# Patient Record
Sex: FEMALE | Race: WHITE | NOT HISPANIC OR LATINO | Employment: OTHER | URBAN - NONMETROPOLITAN AREA
[De-identification: names, ages, dates, MRNs, and addresses within clinical notes are randomized per-mention and may not be internally consistent; named-entity substitution may affect disease eponyms.]

---

## 2017-01-11 ENCOUNTER — OFFICE VISIT (OUTPATIENT)
Dept: FAMILY MEDICINE CLINIC | Facility: CLINIC | Age: 67
End: 2017-01-11

## 2017-01-11 VITALS
HEIGHT: 68 IN | OXYGEN SATURATION: 97 % | SYSTOLIC BLOOD PRESSURE: 112 MMHG | WEIGHT: 136 LBS | DIASTOLIC BLOOD PRESSURE: 68 MMHG | HEART RATE: 110 BPM | BODY MASS INDEX: 20.61 KG/M2

## 2017-01-11 DIAGNOSIS — E11.8 TYPE 2 DIABETES MELLITUS WITH COMPLICATION, WITHOUT LONG-TERM CURRENT USE OF INSULIN (HCC): Primary | ICD-10-CM

## 2017-01-11 DIAGNOSIS — Z11.59 NEED FOR HEPATITIS C SCREENING TEST: Primary | ICD-10-CM

## 2017-01-11 DIAGNOSIS — I10 ESSENTIAL HYPERTENSION: ICD-10-CM

## 2017-01-11 DIAGNOSIS — E78.00 PURE HYPERCHOLESTEROLEMIA: ICD-10-CM

## 2017-01-11 DIAGNOSIS — E03.9 ACQUIRED HYPOTHYROIDISM: ICD-10-CM

## 2017-01-11 LAB
ALT SERPL-CCNC: 37 U/L (ref 7–40)
AST SERPL-CCNC: 32 U/L (ref 0–33)
CHOLEST SERPL-MCNC: 208 MG/DL (ref 0–200)
HDLC SERPL-MCNC: 99 MG/DL (ref 40–60)
LDLC SERPL CALC-MCNC: 87 MG/DL (ref 0–100)
TRIGL SERPL-MCNC: 111 MG/DL (ref 0–150)
VLDLC SERPL CALC-MCNC: 22.2 MG/DL

## 2017-01-11 PROCEDURE — 36415 COLL VENOUS BLD VENIPUNCTURE: CPT | Performed by: FAMILY MEDICINE

## 2017-01-11 PROCEDURE — 99214 OFFICE O/P EST MOD 30 MIN: CPT | Performed by: FAMILY MEDICINE

## 2017-01-11 RX ORDER — ATORVASTATIN CALCIUM 20 MG/1
20 TABLET, FILM COATED ORAL DAILY
Qty: 90 TABLET | Refills: 1 | Status: SHIPPED | OUTPATIENT
Start: 2017-01-11 | End: 2017-07-13 | Stop reason: SDUPTHER

## 2017-01-11 RX ORDER — CYCLOPENTOLATE HYDROCHLORIDE 10 MG/ML
SOLUTION/ DROPS OPHTHALMIC
COMMUNITY
Start: 2016-10-26 | End: 2017-05-22

## 2017-01-11 RX ORDER — CETIRIZINE HYDROCHLORIDE 10 MG/1
1 TABLET ORAL DAILY
COMMUNITY
Start: 2017-01-06

## 2017-01-11 RX ORDER — AMITRIPTYLINE HYDROCHLORIDE 25 MG/1
25 TABLET, FILM COATED ORAL NIGHTLY
Status: ON HOLD | COMMUNITY
Start: 2016-11-11 | End: 2018-01-25 | Stop reason: ALTCHOICE

## 2017-01-11 RX ORDER — ERYTHROMYCIN 5 MG/G
OINTMENT OPHTHALMIC
COMMUNITY
Start: 2016-10-26 | End: 2017-05-22

## 2017-01-11 RX ORDER — DICLOFENAC POTASSIUM 50 MG/1
TABLET, FILM COATED ORAL
COMMUNITY
Start: 2016-12-16 | End: 2017-04-05

## 2017-01-11 NOTE — MR AVS SNAPSHOT
Marifer Davion   1/11/2017 9:00 AM   Office Visit    Dept Phone:  [phone]   Encounter #:  [id number]    Provider:  Payal Edwards MD   Department:  [facility] PRIMARY CARE                Your Full Care Plan              Where to Get Your Medications      These medications were sent to [facility] - [place], [place] - [address] - [phone] [facility] [phone] FX  [address], [facility] [address]     Phone:  [phone]     atorvastatin 20 MG tablet            Your Updated Medication List          This list is accurate as of: 1/11/17  9:44 AM.  Always use your most recent med list.                amitriptyline 25 MG tablet   Commonly known as:  ELAVIL       atorvastatin 20 MG tablet   Commonly known as:  LIPITOR   Take 1 tablet by mouth Daily.       cetirizine 10 MG tablet   Commonly known as:  zyrTEC       cholecalciferol 1000 UNITS tablet   Commonly known as:  VITAMIN D3       Cinnamon 500 MG tablet       coenzyme Q10 100 MG capsule       CVS BIOTIN HIGH POTENCY 1000 MCG tablet   Generic drug:  Biotin       cyclopentolate 1 % ophthalmic solution   Commonly known as:  CYCLOGYL       DEVILS CLAW PO       diclofenac 50 MG tablet   Commonly known as:  CATAFLAM       erythromycin 5 MG/GM ophthalmic ointment   Commonly known as:  ROMYCIN       glucosamine sulfate 500 MG capsule capsule       HYDROcodone-acetaminophen 5-325 MG per tablet   Commonly known as:  NORCO   Take 1 tablet by mouth Every 8 (Eight) Hours As Needed for severe pain (7-10).       Ibuprofen 200 MG capsule       OMEGA 3-6-9 COMPLEX PO       traMADol 50 MG tablet   Commonly known as:  ULTRAM       Vitamin B-12 5000 MCG sublingual tablet       WHITE WILLOW BARK PO       zolpidem 5 MG tablet   Commonly known as:  AMBIEN               We Performed the Following     ALT     AST     Lipid Panel       You Were Diagnosed With        Codes Comments    Type 2 diabetes mellitus with  "complication, without long-term current use of insulin    -  Primary ICD-10-CM: E11.8  ICD-9-CM: 250.90     Essential hypertension     ICD-10-CM: I10  ICD-9-CM: 401.9     Acquired hypothyroidism     ICD-10-CM: E03.9  ICD-9-CM: 244.9     Pure hypercholesterolemia     ICD-10-CM: E78.00  ICD-9-CM: 272.0       Instructions     None    Patient Instructions History      Upcoming Appointments     Visit Type Date Time Department    FOLLOW UP 1/11/2017  9:00 AM [facility]      [employer] Signup     Our records indicate that you have declined [employer] signup. If you would like to sign up for [employer], please email [email or web address] or call [phone] to obtain an activation code.             Other Info from Your Visit           Allergies     Betadine [Povidone Iodine]      Codeine      Penicillins      Reglan [Metoclopramide]  Hives      Vital Signs     Blood Pressure Pulse Height Weight Last Menstrual Period Oxygen Saturation    112/68 110 67.5\" (171.5 cm) 136 lb (61.7 kg) (LMP Unknown) 97%    Body Mass Index Smoking Status                20.99 kg/m2 Never Smoker          Problems and Diagnoses Noted     Acquired underactive thyroid    High blood pressure    High cholesterol or triglycerides    Type 2 diabetes        "

## 2017-01-11 NOTE — PROGRESS NOTES
"Subjective   Marifer Ricardo is a 66 y.o. female.     History of Present Illness   Diabetes Mellitus Type II, Follow-up: Patient here for follow-up of Type 2 diabetes mellitus.  Current symptoms/problems include none.   Known diabetic complications: peripheral neuropathy  Cardiovascular risk factors: advanced age (older than 55 for men, 65 for women), diabetes mellitus, dyslipidemia and hypertension  Current diabetic medications include none.     Eye exam current (within one year): yes, 9/2016  Weight trend: stable  Prior visit with dietician: yes  Current diet: in general, a \"healthy\" diet    Current exercise: none    Current monitoring regimen: home blood tests - occ  Home blood sugar records: stable  Any episodes of hypoglycemia? no    Is She on ACE inhibitor or angiotensin II receptor blocker?   No- not indicated    Hypertension: Patient here for follow-up of h/o HTN. Not currently requiring medication.  She is irregularly exercising and is adherent to low salt diet.  Blood pressure is well controlled at home. Cardiac symptoms fatigue. Patient denies chest pain, claudication, cough, exertional chest pressure/discomfort, irregular heart beat, lower extremity edema, near-syncope, orthopnea, palpitations, syncope and tachypnea.  Cardiovascular risk factors: advanced age (older than 55 for men, 65 for women), diabetes mellitus, dyslipidemia and hypertension. Use of agents associated with hypertension: NSAIDS. History of target organ damage: chronic kidney disease.    Hyperlipidemia: Patient presents with hyperlipidemia.  She was tested because or comorbidities.  Her last labs reviewed as listed in chart. LDL >180. There is a family history of hyperlipidemia. There is a family history of ischemia heart disease.  She was started on Lipitor approx 3 months ago. Tolerating well. Due for recheck FLP and LFTs.    Since last visit she has seen Dr. Mondragon (neurology) and was started on diclofenac for arthritis pain and headaches. "  She uses ibuprofen only intermittently. Denies GI side effects.    The following portions of the patient's history were reviewed and updated as appropriate: allergies, current medications, past family history, past medical history, past social history, past surgical history and problem list.    Review of Systems   Constitutional: Positive for fatigue. Negative for activity change, chills and fever.   HENT: Negative for congestion, mouth sores, rhinorrhea, sore throat and trouble swallowing.    Eyes: Negative for pain and redness.   Respiratory: Positive for cough (mild, dry, intermittent, chronic). Negative for shortness of breath and wheezing.    Cardiovascular: Negative for chest pain, palpitations and leg swelling.   Gastrointestinal: Negative for abdominal pain, diarrhea, nausea and vomiting.   Endocrine: Positive for cold intolerance. Negative for polydipsia and polyuria.   Genitourinary: Negative for dysuria, hematuria and urgency.   Musculoskeletal: Positive for arthralgias and back pain.   Skin: Negative for rash and wound.   Neurological: Positive for weakness (generalized, non-focal) and headaches. Negative for dizziness.   Hematological: Negative for adenopathy. Bruises/bleeds easily.   Psychiatric/Behavioral: Positive for dysphoric mood and sleep disturbance. Negative for suicidal ideas.     Objective    Vitals:    01/11/17 0900   BP: 112/68   Pulse: 110   SpO2: 97%     Body mass index is 20.99 kg/(m^2).  Last 2 weights    01/11/17  0900   Weight: 136 lb (61.7 kg)     Physical Exam   Constitutional: She is oriented to person, place, and time. She appears well-developed and well-nourished. She is cooperative. She does not appear ill. No distress.   Appears older than stated age   HENT:   Head: Normocephalic and atraumatic.   Right Ear: Decreased hearing is noted.   Left Ear: Decreased hearing is noted.   Mouth/Throat: Uvula is midline, oropharynx is clear and moist and mucous membranes are normal. No  posterior oropharyngeal erythema.   Eyes: Conjunctivae, EOM and lids are normal.   Neck: Neck supple. Normal carotid pulses present. Carotid bruit is not present. No thyroid mass and no thyromegaly present.   Cardiovascular: Normal rate, regular rhythm, S1 normal, S2 normal and intact distal pulses.    No edema   Pulmonary/Chest: Effort normal and breath sounds normal. She has no decreased breath sounds. She has no wheezes. She has no rhonchi. She has no rales.   Wet cough with deep breathing   Abdominal: Soft. Bowel sounds are normal. She exhibits no distension and no mass. There is no hepatosplenomegaly. There is no tenderness.   Musculoskeletal: She exhibits no edema or tenderness.    Marifer had a diabetic foot exam performed today.   During the foot exam she had a monofilament test performed (abnormal testing entire plantar surface bilaterally, normal dorsal foot testing).    Vascular Status -  Her exam exhibits right foot vasculature abnormal and no right foot edema. Her exam exhibits left foot vasculature abnormal and no left foot edema.   Skin Integrity  -  Her right foot skin is intact. She has callous right foot.  She hasno right foot ulcer.   Marifer's left foot skin is intact. She has callous left foot. She has no left foot ulcer..  Lymphadenopathy:     She has no cervical adenopathy.   Neurological: She is alert and oriented to person, place, and time. She has normal strength. She displays no tremor. Gait normal.   Skin: Skin is warm and dry. No ecchymosis and no rash noted.   Psychiatric: Her speech is normal and behavior is normal. Thought content normal. Her affect is blunt.   Nursing note and vitals reviewed.    Assessment/Plan   Marifer was seen today for hyperlipidemia and diabetes.    Diagnoses and all orders for this visit:    Type 2 diabetes mellitus with complication, without long-term current use of insulin    Essential hypertension    Acquired hypothyroidism    Pure hypercholesterolemia  -     Lipid  Panel  -     AST  -     ALT    Other orders  -     atorvastatin (LIPITOR) 20 MG tablet; Take 1 tablet by mouth Daily.    Good tolerance of statin. I will contact patient regarding test results and provide instructions regarding any necessary changes in plan of care.  Appears euthyroid; most recent labs normal.  BP at goal without meds; no micro/macroalbuminuria.  DM well controlled diet only.  Routine f/u in 3-6 months, sooner as needed/instructed.  Pt encouraged to schedule [employer] wellness visit.  Patient was encouraged to keep me informed of any acute changes, or any new concerning symptoms.  F/U with neurology as scheduled.  Diabetic appropriate diet and daily exercise recommended.  She is UTD on eye exam.  She is aware of risks/benefits of NSAID use and wishes to continue tx.  Patient voiced understanding of all instructions and denied further questions.

## 2017-01-19 ENCOUNTER — OFFICE VISIT (OUTPATIENT)
Dept: FAMILY MEDICINE CLINIC | Facility: CLINIC | Age: 67
End: 2017-01-19

## 2017-01-19 VITALS
WEIGHT: 138 LBS | HEART RATE: 62 BPM | HEIGHT: 67 IN | OXYGEN SATURATION: 98 % | BODY MASS INDEX: 21.66 KG/M2 | DIASTOLIC BLOOD PRESSURE: 72 MMHG | SYSTOLIC BLOOD PRESSURE: 130 MMHG

## 2017-01-19 DIAGNOSIS — Z78.0 POSTMENOPAUSAL: ICD-10-CM

## 2017-01-19 DIAGNOSIS — Z23 NEED FOR VACCINATION WITH 13-POLYVALENT PNEUMOCOCCAL CONJUGATE VACCINE: ICD-10-CM

## 2017-01-19 DIAGNOSIS — Z12.31 ENCOUNTER FOR SCREENING MAMMOGRAM FOR BREAST CANCER: ICD-10-CM

## 2017-01-19 DIAGNOSIS — Z00.00 ENCOUNTER FOR MEDICARE ANNUAL WELLNESS EXAM: Primary | ICD-10-CM

## 2017-01-19 PROCEDURE — 90670 PCV13 VACCINE IM: CPT | Performed by: FAMILY MEDICINE

## 2017-01-19 PROCEDURE — G0009 ADMIN PNEUMOCOCCAL VACCINE: HCPCS | Performed by: FAMILY MEDICINE

## 2017-01-19 PROCEDURE — 99397 PER PM REEVAL EST PAT 65+ YR: CPT | Performed by: FAMILY MEDICINE

## 2017-01-19 PROCEDURE — G0439 PPPS, SUBSEQ VISIT: HCPCS | Performed by: FAMILY MEDICINE

## 2017-01-19 PROCEDURE — 96160 PT-FOCUSED HLTH RISK ASSMT: CPT | Performed by: FAMILY MEDICINE

## 2017-01-19 NOTE — PROGRESS NOTES
QUICK REFERENCE INFORMATION:  The ABCs of the Annual Wellness Visit    Initial Medicare Wellness Visit    HEALTH RISK ASSESSMENT    Recent Hospitalizations:  No recent hospitalization(s)..        Current Medical Providers:  Patient Care Team:  Payal Edwards MD as PCP - General (Family Medicine)  Jonathan Mondragon MD as Consulting Physician (Neurology)  Ming Stiles MD (Nephrology)  Rosmery Nicholson DC as Consulting Physician (Chiropractic Medicine)        Smoking Status:  History   Smoking Status   • Never Smoker   Smokeless Tobacco   • Not on file       Alcohol Consumption:  History   Alcohol Use   • Yes     Comment: occasionally       Depression Screen:   PHQ-9 Depression Screening 1/19/2017   Little interest or pleasure in doing things 0   Feeling down, depressed, or hopeless 0   Trouble falling or staying asleep, or sleeping too much 3   Feeling tired or having little energy 1   Poor appetite or overeating 0   Feeling bad about yourself - or that you are a failure or have let yourself or your family down 1   Trouble concentrating on things, such as reading the newspaper or watching television 0   Moving or speaking so slowly that other people could have noticed. Or the opposite - being so fidgety or restless that you have been moving around a lot more than usual 0   Thoughts that you would be better off dead, or of hurting yourself in some way 0   PHQ-9 Total Score 5   If you checked off any problems, how difficult have these problems made it for you to do your work, take care of things at home, or get along with other people? Not difficult at all       Health Habits and Functional and Cognitive Screening:  Functional & Cognitive Status 1/19/2017   Do you have difficulty preparing food and eating? No   Do you have difficulty bathing yourself? No   Do you have difficulty getting dressed? No   Do you have difficulty using the toilet? No   Do you have difficulty moving around from place to place? No   In the past  year have you fallen or experienced a near fall? Yes   Do you need help using the phone?  No   Are you deaf or do you have serious difficulty hearing?  Yes   Do you need help with transportation? No   Do you need help shopping? No   Do you need help preparing meals?  No   Do you need help with housework?  No   Do you need help with laundry? No   Do you need help taking your medications? No   Do you need help managing money? No   Do you have difficulty concentrating, remembering or making decisions? No                  Does the patient have evidence of cognitive impairment? No    Asprin use counseling:yes    Finger Rub Hearing Test (right ear):passed  Finger Rub Hearing Test (left ear):passed    Recent Lab Results:  CMP:  Lab Results   Component Value Date    GLU 69 09/12/2016    BUN 18 09/12/2016    CREATININE 0.81 09/12/2016    EGFRIFNONA 75 09/12/2016    EGFRIFAFRI 87 09/12/2016    BCR 22 09/12/2016     (H) 09/12/2016    K 4.5 09/12/2016    CO2 24 09/12/2016    CALCIUM 9.3 09/12/2016    PROTENTOTREF 6.9 09/12/2016    ALBUMIN 4.5 09/12/2016    LABGLOBREF 2.4 09/12/2016    LABIL2 1.9 09/12/2016    BILITOT 0.7 09/12/2016    ALKPHOS 69 09/12/2016    AST 32 01/11/2017    ALT 37 01/11/2017     Lipid Panel:  Lab Results   Component Value Date    CHLPL 208 (H) 01/11/2017    TRIG 111 01/11/2017    HDL 99 (H) 01/11/2017    VLDL 22.2 01/11/2017    LDL 87 01/11/2017     HbA1c:  Lab Results   Component Value Date    HGBA1C 5.6 09/12/2016     Urine Microalbumin:  Lab Results   Component Value Date    MICROALBUR 6.3 09/12/2016     Visual Acuity:  No exam data present pt declines; has had recent cataract surgery    Age-appropriate Screening Schedule:  Refer to the list below for future screening recommendations based on patient's age, sex and/or medical conditions. Orders for these recommended tests are listed in the plan section. The patient has been provided with a written plan.    Health Maintenance   Topic Date Due   •  TDAP/TD VACCINES (1 - Tdap) 01/16/1969   • PNEUMOCOCCAL VACCINES (65+ LOW/MEDIUM RISK) (2 of 2 - PPSV23) 01/16/2014   • MAMMOGRAM  07/06/2016   • DXA SCAN  09/28/2016   • HEMOGLOBIN A1C  03/12/2017   • DIABETIC EYE EXAM  09/08/2017   • URINE MICROALBUMIN  09/12/2017   • DIABETIC FOOT EXAM  01/11/2018   • LIPID PANEL  01/11/2018   • INFLUENZA VACCINE  Excluded   • COLONOSCOPY  Excluded   • ZOSTER VACCINE  Excluded        Subjective   History of Present Illness    Marifer Ricardo is a 67 y.o. female who presents for an Annual Wellness Visit. In addition, we addressed the following health issues: none; recently seen for chronic disease mngt    The following portions of the patient's history were reviewed and updated as appropriate: allergies, current medications, past family history, past medical history, past social history, past surgical history and problem list.    IHWA/PAF reviewed and scanned to chart.    Outpatient Medications Prior to Visit   Medication Sig Dispense Refill   • amitriptyline (ELAVIL) 25 MG tablet      • atorvastatin (LIPITOR) 20 MG tablet Take 1 tablet by mouth Daily. 90 tablet 1   • Biotin (CVS BIOTIN HIGH POTENCY) 1000 MCG tablet Take 1,000 mcg by mouth 3 (three) times a day.     • cetirizine (zyrTEC) 10 MG tablet Take 1 tablet by mouth Daily.     • cholecalciferol (VITAMIN D3) 1000 UNITS tablet Take 1,000 Units by mouth daily.     • Cinnamon 500 MG tablet Take  by mouth 2 (two) times a day.     • coenzyme Q10 100 MG capsule Take 100 mg by mouth daily.     • Cyanocobalamin (VITAMIN B-12) 5000 MCG sublingual tablet      • cyclopentolate (CYCLOGYL) 1 % ophthalmic solution      • DEVILS CLAW PO Take  by mouth.     • diclofenac (CATAFLAM) 50 MG tablet      • erythromycin (ROMYCIN) 5 MG/GM ophthalmic ointment      • glucosamine sulfate 500 MG capsule capsule Take  by mouth 3 (three) times a day with meals.     • HYDROcodone-acetaminophen (NORCO) 5-325 MG per tablet Take 1 tablet by mouth Every 8 (Eight)  Hours As Needed for severe pain (7-10). 10 tablet 0   • Ibuprofen 200 MG capsule      • Misc Natural Products (WHITE WILLOW BARK PO) Take  by mouth.     • Omega 3-6-9 Fatty Acids (OMEGA 3-6-9 COMPLEX PO) Take  by mouth.     • traMADol (ULTRAM) 50 MG tablet      • zolpidem (AMBIEN) 5 MG tablet        No facility-administered medications prior to visit.        Patient Active Problem List   Diagnosis   • Type 2 diabetes mellitus   • Intractable migraine   • Primary generalized (osteo)arthritis   • Difficult intravenous access   • Copy of advanced directive obtained from patient   • No blood products   • Hyperlipidemia   • Essential hypertension   • Acquired hypothyroidism   • Chronic mixed headache syndrome   • Chronic renal insufficiency   • Diabetic peripheral neuropathy   • Gastroparesis   • Rosacea   • Osteopenia   • Need for hepatitis C screening test       Advanced Care Planning:  has an advanced directive - a copy has been provided and is in file    Identification of Risk Factors:  Risk factors include: cardiovascular risk, chronic pain and depression.    Review of Systems   Constitutional: Positive for fatigue. Negative for activity change, chills and fever.   HENT: Negative for congestion, mouth sores, rhinorrhea, sore throat and trouble swallowing.    Eyes: Negative for pain and redness.   Respiratory: Positive for cough (mild, dry, intermittent, chronic). Negative for shortness of breath and wheezing.    Cardiovascular: Negative for chest pain, palpitations and leg swelling.   Gastrointestinal: Negative for abdominal pain, diarrhea, nausea and vomiting.   Endocrine: Positive for cold intolerance. Negative for polydipsia and polyuria.   Genitourinary: Negative for dysuria, hematuria and urgency.   Musculoskeletal: Positive for arthralgias and back pain.   Skin: Negative for rash and wound.   Neurological: Positive for weakness (generalized, non-focal) and headaches. Negative for dizziness.   Hematological:  "Negative for adenopathy. Bruises/bleeds easily.   Psychiatric/Behavioral: Positive for dysphoric mood and sleep disturbance. Negative for suicidal ideas.     Compared to one year ago, the patient feels her physical health is the same.  Compared to one year ago, the patient feels her mental health is the same.    Objective     Physical Exam   Constitutional: She is oriented to person, place, and time. She appears well-developed and well-nourished. She is cooperative. She does not appear ill. No distress.   HENT:   Mouth/Throat: Mucous membranes are normal. Mucous membranes are not dry.   Eyes: Conjunctivae and lids are normal.   Neck: Phonation normal.   Cardiovascular: Normal rate, regular rhythm and intact distal pulses.    Pulmonary/Chest: Effort normal and breath sounds normal.       Vascular Status -  Her exam exhibits no right foot edema. Her exam exhibits no left foot edema.  Neurological: She is alert and oriented to person, place, and time. She has normal strength. She displays no tremor. No cranial nerve deficit. Gait normal.   Skin: Skin is warm and dry. No bruising and no rash noted.   Psychiatric: She has a normal mood and affect. Her behavior is normal. Cognition and memory are normal.   Nursing note and vitals reviewed.  She declines breast exam.    Vitals:    01/19/17 0949   BP: 130/72   Pulse: 62   SpO2: 98%   Weight: 138 lb (62.6 kg)   Height: 66.5\" (168.9 cm)       Body mass index is 21.94 kg/(m^2).  Discussed the patient's BMI with her. The BMI is in the acceptable range.    Assessment/Plan   Patient Self-Management and Personalized Health Advice  The patient has been provided with information about: diet, exercise, prevention of cardiac or vascular disease, fall prevention, supplements and mental health concerns and preventive services including:   · Advanced directives: has an advanced directive - a copy has been provided and is in file, Bone densitometry screening, Counseling for cardiovascular " disease risk reduction, Exercise counseling provided, Fall Risk assessment done, Nutrition counseling provided.  Mammogram rec'd.    Visit Diagnoses:    ICD-10-CM ICD-9-CM   1. Encounter for Medicare annual wellness exam Z00.00 V70.0   2. Encounter for screening mammogram for breast cancer Z12.31 V76.12   3. Postmenopausal Z78.0 V49.81   4. Need for vaccination with 13-polyvalent pneumococcal conjugate vaccine Z23 V03.82       Orders Placed This Encounter   Procedures   • Mammo Screening Bilateral With CAD     Standing Status:   Future     Standing Expiration Date:   1/19/2018     Order Specific Question:   Reason for Exam:     Answer:   screening for breast cancer   • DEXA Bone Density Axial     Standing Status:   Future     Standing Expiration Date:   1/19/2018     Order Specific Question:   Reason for Exam:     Answer:   postmenopausal status,   • Pneumococcal Conjugate Vaccine 13-Valent All       Outpatient Encounter Prescriptions as of 1/19/2017   Medication Sig Dispense Refill   • amitriptyline (ELAVIL) 25 MG tablet      • atorvastatin (LIPITOR) 20 MG tablet Take 1 tablet by mouth Daily. 90 tablet 1   • Biotin (CVS BIOTIN HIGH POTENCY) 1000 MCG tablet Take 1,000 mcg by mouth 3 (three) times a day.     • cetirizine (zyrTEC) 10 MG tablet Take 1 tablet by mouth Daily.     • cholecalciferol (VITAMIN D3) 1000 UNITS tablet Take 1,000 Units by mouth daily.     • Cinnamon 500 MG tablet Take  by mouth 2 (two) times a day.     • coenzyme Q10 100 MG capsule Take 100 mg by mouth daily.     • Cyanocobalamin (VITAMIN B-12) 5000 MCG sublingual tablet      • cyclopentolate (CYCLOGYL) 1 % ophthalmic solution      • DEVILS CLAW PO Take  by mouth.     • diclofenac (CATAFLAM) 50 MG tablet      • erythromycin (ROMYCIN) 5 MG/GM ophthalmic ointment      • glucosamine sulfate 500 MG capsule capsule Take  by mouth 3 (three) times a day with meals.     • HYDROcodone-acetaminophen (NORCO) 5-325 MG per tablet Take 1 tablet by mouth Every  8 (Eight) Hours As Needed for severe pain (7-10). 10 tablet 0   • Ibuprofen 200 MG capsule      • Misc Natural Products (WHITE WILLOW BARK PO) Take  by mouth.     • Omega 3-6-9 Fatty Acids (OMEGA 3-6-9 COMPLEX PO) Take  by mouth.     • traMADol (ULTRAM) 50 MG tablet      • zolpidem (AMBIEN) 5 MG tablet        No facility-administered encounter medications on file as of 1/19/2017.        Reviewed use of high risk medication in the elderly: yes  Reviewed for potential of harmful drug interactions in the elderly: yes  She declines colonoscopy or other forms of colon cancer screening.  She declines vaccinations other than Prevnar 13.  She is considering Tdap, thinks she has had in past but not found in records from previous PCP.    Follow Up:  Return in about 6 months (around 7/19/2017) for Next scheduled follow up.     An After Visit Summary and PPPS with all of these plans were given to the patient.

## 2017-01-19 NOTE — MR AVS SNAPSHOT
Marifer Mariawalt   1/19/2017 10:00 AM   Office Visit    Dept Phone:  [phone]   Encounter #:  [id number]    Provider:  Payal Edwards MD   Department:  [facility] PRIMARY CARE                Your Full Care Plan              Your Updated Medication List          This list is accurate as of: 1/19/17 10:44 AM.  Always use your most recent med list.                amitriptyline 25 MG tablet   Commonly known as:  ELAVIL       atorvastatin 20 MG tablet   Commonly known as:  LIPITOR   Take 1 tablet by mouth Daily.       cetirizine 10 MG tablet   Commonly known as:  zyrTEC       cholecalciferol 1000 UNITS tablet   Commonly known as:  VITAMIN D3       Cinnamon 500 MG tablet       coenzyme Q10 100 MG capsule       CVS BIOTIN HIGH POTENCY 1000 MCG tablet   Generic drug:  Biotin       cyclopentolate 1 % ophthalmic solution   Commonly known as:  CYCLOGYL       DEVILS CLAW PO       diclofenac 50 MG tablet   Commonly known as:  CATAFLAM       erythromycin 5 MG/GM ophthalmic ointment   Commonly known as:  ROMYCIN       glucosamine sulfate 500 MG capsule capsule       HYDROcodone-acetaminophen 5-325 MG per tablet   Commonly known as:  NORCO   Take 1 tablet by mouth Every 8 (Eight) Hours As Needed for severe pain (7-10).       Ibuprofen 200 MG capsule       OMEGA 3-6-9 COMPLEX PO       traMADol 50 MG tablet   Commonly known as:  ULTRAM       Vitamin B-12 5000 MCG sublingual tablet       WHITE WILLOW BARK PO       zolpidem 5 MG tablet   Commonly known as:  AMBIEN               We Performed the Following     Pneumococcal Conjugate Vaccine 13-Valent All       You Were Diagnosed With        Codes Comments    Encounter for Medicare annual wellness exam    -  Primary ICD-10-CM: Z00.00  ICD-9-CM: V70.0     Encounter for screening mammogram for breast cancer     ICD-10-CM: Z12.31  ICD-9-CM: V76.12     Postmenopausal     ICD-10-CM: Z78.0  ICD-9-CM: V49.81     Need for vaccination with 13-polyvalent  pneumococcal conjugate vaccine     ICD-10-CM: Z23  ICD-9-CM: V03.82       Instructions      Medicare Wellness  Personal Prevention Plan of Service     Date of Office Visit:  2017  Encounter Provider:  Payal Edwards MD  Place of Service:  [facility] PRIMARY CARE  Patient Name: Marifer Ricardo  :  1950    As part of the Medicare Wellness portion of your visit today, we are providing you with this personalized preventive plan of services (PPPS). This plan is based upon recommendations of the [place] Preventive Services Task Force (USPSTF) and the Advisory Committee on Immunization Practices (ACIP).    This lists the preventive care services that should be considered, and provides dates of when you are due. Items listed as completed are up-to-date and do not require any further intervention.    Health Maintenance   Topic Date Due   • TDAP/TD VACCINES (1 - Tdap) 1968   • PNEUMOCOCCAL VACCINES (65+ LOW/MEDIUM RISK) (2 of 2 - PPSV23) 2014   • MAMMOGRAM  2016   • DXA SCAN  2016   • HEMOGLOBIN A1C  2017   • DIABETIC EYE EXAM  2017   • URINE MICROALBUMIN  2017   • DIABETIC FOOT EXAM  2018   • LIPID PANEL  2018   • HEPATITIS C SCREENING  Excluded   • INFLUENZA VACCINE  Excluded   • COLONOSCOPY  Excluded   • ZOSTER VACCINE  Excluded         Patient Self-Management and Personalized Health Advice  The patient has been provided with information about: diet, exercise, prevention of cardiac or vascular disease, fall prevention, supplements and mental health concerns and preventive services including:   · Advanced directives: has an advanced directive - a copy has been provided and is in file, Bone densitometry screening, Counseling for cardiovascular disease risk reduction, Exercise counseling provided, Fall Risk assessment done, Nutrition counseling provided.    Visit Diagnoses:  No diagnosis found.    No orders of the defined types were  placed in this encounter.      Outpatient Encounter Prescriptions as of 1/19/2017   Medication Sig Dispense Refill   • amitriptyline (ELAVIL) 25 MG tablet      • atorvastatin (LIPITOR) 20 MG tablet Take 1 tablet by mouth Daily. 90 tablet 1   • Biotin (CVS BIOTIN HIGH POTENCY) 1000 MCG tablet Take 1,000 mcg by mouth 3 (three) times a day.     • cetirizine (zyrTEC) 10 MG tablet Take 1 tablet by mouth Daily.     • cholecalciferol (VITAMIN D3) 1000 UNITS tablet Take 1,000 Units by mouth daily.     • Cinnamon 500 MG tablet Take  by mouth 2 (two) times a day.     • coenzyme Q10 100 MG capsule Take 100 mg by mouth daily.     • Cyanocobalamin (VITAMIN B-12) 5000 MCG sublingual tablet      • cyclopentolate (CYCLOGYL) 1 % ophthalmic solution      • DEVILS CLAW PO Take  by mouth.     • diclofenac (CATAFLAM) 50 MG tablet      • erythromycin (ROMYCIN) 5 MG/GM ophthalmic ointment      • glucosamine sulfate 500 MG capsule capsule Take  by mouth 3 (three) times a day with meals.     • HYDROcodone-acetaminophen (NORCO) 5-325 MG per tablet Take 1 tablet by mouth Every 8 (Eight) Hours As Needed for severe pain (7-10). 10 tablet 0   • Ibuprofen 200 MG capsule      • Misc Natural Products (WHITE WILLOW BARK PO) Take  by mouth.     • Omega 3-6-9 Fatty Acids (OMEGA 3-6-9 COMPLEX PO) Take  by mouth.     • traMADol (ULTRAM) 50 MG tablet      • zolpidem (AMBIEN) 5 MG tablet        No facility-administered encounter medications on file as of 1/19/2017.        Reviewed use of high risk medication in the elderly: yes  Reviewed for potential of harmful drug interactions in the elderly: yes    Follow Up:  Return in about 6 months (around 7/19/2017) for Next scheduled follow up.     An After Visit Summary and PPPS with all of these plans were given to the patient.              Patient Instructions History      Upcoming Appointments     Visit Type Date Time Department    INIT MEDICARE WELLNESS VISIT 1/19/2017 10:00 AM [facility]    FOLLOW UP  "7/11/2017  8:15 AM [facility] Signup     Our records indicate that you have an active [facility] ABB account.    You can view your After Visit Summary by going to b-datum and logging in with your ABB username and password.  If you don't have a ABB username and password but a parent or guardian has access to your record, the parent or guardian should login with their own ABB username and password and access your record to view the After Visit Summary.    If you have questions, you can email [email or web address] or call [phone] to talk to our ABB staff.  Remember, ABB is NOT to be used for urgent needs.  For medical emergencies, dial 911.               Other Info from Your Visit           Your Appointments     Jul 11, 2017  8:15 AM EDT   Follow Up with Payal Edwards MD   [facility] PRIMARY CARE (--)    [address]   [place] [address]   [phone]           Arrive 15 minutes prior to appointment.              Allergies     Betadine [Povidone Iodine]      Codeine      Penicillins      Reglan [Metoclopramide]  Hives      Vital Signs     Blood Pressure Pulse Height Weight Last Menstrual Period Oxygen Saturation    130/72 62 66.5\" (168.9 cm) 138 lb (62.6 kg) (LMP Unknown) 98%    Body Mass Index Smoking Status                21.94 kg/m2 Never Smoker          Problems and Diagnoses Noted     Encounter for Medicare annual wellness exam    -  Primary    Encounter for screening mammogram for breast cancer        Postmenopausal        Need for vaccination with 13-polyvalent pneumococcal conjugate vaccine          Immunizations Administered     Name Date    Pneumococcal Conjugate 13-Valent         "

## 2017-01-19 NOTE — PATIENT INSTRUCTIONS
Medicare Wellness  Personal Prevention Plan of Service     Date of Office Visit:  2017  Encounter Provider:  Payal Edwards MD  Place of Service:  [facility] PRIMARY CARE  Patient Name: Marifer Ricardo  :  1950    As part of the Medicare Wellness portion of your visit today, we are providing you with this personalized preventive plan of services (PPPS). This plan is based upon recommendations of the [place] Preventive Services Task Force (USPSTF) and the Advisory Committee on Immunization Practices (ACIP).    This lists the preventive care services that should be considered, and provides dates of when you are due. Items listed as completed are up-to-date and do not require any further intervention.    Health Maintenance   Topic Date Due   • TDAP/TD VACCINES (1 - Tdap) 1968   • PNEUMOCOCCAL VACCINES (65+ LOW/MEDIUM RISK) (2 of 2 - PPSV23) 2014   • MAMMOGRAM  2016   • DXA SCAN  2016   • HEMOGLOBIN A1C  2017   • DIABETIC EYE EXAM  2017   • URINE MICROALBUMIN  2017   • DIABETIC FOOT EXAM  2018   • LIPID PANEL  2018   • HEPATITIS C SCREENING  Excluded   • INFLUENZA VACCINE  Excluded   • COLONOSCOPY  Excluded   • ZOSTER VACCINE  Excluded         Patient Self-Management and Personalized Health Advice  The patient has been provided with information about: diet, exercise, prevention of cardiac or vascular disease, fall prevention, supplements and mental health concerns and preventive services including:   · Advanced directives: has an advanced directive - a copy has been provided and is in file, Bone densitometry screening, Counseling for cardiovascular disease risk reduction, Exercise counseling provided, Fall Risk assessment done, Nutrition counseling provided.    Visit Diagnoses:  No diagnosis found.    No orders of the defined types were placed in this encounter.      Outpatient Encounter Prescriptions as of 2017   Medication  Sig Dispense Refill   • amitriptyline (ELAVIL) 25 MG tablet      • atorvastatin (LIPITOR) 20 MG tablet Take 1 tablet by mouth Daily. 90 tablet 1   • Biotin (CVS BIOTIN HIGH POTENCY) 1000 MCG tablet Take 1,000 mcg by mouth 3 (three) times a day.     • cetirizine (zyrTEC) 10 MG tablet Take 1 tablet by mouth Daily.     • cholecalciferol (VITAMIN D3) 1000 UNITS tablet Take 1,000 Units by mouth daily.     • Cinnamon 500 MG tablet Take  by mouth 2 (two) times a day.     • coenzyme Q10 100 MG capsule Take 100 mg by mouth daily.     • Cyanocobalamin (VITAMIN B-12) 5000 MCG sublingual tablet      • cyclopentolate (CYCLOGYL) 1 % ophthalmic solution      • DEVILS CLAW PO Take  by mouth.     • diclofenac (CATAFLAM) 50 MG tablet      • erythromycin (ROMYCIN) 5 MG/GM ophthalmic ointment      • glucosamine sulfate 500 MG capsule capsule Take  by mouth 3 (three) times a day with meals.     • HYDROcodone-acetaminophen (NORCO) 5-325 MG per tablet Take 1 tablet by mouth Every 8 (Eight) Hours As Needed for severe pain (7-10). 10 tablet 0   • Ibuprofen 200 MG capsule      • Misc Natural Products (WHITE WILLOW BARK PO) Take  by mouth.     • Omega 3-6-9 Fatty Acids (OMEGA 3-6-9 COMPLEX PO) Take  by mouth.     • traMADol (ULTRAM) 50 MG tablet      • zolpidem (AMBIEN) 5 MG tablet        No facility-administered encounter medications on file as of 1/19/2017.        Reviewed use of high risk medication in the elderly: yes  Reviewed for potential of harmful drug interactions in the elderly: yes    Follow Up:  Return in about 6 months (around 7/19/2017) for Next scheduled follow up.     An After Visit Summary and PPPS with all of these plans were given to the patient.

## 2017-04-04 ENCOUNTER — TELEPHONE (OUTPATIENT)
Dept: FAMILY MEDICINE CLINIC | Facility: CLINIC | Age: 67
End: 2017-04-04

## 2017-04-05 RX ORDER — MELOXICAM 15 MG/1
15 TABLET ORAL DAILY
Qty: 90 TABLET | Refills: 1 | Status: SHIPPED | OUTPATIENT
Start: 2017-04-05 | End: 2017-09-11 | Stop reason: SDUPTHER

## 2017-04-05 NOTE — TELEPHONE ENCOUNTER
She currently has diclofenac and ibuprofen on her med list. Mobic is in the same family as these meds. Please clarify if she is wishing to stop the 2 in order to begin Mobic. They should not be taken together.

## 2017-04-05 NOTE — TELEPHONE ENCOUNTER
I spoke with Marifer and she stated she has stopped the diclofenac and ibuprofen about 2 weeks ago and isnt going back on them. She would like to mobic sent to mail order.

## 2017-05-18 ENCOUNTER — TELEPHONE (OUTPATIENT)
Dept: FAMILY MEDICINE CLINIC | Facility: CLINIC | Age: 67
End: 2017-05-18

## 2017-05-22 ENCOUNTER — OFFICE VISIT (OUTPATIENT)
Dept: FAMILY MEDICINE CLINIC | Facility: CLINIC | Age: 67
End: 2017-05-22

## 2017-05-22 VITALS
OXYGEN SATURATION: 95 % | DIASTOLIC BLOOD PRESSURE: 78 MMHG | HEIGHT: 67 IN | SYSTOLIC BLOOD PRESSURE: 148 MMHG | WEIGHT: 135 LBS | BODY MASS INDEX: 21.19 KG/M2 | HEART RATE: 78 BPM

## 2017-05-22 DIAGNOSIS — R22.41 MASS OF KNEE, RIGHT: Primary | ICD-10-CM

## 2017-05-22 PROCEDURE — 99213 OFFICE O/P EST LOW 20 MIN: CPT | Performed by: NURSE PRACTITIONER

## 2017-05-22 RX ORDER — GABAPENTIN 100 MG/1
CAPSULE ORAL
Qty: 90 CAPSULE | Refills: 0 | Status: SHIPPED | OUTPATIENT
Start: 2017-05-22 | End: 2017-07-26 | Stop reason: SDUPTHER

## 2017-05-24 ENCOUNTER — HOSPITAL ENCOUNTER (OUTPATIENT)
Dept: ULTRASOUND IMAGING | Facility: HOSPITAL | Age: 67
Discharge: HOME | End: 2017-05-24
Admitting: NURSE PRACTITIONER

## 2017-05-24 DIAGNOSIS — R22.41 MASS OF KNEE, RIGHT: ICD-10-CM

## 2017-05-24 PROCEDURE — 76882 US LMTD JT/FCL EVL NVASC XTR: CPT

## 2017-05-30 ENCOUNTER — TELEPHONE (OUTPATIENT)
Dept: FAMILY MEDICINE CLINIC | Facility: CLINIC | Age: 67
End: 2017-05-30

## 2017-05-30 DIAGNOSIS — D36.7 DERMOID CYST OF LEG, RIGHT: Primary | ICD-10-CM

## 2017-06-27 ENCOUNTER — OFFICE VISIT (OUTPATIENT)
Dept: FAMILY MEDICINE CLINIC | Facility: CLINIC | Age: 67
End: 2017-06-27

## 2017-06-27 VITALS
HEART RATE: 78 BPM | DIASTOLIC BLOOD PRESSURE: 80 MMHG | SYSTOLIC BLOOD PRESSURE: 118 MMHG | WEIGHT: 137 LBS | OXYGEN SATURATION: 98 % | TEMPERATURE: 99.1 F | BODY MASS INDEX: 21.5 KG/M2 | HEIGHT: 67 IN

## 2017-06-27 DIAGNOSIS — L24.89 IRRITANT CONTACT DERMATITIS DUE TO OTHER AGENTS: Primary | ICD-10-CM

## 2017-06-27 PROCEDURE — 99213 OFFICE O/P EST LOW 20 MIN: CPT | Performed by: FAMILY MEDICINE

## 2017-06-27 RX ORDER — TRIAMCINOLONE ACETONIDE 1 MG/G
1 CREAM TOPICAL 2 TIMES DAILY PRN
COMMUNITY
Start: 2017-06-22 | End: 2018-01-23

## 2017-06-27 RX ORDER — PREDNISONE 10 MG/1
10 TABLET ORAL 2 TIMES DAILY
Qty: 10 TABLET | Refills: 0 | Status: SHIPPED | OUTPATIENT
Start: 2017-06-27 | End: 2017-07-11

## 2017-06-27 NOTE — PROGRESS NOTES
Subjective   Marifer Ricardo is a 67 y.o. female.     Rash   This is a new problem. The current episode started 1 to 4 weeks ago (approx 11 days ago). The problem has been gradually worsening since onset. The affected locations include the right lowerleg, left lower leg, left arm, right arm and neck. The rash is characterized by itchiness and redness. It is unknown (no change is cosmetics/toiletries but has been doing yard work including weedeating) if there was an exposure to a precipitant. Pertinent negatives include no congestion, cough, diarrhea, eye pain, fever, rhinorrhea, shortness of breath, sore throat or vomiting. Past treatments include anti-itch cream and topical steroids. The treatment provided no relief.       The following portions of the patient's history were reviewed and updated as appropriate: allergies, current medications, past family history, past medical history, past social history, past surgical history and problem list.    Review of Systems   Constitutional: Negative for fever.   HENT: Negative for congestion, mouth sores, rhinorrhea, sore throat and trouble swallowing.    Eyes: Negative for pain and visual disturbance.   Respiratory: Negative for cough, shortness of breath and wheezing.    Cardiovascular: Negative for chest pain.   Gastrointestinal: Negative for diarrhea, nausea and vomiting.   Skin: Positive for rash.   Psychiatric/Behavioral: Positive for sleep disturbance.       Objective    Vitals:    06/27/17 1044   BP: 118/80   Pulse: 78   Temp: 99.1 °F (37.3 °C)   SpO2: 98%     Body mass index is 21.78 kg/(m^2).  Last 2 weights    06/27/17  1044   Weight: 137 lb (62.1 kg)       Physical Exam   Constitutional: She is oriented to person, place, and time. She appears well-developed and well-nourished. She is cooperative. She does not appear ill. No distress.   HENT:   Mouth/Throat: Oropharynx is clear and moist and mucous membranes are normal. Mucous membranes are not dry. No oral lesions.    Eyes: Conjunctivae and lids are normal.   Cardiovascular: Normal rate and regular rhythm.    Pulmonary/Chest: Effort normal and breath sounds normal.   Neurological: She is alert and oriented to person, place, and time. Gait normal.   Skin: Skin is warm and dry. Rash noted. Rash is maculopapular (erythematous generally M-P rash affecting marked areas;  no erika vesicles but evidence of excoriation in marked areas).        Psychiatric: She has a normal mood and affect. Her behavior is normal.   Nursing note and vitals reviewed.      Assessment/Plan   Marifer was seen today for rash.    Diagnoses and all orders for this visit:    Irritant contact dermatitis due to other agents  Comments:  Most likely with recent weed-eating. She is advised to wear long sleeves/pants with yard work. Encouraged use of emoliient as she has very dry skin.  Orders:  -     predniSONE (DELTASONE) 10 MG tablet; Take 1 tablet by mouth 2 (Two) Times a Day.    Continue topical steroid as needed for itching. Can be mixed with eucerin prior to application.  Patient was encouraged to keep me informed of any acute changes, lack of improvement, or any new concerning symptoms.  Pt is aware of reasons to seek emergent care.  Keep routine f/u apt, f/u sooner as needed.  Patient voiced understanding of all instructions and denied further questions.

## 2017-07-11 ENCOUNTER — OFFICE VISIT (OUTPATIENT)
Dept: FAMILY MEDICINE CLINIC | Facility: CLINIC | Age: 67
End: 2017-07-11

## 2017-07-11 VITALS
WEIGHT: 138 LBS | HEIGHT: 67 IN | OXYGEN SATURATION: 97 % | DIASTOLIC BLOOD PRESSURE: 80 MMHG | BODY MASS INDEX: 21.66 KG/M2 | HEART RATE: 78 BPM | SYSTOLIC BLOOD PRESSURE: 122 MMHG

## 2017-07-11 DIAGNOSIS — E11.8 TYPE 2 DIABETES MELLITUS WITH COMPLICATION, WITHOUT LONG-TERM CURRENT USE OF INSULIN (HCC): Primary | ICD-10-CM

## 2017-07-11 DIAGNOSIS — N18.9 CHRONIC RENAL INSUFFICIENCY, UNSPECIFIED STAGE: ICD-10-CM

## 2017-07-11 DIAGNOSIS — E78.00 PURE HYPERCHOLESTEROLEMIA: ICD-10-CM

## 2017-07-11 DIAGNOSIS — I10 ESSENTIAL HYPERTENSION: ICD-10-CM

## 2017-07-11 DIAGNOSIS — Z23 NEED FOR TDAP VACCINATION: ICD-10-CM

## 2017-07-11 DIAGNOSIS — E03.9 ACQUIRED HYPOTHYROIDISM: ICD-10-CM

## 2017-07-11 PROBLEM — Z11.59 NEED FOR HEPATITIS C SCREENING TEST: Status: RESOLVED | Noted: 2017-01-11 | Resolved: 2017-07-11

## 2017-07-11 PROCEDURE — 99214 OFFICE O/P EST MOD 30 MIN: CPT | Performed by: FAMILY MEDICINE

## 2017-07-11 NOTE — PROGRESS NOTES
"Subjective   Marifer Ricardo is a 67 y.o. female.     History of Present Illness   Ms. Ricardo is here today to f/u on several chronic issues.  Diabetes Mellitus Type II, Follow-up: Patient here for follow-up of Type 2 diabetes mellitus. Current symptoms/problems include none.   Known diabetic complications: peripheral neuropathy  Cardiovascular risk factors: advanced age (older than 55 for men, 65 for women), diabetes mellitus, dyslipidemia and hypertension  Current diabetic medications include none.   Eye exam current (within one year): yes, 9/2016  Weight trend: stable  Prior visit with dietician: yes  Current diet: in general, a \"healthy\" diet   Current exercise: none  Current monitoring regimen: not currently checking BG  Any episodes of hypoglycemia? no  Is She on ACE inhibitor or angiotensin II receptor blocker?   No- not indicated     Hypertension: Patient here for follow-up of h/o HTN. Not currently requiring medication. She is irregularly exercising and is adherent to low salt diet. Blood pressure is well controlled at home. Cardiac symptoms fatigue. Patient denies chest pain, claudication, cough, exertional chest pressure/discomfort, irregular heart beat, lower extremity edema, near-syncope, orthopnea, palpitations, syncope and tachypnea. Cardiovascular risk factors: advanced age (older than 55 for men, 65 for women), diabetes mellitus, dyslipidemia and hypertension. Use of agents associated with hypertension: NSAIDS. History of target organ damage: chronic kidney disease.     Hyperlipidemia: Patient presents with hyperlipidemia. She was tested because or comorbidities. Her last labs reviewed as listed in chart. There is a family history of hyperlipidemia. There is a family history of ischemia heart disease. She is taking Lipitor. Tolerating well.      She is followed by Dr. Mondragon (neurology) . Currently on diclofenac for arthritis pain and headaches. She uses ibuprofen only intermittently. Denies GI side " effects.    She has h/o abnormal thyroid function as well as mild CRI.    The following portions of the patient's history were reviewed and updated as appropriate: allergies, current medications, past family history, past medical history, past social history, past surgical history and problem list.    Review of Systems   Constitutional: Positive for fatigue. Negative for activity change, chills and fever.   HENT: Negative for congestion, mouth sores, rhinorrhea, sore throat and trouble swallowing.    Eyes: Negative for pain and redness.   Respiratory: Positive for cough (mild, dry, intermittent, chronic). Negative for shortness of breath and wheezing.    Cardiovascular: Negative for chest pain, palpitations and leg swelling.   Gastrointestinal: Negative for abdominal pain, diarrhea, nausea and vomiting.   Endocrine: Positive for cold intolerance. Negative for polydipsia and polyuria.   Genitourinary: Negative for dysuria, hematuria and urgency.   Musculoskeletal: Positive for arthralgias and back pain.   Skin: Negative for rash and wound.   Neurological: Positive for weakness (generalized, non-focal) and headaches. Negative for dizziness.   Hematological: Negative for adenopathy. Bruises/bleeds easily.   Psychiatric/Behavioral: Positive for dysphoric mood and sleep disturbance. Negative for suicidal ideas.       Objective    Vitals:    07/11/17 0813   BP: 122/80   Pulse: 78   SpO2: 97%     Body mass index is 21.94 kg/(m^2).  Last 2 weights    07/11/17 0813   Weight: 138 lb (62.6 kg)       Physical Exam   Constitutional: She is oriented to person, place, and time. She appears well-developed and well-nourished. She is cooperative. She does not appear ill. No distress.   Appears older than stated age   HENT:   Head: Normocephalic and atraumatic.   Right Ear: Decreased hearing is noted.   Left Ear: Decreased hearing is noted.   Mouth/Throat: Oropharynx is clear and moist and mucous membranes are normal. No oral  lesions. No posterior oropharyngeal erythema.   Eyes: Conjunctivae, EOM and lids are normal.   Neck: Phonation normal. Neck supple. Normal carotid pulses present. Carotid bruit is not present. No thyroid mass and no thyromegaly present.   Cardiovascular: Normal rate, regular rhythm, S1 normal, S2 normal and intact distal pulses.    Pulmonary/Chest: Effort normal and breath sounds normal. She has no decreased breath sounds. She has no wheezes. She has no rhonchi. She has no rales.   Musculoskeletal: She exhibits no edema or tenderness.    Marifer had a diabetic foot exam performed today.   During the foot exam she had a monofilament test performed (abnormal testing entire plantar surface bilaterally, normal dorsal foot testing).    Vascular Status -  Her exam exhibits right foot vasculature normal. Her exam exhibits no right foot edema. Her exam exhibits left foot vasculature normal. Her exam exhibits no left foot edema.   Skin Integrity  -  Her right foot skin is intact.  She has callous right foot.  She hasno right foot ulcer.    Marifer 's left foot skin is intact. She has callous left foot. She has no left foot ulcer..  Lymphadenopathy:     She has no cervical adenopathy.   Neurological: She is alert and oriented to person, place, and time. She has normal strength. She displays no tremor. No cranial nerve deficit. Gait normal.   Skin: Skin is warm and dry. No ecchymosis and no rash noted.   Psychiatric: Her speech is normal and behavior is normal. Thought content normal. Her affect is blunt.   Nursing note and vitals reviewed.      Assessment/Plan   Marifer was seen today for diabetes and hyperlipidemia.    Diagnoses and all orders for this visit:    Type 2 diabetes mellitus with complication, without long-term current use of insulin  -     CBC (No Diff)  -     Comprehensive Metabolic Panel  -     Hemoglobin A1c  -     Microalbumin / Creatinine Urine Ratio    Essential hypertension  -     CBC (No Diff)  -      Comprehensive Metabolic Panel  -     Microalbumin / Creatinine Urine Ratio    Pure hypercholesterolemia  -     Comprehensive Metabolic Panel    Acquired hypothyroidism  -     TSH  -     T4, Free    Chronic renal insufficiency, unspecified stage  -     CBC (No Diff)  -     Comprehensive Metabolic Panel    Need for Tdap vaccination  -     Tdap (BOOSTRIX) 5-2.5-18.5 LF-MCG/0.5 injection; Inject 0.5 mL into the shoulder, thigh, or buttocks 1 (One) Time for 1 dose.    Stable chronic conditions.   She is aware of risks of long-term NSAID use.  I will contact patient regarding test results and provide instructions regarding any necessary changes in plan of care.  Diabetic appropriate, heart healthy diet with daily exercise advised.  Routine f/u in 3-6 months, sooner as needed/instructed.  Patient was encouraged to keep me informed of any acute changes, lack of improvement, or any new concerning symptoms.  Patient voiced understanding of all instructions and denied further questions.

## 2017-07-12 LAB
ALBUMIN SERPL-MCNC: 4.1 G/DL (ref 3.5–5)
ALBUMIN/CREAT UR: <19.5 MG/G CREAT (ref 0–30)
ALBUMIN/GLOB SERPL: 1.6 G/DL (ref 1–2)
ALP SERPL-CCNC: 77 U/L (ref 38–126)
ALT SERPL-CCNC: 35 U/L (ref 13–69)
AST SERPL-CCNC: 30 U/L (ref 15–46)
BILIRUB SERPL-MCNC: 0.8 MG/DL (ref 0.2–1.3)
BUN SERPL-MCNC: 19 MG/DL (ref 7–20)
BUN/CREAT SERPL: 21.1 (ref 7.1–23.5)
CALCIUM SERPL-MCNC: 9.2 MG/DL (ref 8.4–10.2)
CHLORIDE SERPL-SCNC: 102 MMOL/L (ref 98–107)
CO2 SERPL-SCNC: 29 MMOL/L (ref 26–30)
CREAT SERPL-MCNC: 0.9 MG/DL (ref 0.6–1.3)
CREAT UR-MCNC: 15.4 MG/DL
ERYTHROCYTE [DISTWIDTH] IN BLOOD BY AUTOMATED COUNT: 13.2 % (ref 11.5–14.5)
GLOBULIN SER CALC-MCNC: 2.6 GM/DL
GLUCOSE SERPL-MCNC: 80 MG/DL (ref 74–98)
HBA1C MFR BLD: 6 %
HCT VFR BLD AUTO: 43.2 % (ref 37–47)
HGB BLD-MCNC: 14 G/DL (ref 12–16)
MCH RBC QN AUTO: 31.2 PG (ref 27–31)
MCHC RBC AUTO-ENTMCNC: 32.4 G/DL (ref 30–37)
MCV RBC AUTO: 96.2 FL (ref 81–99)
MICROALBUMIN UR-MCNC: <3 UG/ML
PLATELET # BLD AUTO: 147 10*3/MM3 (ref 130–400)
POTASSIUM SERPL-SCNC: 4.4 MMOL/L (ref 3.5–5.1)
PROT SERPL-MCNC: 6.7 G/DL (ref 6.3–8.2)
RBC # BLD AUTO: 4.49 10*6/MM3 (ref 4.2–5.4)
SODIUM SERPL-SCNC: 138 MMOL/L (ref 137–145)
T4 FREE SERPL-MCNC: 1.2 NG/DL (ref 0.78–2.19)
TSH SERPL DL<=0.005 MIU/L-ACNC: 0.04 MIU/ML (ref 0.47–4.68)
WBC # BLD AUTO: 4.55 10*3/MM3 (ref 4.8–10.8)

## 2017-07-13 RX ORDER — ATORVASTATIN CALCIUM 20 MG/1
20 TABLET, FILM COATED ORAL DAILY
Qty: 90 TABLET | Refills: 1 | Status: SHIPPED | OUTPATIENT
Start: 2017-07-13 | End: 2017-12-11 | Stop reason: SDUPTHER

## 2017-07-26 ENCOUNTER — TELEPHONE (OUTPATIENT)
Dept: FAMILY MEDICINE CLINIC | Facility: CLINIC | Age: 67
End: 2017-07-26

## 2017-07-26 RX ORDER — GABAPENTIN 100 MG/1
CAPSULE ORAL
Qty: 270 CAPSULE | Refills: 0 | Status: ON HOLD | OUTPATIENT
Start: 2017-07-26 | End: 2018-01-25 | Stop reason: ALTCHOICE

## 2017-07-26 NOTE — TELEPHONE ENCOUNTER
----- Message from Martha Jasso MA sent at 7/26/2017 10:55 AM EDT -----  Regarding: FW: Prescription Question  Contact: [phone]  Do you want to do a 90 day script for this medication?  ----- Message -----     From: Marifer Ricardo     Sent: 7/26/2017  10:50 AM       To: [facility]  Subject: Prescription Question                            Could you fax [facility] so that I can get a refill for the Gabapentin? Thanks

## 2017-07-26 NOTE — TELEPHONE ENCOUNTER
----- Message from Martha Jasso MA sent at 7/26/2017 10:55 AM EDT -----  Regarding: FW: Prescription Question  Contact: [phone]  Do you want to do a 90 day script for this medication?  ----- Message -----     From: Marifer Ricardo     Sent: 7/26/2017  10:50 AM       To: [facility]  Subject: Prescription Question                            Could you fax [facility] so that I can get a refill for the Gabapentin? Thanks

## 2017-09-11 RX ORDER — MELOXICAM 15 MG/1
TABLET ORAL
Qty: 90 TABLET | Refills: 1 | Status: SHIPPED | OUTPATIENT
Start: 2017-09-11 | End: 2018-05-14 | Stop reason: SDUPTHER

## 2017-12-12 RX ORDER — ATORVASTATIN CALCIUM 20 MG/1
TABLET, FILM COATED ORAL
Qty: 90 TABLET | Refills: 1 | Status: SHIPPED | OUTPATIENT
Start: 2017-12-12 | End: 2018-05-03 | Stop reason: SDUPTHER

## 2018-01-04 ENCOUNTER — OFFICE VISIT (OUTPATIENT)
Dept: FAMILY MEDICINE CLINIC | Facility: CLINIC | Age: 68
End: 2018-01-04

## 2018-01-04 VITALS
SYSTOLIC BLOOD PRESSURE: 100 MMHG | BODY MASS INDEX: 21.53 KG/M2 | WEIGHT: 134 LBS | HEIGHT: 66 IN | OXYGEN SATURATION: 93 % | HEART RATE: 82 BPM | DIASTOLIC BLOOD PRESSURE: 60 MMHG | TEMPERATURE: 98.3 F

## 2018-01-04 DIAGNOSIS — N18.9 CHRONIC RENAL IMPAIRMENT, UNSPECIFIED CKD STAGE: ICD-10-CM

## 2018-01-04 DIAGNOSIS — E03.9 ACQUIRED HYPOTHYROIDISM: ICD-10-CM

## 2018-01-04 DIAGNOSIS — E11.8 TYPE 2 DIABETES MELLITUS WITH COMPLICATION, WITHOUT LONG-TERM CURRENT USE OF INSULIN (HCC): Primary | ICD-10-CM

## 2018-01-04 DIAGNOSIS — E78.00 PURE HYPERCHOLESTEROLEMIA: ICD-10-CM

## 2018-01-04 DIAGNOSIS — K31.84 GASTROPARESIS: ICD-10-CM

## 2018-01-04 DIAGNOSIS — R10.13 POSTPRANDIAL EPIGASTRIC PAIN: ICD-10-CM

## 2018-01-04 LAB — GLUCOSE BLDC GLUCOMTR-MCNC: 141 MG/DL (ref 70–130)

## 2018-01-04 PROCEDURE — 82962 GLUCOSE BLOOD TEST: CPT | Performed by: FAMILY MEDICINE

## 2018-01-04 PROCEDURE — 99214 OFFICE O/P EST MOD 30 MIN: CPT | Performed by: FAMILY MEDICINE

## 2018-01-04 NOTE — PROGRESS NOTES
"Subjective   Marifer Ricardo is a 67 y.o. female.     History of Present Illness  Ms. Ricardo is here today to f/u on several chronic issues.  Diabetes Mellitus Type II, Follow-up: Patient here for follow-up of Type 2 diabetes mellitus. Current symptoms/problems include none.   Known diabetic complications: peripheral neuropathy  Cardiovascular risk factors: advanced age (older than 55 for men, 65 for women), diabetes mellitus, dyslipidemia and hypertension  Current diabetic medications include none.   Eye exam current (within one year): yes  Weight trend: recent drop  Prior visit with dietician: yes  Current diet: in general, a \"healthy\" diet   Current exercise: none  Current monitoring regimen: not currently checking BG  Any episodes of hypoglycemia? no  Is She on ACE inhibitor or angiotensin II receptor blocker?   No- not indicated      Hyperlipidemia: Patient presents with hyperlipidemia. She was tested because or comorbidities. Her last labs reviewed as listed in chart. There is a family history of hyperlipidemia. There is a family history of ischemia heart disease. She is taking Lipitor. Tolerating well.       She is followed by Dr. Mondragon (neurology) for chronic migraine. Currently on NSAID for arthritis pain and headaches. Denies GI side effects. Also on gabapentin.     She has h/o mild CRI due for recheck.    Hypothyroidism: Patient presents for evaluation of thyroid function. Symptoms consist of fatigue, feeling cold and cold intolerance, change in skin,  nails, or hair. Symptoms have present for several years. The symptoms are moderate.  The problem has been stable.  Previous thyroid studies include TSH, triiodothyronine free and free thyroxine. The hypothyroidism is acquired. SHe is taking replacment as rx'd.    Ms. Ricardo also c/o intermittent epigastric \"crushing\" abd pain x 3 weeks. No change in bowel habits. No nausea. Worsened by eating. Better with fasting. Some mild weight loss. Appetite decreased. Has " noticed increased belching as well. Worse when lying down. No fever.    The following portions of the patient's history were reviewed and updated as appropriate: allergies, current medications, past family history, past medical history, past social history, past surgical history and problem list.    Review of Systems   Constitutional: Positive for fatigue. Negative for activity change, chills and fever.   HENT: Negative for congestion, mouth sores, rhinorrhea, sore throat and trouble swallowing.    Eyes: Negative for pain and redness.   Respiratory: Positive for cough (mild, dry, intermittent, chronic). Negative for shortness of breath and wheezing.    Cardiovascular: Negative for chest pain, palpitations and leg swelling.   Gastrointestinal: Positive for abdominal pain. Negative for diarrhea, nausea and vomiting.   Endocrine: Positive for cold intolerance. Negative for polydipsia and polyuria.   Genitourinary: Negative for dysuria, hematuria and urgency.   Musculoskeletal: Positive for arthralgias and back pain.   Skin: Negative for rash and wound.   Neurological: Positive for weakness (generalized, non-focal) and headaches. Negative for dizziness.   Hematological: Negative for adenopathy. Bruises/bleeds easily.   Psychiatric/Behavioral: Positive for dysphoric mood and sleep disturbance. Negative for confusion and suicidal ideas.       Objective    Vitals:    01/04/18 0913   BP: 100/60   Pulse: 82   Temp: 98.3 °F (36.8 °C)   SpO2: 93%     Body mass index is 21.31 kg/(m^2).  Last 2 weights    01/04/18  0913   Weight: 60.8 kg (134 lb)       Physical Exam   Constitutional: She is oriented to person, place, and time. She appears well-developed and well-nourished. She is cooperative. She does not appear ill. No distress.   Appears older than stated age   HENT:   Head: Normocephalic and atraumatic.   Right Ear: Decreased hearing is noted.   Left Ear: Decreased hearing is noted.   Mouth/Throat: Oropharynx is clear and  moist and mucous membranes are normal. No oral lesions. No posterior oropharyngeal erythema.   Eyes: Conjunctivae, EOM and lids are normal.   Neck: Phonation normal. Neck supple. Normal carotid pulses present. No thyroid mass and no thyromegaly present.   Cardiovascular: Normal rate, regular rhythm, S1 normal, S2 normal and intact distal pulses.    Pulmonary/Chest: Effort normal and breath sounds normal. She has no decreased breath sounds. She has no wheezes. She has no rhonchi. She has no rales.   Abdominal: Soft. She exhibits no distension and no mass. Bowel sounds are decreased. There is no hepatosplenomegaly. There is tenderness (mild) in the epigastric area. There is no rigidity, no rebound, no guarding and no CVA tenderness.   Musculoskeletal: She exhibits no edema or tenderness.       Vascular Status -  Her exam exhibits no right foot edema. Her exam exhibits no left foot edema.  Lymphadenopathy:     She has no cervical adenopathy.   Neurological: She is alert and oriented to person, place, and time. She has normal strength. She displays no tremor. Gait normal.   Skin: Skin is warm and dry. No ecchymosis and no rash noted.   Psychiatric: Her speech is normal and behavior is normal. Thought content normal. Her affect is blunt. Cognition and memory are normal.   Nursing note and vitals reviewed.      Assessment/Plan   Marifer was seen today for diabetes and heartburn.    Diagnoses and all orders for this visit:    Type 2 diabetes mellitus with complication, without long-term current use of insulin  -     POC Glucose  -     Comprehensive Metabolic Panel  -     Hemoglobin A1c    Pure hypercholesterolemia  -     Comprehensive Metabolic Panel  -     Lipid Panel    Acquired hypothyroidism  -     TSH Rfx On Abnormal To Free T4    Chronic renal impairment, unspecified CKD stage    Postprandial epigastric pain  -     CBC & Differential  -     Comprehensive Metabolic Panel  -     Amylase  -      Lipase    Gastroparesis    Apparently stable chronic conditions. F/U labs as above.  PP epigastric pain possibly due to gallbladder dysfunction, PUDz, etc. Of note, she has h/o severe gastroparesis in past. She is clinically stable at this time. Recommend lab eval and f/u EGD vs imaging pending review of results. [place] diet for now. She is instructed to remain well hydrated. She is amenable to plan of care.  Otherwise continue current tx plan.     I will contact patient regarding test results and provide instructions regarding any necessary changes in plan of care.  Patient was encouraged to keep me informed of any acute changes, lack of improvement, or any new concerning symptoms.  Pt is aware of reasons to seek emergent care.  Routine f/u in 3 months, sooner as needed/instructed.  Patient voiced understanding of all instructions and denied further questions.

## 2018-01-08 ENCOUNTER — TELEPHONE (OUTPATIENT)
Dept: FAMILY MEDICINE CLINIC | Facility: CLINIC | Age: 68
End: 2018-01-08

## 2018-01-08 DIAGNOSIS — R10.84 GENERALIZED POSTPRANDIAL ABDOMINAL PAIN: Primary | ICD-10-CM

## 2018-01-09 NOTE — TELEPHONE ENCOUNTER
Was she able to have labs at [facility]- it appears they were not able to draw them here. It would be helpful to have those as she may need CT abd/pelvis with contrast rather than EGD or US.

## 2018-01-11 DIAGNOSIS — R10.12 POSTPRANDIAL ABDOMINAL PAIN IN LEFT UPPER QUADRANT: Primary | ICD-10-CM

## 2018-01-11 LAB
ALBUMIN SERPL-MCNC: 3.3 G/DL (ref 3.5–5)
ALBUMIN/GLOB SERPL: 1.2 G/DL (ref 1–2)
ALP SERPL-CCNC: 77 U/L (ref 38–126)
ALT SERPL-CCNC: 40 U/L (ref 13–69)
AMYLASE SERPL-CCNC: 41 U/L (ref 30–110)
AST SERPL-CCNC: 33 U/L (ref 15–46)
BASOPHILS # BLD AUTO: 0.02 10*3/MM3 (ref 0–0.2)
BASOPHILS NFR BLD AUTO: 0.3 % (ref 0–2.5)
BILIRUB SERPL-MCNC: 0.6 MG/DL (ref 0.2–1.3)
BUN SERPL-MCNC: 9 MG/DL (ref 7–20)
BUN/CREAT SERPL: 12.9 (ref 7.1–23.5)
CALCIUM SERPL-MCNC: 8.7 MG/DL (ref 8.4–10.2)
CHLORIDE SERPL-SCNC: 99 MMOL/L (ref 98–107)
CHOLEST SERPL-MCNC: 187 MG/DL (ref 0–199)
CO2 SERPL-SCNC: 26 MMOL/L (ref 26–30)
CREAT SERPL-MCNC: 0.7 MG/DL (ref 0.6–1.3)
EOSINOPHIL # BLD AUTO: 0.12 10*3/MM3 (ref 0–0.7)
EOSINOPHIL NFR BLD AUTO: 1.7 % (ref 0–7)
ERYTHROCYTE [DISTWIDTH] IN BLOOD BY AUTOMATED COUNT: 12.7 % (ref 11.5–14.5)
GLOBULIN SER CALC-MCNC: 2.8 GM/DL
GLUCOSE SERPL-MCNC: 98 MG/DL (ref 74–98)
HBA1C MFR BLD: 6.4 %
HCT VFR BLD AUTO: 38.1 % (ref 37–47)
HDLC SERPL-MCNC: 40 MG/DL (ref 40–60)
HGB BLD-MCNC: 12.3 G/DL (ref 12–16)
IMM GRANULOCYTES # BLD: 0.03 10*3/MM3 (ref 0–0.06)
IMM GRANULOCYTES NFR BLD: 0.4 % (ref 0–0.6)
LDLC SERPL CALC-MCNC: 128 MG/DL (ref 0–99)
LIPASE SERPL-CCNC: 43 U/L (ref 23–300)
LYMPHOCYTES # BLD AUTO: 0.74 10*3/MM3 (ref 0.6–3.4)
LYMPHOCYTES NFR BLD AUTO: 10.5 % (ref 10–50)
MCH RBC QN AUTO: 29.7 PG (ref 27–31)
MCHC RBC AUTO-ENTMCNC: 32.3 G/DL (ref 30–37)
MCV RBC AUTO: 92 FL (ref 81–99)
MONOCYTES # BLD AUTO: 0.57 10*3/MM3 (ref 0–0.9)
MONOCYTES NFR BLD AUTO: 8.1 % (ref 0–12)
NEUTROPHILS # BLD AUTO: 5.58 10*3/MM3 (ref 2–6.9)
NEUTROPHILS NFR BLD AUTO: 79 % (ref 37–80)
NRBC BLD AUTO-RTO: 0 /100 WBC (ref 0–0)
PLATELET # BLD AUTO: 322 10*3/MM3 (ref 130–400)
POTASSIUM SERPL-SCNC: 3.9 MMOL/L (ref 3.5–5.1)
PROT SERPL-MCNC: 6.1 G/DL (ref 6.3–8.2)
RBC # BLD AUTO: 4.14 10*6/MM3 (ref 4.2–5.4)
SODIUM SERPL-SCNC: 136 MMOL/L (ref 137–145)
TRIGL SERPL-MCNC: 94 MG/DL
TSH SERPL DL<=0.005 MIU/L-ACNC: 3.16 MIU/ML (ref 0.47–4.68)
VLDLC SERPL CALC-MCNC: 18.8 MG/DL
WBC # BLD AUTO: 7.06 10*3/MM3 (ref 4.8–10.8)

## 2018-01-12 ENCOUNTER — TELEPHONE (OUTPATIENT)
Dept: FAMILY MEDICINE CLINIC | Facility: CLINIC | Age: 68
End: 2018-01-12

## 2018-01-16 NOTE — TELEPHONE ENCOUNTER
I agree she needs to be seen in ER, receive fluids, have further eval. Pending her w/u in ER I will order upper GI. HIDA has already been scheduled as far as I know.

## 2018-01-16 NOTE — TELEPHONE ENCOUNTER
Patient daughter called to let us know Marifer isn't any better. She unable to keep anything down, she either vomits or has diarrhea.  They have been tring to get her to drink some Pediasure and broth.  She seems weak and is light headed. they wanted to know about further testing that she did have a HX of hiatal hernia in the past and wondered if that could be it. She wanted to see if you would go ahead and do the HIDA scan and the upper GI.  I also informed her to take Mrs Ricardo to the ER to be evaluated for dehydration.  She stated that she would.

## 2018-01-18 ENCOUNTER — HOSPITAL ENCOUNTER (EMERGENCY)
Facility: HOSPITAL | Age: 68
Discharge: HOME | End: 2018-01-18
Attending: EMERGENCY MEDICINE | Admitting: EMERGENCY MEDICINE

## 2018-01-18 ENCOUNTER — APPOINTMENT (OUTPATIENT)
Dept: CT IMAGING | Facility: HOSPITAL | Age: 68
End: 2018-01-18

## 2018-01-18 VITALS
HEIGHT: 68 IN | BODY MASS INDEX: 19.4 KG/M2 | RESPIRATION RATE: 18 BRPM | WEIGHT: 128 LBS | HEART RATE: 98 BPM | DIASTOLIC BLOOD PRESSURE: 92 MMHG | TEMPERATURE: 97.9 F | OXYGEN SATURATION: 96 % | SYSTOLIC BLOOD PRESSURE: 161 MMHG

## 2018-01-18 DIAGNOSIS — R10.13 EPIGASTRIC PAIN: Primary | ICD-10-CM

## 2018-01-18 DIAGNOSIS — R10.84 GENERALIZED POSTPRANDIAL ABDOMINAL PAIN: Primary | ICD-10-CM

## 2018-01-18 DIAGNOSIS — R10.11 RUQ ABDOMINAL PAIN: ICD-10-CM

## 2018-01-18 LAB
ALBUMIN SERPL-MCNC: 3.5 G/DL (ref 3.5–5)
ALBUMIN/GLOB SERPL: 1.1 G/DL (ref 1–2)
ALP SERPL-CCNC: 82 U/L (ref 38–126)
ALT SERPL W P-5'-P-CCNC: 31 U/L (ref 13–69)
ANION GAP SERPL CALCULATED.3IONS-SCNC: 12.9 MMOL/L
AST SERPL-CCNC: 23 U/L (ref 15–46)
BASOPHILS # BLD AUTO: 0.02 10*3/MM3 (ref 0–0.2)
BASOPHILS NFR BLD AUTO: 0.2 % (ref 0–2.5)
BILIRUB SERPL-MCNC: 0.6 MG/DL (ref 0.2–1.3)
BILIRUB UR QL STRIP: NEGATIVE
BUN BLD-MCNC: 14 MG/DL (ref 7–20)
BUN/CREAT SERPL: 20 (ref 7.1–23.5)
CALCIUM SPEC-SCNC: 9.2 MG/DL (ref 8.4–10.2)
CHLORIDE SERPL-SCNC: 98 MMOL/L (ref 98–107)
CLARITY UR: ABNORMAL
CO2 SERPL-SCNC: 33 MMOL/L (ref 26–30)
COLOR UR: ABNORMAL
CREAT BLD-MCNC: 0.7 MG/DL (ref 0.6–1.3)
DEPRECATED RDW RBC AUTO: 42.1 FL (ref 37–54)
EOSINOPHIL # BLD AUTO: 0.17 10*3/MM3 (ref 0–0.7)
EOSINOPHIL NFR BLD AUTO: 2.1 % (ref 0–7)
ERYTHROCYTE [DISTWIDTH] IN BLOOD BY AUTOMATED COUNT: 12.7 % (ref 11.5–14.5)
GFR SERPL CREATININE-BSD FRML MDRD: 83 ML/MIN/1.73
GLOBULIN UR ELPH-MCNC: 3.3 GM/DL
GLUCOSE BLD-MCNC: 122 MG/DL (ref 74–98)
GLUCOSE UR STRIP-MCNC: NEGATIVE MG/DL
HCT VFR BLD AUTO: 37.9 % (ref 37–47)
HGB BLD-MCNC: 12.4 G/DL (ref 12–16)
HGB UR QL STRIP.AUTO: NEGATIVE
HOLD SPECIMEN: NORMAL
HOLD SPECIMEN: NORMAL
IMM GRANULOCYTES # BLD: 0.05 10*3/MM3 (ref 0–0.06)
IMM GRANULOCYTES NFR BLD: 0.6 % (ref 0–0.6)
KETONES UR QL STRIP: ABNORMAL
LEUKOCYTE ESTERASE UR QL STRIP.AUTO: NEGATIVE
LIPASE SERPL-CCNC: 51 U/L (ref 23–300)
LYMPHOCYTES # BLD AUTO: 0.99 10*3/MM3 (ref 0.6–3.4)
LYMPHOCYTES NFR BLD AUTO: 12.3 % (ref 10–50)
MCH RBC QN AUTO: 29.8 PG (ref 27–31)
MCHC RBC AUTO-ENTMCNC: 32.7 G/DL (ref 30–37)
MCV RBC AUTO: 91.1 FL (ref 81–99)
MONOCYTES # BLD AUTO: 0.57 10*3/MM3 (ref 0–0.9)
MONOCYTES NFR BLD AUTO: 7.1 % (ref 0–12)
NEUTROPHILS # BLD AUTO: 6.28 10*3/MM3 (ref 2–6.9)
NEUTROPHILS NFR BLD AUTO: 77.7 % (ref 37–80)
NITRITE UR QL STRIP: NEGATIVE
NRBC BLD MANUAL-RTO: 0 /100 WBC (ref 0–0)
PH UR STRIP.AUTO: 7.5 [PH] (ref 5–8)
PLATELET # BLD AUTO: 328 10*3/MM3 (ref 130–400)
PMV BLD AUTO: 9.5 FL (ref 6–12)
POTASSIUM BLD-SCNC: 3.9 MMOL/L (ref 3.5–5.1)
PROT SERPL-MCNC: 6.8 G/DL (ref 6.3–8.2)
PROT UR QL STRIP: NEGATIVE
RBC # BLD AUTO: 4.16 10*6/MM3 (ref 4.2–5.4)
SODIUM BLD-SCNC: 140 MMOL/L (ref 137–145)
SP GR UR STRIP: 1.01 (ref 1–1.03)
TROPONIN I SERPL-MCNC: <0.012 NG/ML (ref 0–0.03)
UROBILINOGEN UR QL STRIP: ABNORMAL
WBC NRBC COR # BLD: 8.08 10*3/MM3 (ref 4.8–10.8)
WHOLE BLOOD HOLD SPECIMEN: NORMAL
WHOLE BLOOD HOLD SPECIMEN: NORMAL

## 2018-01-18 PROCEDURE — 99284 EMERGENCY DEPT VISIT MOD MDM: CPT

## 2018-01-18 PROCEDURE — 93005 ELECTROCARDIOGRAM TRACING: CPT | Performed by: EMERGENCY MEDICINE

## 2018-01-18 PROCEDURE — 74177 CT ABD & PELVIS W/CONTRAST: CPT

## 2018-01-18 PROCEDURE — 84484 ASSAY OF TROPONIN QUANT: CPT | Performed by: PHYSICIAN ASSISTANT

## 2018-01-18 PROCEDURE — 80053 COMPREHEN METABOLIC PANEL: CPT | Performed by: PHYSICIAN ASSISTANT

## 2018-01-18 PROCEDURE — 81003 URINALYSIS AUTO W/O SCOPE: CPT | Performed by: EMERGENCY MEDICINE

## 2018-01-18 PROCEDURE — 0 IOPAMIDOL 61 % SOLUTION: Performed by: PHYSICIAN ASSISTANT

## 2018-01-18 PROCEDURE — 83690 ASSAY OF LIPASE: CPT | Performed by: PHYSICIAN ASSISTANT

## 2018-01-18 PROCEDURE — 85025 COMPLETE CBC W/AUTO DIFF WBC: CPT | Performed by: PHYSICIAN ASSISTANT

## 2018-01-18 RX ORDER — SODIUM CHLORIDE 0.9 % (FLUSH) 0.9 %
10 SYRINGE (ML) INJECTION AS NEEDED
Status: DISCONTINUED | OUTPATIENT
Start: 2018-01-18 | End: 2018-01-18 | Stop reason: HOSPADM

## 2018-01-18 RX ADMIN — IOPAMIDOL 100 ML: 612 INJECTION, SOLUTION INTRAVENOUS at 19:25

## 2018-01-18 RX ADMIN — SODIUM CHLORIDE 1000 ML: 9 INJECTION, SOLUTION INTRAVENOUS at 17:48

## 2018-01-18 NOTE — ED PROVIDER NOTES
Subjective   HPI Comments: 68-year-old female presents with epigastric pain and belching ×1 week.  She also reports a decreased appetite.  She reports that she's had chronic abdominal pain intermittently for years.  She reports that in the past she had parenteral nutrition due to malnourishment from poor intake from abdominal pain.  She recently had her gallbladder evaluated by an ultrasound and had a HIDA scan today.  She reports that she has epigastric and upper abdominal pain that can be worse with eating.  Some nausea no vomiting.  Last episode of abdominal pain was earlier today.      History provided by:  Patient   used: No        Review of Systems   Gastrointestinal: Positive for abdominal pain.       Past Medical History:   Diagnosis Date   • Abnormal bone density screening 2014    osteopenia   • Arthritis    • Asthma    • Diabetes mellitus    • Essential hypertension 2016   • GERD (gastroesophageal reflux disease)    • Hx of mammogram    • Hyperparathyroidism    • Mastoiditis    • Pregnancy      s/p  x 3   • Renal insufficiency    • Urticaria        Allergies   Allergen Reactions   • Betadine [Povidone Iodine]    • Codeine    • Penicillins    • Reglan [Metoclopramide] Hives       Past Surgical History:   Procedure Laterality Date   • COLECTOMY PARTIAL / TOTAL      Pt reports multiple abd surgeries for ?pseudo-bowel obstruction   • COLONOSCOPY     • COLOSTOMY      and revision   • HYSTERECTOMY      age 31 for DUB, ovaries intact   • TONSILLECTOMY AND ADENOIDECTOMY         Family History   Problem Relation Age of Onset   • Arthritis Mother    • Migraines Mother    • Thyroid disease Mother    • Diabetes Mother    • Hypertension Mother    • Stroke Mother    • Tuberculosis Mother    • Heart attack Father    • Heart disease Father    • Diabetes Maternal Grandmother    • Cancer Maternal Aunt    • Cancer Paternal Aunt    • Cancer Cousin        Social History     Social  History   • Marital status:      Spouse name: N/A   • Number of children: N/A   • Years of education: N/A     Social History Main Topics   • Smoking status: Never Smoker   • Smokeless tobacco: Never Used   • Alcohol use Yes      Comment: occasionally   • Drug use: No   • Sexual activity: Defer     Other Topics Concern   • None     Social History Narrative   • None           Objective   Physical Exam   Constitutional: She is oriented to person, place, and time. She appears well-developed and well-nourished.   Eyes: EOM are normal.   Neck: Normal range of motion. Neck supple.   Cardiovascular: Normal rate and regular rhythm.    Pulmonary/Chest: Effort normal and breath sounds normal.   Abdominal: Soft. Bowel sounds are normal. There is tenderness. There is no rebound and no guarding.   Musculoskeletal: Normal range of motion.   Neurological: She is alert and oriented to person, place, and time. She has normal reflexes.   Skin: Skin is warm and dry.   Psychiatric: She has a normal mood and affect.   Nursing note and vitals reviewed.      Procedures         ED Course  ED Course                  MDM    Final diagnoses:   Epigastric pain   RUQ abdominal pain            Spencer Johnson Jr., PA-C  01/18/18 2037       Spencer Johnson Jr., PA-C  01/18/18 2038

## 2018-01-19 ENCOUNTER — TELEPHONE (OUTPATIENT)
Dept: FAMILY MEDICINE CLINIC | Facility: CLINIC | Age: 68
End: 2018-01-19

## 2018-01-19 NOTE — TELEPHONE ENCOUNTER
----- Message from Payal Edwards MD sent at 1/18/2018  4:21 PM EST -----  Please make sure pt gets message regarding normal HIDA scan. She needs to see surgeon about possible endoscopy. Referral placed. SHe should let us know if she has a preference.

## 2018-01-19 NOTE — DISCHARGE INSTRUCTIONS
Abdominal Pain, Adult  Many things can cause belly (abdominal) pain. Most times, belly pain is not dangerous. Many cases of belly pain can be watched and treated at home. Sometimes belly pain is serious, though. Your doctor will try to find the cause of your belly pain.  Follow these instructions at home:  · Take over-the-counter and prescription medicines only as told by your doctor. Do not take medicines that help you poop (laxatives) unless told to by your doctor.  · Drink enough fluid to keep your pee (urine) clear or pale yellow.  · Watch your belly pain for any changes.  · Keep all follow-up visits as told by your doctor. This is important.  Contact a doctor if:  · Your belly pain changes or gets worse.  · You are not hungry, or you lose weight without trying.  · You are having trouble pooping (constipated) or have watery poop (diarrhea) for more than 2-3 days.  · You have pain when you pee or poop.  · Your belly pain wakes you up at night.  · Your pain gets worse with meals, after eating, or with certain foods.  · You are throwing up and cannot keep anything down.  · You have a fever.  Get help right away if:  · Your pain does not go away as soon as your doctor says it should.  · You cannot stop throwing up.  · Your pain is only in areas of your belly, such as the right side or the left lower part of the belly.  · You have bloody or black poop, or poop that looks like tar.  · You have very bad pain, cramping, or bloating in your belly.  · You have signs of not having enough fluid or water in your body (dehydration), such as:  ¨ Dark pee, very little pee, or no pee.  ¨ Cracked lips.  ¨ Dry mouth.  ¨ Sunken eyes.  ¨ Sleepiness.  ¨ Weakness.  This information is not intended to replace advice given to you by your health care provider. Make sure you discuss any questions you have with your health care provider.  Document Released: 06/05/2009 Document Revised: 07/07/2017 Document Reviewed: 05/31/2017  [employer]  Interactive Patient Education © 2017 [employer]

## 2018-01-22 ENCOUNTER — OFFICE VISIT (OUTPATIENT)
Dept: SURGERY | Facility: CLINIC | Age: 68
End: 2018-01-22

## 2018-01-22 VITALS
WEIGHT: 129.8 LBS | OXYGEN SATURATION: 96 % | SYSTOLIC BLOOD PRESSURE: 138 MMHG | BODY MASS INDEX: 19.67 KG/M2 | HEIGHT: 68 IN | HEART RATE: 108 BPM | DIASTOLIC BLOOD PRESSURE: 70 MMHG | TEMPERATURE: 98.7 F

## 2018-01-22 DIAGNOSIS — R12 HEARTBURN: Primary | ICD-10-CM

## 2018-01-22 PROCEDURE — 99204 OFFICE O/P NEW MOD 45 MIN: CPT | Performed by: SURGERY

## 2018-01-22 NOTE — PROGRESS NOTES
Patient: Marifer Ricardo    YOB: 1950    Date: 01/22/2018    Primary Care Provider: Payal Edwards MD    Reason for Consultation:Epigastric pain, GERD    Chief complaint:   Chief Complaint   Patient presents with   • Heartburn     Heartburn and belching after lying down       Subjective .     History of present illness:  I saw the patient in the office  today as a consultation for evaluation and treatment of heartburn and belching.  The patient states that a couple months ago she started having excessive heartburn/reflux and belching after meals and after lying down in the evening.  She also states she has had a 20lb weight loss in a short amount of time, fatigue and weakness.  She does have a history of a hiatal hernia that was diagnoses over 20 years ago when having an EGD.  She states that not eating or drinking is the only thing that will ease up the discomfort.  She was recently seen in  ED where they did a CT scan that showed Nonobstructive left nephrolithiasis.  She also has had a gallbladder ultrasound that showed some wall thickening but otherwise normal along with a hida scan that showed a 66% EF.      She does have a significant history of previous multiple abdominal operations.  In [place] she did have to have a colectomy performed with colostomy, the patient states that she has never had colon cancer.  She did have a reversal of her colostomy performed eventually.  She has had a long-standing history of belching, reflux esophagitis and a history significant for epigastric discomfort.  This does seem to be diet related.    The following portions of the patient's history were reviewed and updated as appropriate: allergies, current medications, past family history, past medical history, past social history, past surgical history and problem list.      Review of Systems   Constitutional: Positive for activity change, appetite change, fatigue and unexpected weight change. Negative for chills  and fever.   HENT: Negative for hearing loss, trouble swallowing and voice change.    Eyes: Negative for visual disturbance.   Respiratory: Positive for cough. Negative for apnea, chest tightness, shortness of breath and wheezing.    Cardiovascular: Negative for chest pain, palpitations and leg swelling.   Gastrointestinal: Negative for abdominal distention, abdominal pain, anal bleeding, blood in stool, constipation, diarrhea, nausea, rectal pain and vomiting.   Endocrine: Negative for cold intolerance and heat intolerance.   Genitourinary: Negative for difficulty urinating, dysuria and flank pain.   Musculoskeletal: Negative for back pain and gait problem.   Skin: Negative for color change, rash and wound.   Neurological: Negative for dizziness, syncope, speech difficulty, weakness, light-headedness, numbness and headaches.   Hematological: Negative for adenopathy. Does not bruise/bleed easily.   Psychiatric/Behavioral: Negative for confusion. The patient is not nervous/anxious.        History:  Past Medical History:   Diagnosis Date   • Abnormal bone density screening 2014    osteopenia   • Arthritis    • Asthma    • Body piercing     BOTH EARS   • Cataract, bilateral    • Diabetes mellitus    • Essential hypertension 2016   • GERD (gastroesophageal reflux disease)    • Hx of mammogram    • Hyperparathyroidism    • Mastoiditis    • Pregnancy      s/p  x 3   • Renal insufficiency    • Urticaria    • Wears glasses        Past Surgical History:   Procedure Laterality Date   • CATARACT EXTRACTION, BILATERAL     • COLECTOMY PARTIAL / TOTAL      Pt reports multiple abd surgeries for ?pseudo-bowel obstruction   • COLONOSCOPY     • COLOSTOMY      and revision   • ENDOSCOPY     • HYSTERECTOMY      age 31 for DUB, ovaries intact   • TONSILLECTOMY AND ADENOIDECTOMY         Family History   Problem Relation Age of Onset   • Arthritis Mother    • Migraines Mother    • Thyroid disease Mother    •  Diabetes Mother    • Hypertension Mother    • Stroke Mother    • Tuberculosis Mother    • Heart attack Father    • Heart disease Father    • Diabetes Maternal Grandmother    • Cancer Maternal Aunt    • Cancer Paternal Aunt    • Cancer Cousin        Social History   Substance Use Topics   • Smoking status: Never Smoker   • Smokeless tobacco: Never Used   • Alcohol use No       Allergies:  Allergies   Allergen Reactions   • Penicillins Anaphylaxis   • Betadine [Povidone Iodine] Hives   • Codeine Hives   • Reglan [Metoclopramide] Hives       Medications:    Current Outpatient Prescriptions:   •  atorvastatin (LIPITOR) 20 MG tablet, TAKE 1 TABLET EVERY DAY, Disp: 90 tablet, Rfl: 1  •  Biotin ([employer] BIOTIN HIGH POTENCY) 1000 MCG tablet, Take 1,000 mcg by mouth 3 (three) times a day., Disp: , Rfl:   •  cetirizine (zyrTEC) 10 MG tablet, Take 1 tablet by mouth Daily., Disp: , Rfl:   •  cholecalciferol (VITAMIN D3) 1000 UNITS tablet, Take 1,000 Units by mouth daily., Disp: , Rfl:   •  Cinnamon 500 MG tablet, Take  by mouth 2 (two) times a day., Disp: , Rfl:   •  coenzyme Q10 100 MG capsule, Take 100 mg by mouth daily., Disp: , Rfl:   •  Cyanocobalamin (VITAMIN B-12) 5000 MCG sublingual tablet, Place 1 tablet under the tongue Daily., Disp: , Rfl:   •  DEVILS CLAW PO, Take 1 tablet by mouth Daily., Disp: , Rfl:   •  glucosamine sulfate 500 MG capsule capsule, Take  by mouth 3 (three) times a day with meals., Disp: , Rfl:   •  meloxicam (MOBIC) 15 MG tablet, TAKE 1 TABLET EVERY DAY, Disp: 90 tablet, Rfl: 1  •  Misc Natural Products (WHITE WILLOW BARK PO), Take 1 tablet by mouth Daily., Disp: , Rfl:   •  Omega 3-6-9 Fatty Acids (OMEGA 3-6-9 COMPLEX PO), Take 1 capsule by mouth Daily., Disp: , Rfl:   •  traMADol (ULTRAM) 50 MG tablet, Take 50 mg by mouth Every 6 (Six) Hours As Needed., Disp: , Rfl:   No current facility-administered medications for this visit.     Objective     Vital Signs:   Temp:  [97.3 °F (36.3 °C)-97.8 °F (36.6  °C)] 97.6 °F (36.4 °C)  Heart Rate:  [81-97] 81  Resp:  [16-18] 18  BP: (116-144)/(66-79) 144/78    Physical Exam:   General Appearance:    Alert, cooperative, in no acute distress   Head:    Normocephalic, without obvious abnormality, atraumatic   Eyes:            Lids and lashes normal, conjunctivae and sclerae normal, no   icterus, no pallor, corneas clear, PERRLA   Ears:    Ears appear intact with no abnormalities noted   Throat:   No oral lesions, no thrush, oral mucosa moist   Neck:   No adenopathy, supple, trachea midline, no thyromegaly, no   carotid bruit, no JVD   Lungs:     Clear to auscultation,respirations regular, even and                  unlabored    Heart:    Regular rhythm and normal rate, normal S1 and S2, no            murmur, no gallop, no rub, no click   Chest Wall:    No abnormalities observed   Abdomen:     Normal bowel sounds, no masses, no organomegaly, soft        non-tender, non-distended, no guarding, there is evidence of epigastric  tenderness   Extremities:   Moves all extremities well, no edema, no cyanosis, no             redness   Pulses:   Pulses palpable and equal bilaterally   Skin:   No bleeding, bruising or rash   Lymph nodes:   No palpable adenopathy   Neurologic:   Cranial nerves 2 - 12 grossly intact, sensation intact     Results Review:   I reviewed the patient's new clinical results.  I reviewed the patient's new imaging results and agree with the interpretation.  I reviewed the patient's other test results and agree with the interpretation    Review of Systems was reviewed and confirmed as accurate today.    Assessment/Plan     1. Heartburn        I did have a detailed and extensive discussion with the patient in the office and they understand that they need to undergo upper endoscopy. Full risks and benefits of operative versus nonoperative intervention were discussed with the patient and these include bleeding and esophageal injury. The patient understands, agrees, and  wishes to proceed with the surgical treatment plan as mentioned above. The patient had no questions for me at the end of the discussion.      She very well may need to undergo future colonoscopy also.     I discussed the patients findings and my recommendations with patient.     Electronically signed by Taras Morillo MD  01/25/18 9:34 AM        Scribed for Taras Morillo MD by Sasha Mcclain. 1/25/2018  12:26 PM

## 2018-01-25 ENCOUNTER — ANESTHESIA (OUTPATIENT)
Dept: GASTROENTEROLOGY | Facility: HOSPITAL | Age: 68
End: 2018-01-25

## 2018-01-25 ENCOUNTER — ANESTHESIA EVENT (OUTPATIENT)
Dept: GASTROENTEROLOGY | Facility: HOSPITAL | Age: 68
End: 2018-01-25

## 2018-01-25 ENCOUNTER — HOSPITAL ENCOUNTER (OUTPATIENT)
Facility: HOSPITAL | Age: 68
Setting detail: HOSPITAL OUTPATIENT SURGERY
Discharge: HOME | End: 2018-01-25
Attending: SURGERY | Admitting: SURGERY

## 2018-01-25 VITALS
TEMPERATURE: 97.6 F | HEIGHT: 68 IN | OXYGEN SATURATION: 95 % | SYSTOLIC BLOOD PRESSURE: 144 MMHG | WEIGHT: 129 LBS | RESPIRATION RATE: 18 BRPM | HEART RATE: 81 BPM | BODY MASS INDEX: 19.55 KG/M2 | DIASTOLIC BLOOD PRESSURE: 78 MMHG

## 2018-01-25 DIAGNOSIS — R12 HEARTBURN: ICD-10-CM

## 2018-01-25 LAB — GLUCOSE BLDC GLUCOMTR-MCNC: 55 MG/DL (ref 70–130)

## 2018-01-25 PROCEDURE — 25810000003 DEXTROSE-NACL PER 500 ML: Performed by: NURSE ANESTHETIST, CERTIFIED REGISTERED

## 2018-01-25 PROCEDURE — 88305 TISSUE EXAM BY PATHOLOGIST: CPT | Performed by: SURGERY

## 2018-01-25 PROCEDURE — 25010000002 PROPOFOL 10 MG/ML EMULSION: Performed by: NURSE ANESTHETIST, CERTIFIED REGISTERED

## 2018-01-25 PROCEDURE — 82962 GLUCOSE BLOOD TEST: CPT

## 2018-01-25 RX ORDER — ONDANSETRON 2 MG/ML
4 INJECTION INTRAMUSCULAR; INTRAVENOUS ONCE AS NEEDED
Status: CANCELLED | OUTPATIENT
Start: 2018-01-25 | End: 2018-01-25

## 2018-01-25 RX ORDER — PROPOFOL 10 MG/ML
VIAL (ML) INTRAVENOUS AS NEEDED
Status: DISCONTINUED | OUTPATIENT
Start: 2018-01-25 | End: 2018-01-25 | Stop reason: SURG

## 2018-01-25 RX ORDER — DEXTROSE AND SODIUM CHLORIDE 5; .9 G/100ML; G/100ML
20 INJECTION, SOLUTION INTRAVENOUS CONTINUOUS
Status: DISCONTINUED | OUTPATIENT
Start: 2018-01-25 | End: 2018-01-25 | Stop reason: HOSPADM

## 2018-01-25 RX ORDER — SODIUM CHLORIDE, SODIUM LACTATE, POTASSIUM CHLORIDE, CALCIUM CHLORIDE 600; 310; 30; 20 MG/100ML; MG/100ML; MG/100ML; MG/100ML
1000 INJECTION, SOLUTION INTRAVENOUS CONTINUOUS PRN
Status: CANCELLED | OUTPATIENT
Start: 2018-01-25

## 2018-01-25 RX ORDER — LIDOCAINE HYDROCHLORIDE 20 MG/ML
INJECTION, SOLUTION EPIDURAL; INFILTRATION; INTRACAUDAL; PERINEURAL AS NEEDED
Status: DISCONTINUED | OUTPATIENT
Start: 2018-01-25 | End: 2018-01-25 | Stop reason: SURG

## 2018-01-25 RX ADMIN — DEXTROSE AND SODIUM CHLORIDE 20 ML/HR: 5; 900 INJECTION, SOLUTION INTRAVENOUS at 08:20

## 2018-01-25 RX ADMIN — DEXTROSE AND SODIUM CHLORIDE: 5; 900 INJECTION, SOLUTION INTRAVENOUS at 08:33

## 2018-01-25 RX ADMIN — LIDOCAINE HYDROCHLORIDE 60 MG: 20 INJECTION, SOLUTION EPIDURAL; INFILTRATION; INTRACAUDAL; PERINEURAL at 08:36

## 2018-01-25 RX ADMIN — PROPOFOL 100 MG: 10 INJECTION, EMULSION INTRAVENOUS at 08:47

## 2018-01-25 NOTE — ANESTHESIA PREPROCEDURE EVALUATION
Anesthesia Evaluation     Patient summary reviewed and Nursing notes reviewed   no history of anesthetic complications:  NPO Solid Status: > 8 hours  NPO Liquid Status: > 8 hours     Airway   Mallampati: II  TM distance: >3 FB  Neck ROM: full  no difficulty expected  Dental - normal exam     Pulmonary - normal exam   (+) asthma,   Cardiovascular - normal exam    Rhythm: regular  Rate: normal    (+) hypertension, hyperlipidemia      Neuro/Psych  (+) headaches, numbness,     GI/Hepatic/Renal/Endo    (+)  diabetes mellitus type 2 well controlled, hypothyroidism,     Musculoskeletal     Abdominal    Substance History - negative use     OB/GYN negative ob/gyn ROS   (-)  Pregnant        Other   (+) arthritis                                             Anesthesia Plan    ASA 2     MAC   (Pt told that intravenous sedation will be used as the primary anesthetic along with local anesthesia if necessary. Every effort will be made to make sure the patient is comfortable.     The patient was told they may or may not have recall for the procedure. It was further explained that if the MAC was not adequate that a general anesthetic with either an LMA or endotracheal tube would be required.     Will proceed with the plan of care.)  intravenous induction   Anesthetic plan and risks discussed with patient.

## 2018-01-25 NOTE — ANESTHESIA POSTPROCEDURE EVALUATION
Patient: Marifer Ricardo    Procedure Summary     Date Anesthesia Start Anesthesia Stop Room / Location    01/25/18 0833 0848 [facility] ENDOSCOPY 3 / [facility] ENDOSCOPY       Procedure Diagnosis Surgeon Provider    ESOPHAGOGASTRODUODENOSCOPY WITH BIOPSIES (N/A Esophagus) Heartburn  (Heartburn [R12]) MD Josue Jose CRNA          Anesthesia Type: MAC  Last vitals  BP   116/66 (01/25/18 0855)   Temp   97.3 °F (36.3 °C) (01/25/18 0855)   Pulse   83 (01/25/18 0855)   Resp   16 (01/25/18 0855)     SpO2   94 % (01/25/18 0855)     Post Anesthesia Care and Evaluation    Patient location during evaluation: PACU  Patient participation: complete - patient participated  Level of consciousness: awake  Pain score: 1  Pain management: adequate  Airway patency: patent  Anesthetic complications: No anesthetic complications  PONV Status: controlled  Cardiovascular status: acceptable and stable  Respiratory status: acceptable and nasal cannula  Hydration status: acceptable

## 2018-01-26 ENCOUNTER — TELEPHONE (OUTPATIENT)
Dept: FAMILY MEDICINE CLINIC | Facility: CLINIC | Age: 68
End: 2018-01-26

## 2018-01-26 DIAGNOSIS — E11.9 DIABETES MELLITUS, STABLE (HCC): Primary | ICD-10-CM

## 2018-01-26 RX ORDER — LANCETS 30 GAUGE
1 EACH MISCELLANEOUS DAILY
Qty: 100 EACH | Refills: 5 | Status: SHIPPED | OUTPATIENT
Start: 2018-01-26

## 2018-01-26 RX ORDER — GLUCOSAMINE HCL/CHONDROITIN SU 500-400 MG
1 CAPSULE ORAL DAILY
Qty: 50 EACH | Refills: 5 | Status: SHIPPED | OUTPATIENT
Start: 2018-01-26

## 2018-01-26 NOTE — TELEPHONE ENCOUNTER
Patient called to see if she can get the H. Pyloric test done because her friend is a nurse and and said that can cause a lot of belching.

## 2018-01-26 NOTE — TELEPHONE ENCOUNTER
This was most likely checked when she had her recent upper endoscopy per Dr. Morillo. I or Dr. Morillo will let her know what the results show and if treatment is needed.

## 2018-01-29 ENCOUNTER — OFFICE VISIT (OUTPATIENT)
Dept: SURGERY | Facility: CLINIC | Age: 68
End: 2018-01-29

## 2018-01-29 VITALS
TEMPERATURE: 98.2 F | DIASTOLIC BLOOD PRESSURE: 86 MMHG | WEIGHT: 128.97 LBS | SYSTOLIC BLOOD PRESSURE: 142 MMHG | BODY MASS INDEX: 19.55 KG/M2 | HEIGHT: 68 IN | OXYGEN SATURATION: 97 % | HEART RATE: 110 BPM

## 2018-01-29 DIAGNOSIS — R13.10 DYSPHAGIA, UNSPECIFIED TYPE: Primary | ICD-10-CM

## 2018-01-29 DIAGNOSIS — R63.4 WEIGHT LOSS: ICD-10-CM

## 2018-01-29 PROCEDURE — 99213 OFFICE O/P EST LOW 20 MIN: CPT | Performed by: SURGERY

## 2018-01-29 NOTE — PROGRESS NOTES
Patient: Marifer Ricardo    YOB: 1950    Date: 2018    Primary Care Provider: Payal Edwards MD    Chief complaint:   Chief Complaint   Patient presents with   • Follow-up     Follow up EGD       Subjective .     History of present illness:  I saw the patient in the office today as a consultation for evaluation and follow up from her recent EGD performed on 18. The pathology report did show duodenal mucosa with no significant histopathologic abnormality, slight reactive gastropathy, minimal chronic inactive inflammation and gastric cardia-type glandular mucosa with no significant histopathologic abnormality. She states she is still experiencing belching after meals. She states she feels as if food is still getting stuck in her chest. She states in the hospital her blood glucose levels were low and when they added sugar water into her IV she felt a lot better.    She does have a history of weight loss over the past several months of 20+ pounds.    The following portions of the patient's history were reviewed and updated as appropriate: allergies, current medications, past family history, past medical history, past social history, past surgical history and problem list.      Review of Systems   Constitutional: Negative for chills, fatigue and fever.   Respiratory: Negative for cough.    Cardiovascular: Negative for chest pain.   Gastrointestinal: Negative for abdominal pain, diarrhea, nausea and vomiting.       History:  Past Medical History:   Diagnosis Date   • Abnormal bone density screening 2014    osteopenia   • Arthritis    • Asthma    • Body piercing     BOTH EARS   • Cataract, bilateral    • Diabetes mellitus    • Essential hypertension 2016   • GERD (gastroesophageal reflux disease)    • Hx of mammogram    • Hyperparathyroidism    • Mastoiditis    • Pregnancy      s/p  x 3   • Renal insufficiency    • Urticaria    • Wears glasses        Past Surgical History:    Procedure Laterality Date   • CATARACT EXTRACTION, BILATERAL     • COLECTOMY PARTIAL / TOTAL      Pt reports multiple abd surgeries for ?pseudo-bowel obstruction   • COLONOSCOPY  2005   • COLOSTOMY      and revision   • ENDOSCOPY     • ENDOSCOPY N/A 1/25/2018    Procedure: ESOPHAGOGASTRODUODENOSCOPY WITH BIOPSIES;  Surgeon: Taras Morillo MD;  Location: [facility] ENDOSCOPY;  Service:    • HYSTERECTOMY      age 31 for DUB, ovaries intact   • TONSILLECTOMY AND ADENOIDECTOMY         Family History   Problem Relation Age of Onset   • Arthritis Mother    • Migraines Mother    • Thyroid disease Mother    • Diabetes Mother    • Hypertension Mother    • Stroke Mother    • Tuberculosis Mother    • Heart attack Father    • Heart disease Father    • Diabetes Maternal Grandmother    • Cancer Maternal Aunt    • Cancer Paternal Aunt    • Cancer Cousin        Social History   Substance Use Topics   • Smoking status: Never Smoker   • Smokeless tobacco: Never Used   • Alcohol use No       Allergies:  Allergies   Allergen Reactions   • Penicillins Anaphylaxis   • Betadine [Povidone Iodine] Hives   • Codeine Hives   • Reglan [Metoclopramide] Hives       Medications:    Current Outpatient Prescriptions:   •  atorvastatin (LIPITOR) 20 MG tablet, TAKE 1 TABLET EVERY DAY, Disp: 90 tablet, Rfl: 1  •  Biotin ([employer] BIOTIN HIGH POTENCY) 1000 MCG tablet, Take 1,000 mcg by mouth 3 (three) times a day., Disp: , Rfl:   •  Blood Glucose Monitoring Suppl w/Device kit, 1 Device Daily. CHECK BLOOD SUGAR ONCE DAILY, Disp: 1 each, Rfl: 0  •  cetirizine (zyrTEC) 10 MG tablet, Take 1 tablet by mouth Daily., Disp: , Rfl:   •  cholecalciferol (VITAMIN D3) 1000 UNITS tablet, Take 1,000 Units by mouth daily., Disp: , Rfl:   •  Cinnamon 500 MG tablet, Take  by mouth 2 (two) times a day., Disp: , Rfl:   •  coenzyme Q10 100 MG capsule, Take 100 mg by mouth daily., Disp: , Rfl:   •  Cyanocobalamin (VITAMIN B-12) 5000 MCG sublingual tablet, Place 1 tablet under  the tongue Daily., Disp: , Rfl:   •  DEVILS CLAW PO, Take 1 tablet by mouth Daily., Disp: , Rfl:   •  glucosamine sulfate 500 MG capsule capsule, Take  by mouth 3 (three) times a day with meals., Disp: , Rfl:   •  Glucose Blood (BLOOD GLUCOSE TEST) strip, 1 strip by In Vitro route Daily. CHECK BLOOD SUGAR DAILY., Disp: 50 each, Rfl: 5  •  Lancets misc, 1 each Daily. CHECK BLOOD SUGAR DAILY, Disp: 100 each, Rfl: 5  •  meloxicam (MOBIC) 15 MG tablet, TAKE 1 TABLET EVERY DAY, Disp: 90 tablet, Rfl: 1  •  Misc Natural Products (WHITE WILLOW BARK PO), Take 1 tablet by mouth Daily., Disp: , Rfl:   •  Omega 3-6-9 Fatty Acids (OMEGA 3-6-9 COMPLEX PO), Take 1 capsule by mouth Daily., Disp: , Rfl:   •  traMADol (ULTRAM) 50 MG tablet, Take 50 mg by mouth Every 6 (Six) Hours As Needed., Disp: , Rfl:     Objective     Vital Signs:   Temp:  [98.2 °F (36.8 °C)] 98.2 °F (36.8 °C)  Heart Rate:  [110] 110  BP: (142)/(86) 142/86    Physical Exam:   General Appearance:    Alert, cooperative, in no acute distress   Head:    Normocephalic, without obvious abnormality, atraumatic   Eyes:            Lids and lashes normal, conjunctivae and sclerae normal, no   icterus, no pallor, corneas clear, PERRL   Ears:    Ears appear intact with no abnormalities noted   Throat:   No oral lesions, no thrush, oral mucosa moist   Neck:   No adenopathy, supple, trachea midline, no thyromegaly,  no JVD   Lungs:     Clear to auscultation,respirations regular, even and                  unlabored    Heart:    Regular rhythm and normal rate, normal S1 and S2, no            murmur   Abdomen:     no masses, no organomegaly, soft non-tender, non-distended, no guarding, there is no evidence of tenderness   Extremities:   Moves all extremities well, no edema, no cyanosis, no             redness   Pulses:   Pulses palpable and equal bilaterally   Skin:   No bleeding, bruising or rash   Lymph nodes:   No palpable adenopathy   Neurologic:   Cranial nerves 2 - 12  grossly intact, sensation intact      Results Review:   I reviewed the patient's new clinical results.  I reviewed the patient's new imaging results and agree with the interpretation.  I reviewed the patient's other test results and agree with the interpretation    Review of Systems was reviewed and confirmed as accurate today.    Assessment/Plan :    1. Dysphagia, unspecified type    2. Weight loss        I recommend a colonoscopy for further evaluation. The procedure was explained as well as the risks which include but are not limited to bleeding, infection, perforation, abdominal pain etc. The patient understands these risks and the procedure and wishes to proceed. I did have a detailed discussion with the patient and her son today in the office.    Electronically signed by Taras Morillo MD  01/29/18 10:51 AM  Scribed for Taras Morillo MD by Karlee Rivera. 1/29/2018  4:03 PM

## 2018-01-30 PROBLEM — R63.4 WEIGHT LOSS: Status: ACTIVE | Noted: 2018-01-30

## 2018-01-30 LAB
LAB AP CASE REPORT: NORMAL
Lab: NORMAL
PATH REPORT.FINAL DX SPEC: NORMAL

## 2018-02-07 ENCOUNTER — HOSPITAL ENCOUNTER (OUTPATIENT)
Facility: HOSPITAL | Age: 68
Setting detail: HOSPITAL OUTPATIENT SURGERY
Discharge: HOME | End: 2018-02-07
Attending: SURGERY | Admitting: SURGERY

## 2018-02-07 ENCOUNTER — ANESTHESIA (OUTPATIENT)
Dept: GASTROENTEROLOGY | Facility: HOSPITAL | Age: 68
End: 2018-02-07

## 2018-02-07 ENCOUNTER — ANESTHESIA EVENT (OUTPATIENT)
Dept: GASTROENTEROLOGY | Facility: HOSPITAL | Age: 68
End: 2018-02-07

## 2018-02-07 VITALS
BODY MASS INDEX: 19.4 KG/M2 | RESPIRATION RATE: 18 BRPM | HEART RATE: 85 BPM | OXYGEN SATURATION: 96 % | HEIGHT: 68 IN | WEIGHT: 128 LBS | DIASTOLIC BLOOD PRESSURE: 77 MMHG | TEMPERATURE: 97.8 F | SYSTOLIC BLOOD PRESSURE: 140 MMHG

## 2018-02-07 DIAGNOSIS — R63.4 WEIGHT LOSS: ICD-10-CM

## 2018-02-07 PROCEDURE — 82962 GLUCOSE BLOOD TEST: CPT

## 2018-02-07 PROCEDURE — 25010000002 PROPOFOL 200 MG/20ML EMULSION: Performed by: NURSE ANESTHETIST, CERTIFIED REGISTERED

## 2018-02-07 PROCEDURE — 88305 TISSUE EXAM BY PATHOLOGIST: CPT | Performed by: SURGERY

## 2018-02-07 RX ORDER — PROPOFOL 10 MG/ML
INJECTION, EMULSION INTRAVENOUS AS NEEDED
Status: DISCONTINUED | OUTPATIENT
Start: 2018-02-07 | End: 2018-02-07 | Stop reason: SURG

## 2018-02-07 RX ORDER — SODIUM CHLORIDE 0.9 % (FLUSH) 0.9 %
3 SYRINGE (ML) INJECTION AS NEEDED
Status: DISCONTINUED | OUTPATIENT
Start: 2018-02-07 | End: 2018-02-07 | Stop reason: HOSPADM

## 2018-02-07 RX ORDER — SODIUM CHLORIDE, SODIUM LACTATE, POTASSIUM CHLORIDE, CALCIUM CHLORIDE 600; 310; 30; 20 MG/100ML; MG/100ML; MG/100ML; MG/100ML
1000 INJECTION, SOLUTION INTRAVENOUS CONTINUOUS PRN
Status: DISCONTINUED | OUTPATIENT
Start: 2018-02-07 | End: 2018-02-07 | Stop reason: HOSPADM

## 2018-02-07 RX ADMIN — SODIUM CHLORIDE, POTASSIUM CHLORIDE, SODIUM LACTATE AND CALCIUM CHLORIDE 1000 ML: 600; 310; 30; 20 INJECTION, SOLUTION INTRAVENOUS at 08:15

## 2018-02-07 RX ADMIN — PROPOFOL 50 MG: 10 INJECTION, EMULSION INTRAVENOUS at 09:49

## 2018-02-07 RX ADMIN — PROPOFOL 50 MG: 10 INJECTION, EMULSION INTRAVENOUS at 09:46

## 2018-02-07 RX ADMIN — PROPOFOL 50 MG: 10 INJECTION, EMULSION INTRAVENOUS at 09:39

## 2018-02-07 RX ADMIN — PROPOFOL 50 MG: 10 INJECTION, EMULSION INTRAVENOUS at 09:33

## 2018-02-07 NOTE — DISCHARGE INSTRUCTIONS
Rest today  No pushing,pulling,tugging,heavy lifting, or strenuous activity   No major decision making,driving,or drinking alcoholic beverages for 24 hours due to the sedation you received  Always use good hand hygiene/washing technique  No driving on pain medication.    To assist you in voiding:  Drink plenty of fluids  Listen to running water while attempting to void.    If you are unable to urinate and you have an uncomfortable urge to void or it has been   6 hours since you were discharged, return to the Emergency Room.

## 2018-02-07 NOTE — ANESTHESIA POSTPROCEDURE EVALUATION
Patient: Marifer Ricardo    Procedure Summary     Date Anesthesia Start Anesthesia Stop Room / Location    02/07/18 0932 1007 [facility] ENDOSCOPY 3 / [facility] ENDOSCOPY       Procedure Diagnosis Surgeon Provider    COLONOSCOPY WITH RANDOM COLON BIOPSIES (N/A ) Weight loss  (Weight loss [R63.4]) MD Cooper Jose CRNA          Anesthesia Type: MAC  Last vitals  BP   100/60 (02/07/18 1008)   Temp   97.8 °F (36.6 °C) (02/07/18 1008)   Pulse   81 (02/07/18 1008)   Resp   16 (02/07/18 1008)     SpO2   98 % (02/07/18 1008)     Anesthesia Post Evaluation

## 2018-02-07 NOTE — H&P (VIEW-ONLY)
Patient: Marifer Ricardo    YOB: 1950    Date: 2018    Primary Care Provider: Payal Edwards MD    Chief complaint:   Chief Complaint   Patient presents with   • Follow-up     Follow up EGD       Subjective .     History of present illness:  I saw the patient in the office today as a consultation for evaluation and follow up from her recent EGD performed on 18. The pathology report did show duodenal mucosa with no significant histopathologic abnormality, slight reactive gastropathy, minimal chronic inactive inflammation and gastric cardia-type glandular mucosa with no significant histopathologic abnormality. She states she is still experiencing belching after meals. She states she feels as if food is still getting stuck in her chest. She states in the hospital her blood glucose levels were low and when they added sugar water into her IV she felt a lot better.    She does have a history of weight loss over the past several months of 20+ pounds.    The following portions of the patient's history were reviewed and updated as appropriate: allergies, current medications, past family history, past medical history, past social history, past surgical history and problem list.      Review of Systems   Constitutional: Negative for chills, fatigue and fever.   Respiratory: Negative for cough.    Cardiovascular: Negative for chest pain.   Gastrointestinal: Negative for abdominal pain, diarrhea, nausea and vomiting.       History:  Past Medical History:   Diagnosis Date   • Abnormal bone density screening 2014    osteopenia   • Arthritis    • Asthma    • Body piercing     BOTH EARS   • Cataract, bilateral    • Diabetes mellitus    • Essential hypertension 2016   • GERD (gastroesophageal reflux disease)    • Hx of mammogram    • Hyperparathyroidism    • Mastoiditis    • Pregnancy      s/p  x 3   • Renal insufficiency    • Urticaria    • Wears glasses        Past Surgical History:    Procedure Laterality Date   • CATARACT EXTRACTION, BILATERAL     • COLECTOMY PARTIAL / TOTAL      Pt reports multiple abd surgeries for ?pseudo-bowel obstruction   • COLONOSCOPY  2005   • COLOSTOMY      and revision   • ENDOSCOPY     • ENDOSCOPY N/A 1/25/2018    Procedure: ESOPHAGOGASTRODUODENOSCOPY WITH BIOPSIES;  Surgeon: Taras Morillo MD;  Location: [facility] ENDOSCOPY;  Service:    • HYSTERECTOMY      age 31 for DUB, ovaries intact   • TONSILLECTOMY AND ADENOIDECTOMY         Family History   Problem Relation Age of Onset   • Arthritis Mother    • Migraines Mother    • Thyroid disease Mother    • Diabetes Mother    • Hypertension Mother    • Stroke Mother    • Tuberculosis Mother    • Heart attack Father    • Heart disease Father    • Diabetes Maternal Grandmother    • Cancer Maternal Aunt    • Cancer Paternal Aunt    • Cancer Cousin        Social History   Substance Use Topics   • Smoking status: Never Smoker   • Smokeless tobacco: Never Used   • Alcohol use No       Allergies:  Allergies   Allergen Reactions   • Penicillins Anaphylaxis   • Betadine [Povidone Iodine] Hives   • Codeine Hives   • Reglan [Metoclopramide] Hives       Medications:    Current Outpatient Prescriptions:   •  atorvastatin (LIPITOR) 20 MG tablet, TAKE 1 TABLET EVERY DAY, Disp: 90 tablet, Rfl: 1  •  Biotin ([employer] BIOTIN HIGH POTENCY) 1000 MCG tablet, Take 1,000 mcg by mouth 3 (three) times a day., Disp: , Rfl:   •  Blood Glucose Monitoring Suppl w/Device kit, 1 Device Daily. CHECK BLOOD SUGAR ONCE DAILY, Disp: 1 each, Rfl: 0  •  cetirizine (zyrTEC) 10 MG tablet, Take 1 tablet by mouth Daily., Disp: , Rfl:   •  cholecalciferol (VITAMIN D3) 1000 UNITS tablet, Take 1,000 Units by mouth daily., Disp: , Rfl:   •  Cinnamon 500 MG tablet, Take  by mouth 2 (two) times a day., Disp: , Rfl:   •  coenzyme Q10 100 MG capsule, Take 100 mg by mouth daily., Disp: , Rfl:   •  Cyanocobalamin (VITAMIN B-12) 5000 MCG sublingual tablet, Place 1 tablet under  the tongue Daily., Disp: , Rfl:   •  DEVILS CLAW PO, Take 1 tablet by mouth Daily., Disp: , Rfl:   •  glucosamine sulfate 500 MG capsule capsule, Take  by mouth 3 (three) times a day with meals., Disp: , Rfl:   •  Glucose Blood (BLOOD GLUCOSE TEST) strip, 1 strip by In Vitro route Daily. CHECK BLOOD SUGAR DAILY., Disp: 50 each, Rfl: 5  •  Lancets misc, 1 each Daily. CHECK BLOOD SUGAR DAILY, Disp: 100 each, Rfl: 5  •  meloxicam (MOBIC) 15 MG tablet, TAKE 1 TABLET EVERY DAY, Disp: 90 tablet, Rfl: 1  •  Misc Natural Products (WHITE WILLOW BARK PO), Take 1 tablet by mouth Daily., Disp: , Rfl:   •  Omega 3-6-9 Fatty Acids (OMEGA 3-6-9 COMPLEX PO), Take 1 capsule by mouth Daily., Disp: , Rfl:   •  traMADol (ULTRAM) 50 MG tablet, Take 50 mg by mouth Every 6 (Six) Hours As Needed., Disp: , Rfl:     Objective     Vital Signs:   Temp:  [98.2 °F (36.8 °C)] 98.2 °F (36.8 °C)  Heart Rate:  [110] 110  BP: (142)/(86) 142/86    Physical Exam:   General Appearance:    Alert, cooperative, in no acute distress   Head:    Normocephalic, without obvious abnormality, atraumatic   Eyes:            Lids and lashes normal, conjunctivae and sclerae normal, no   icterus, no pallor, corneas clear, PERRL   Ears:    Ears appear intact with no abnormalities noted   Throat:   No oral lesions, no thrush, oral mucosa moist   Neck:   No adenopathy, supple, trachea midline, no thyromegaly,  no JVD   Lungs:     Clear to auscultation,respirations regular, even and                  unlabored    Heart:    Regular rhythm and normal rate, normal S1 and S2, no            murmur   Abdomen:     no masses, no organomegaly, soft non-tender, non-distended, no guarding, there is no evidence of tenderness   Extremities:   Moves all extremities well, no edema, no cyanosis, no             redness   Pulses:   Pulses palpable and equal bilaterally   Skin:   No bleeding, bruising or rash   Lymph nodes:   No palpable adenopathy   Neurologic:   Cranial nerves 2 - 12  grossly intact, sensation intact      Results Review:   I reviewed the patient's new clinical results.  I reviewed the patient's new imaging results and agree with the interpretation.  I reviewed the patient's other test results and agree with the interpretation    Review of Systems was reviewed and confirmed as accurate today.    Assessment/Plan :    1. Dysphagia, unspecified type    2. Weight loss        I recommend a colonoscopy for further evaluation. The procedure was explained as well as the risks which include but are not limited to bleeding, infection, perforation, abdominal pain etc. The patient understands these risks and the procedure and wishes to proceed. I did have a detailed discussion with the patient and her son today in the office.    Electronically signed by Taras Morillo MD  01/29/18 10:51 AM  Scribed for Taras Morillo MD by Karlee Rivera. 1/29/2018  4:03 PM

## 2018-02-07 NOTE — ANESTHESIA PREPROCEDURE EVALUATION
Anesthesia Evaluation     Patient summary reviewed and Nursing notes reviewed   no history of anesthetic complications:  NPO Solid Status: > 8 hours  NPO Liquid Status: > 8 hours           Airway   Mallampati: II  TM distance: >3 FB  Neck ROM: full  no difficulty expected  Dental - normal exam     Pulmonary - normal exam   (+) asthma,   (-) not a smoker  Cardiovascular - normal exam  Exercise tolerance: good (4-7 METS)    Rhythm: regular  Rate: normal    (+) hypertension well controlled, hyperlipidemia      Neuro/Psych  (+) headaches, numbness,     (-) seizures, TIA, CVA  GI/Hepatic/Renal/Endo    (+)  GERD, diabetes mellitus (FSBS 85) type 2 well controlled, hypothyroidism,     Musculoskeletal     Abdominal    Substance History - negative use     OB/GYN negative ob/gyn ROS   (-)  Pregnant        Other   (+) arthritis                       Anesthesia Plan    ASA 2     MAC   (Pt told that intravenous sedation will be used as the primary anesthetic along with local anesthesia if necessary. Every effort will be made to make sure the patient is comfortable.     The patient was told they may or may not have recall for the procedure. It was further explained that if the MAC was not adequate that a general anesthetic with either an LMA or endotracheal tube would be required.     Will proceed with the plan of care.)  intravenous induction   Anesthetic plan and risks discussed with patient.

## 2018-02-07 NOTE — ANESTHESIA POSTPROCEDURE EVALUATION
Patient: Marifer Ricardo    Procedure Summary     Date Anesthesia Start Anesthesia Stop Room / Location    02/07/18 0932  [facility] ENDOSCOPY 3 / [facility] ENDOSCOPY       Procedure Diagnosis Surgeon Provider    COLONOSCOPY WITH RANDOM COLON BIOPSIES (N/A ) Weight loss  (Weight loss [R63.4]) MD Cooper Jose CRNA          Anesthesia Type: MAC  Last vitals  BP   124/87 (02/07/18 0720)   Temp   98 °F (36.7 °C) (02/07/18 0720)   Pulse   101 (02/07/18 0720)   Resp   18 (02/07/18 0720)     SpO2   97 % (02/07/18 0720)     Post Anesthesia Care and Evaluation    Patient location during evaluation: bedside  Patient participation: complete - patient participated  Level of consciousness: awake and alert  Pain score: 0  Pain management: adequate  Airway patency: patent  Anesthetic complications: No anesthetic complications  PONV Status: none  Cardiovascular status: acceptable  Respiratory status: acceptable  Hydration status: acceptable

## 2018-02-07 NOTE — PLAN OF CARE
Problem: GI Endoscopy (Adult)  Goal: Signs and Symptoms of Listed Potential Problems Will be Absent or Manageable (GI Endoscopy)  Outcome: Ongoing (interventions implemented as appropriate)   02/07/18 0718   GI Endoscopy   Problems Assessed (GI Endoscopy) all   Problems Present (GI Endoscopy) none

## 2018-02-08 ENCOUNTER — HOSPITAL ENCOUNTER (OUTPATIENT)
Dept: GENERAL RADIOLOGY | Facility: HOSPITAL | Age: 68
Discharge: HOME | End: 2018-02-08
Attending: SURGERY | Admitting: SURGERY

## 2018-02-08 DIAGNOSIS — R63.4 WEIGHT LOSS: ICD-10-CM

## 2018-02-08 LAB — GLUCOSE BLDC GLUCOMTR-MCNC: 85 MG/DL (ref 70–130)

## 2018-02-08 PROCEDURE — 74280 X-RAY XM COLON 2CNTRST STD: CPT

## 2018-02-13 LAB
LAB AP CASE REPORT: NORMAL
Lab: NORMAL
PATH REPORT.FINAL DX SPEC: NORMAL

## 2018-02-21 ENCOUNTER — OFFICE VISIT (OUTPATIENT)
Dept: SURGERY | Facility: CLINIC | Age: 68
End: 2018-02-21

## 2018-02-21 VITALS
DIASTOLIC BLOOD PRESSURE: 68 MMHG | SYSTOLIC BLOOD PRESSURE: 122 MMHG | TEMPERATURE: 99.4 F | OXYGEN SATURATION: 98 % | RESPIRATION RATE: 18 BRPM | HEIGHT: 68 IN | HEART RATE: 92 BPM | WEIGHT: 124 LBS | BODY MASS INDEX: 18.79 KG/M2

## 2018-02-21 DIAGNOSIS — R63.4 WEIGHT LOSS: Primary | ICD-10-CM

## 2018-02-21 PROCEDURE — 99213 OFFICE O/P EST LOW 20 MIN: CPT | Performed by: SURGERY

## 2018-02-21 NOTE — PROGRESS NOTES
"Patient: Marifer Ricardo    YOB: 1950    Date: 02/21/2018    Primary Care Provider: Payal Edwards MD    Chief Complaint:   Chief Complaint   Patient presents with   • Follow-up     colonoscopy.       History: Pt is here for follow-up colonoscopy which was done on 02/07/2018, it was done in part for evaluation of recent weight loss.  Random colon biopsy showed benign colonic mucosa.  Pt has had a 4 lb weight loss since she was last seen in the office, she stated \"I don't have much of an appetite.\"  Pt denies change in bowel habits including dark colored stool and/or visible rectal bleeding.     After further discussion with the patient and her son she did have multiple previous surgeries in the past for unknown reason, barium enema does reveal previous Subtotal colectomy with ileocolonic anastomosis.  The patient's son states that she has been seen at both the [facility] and the [facility] in the distant past, she did have feedings tube placed in the past, she really doesn't know why she was evaluated at these institutions in the past.    She continues to complain of some aspects of food getting stuck in her esophagus when she swallows, this doesn't seem to happen with liquids.    The following portions of the patient's history were reviewed and updated as appropriate: allergies, current medications, past family history, past medical history, past social history, past surgical history and problem list.      Review of Systems   Constitutional: Positive for appetite change and unexpected weight change. Negative for chills and fever.   HENT: Negative for hearing loss, trouble swallowing and voice change.    Eyes: Negative for visual disturbance.   Respiratory: Negative for apnea, cough, chest tightness, shortness of breath and wheezing.    Cardiovascular: Negative for chest pain, palpitations and leg swelling.   Gastrointestinal: Negative for abdominal distention, abdominal pain, anal bleeding, " "blood in stool, constipation, diarrhea, nausea, rectal pain and vomiting.   Endocrine: Negative for cold intolerance and heat intolerance.   Genitourinary: Negative for difficulty urinating, dysuria and flank pain.   Musculoskeletal: Negative for back pain and gait problem.   Skin: Negative for color change, rash and wound.   Neurological: Negative for dizziness, syncope, speech difficulty, weakness, light-headedness, numbness and headaches.   Hematological: Negative for adenopathy. Does not bruise/bleed easily.   Psychiatric/Behavioral: Negative for confusion. The patient is not nervous/anxious.        Vital Signs  /68  Pulse 92  Temp 99.4 °F (37.4 °C) (Temporal Artery )   Resp 18  Ht 172.7 cm (67.99\")  Wt 56.2 kg (124 lb)  LMP  (LMP Unknown)  SpO2 98%  BMI 18.86 kg/m2    Allergies:  Allergies   Allergen Reactions   • Penicillins Anaphylaxis   • Betadine [Povidone Iodine] Hives   • Codeine Hives   • Reglan [Metoclopramide] Hives       Medications:    Current Outpatient Prescriptions:   •  atorvastatin (LIPITOR) 20 MG tablet, TAKE 1 TABLET EVERY DAY, Disp: 90 tablet, Rfl: 1  •  Biotin ([employer] BIOTIN HIGH POTENCY) 1000 MCG tablet, Take 1,000 mcg by mouth 3 (three) times a day., Disp: , Rfl:   •  cetirizine (zyrTEC) 10 MG tablet, Take 1 tablet by mouth Daily., Disp: , Rfl:   •  cholecalciferol (VITAMIN D3) 1000 UNITS tablet, Take 1,000 Units by mouth daily., Disp: , Rfl:   •  Cinnamon 500 MG tablet, Take  by mouth 2 (two) times a day., Disp: , Rfl:   •  coenzyme Q10 100 MG capsule, Take 100 mg by mouth daily., Disp: , Rfl:   •  DEVILS CLAW PO, Take 1 tablet by mouth Daily., Disp: , Rfl:   •  glucosamine sulfate 500 MG capsule capsule, Take  by mouth 3 (three) times a day with meals., Disp: , Rfl:   •  meloxicam (MOBIC) 15 MG tablet, TAKE 1 TABLET EVERY DAY, Disp: 90 tablet, Rfl: 1  •  Misc Natural Products (WHITE WILLOW BARK PO), Take 1 tablet by mouth Daily., Disp: , Rfl:   •  Omega 3-6-9 Fatty Acids " (OMEGA 3-6-9 COMPLEX PO), Take 1 capsule by mouth Daily., Disp: , Rfl:   •  traMADol (ULTRAM) 50 MG tablet, Take 50 mg by mouth Every 6 (Six) Hours As Needed., Disp: , Rfl:   •  Blood Glucose Monitoring Suppl w/Device kit, 1 Device Daily. CHECK BLOOD SUGAR ONCE DAILY, Disp: 1 each, Rfl: 0  •  Cyanocobalamin (VITAMIN B-12) 5000 MCG sublingual tablet, Place 1 tablet under the tongue Daily., Disp: , Rfl:   •  Glucose Blood (BLOOD GLUCOSE TEST) strip, 1 strip by In Vitro route Daily. CHECK BLOOD SUGAR DAILY., Disp: 50 each, Rfl: 5  •  Lancets misc, 1 each Daily. CHECK BLOOD SUGAR DAILY, Disp: 100 each, Rfl: 5    Physical Exam:   General Appearance:    Alert, cooperative, in no acute distress   Head:    Normocephalic, without obvious abnormality, atraumatic   Lungs:     Clear to auscultation,respirations regular, even and                  unlabored    Heart:    Regular rhythm and normal rate, normal S1 and S2, no            murmur, no gallop, no rub, no click   Abdomen:     Normal bowel sounds, no masses, no organomegaly, soft        non-tender, non-distended, no guarding, no rebound                tenderness   Extremities:   Moves all extremities well, no edema, no cyanosis, no             redness   Pulses:   Pulses palpable and equal bilaterally   Skin:   No bleeding, bruising or rash       Results Review:   I reviewed the patient's new clinical results.  I reviewed the patient's new imaging results and agree with the interpretation.  I reviewed the patient's other test results and agree with the interpretation     Assessment/Plan     1. Weight loss        I did have a detailed discussion with the patient and her son in the office today.  I can find no reason for her recent history of weight loss.  Given the fact that she continues to have difficulty swallowing I think she needs to have a barium swallow and upper GI performed and then would like to see her back in the office in follow-up.    Electronically signed by  Taras Morillo MD  02/21/18    Scribed for Taras Morillo MD by Sarah Jones. 2/21/2018  1:13 PM

## 2018-02-26 ENCOUNTER — HOSPITAL ENCOUNTER (OUTPATIENT)
Dept: GENERAL RADIOLOGY | Facility: HOSPITAL | Age: 68
Discharge: HOME | End: 2018-02-26
Attending: SURGERY | Admitting: SURGERY

## 2018-02-26 DIAGNOSIS — R63.4 WEIGHT LOSS: ICD-10-CM

## 2018-02-26 PROCEDURE — 74246 X-RAY XM UPR GI TRC 2CNTRST: CPT

## 2018-02-28 ENCOUNTER — OFFICE VISIT (OUTPATIENT)
Dept: SURGERY | Facility: CLINIC | Age: 68
End: 2018-02-28

## 2018-02-28 VITALS
BODY MASS INDEX: 18.82 KG/M2 | HEIGHT: 68 IN | HEART RATE: 80 BPM | DIASTOLIC BLOOD PRESSURE: 94 MMHG | SYSTOLIC BLOOD PRESSURE: 162 MMHG | WEIGHT: 124.2 LBS | TEMPERATURE: 97.9 F | OXYGEN SATURATION: 97 %

## 2018-02-28 DIAGNOSIS — R63.4 WEIGHT LOSS: Primary | ICD-10-CM

## 2018-02-28 DIAGNOSIS — R14.2 BELCHING: ICD-10-CM

## 2018-02-28 PROCEDURE — 99213 OFFICE O/P EST LOW 20 MIN: CPT | Performed by: SURGERY

## 2018-02-28 NOTE — PROGRESS NOTES
Patient: Marifer Ricardo    YOB: 1950    Date: 02/28/2018    Primary Care Provider: Payal Edwards MD    Chief Complaint   Patient presents with   • Follow-up     follow up upper gi       History: I saw the patient in the office today for an evaluation and follow up from her recent upper gi. The report was unremarkable. She had a colonoscopy done on 02/07/18 for evaluation for recent weight loss. Random colon biopsy showed benign colonic mucosa. Her weight has not changed since her last visit. She complains of excessive belching after meals, does not change with diet. She still does not have much of an appetite but has not gotten worse since her last visit. She denies dysphagia, abdominal pain nausea and vomiting.     The following portions of the patient's history were reviewed and updated as appropriate: allergies, current medications, past family history, past medical history, past social history, past surgical history and problem list.      Review of Systems   Constitutional: Positive for appetite change and unexpected weight change. Negative for chills and fever.   HENT: Negative for hearing loss, trouble swallowing and voice change.    Eyes: Negative for visual disturbance.   Respiratory: Negative for apnea, cough, chest tightness, shortness of breath and wheezing.    Cardiovascular: Negative for chest pain, palpitations and leg swelling.   Gastrointestinal: Negative for abdominal distention, abdominal pain, anal bleeding, blood in stool, constipation, diarrhea, nausea, rectal pain and vomiting.   Endocrine: Negative for cold intolerance and heat intolerance.   Genitourinary: Negative for difficulty urinating, dysuria and flank pain.   Musculoskeletal: Negative for back pain and gait problem.   Skin: Negative for color change, rash and wound.   Neurological: Negative for dizziness, syncope, speech difficulty, weakness, light-headedness, numbness and headaches.   Hematological: Negative for  "adenopathy. Does not bruise/bleed easily.   Psychiatric/Behavioral: Negative for confusion. The patient is not nervous/anxious.        Vital Signs  /94  Pulse 80  Temp 97.9 °F (36.6 °C)  Ht 172.7 cm (67.99\")  Wt 56.3 kg (124 lb 3.2 oz)  LMP  (LMP Unknown)  SpO2 97%  BMI 18.89 kg/m2    Allergies:  Allergies   Allergen Reactions   • Penicillins Anaphylaxis   • Betadine [Povidone Iodine] Hives   • Codeine Hives   • Reglan [Metoclopramide] Hives       Medications:    Current Outpatient Prescriptions:   •  atorvastatin (LIPITOR) 20 MG tablet, TAKE 1 TABLET EVERY DAY, Disp: 90 tablet, Rfl: 1  •  Biotin ([employer] BIOTIN HIGH POTENCY) 1000 MCG tablet, Take 1,000 mcg by mouth 3 (three) times a day., Disp: , Rfl:   •  Blood Glucose Monitoring Suppl w/Device kit, 1 Device Daily. CHECK BLOOD SUGAR ONCE DAILY, Disp: 1 each, Rfl: 0  •  cetirizine (zyrTEC) 10 MG tablet, Take 1 tablet by mouth Daily., Disp: , Rfl:   •  cholecalciferol (VITAMIN D3) 1000 UNITS tablet, Take 1,000 Units by mouth daily., Disp: , Rfl:   •  Cinnamon 500 MG tablet, Take  by mouth 2 (two) times a day., Disp: , Rfl:   •  coenzyme Q10 100 MG capsule, Take 100 mg by mouth daily., Disp: , Rfl:   •  Cyanocobalamin (VITAMIN B-12) 5000 MCG sublingual tablet, Place 1 tablet under the tongue Daily., Disp: , Rfl:   •  DEVILS CLAW PO, Take 1 tablet by mouth Daily., Disp: , Rfl:   •  glucosamine sulfate 500 MG capsule capsule, Take  by mouth 3 (three) times a day with meals., Disp: , Rfl:   •  Glucose Blood (BLOOD GLUCOSE TEST) strip, 1 strip by In Vitro route Daily. CHECK BLOOD SUGAR DAILY., Disp: 50 each, Rfl: 5  •  Lancets misc, 1 each Daily. CHECK BLOOD SUGAR DAILY, Disp: 100 each, Rfl: 5  •  meloxicam (MOBIC) 15 MG tablet, TAKE 1 TABLET EVERY DAY, Disp: 90 tablet, Rfl: 1  •  Misc Natural Products (WHITE WILLOW BARK PO), Take 1 tablet by mouth Daily., Disp: , Rfl:   •  Omega 3-6-9 Fatty Acids (OMEGA 3-6-9 COMPLEX PO), Take 1 capsule by mouth Daily., Disp: , " Rfl:   •  traMADol (ULTRAM) 50 MG tablet, Take 50 mg by mouth Every 6 (Six) Hours As Needed., Disp: , Rfl:     Physical Exam:   General Appearance:    Alert, cooperative, in no acute distress   Head:    Normocephalic, without obvious abnormality, atraumatic   Lungs:     Clear to auscultation,respirations regular, even and                  unlabored    Heart:    Regular rhythm and normal rate, normal S1 and S2, no            murmur, no gallop, no rub, no click   Abdomen:     Normal bowel sounds, no masses, no organomegaly, soft        non-tender, non-distended, no guarding, no rebound                tenderness   Extremities:   Moves all extremities well, no edema, no cyanosis, no             redness   Pulses:   Pulses palpable and equal bilaterally   Skin:   No bleeding, bruising or rash       Results Review:   I reviewed the patient's new clinical results.  I reviewed the patient's new imaging results and agree with the interpretation.  I reviewed the patient's other test results and agree with the interpretation     Assessment/Plan     1. Weight loss    2. Belching      I did have a detailed and extensive discussion with the patient and her son today in the office.  Her recent upper GI was fairly normal, she has had a previous subtotal colectomy for unknown reasons.  I think the patient needs to increase her dietary intake and specifically she needs to have protein supplementation.  I have given them some suggestions, I don't think she needs surgical intervention, I will see her back in the office only if she has further problems.    Electronically signed by Taras Morillo MD  02/28/18  Scribed for Taras Morillo MD by Karlee Rivera. 2/28/2018  1:46 PM

## 2018-03-05 ENCOUNTER — TELEPHONE (OUTPATIENT)
Dept: FAMILY MEDICINE CLINIC | Facility: CLINIC | Age: 68
End: 2018-03-05

## 2018-03-05 NOTE — TELEPHONE ENCOUNTER
H pylori is checked during upper endoscopy. Her pathology report from her EGD was negative for H pylori.

## 2018-03-05 NOTE — TELEPHONE ENCOUNTER
Pt called stating that she was released from Dr Morillo after all her testing was completed. States she is still having same symptoms and thought she maybe needed H-pylori testing to rule that out?

## 2018-04-09 ENCOUNTER — OFFICE VISIT (OUTPATIENT)
Dept: FAMILY MEDICINE CLINIC | Facility: CLINIC | Age: 68
End: 2018-04-09

## 2018-04-09 VITALS
OXYGEN SATURATION: 99 % | HEIGHT: 67 IN | TEMPERATURE: 98.5 F | SYSTOLIC BLOOD PRESSURE: 158 MMHG | BODY MASS INDEX: 19.62 KG/M2 | DIASTOLIC BLOOD PRESSURE: 90 MMHG | WEIGHT: 125 LBS | HEART RATE: 80 BPM

## 2018-04-09 DIAGNOSIS — R63.4 WEIGHT LOSS: ICD-10-CM

## 2018-04-09 DIAGNOSIS — G44.89 CHRONIC MIXED HEADACHE SYNDROME: ICD-10-CM

## 2018-04-09 DIAGNOSIS — E16.2 HYPOGLYCEMIA: ICD-10-CM

## 2018-04-09 DIAGNOSIS — R05.3 CHRONIC COUGH: ICD-10-CM

## 2018-04-09 DIAGNOSIS — M85.89 OSTEOPENIA OF MULTIPLE SITES: ICD-10-CM

## 2018-04-09 DIAGNOSIS — E11.8 TYPE 2 DIABETES MELLITUS WITH COMPLICATION, WITHOUT LONG-TERM CURRENT USE OF INSULIN (HCC): Primary | ICD-10-CM

## 2018-04-09 DIAGNOSIS — N18.9 CHRONIC RENAL IMPAIRMENT, UNSPECIFIED CKD STAGE: ICD-10-CM

## 2018-04-09 DIAGNOSIS — N18.30 CHRONIC KIDNEY DISEASE, STAGE III (MODERATE) (HCC): ICD-10-CM

## 2018-04-09 DIAGNOSIS — R53.82 CHRONIC FATIGUE: ICD-10-CM

## 2018-04-09 DIAGNOSIS — M25.50 POLYARTHRALGIA: ICD-10-CM

## 2018-04-09 DIAGNOSIS — Z78.9 DIFFICULT INTRAVENOUS ACCESS: ICD-10-CM

## 2018-04-09 LAB — GLUCOSE BLDC GLUCOMTR-MCNC: 72 MG/DL (ref 70–130)

## 2018-04-09 PROCEDURE — 82947 ASSAY GLUCOSE BLOOD QUANT: CPT | Performed by: FAMILY MEDICINE

## 2018-04-09 PROCEDURE — 99215 OFFICE O/P EST HI 40 MIN: CPT | Performed by: FAMILY MEDICINE

## 2018-04-09 RX ORDER — CETIRIZINE HYDROCHLORIDE 10 MG/1
TABLET ORAL
COMMUNITY
Start: 2018-02-14 | End: 2018-04-09

## 2018-04-09 NOTE — PROGRESS NOTES
"Subjective   Marifer Crista Ricardo is a 68 y.o. female.     History of Present Illness  Ms. Ricardo is here today to f/u on several chronic issues.  Diabetes Mellitus Type II, Follow-up: Patient here for follow-up of Type 2 diabetes mellitus. Current symptoms/problems include none.   Known diabetic complications: peripheral neuropathy, ?gastroparesis  Cardiovascular risk factors: advanced age (older than 55 for men, 65 for women), diabetes mellitus, dyslipidemia and hypertension  Current diabetic medications include none.   Eye exam current (within one year): yes  Weight trend: recent drop  Prior visit with dietician: yes  Current diet: in general, a generally \"healthy\" diet but poor appetite overall caloric intake low  Current exercise: none  Current monitoring regimen: not currently checking BG  Any episodes of hypoglycemia? no  Is She on ACE inhibitor or angiotensin II receptor blocker?   No- not indicated      Hyperlipidemia: Patient presents with hyperlipidemia. She was tested because or comorbidities. Her last labs reviewed as listed in chart. There is a family history of hyperlipidemia. There is a family history of ischemia heart disease. She is taking Lipitor. Tolerating well.       She is followed by Dr. Mondragon (neurology) for chronic migraine. Currently on NSAID for arthritis pain and headaches. Also on gabapentin.     She has mild CRI which is being closely monitored.      Over past few months she has had extensive w/u for abd pain assoc'd with early satiety, weight loss, nausea.  Has had eval per Dr. Morillo including EGD, upper GI, barium enema, HIDA, gallbladder US. W/U essentially negative. She has unclear medical hx regarding previous severe gastroparesis, protein malabsorption? With requirement of what sounds like TPN. Also had partial colectomy for reasons she cannot recall. Since her last visit with me her abd pain has resolved. She continues to have early satiety, belching/bloating. No dysphagia, no " blood in stool. Having some intermittent heartburn. She is on Mobic chronically but states she does not use it daily, generally 1-2 times per week.    The following portions of the patient's history were reviewed and updated as appropriate: allergies, current medications, past family history, past medical history, past social history, past surgical history and problem list.    Review of Systems   Constitutional: Positive for fatigue. Negative for activity change, chills and fever.   HENT: Negative for congestion, mouth sores, rhinorrhea, sore throat and trouble swallowing.    Eyes: Negative for pain and redness.   Respiratory: Positive for cough (mild, dry, intermittent, chronic). Negative for shortness of breath and wheezing.    Cardiovascular: Negative for chest pain, palpitations and leg swelling.   Gastrointestinal: Negative for abdominal pain, diarrhea, nausea and vomiting.        As per HPI   Endocrine: Positive for cold intolerance. Negative for polydipsia and polyuria.   Genitourinary: Negative for dysuria, hematuria and urgency.   Musculoskeletal: Positive for arthralgias and back pain.   Skin: Negative for rash and wound.   Neurological: Positive for weakness (generalized, non-focal) and headaches. Negative for dizziness.   Hematological: Negative for adenopathy. Bruises/bleeds easily.   Psychiatric/Behavioral: Positive for dysphoric mood and sleep disturbance. Negative for confusion and suicidal ideas.       Objective    Vitals:    04/09/18 1102   BP: 158/90   Pulse: 80   Temp: 98.5 °F (36.9 °C)   SpO2: 99%     Body mass index is 19.58 kg/m².  1    04/09/18  1102   Weight: 56.7 kg (125 lb)       Physical Exam   Constitutional: She is oriented to person, place, and time. She appears well-developed and well-nourished. She is cooperative. She does not appear ill. No distress.   Appears older than stated age   HENT:   Head: Normocephalic and atraumatic.   Right Ear: Decreased hearing is noted.   Left Ear:  Decreased hearing is noted.   Mouth/Throat: Oropharynx is clear and moist and mucous membranes are normal.   Eyes: Conjunctivae and lids are normal.   Neck: Phonation normal. Neck supple. Normal carotid pulses present. No thyroid mass and no thyromegaly present.   Cardiovascular: Normal rate, regular rhythm, S1 normal, S2 normal and intact distal pulses.  Exam reveals no gallop.    No murmur heard.  No per edema   Pulmonary/Chest: Effort normal and breath sounds normal.   Abdominal: Soft. She exhibits no distension and no mass. Bowel sounds are increased. There is no hepatosplenomegaly. There is no tenderness.   Musculoskeletal: She exhibits no edema or tenderness.   Lymphadenopathy:     She has no cervical adenopathy.   Neurological: She is alert and oriented to person, place, and time. She has normal strength. She displays no tremor. Gait normal.   Skin: Skin is warm and dry. No ecchymosis and no rash noted. There is pallor.   Psychiatric: Her speech is normal and behavior is normal. Thought content normal. Her affect is blunt. Cognition and memory are normal.   Nursing note and vitals reviewed.    Upper GI - normal  Barium Enema - multiple surgical clips, near total colectomy with intact rectaum and sigmoid colon with patent anastomosis.  CT abd/pelvis - non-obstructive left sided nephrolithiasis, otherwise normal.   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    Lab Results   Component Value Date    WBC 8.08 01/18/2018    HGB 12.4 01/18/2018    HCT 37.9 01/18/2018    MCV 91.1 01/18/2018     01/18/2018     Lab Results   Component Value Date    GLUCOSE 122 (H) 01/18/2018    BUN 14 01/18/2018    CREATININE 0.70 01/18/2018    EGFRIFNONA 83 01/18/2018    EGFRIFAFRI 101 01/10/2018    BCR 20.0 01/18/2018    K 3.9 01/18/2018    CO2 33.0 (H) 01/18/2018    CALCIUM 9.2 01/18/2018    PROTENTOTREF 6.1 (L) 01/10/2018    ALBUMIN 3.50  01/18/2018    LABIL2 1.1 01/18/2018    AST 23 01/18/2018    ALT 31 01/18/2018     Lab Results   Component Value Date    CHLPL 187 01/10/2018    TRIG 94 01/10/2018    HDL 40 01/10/2018     (H) 01/10/2018     Lab Results   Component Value Date    HGBA1C 6.40 01/10/2018     Lab Results   Component Value Date    LIPASE 51 01/18/2018     All available previous EMR/[employer] records with pertinent data summarized as above/below.    Assessment/Plan   Marifer was seen today for follow-up, diabetes and hypothyroidism.    Diagnoses and all orders for this visit:    Type 2 diabetes mellitus with complication, without long-term current use of insulin  -     POCT Glucose  -     Microalbumin / Creatinine Urine Ratio - Urine, Clean Catch; Future  -     Hemoglobin A1c; Future    Weight loss  -     XR Chest PA & Lateral; Future  -     CBC & Differential; Future  -     TSH Rfx On Abnormal To Free T4; Future  -     CHRISTINA With / DsDNA, RNP, Sjogrens A / B, Smith; Future  -     C-reactive Protein; Future  -     Sedimentation Rate; Future    Chronic mixed headache syndrome  -     CBC & Differential; Future  -     CHRISTINA With / DsDNA, RNP, Sjogrens A / B, Smith; Future  -     C-reactive Protein; Future  -     Sedimentation Rate; Future    Difficult intravenous access    Chronic renal impairment, unspecified CKD stage  -     CBC & Differential; Future  -     Renal Function Panel; Future  -     PTH, Intact; Future  -     Microalbumin / Creatinine Urine Ratio - Urine, Clean Catch; Future  -     Vitamin D 25 Hydroxy; Future    Osteopenia of multiple sites  -     TSH Rfx On Abnormal To Free T4; Future    Chronic fatigue  -     CBC & Differential; Future  -     TSH Rfx On Abnormal To Free T4; Future  -     CHRISTINA With / DsDNA, RNP, Sjogrens A / B, Smith; Future  -     C-reactive Protein; Future  -     Sedimentation Rate; Future    Hypoglycemia  -     TSH Rfx On Abnormal To Free T4; Future    Chronic cough  -     XR Chest PA & Lateral; Future  -     " CBC & Differential; Future  -     CHRISTINA With / DsDNA, RNP, Sjogrens A / B, Smith; Future  -     C-reactive Protein; Future  -     Sedimentation Rate; Future    Polyarthralgia  -     CBC & Differential; Future  -     CHRISTINA With / DsDNA, RNP, Sjogrens A / B, Smith; Future  -     C-reactive Protein; Future  -     Sedimentation Rate; Future    Chronic kidney disease, stage III (moderate)   -     Vitamin D 25 Hydroxy; Future    Ms. Ricardo is a somewhat limited historian in regard to her GI problems. SHe has had near total colectomy for unclear reasons (with temp colostomy, revision, etc). She reports having had this due to a ?pseudo-bowel obstruction, requiring \"feeds\", etc. She reports this was due to severe gastroparesis and malabsorption, but is unsure. 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 SHe also has h/o \"port\" in upper chest. Pt currently has mild chronic renal insufficiency. She has had leukopenia, anemia, and thrombocytopenia in the past. As well as hypoproteinemia, e-lyte fluctuations, and hypoglycemia. Has h/o osteopenia, nephrolithiasis and intermittently high PTH. As above, she continues to have intermittent abd pain, poor appetite, belching, heartburn. She has h/o being chronically very poor IV access, having chronic fatigue, chronic polyarthralgia.    Considerations include atypical adult CF, h/o paralytic ileus or intussusception or IBDz or bowel infarction/mesenteric ischemia, complications of previous anorexia nervosa, other autoimmune/connective tissue dz. She has h/o recurrent sinusitis based on MRI findings as well as previous mastoiditis (fluid in mastoid cells). She c/o chronic cough but denies h/o COPD, asthma, etc.    I have recommended she have further eval by gastroenterology. She declines at this time as she is \"not in pain right now\".    Mrs. Ricardo has a chronically stoic/blunt affect. Demonstrates poor memory vs withdrawal. I " do have concern for possible assoc'd mood/psychiatric or neurological DO as well. SHe has had extensive eval for chronic migraines for which she followed by Dr. Paris (neurology in [place]).    Have recommended she have CXR and further autoimmune lab eval to which she is amenable.    I will contact patient regarding test results and provide instructions regarding any necessary changes in plan of care.  Routine f/u in 3 months, sooner as needed/instructed.  [facility] and will need repeat Pneumovax at that time.    Patient was encouraged to keep me informed of any acute changes, lack of improvement, or any new concerning symptoms.  Pt is aware of reasons to seek emergent care.  Patient voiced understanding of all instructions and denied further questions.

## 2018-04-10 ENCOUNTER — TELEPHONE (OUTPATIENT)
Dept: FAMILY MEDICINE CLINIC | Facility: CLINIC | Age: 68
End: 2018-04-10

## 2018-04-10 DIAGNOSIS — R63.4 WEIGHT LOSS: ICD-10-CM

## 2018-04-10 DIAGNOSIS — R05.3 CHRONIC COUGH: ICD-10-CM

## 2018-04-10 DIAGNOSIS — J43.8 OTHER EMPHYSEMA (HCC): ICD-10-CM

## 2018-04-10 NOTE — TELEPHONE ENCOUNTER
Please inform patient that I have done extensive review of her medical record.  Considering her previous medical problems as well as her current symptoms I strongly advise that she have chest x-ray and follow-up labs.  She generally likes to have these at [facility] as she has difficult venipuncture access.  Orders have been entered into chart and printed.

## 2018-04-11 ENCOUNTER — RESULTS ENCOUNTER (OUTPATIENT)
Dept: FAMILY MEDICINE CLINIC | Facility: CLINIC | Age: 68
End: 2018-04-11

## 2018-04-11 DIAGNOSIS — R53.82 CHRONIC FATIGUE: ICD-10-CM

## 2018-04-11 DIAGNOSIS — R63.4 WEIGHT LOSS: ICD-10-CM

## 2018-04-11 DIAGNOSIS — G44.89 CHRONIC MIXED HEADACHE SYNDROME: ICD-10-CM

## 2018-04-11 DIAGNOSIS — E16.2 HYPOGLYCEMIA: ICD-10-CM

## 2018-04-11 DIAGNOSIS — N18.30 CHRONIC KIDNEY DISEASE, STAGE III (MODERATE) (HCC): ICD-10-CM

## 2018-04-11 DIAGNOSIS — E11.8 TYPE 2 DIABETES MELLITUS WITH COMPLICATION, WITHOUT LONG-TERM CURRENT USE OF INSULIN (HCC): ICD-10-CM

## 2018-04-11 DIAGNOSIS — M25.50 POLYARTHRALGIA: ICD-10-CM

## 2018-04-11 DIAGNOSIS — N18.9 CHRONIC RENAL IMPAIRMENT, UNSPECIFIED CKD STAGE: ICD-10-CM

## 2018-04-11 DIAGNOSIS — M85.89 OSTEOPENIA OF MULTIPLE SITES: ICD-10-CM

## 2018-04-11 DIAGNOSIS — R05.3 CHRONIC COUGH: ICD-10-CM

## 2018-04-11 NOTE — TELEPHONE ENCOUNTER
Pt notified and she said that was ok and would like order faxed to [facility].    Orders faxed to [facility] per patient

## 2018-04-12 DIAGNOSIS — J43.8 OTHER EMPHYSEMA (HCC): Primary | ICD-10-CM

## 2018-05-03 ENCOUNTER — OFFICE VISIT (OUTPATIENT)
Dept: FAMILY MEDICINE CLINIC | Facility: CLINIC | Age: 68
End: 2018-05-03

## 2018-05-03 DIAGNOSIS — Z23 NEED FOR DIPHTHERIA-TETANUS-PERTUSSIS (TDAP) VACCINE: ICD-10-CM

## 2018-05-03 DIAGNOSIS — E11.40 TYPE 2 DIABETES MELLITUS WITH DIABETIC NEUROPATHY, WITHOUT LONG-TERM CURRENT USE OF INSULIN (HCC): ICD-10-CM

## 2018-05-03 DIAGNOSIS — G43.919 INTRACTABLE MIGRAINE WITHOUT STATUS MIGRAINOSUS, UNSPECIFIED MIGRAINE TYPE: ICD-10-CM

## 2018-05-03 DIAGNOSIS — E11.42 DIABETIC PERIPHERAL NEUROPATHY (HCC): ICD-10-CM

## 2018-05-03 DIAGNOSIS — L71.9 ROSACEA: ICD-10-CM

## 2018-05-03 DIAGNOSIS — E78.00 PURE HYPERCHOLESTEROLEMIA: ICD-10-CM

## 2018-05-03 DIAGNOSIS — Z86.39 HISTORY OF HYPOTHYROIDISM: ICD-10-CM

## 2018-05-03 DIAGNOSIS — G44.89 CHRONIC MIXED HEADACHE SYNDROME: ICD-10-CM

## 2018-05-03 DIAGNOSIS — Z11.59 NEED FOR HEPATITIS C SCREENING TEST: ICD-10-CM

## 2018-05-03 DIAGNOSIS — Z00.00 MEDICARE ANNUAL WELLNESS VISIT, SUBSEQUENT: Primary | ICD-10-CM

## 2018-05-03 DIAGNOSIS — M85.89 OSTEOPENIA OF MULTIPLE SITES: ICD-10-CM

## 2018-05-03 DIAGNOSIS — Z23 NEED FOR PNEUMOCOCCAL VACCINATION: ICD-10-CM

## 2018-05-03 PROBLEM — R63.4 WEIGHT LOSS: Status: RESOLVED | Noted: 2018-01-30 | Resolved: 2018-05-03

## 2018-05-03 PROCEDURE — 99397 PER PM REEVAL EST PAT 65+ YR: CPT | Performed by: FAMILY MEDICINE

## 2018-05-03 PROCEDURE — 96372 THER/PROPH/DIAG INJ SC/IM: CPT | Performed by: FAMILY MEDICINE

## 2018-05-03 PROCEDURE — 90732 PPSV23 VACC 2 YRS+ SUBQ/IM: CPT | Performed by: FAMILY MEDICINE

## 2018-05-03 PROCEDURE — G0009 ADMIN PNEUMOCOCCAL VACCINE: HCPCS | Performed by: FAMILY MEDICINE

## 2018-05-03 PROCEDURE — G0439 PPPS, SUBSEQ VISIT: HCPCS | Performed by: FAMILY MEDICINE

## 2018-05-03 PROCEDURE — 96160 PT-FOCUSED HLTH RISK ASSMT: CPT | Performed by: FAMILY MEDICINE

## 2018-05-03 RX ORDER — ATORVASTATIN CALCIUM 20 MG/1
TABLET, FILM COATED ORAL
Qty: 90 TABLET | Refills: 1 | Status: SHIPPED | OUTPATIENT
Start: 2018-05-03 | End: 2018-10-10 | Stop reason: SDUPTHER

## 2018-05-03 RX ORDER — METHYLPREDNISOLONE ACETATE 80 MG/ML
120 INJECTION, SUSPENSION INTRA-ARTICULAR; INTRALESIONAL; INTRAMUSCULAR; SOFT TISSUE ONCE
Status: DISCONTINUED | OUTPATIENT
Start: 2018-05-03 | End: 2018-05-03

## 2018-05-03 RX ADMIN — METHYLPREDNISOLONE ACETATE 120 MG: 80 INJECTION, SUSPENSION INTRA-ARTICULAR; INTRALESIONAL; INTRAMUSCULAR; SOFT TISSUE at 16:23

## 2018-05-03 NOTE — PATIENT INSTRUCTIONS
Preventive Care 65 Years and Older, Female  Preventive care refers to lifestyle choices and visits with your health care provider that can promote health and wellness.  What does preventive care include?  · A yearly physical exam. This is also called an annual well check.  · Dental exams once or twice a year.  · Routine eye exams. Ask your health care provider how often you should have your eyes checked.  · Personal lifestyle choices, including:  ¨ Daily care of your teeth and gums.  ¨ Regular physical activity.  ¨ Eating a healthy diet.  ¨ Avoiding tobacco and drug use.  ¨ Limiting alcohol use.  ¨ Practicing safe sex.  ¨ Taking low-dose aspirin every day.  ¨ Taking vitamin and mineral supplements as recommended by your health care provider.  What happens during an annual well check?  The services and screenings done by your health care provider during your annual well check will depend on your age, overall health, lifestyle risk factors, and family history of disease.  Counseling   Your health care provider may ask you questions about your:  · Alcohol use.  · Tobacco use.  · Drug use.  · Emotional well-being.  · Home and relationship well-being.  · Sexual activity.  · Eating habits.  · History of falls.  · Memory and ability to understand (cognition).  · Work and work environment.  · Reproductive health.  Screening   You may have the following tests or measurements:  · Height, weight, and BMI.  · Blood pressure.  · Lipid and cholesterol levels. These may be checked every 5 years, or more frequently if you are over 50 years old.  · Skin check.  · Lung cancer screening. You may have this screening every year starting at age 55 if you have a 30-pack-year history of smoking and currently smoke or have quit within the past 15 years.  · Fecal occult blood test (FOBT) of the stool. You may have this test every year starting at age 50.  · Flexible sigmoidoscopy or colonoscopy. You may have a sigmoidoscopy every 5 years or  a colonoscopy every 10 years starting at age 50.  · Hepatitis C blood test.  · Hepatitis B blood test.  · Sexually transmitted disease (STD) testing.  · Diabetes screening. This is done by checking your blood sugar (glucose) after you have not eaten for a while (fasting). You may have this done every 1-3 years.  · Bone density scan. This is done to screen for osteoporosis. You may have this done starting at age 65.  · Mammogram. This may be done every 1-2 years. Talk to your health care provider about how often you should have regular mammograms.  Talk with your health care provider about your test results, treatment options, and if necessary, the need for more tests.  Vaccines   Your health care provider may recommend certain vaccines, such as:  · Influenza vaccine. This is recommended every year.  · Tetanus, diphtheria, and acellular pertussis (Tdap, Td) vaccine. You may need a Td booster every 10 years.  · Varicella vaccine. You may need this if you have not been vaccinated.  · Zoster vaccine. You may need this after age 60.  · Measles, mumps, and rubella (MMR) vaccine. You may need at least one dose of MMR if you were born in 1957 or later. You may also need a second dose.  · Pneumococcal 13-valent conjugate (PCV13) vaccine. One dose is recommended after age 65.  · Pneumococcal polysaccharide (PPSV23) vaccine. One dose is recommended after age 65.  · Meningococcal vaccine. You may need this if you have certain conditions.  · Hepatitis A vaccine. You may need this if you have certain conditions or if you travel or work in places where you may be exposed to hepatitis A.  · Hepatitis B vaccine. You may need this if you have certain conditions or if you travel or work in places where you may be exposed to hepatitis B.  · Haemophilus influenzae type b (Hib) vaccine. You may need this if you have certain conditions.  Talk to your health care provider about which screenings and vaccines you need and how often you need  them.  This information is not intended to replace advice given to you by your health care provider. Make sure you discuss any questions you have with your health care provider.  Document Released: 2017 Document Revised: 2017 Document Reviewed: 10/18/2016  [employer] Interactive Patient Education © 2017 [employer]        Medicare Wellness  Personal Prevention Plan of Service     Date of Office Visit:  2018  Encounter Provider:  Payal Edwards MD  Place of Service:  [facility]  Patient Name: Marifer Ricardo  :  1950    As part of the Medicare Wellness portion of your visit today, we are providing you with this personalized preventive plan of services (PPPS). This plan is based upon recommendations of the [place] Preventive Services Task Force (USPSTF) and the Advisory Committee on Immunization Practices (ACIP).    This lists the preventive care services that should be considered, and provides dates of when you are due. Items listed as completed are up-to-date and do not require any further intervention.    Health Maintenance   Topic Date Due   • TDAP/TD VACCINES (1 - Tdap) 1968   • MEDICARE ANNUAL WELLNESS  2018   • PNEUMOCOCCAL VACCINES (65+ LOW/MEDIUM RISK) (2 of 2 - PPSV23) 2018   • HEMOGLOBIN A1C  07/10/2018   • DIABETIC FOOT EXAM  2018   • LIPID PANEL  01/10/2019   • MAMMOGRAM  2019   • DXA SCAN  2019   • DIABETIC EYE EXAM  2019   • URINE MICROALBUMIN  2019   • COLONOSCOPY  2028   • HEPATITIS C SCREENING  Excluded   • INFLUENZA VACCINE  Excluded   • ZOSTER VACCINE  Excluded       No orders of the defined types were placed in this encounter.      Return in about 6 months (around 11/3/2018).

## 2018-05-03 NOTE — PROGRESS NOTES
QUICK REFERENCE INFORMATION:  The ABCs of the Annual Wellness Visit    Subsequent Medicare Wellness Visit    HEALTH RISK ASSESSMENT    1950    Recent Hospitalizations:  No hospitalization(s) within the last year..        Current Medical Providers:  Patient Care Team:  Payal Edwards MD as PCP - General (Family Medicine)  Jonathan Mondragon MD as Consulting Physician (Neurology)  Ming Stiles MD (Nephrology)  Rosmery Nicholson DC as Consulting Physician (Chiropractic Medicine)  Taras Morillo MD as Consulting Physician (General Surgery)        Smoking Status:  History   Smoking Status   • Never Smoker   Smokeless Tobacco   • Never Used       Alcohol Consumption:  History   Alcohol Use No       Depression Screen:   PHQ-2/PHQ-9 Depression Screening 5/3/2018   Little interest or pleasure in doing things 0   Feeling down, depressed, or hopeless 0   Trouble falling or staying asleep, or sleeping too much -   Feeling tired or having little energy -   Poor appetite or overeating -   Feeling bad about yourself - or that you are a failure or have let yourself or your family down -   Trouble concentrating on things, such as reading the newspaper or watching television -   Moving or speaking so slowly that other people could have noticed. Or the opposite - being so fidgety or restless that you have been moving around a lot more than usual -   Thoughts that you would be better off dead, or of hurting yourself in some way -   Total Score 0   If you checked off any problems, how difficult have these problems made it for you to do your work, take care of things at home, or get along with other people? -       Health Habits and Functional and Cognitive Screening:  Functional & Cognitive Status 5/3/2018   Do you have difficulty preparing food and eating? No   Do you have difficulty bathing yourself, getting dressed or grooming yourself? No   Do you have difficulty using the toilet? No   Do you have difficulty moving around from  place to place? No   Do you have trouble with steps or getting out of a bed or a chair? No   In the past year have you fallen or experienced a near fall? Yes   Current Diet Diabetic Diet   Dental Exam Up to date   Eye Exam Up to date   Exercise (times per week) 2 times per week   Current Exercise Activities Include Walking   Do you need help using the phone?  -   Are you deaf or do you have serious difficulty hearing?  No   Do you need help with transportation? No   Do you need help shopping? -   Do you need help preparing meals?  No   Do you need help with housework?  No   Do you need help with laundry? -   Do you need help taking your medications? No   Do you need help managing money? No   Do you ever drive or ride in a car without wearing a seat belt? No   Have you felt unusual stress, anger or loneliness in the last month? No   Who do you live with? Alone   If you need help, do you have trouble finding someone available to you? No   Have you been bothered in the last four weeks by sexual problems? No   Do you have difficulty concentrating, remembering or making decisions? No           Does the patient have evidence of cognitive impairment? No    Aspirin use counseling: Start ASA 81 mg daily       Recent Lab Results:  CMP:  Lab Results   Component Value Date    GLU 98 01/10/2018    BUN 14 01/18/2018    CREATININE 0.70 01/18/2018    EGFRIFNONA 83 01/18/2018    EGFRIFAFRI 101 01/10/2018    BCR 20.0 01/18/2018     01/18/2018    K 3.9 01/18/2018    CO2 33.0 (H) 01/18/2018    CALCIUM 9.2 01/18/2018    PROTENTOTREF 6.1 (L) 01/10/2018    ALBUMIN 3.50 01/18/2018    LABGLOBREF 2.8 01/10/2018    LABIL2 1.1 01/18/2018    BILITOT 0.6 01/18/2018    ALKPHOS 82 01/18/2018    AST 23 01/18/2018    ALT 31 01/18/2018     Lipid Panel:    Lab Results   Component Value Date    TRIG 73 05/03/2018    HDL 94 (H) 05/03/2018    VLDL 14.6 05/03/2018     HbA1c:    Lab Results   Component Value Date    HGBA1C 5.70 05/03/2018     Lab  Results   Component Value Date    TSH 3.16 01/10/2018     Visual Acuity:  No exam data present; pt declined as she has had recent eye exam    Age-appropriate Screening Schedule:  Refer to the list below for future screening recommendations based on patient's age, sex and/or medical conditions. Orders for these recommended tests are listed in the plan section. The patient has been provided with a written plan.    Health Maintenance   Topic Date Due   • TDAP/TD VACCINES (1 - Tdap) 01/16/1968   • DIABETIC FOOT EXAM  07/11/2018   • HEMOGLOBIN A1C  11/03/2018   • MAMMOGRAM  02/09/2019   • DXA SCAN  02/09/2019   • DIABETIC EYE EXAM  03/08/2019   • URINE MICROALBUMIN  04/12/2019   • LIPID PANEL  05/03/2019   • COLONOSCOPY  02/07/2028   • PNEUMOCOCCAL VACCINES (65+ LOW/MEDIUM RISK)  Completed   • INFLUENZA VACCINE  Excluded   • ZOSTER VACCINE  Excluded        Subjective   History of Present Illness    Marifer Ricarod is a 68 y.o. female who presents for an Subsequent Wellness Visit.    She has non-insulin-dependent diabetes mellitus with associated diabetic neuropathy.  Blood glucose his recently been under control.  A1c reviewed as above. Repeat today.  She is up-to-date on diabetic eye exam.  She requests prescription for diabetic shoes.  Taking statin as prescribed.  Advised to take daily aspirin.    She has history of intermittent elevated blood pressure but not currently requiring antihypertensives.  Mild microalbuminuria on last check.  Renal function actually improved from baseline.  She does not check blood pressure at home.  Generally does follow a low-salt diet.  Exercise at least twice weekly.    Followed by neurology for chronic mixed headache syndrome.  No new changes in severity, frequency or associated symptoms with headaches.    She has had abnormal TSH and thyroid levels previously.  Not currently on replacement.  Of note is currently taking biotin supplement.    The following portions of the patient's  history were reviewed and updated as appropriate: allergies, current medications, past family history, past medical history, past social history, past surgical history and problem list.    Outpatient Medications Prior to Visit   Medication Sig Dispense Refill   • Biotin ([facility] BIOTIN HIGH POTENCY) 1000 MCG tablet Take 1,000 mcg by mouth 3 (three) times a day.     • cetirizine (zyrTEC) 10 MG tablet Take 1 tablet by mouth Daily.     • cholecalciferol (VITAMIN D3) 1000 UNITS tablet Take 1,000 Units by mouth daily.     • Cinnamon 500 MG tablet Take  by mouth 2 (two) times a day.     • coenzyme Q10 100 MG capsule Take 100 mg by mouth daily.     • Cyanocobalamin (VITAMIN B-12) 5000 MCG sublingual tablet Place 1 tablet under the tongue Daily.     • DEVILS CLAW PO Take 1 tablet by mouth Daily.     • glucosamine sulfate 500 MG capsule capsule Take  by mouth 3 (three) times a day with meals.     • Glucose Blood (BLOOD GLUCOSE TEST) strip 1 strip by In Vitro route Daily. CHECK BLOOD SUGAR DAILY. 50 each 5   • Lancets misc 1 each Daily. CHECK BLOOD SUGAR DAILY 100 each 5   • meloxicam (MOBIC) 15 MG tablet TAKE 1 TABLET EVERY DAY 90 tablet 1   • Misc Natural Products (WHITE WILLOW BARK PO) Take 1 tablet by mouth Daily.     • Omega 3-6-9 Fatty Acids (OMEGA 3-6-9 COMPLEX PO) Take 1 capsule by mouth Daily.     • traMADol (ULTRAM) 50 MG tablet Take 50 mg by mouth Every 6 (Six) Hours As Needed.     • atorvastatin (LIPITOR) 20 MG tablet TAKE 1 TABLET EVERY DAY 90 tablet 1     No facility-administered medications prior to visit.        Patient Active Problem List   Diagnosis   • Type 2 diabetes mellitus   • Intractable migraine   • Primary generalized (osteo)arthritis   • Difficult intravenous access   • Copy of advanced directive obtained from patient   • No blood products   • Hyperlipidemia   • History of hypothyroidism   • Chronic mixed headache syndrome   • Diabetic peripheral neuropathy   • Gastroparesis   • Rosacea   • Osteopenia    • Heartburn       Advance Care Planning:  has an advance directive - a copy has been provided and is in file    Identification of Risk Factors:  Risk factors include: unhealthy diet, cardiovascular risk, inactivity, increased fall risk, depression, hearing limitations and polypharmacy.    Review of Systems   Constitutional: Positive for fatigue. Negative for activity change, chills and fever.   HENT: Negative for congestion, mouth sores, rhinorrhea, sore throat and trouble swallowing.    Eyes: Negative for pain and redness.   Respiratory: Positive for cough (mild, dry, intermittent, chronic). Negative for shortness of breath and wheezing.    Cardiovascular: Negative for chest pain, palpitations and leg swelling.   Gastrointestinal: Negative for abdominal pain, diarrhea, nausea and vomiting.   Endocrine: Positive for cold intolerance. Negative for polydipsia and polyuria.   Genitourinary: Negative for dysuria, hematuria and urgency.   Musculoskeletal: Positive for arthralgias and back pain.   Skin: Negative for rash and wound.   Neurological: Positive for weakness (generalized, non-focal) and headaches. Negative for dizziness.   Hematological: Negative for adenopathy. Bruises/bleeds easily.   Psychiatric/Behavioral: Positive for dysphoric mood and sleep disturbance. Negative for confusion and suicidal ideas.       Compared to one year ago, the patient feels her physical health is the same.  Compared to one year ago, the patient feels her mental health is the same.    Objective     Physical Exam   Constitutional: She is oriented to person, place, and time. She appears well-developed and well-nourished. She is cooperative. She does not appear ill. No distress.   Appears older than stated age   HENT:   Head: Normocephalic and atraumatic.   Right Ear: Decreased hearing is noted.   Left Ear: Decreased hearing is noted.   Mouth/Throat: Mucous membranes are normal. Mucous membranes are not dry.   Bilateral cerumen  "impaction   Eyes: Conjunctivae and lids are normal.   Neck: Phonation normal. Neck supple. Normal carotid pulses present. No thyromegaly present.   Cardiovascular: Normal rate, regular rhythm, S1 normal, S2 normal and intact distal pulses.  Exam reveals no gallop.    No murmur heard.  No per edema   Pulmonary/Chest: Effort normal and breath sounds normal.   Musculoskeletal: She exhibits no edema or tenderness.   Lymphadenopathy:     She has no cervical adenopathy.   Neurological: She is alert and oriented to person, place, and time. She has normal strength. She displays no tremor. Gait normal.   Skin: Skin is warm and dry. No ecchymosis and no rash noted. There is pallor.   Psychiatric: Her speech is normal and behavior is normal. Judgment and thought content normal. Her affect is blunt. Cognition and memory are normal.   Nursing note and vitals reviewed.      Vitals:    05/03/18 1027 05/10/18 1349   BP: 130/82    Pulse: 95    SpO2: 99%    Weight: 56.7 kg (125 lb)    Height: 168.9 cm (66.5\")    PainSc:  0-No pain       Patient's Body mass index is 19.87 kg/m². BMI is within normal parameters. No follow-up required.      Assessment/Plan   Patient Self-Management and Personalized Health Advice  The patient has been provided with information about: diet, exercise, prevention of cardiac or vascular disease, fall prevention, supplements and mental health concerns and preventive services including:   · Exercise counseling provided, Fall Risk assessment done, Fall Risk plan of care done, Nutrition counseling provided, Pneumococcal vaccine , TdaP vaccine.    Visit Diagnoses:    ICD-10-CM ICD-9-CM   1. Medicare annual wellness visit, subsequent Z00.00 V70.0   2. Need for diphtheria-tetanus-pertussis (Tdap) vaccine Z23 V06.1   3. Need for hepatitis C screening test Z11.59 V73.89   4. Pure hypercholesterolemia E78.00 272.0   5. Type 2 diabetes mellitus with diabetic neuropathy, without long-term current use of insulin E11.40 " 250.60     357.2   6. Need for pneumococcal vaccination Z23 V03.82   7. Chronic mixed headache syndrome G44.89 339.89   8. History of hypothyroidism Z86.39 V12.29   9. Diabetic peripheral neuropathy E11.42 250.60     357.2   10. Osteopenia of multiple sites M85.89 733.90   11. Rosacea L71.9 695.3   12. Intractable migraine without status migrainosus, unspecified migraine type G43.919 346.91       Orders Placed This Encounter   Procedures   • Pneumococcal Polysaccharide Vaccine 23-Valent Greater Than or Equal To 3yo Subcutaneous / IM   • Hemoglobin A1c     Order Specific Question:   [facility] Has the patient fasted?     Answer:   Yes   • Hepatitis C Antibody     Order Specific Question:   [facility] Has the patient fasted?     Answer:   Yes   • Lipid Panel     Order Specific Question:   [facility] Has the patient fasted?     Answer:   Yes       Outpatient Encounter Prescriptions as of 5/3/2018   Medication Sig Dispense Refill   • Biotin ([employer] BIOTIN HIGH POTENCY) 1000 MCG tablet Take 1,000 mcg by mouth 3 (three) times a day.     • cetirizine (zyrTEC) 10 MG tablet Take 1 tablet by mouth Daily.     • cholecalciferol (VITAMIN D3) 1000 UNITS tablet Take 1,000 Units by mouth daily.     • Cinnamon 500 MG tablet Take  by mouth 2 (two) times a day.     • coenzyme Q10 100 MG capsule Take 100 mg by mouth daily.     • Cyanocobalamin (VITAMIN B-12) 5000 MCG sublingual tablet Place 1 tablet under the tongue Daily.     • DEVILS CLAW PO Take 1 tablet by mouth Daily.     • glucosamine sulfate 500 MG capsule capsule Take  by mouth 3 (three) times a day with meals.     • Glucose Blood (BLOOD GLUCOSE TEST) strip 1 strip by In Vitro route Daily. CHECK BLOOD SUGAR DAILY. 50 each 5   • Lancets misc 1 each Daily. CHECK BLOOD SUGAR DAILY 100 each 5   • meloxicam (MOBIC) 15 MG tablet TAKE 1 TABLET EVERY DAY 90 tablet 1   • Misc Natural Products (WHITE WILLOW BARK PO) Take 1 tablet by mouth Daily.     • Omega 3-6-9 Fatty Acids (OMEGA 3-6-9 COMPLEX PO)  Take 1 capsule by mouth Daily.     • traMADol (ULTRAM) 50 MG tablet Take 50 mg by mouth Every 6 (Six) Hours As Needed.     • [DISCONTINUED] atorvastatin (LIPITOR) 20 MG tablet TAKE 1 TABLET EVERY DAY 90 tablet 1   • Menthol-Methyl Salicylate (MUSCLE RUB EX)      • [] Tdap (BOOSTRIX) 5-2.5-18.5 LF-MCG/0.5 injection Inject 0.5 mL into the shoulder, thigh, or buttocks 1 (One) Time for 1 dose. 0.5 mL 0   • [] methylPREDNISolone acetate (DEPO-medrol) injection 120 mg        No facility-administered encounter medications on file as of 5/3/2018.        Reviewed use of high risk medication in the elderly: yes  Reviewed for potential of harmful drug interactions in the elderly: yes    Pt advised to eat a heart healthy diet and get regular aerobic exercise.    Age appropriate preventive care reviewed including cancer screenings, safety measures, mental health concerns, supplements, prevention of CV disease and DM, etc. Handout provided.    Chronic conditions stable.     F/u with specialists as scheduled.    Follow Up:  Return in about 6 months (around 11/3/2018).     An After Visit Summary and PPPS with all of these plans were given to the patient.

## 2018-05-04 LAB
CHOLEST SERPL-MCNC: 252 MG/DL (ref 0–199)
HBA1C MFR BLD: 5.7 %
HCV AB S/CO SERPL IA: <0.1 S/CO RATIO (ref 0–0.9)
HDLC SERPL-MCNC: 94 MG/DL (ref 40–60)
LDLC SERPL CALC-MCNC: 143 MG/DL (ref 0–99)
TRIGL SERPL-MCNC: 73 MG/DL
VLDLC SERPL CALC-MCNC: 14.6 MG/DL

## 2018-05-10 VITALS
BODY MASS INDEX: 19.62 KG/M2 | HEART RATE: 95 BPM | SYSTOLIC BLOOD PRESSURE: 130 MMHG | HEIGHT: 67 IN | OXYGEN SATURATION: 99 % | DIASTOLIC BLOOD PRESSURE: 82 MMHG | WEIGHT: 125 LBS

## 2018-05-14 ENCOUNTER — TELEPHONE (OUTPATIENT)
Dept: FAMILY MEDICINE CLINIC | Facility: CLINIC | Age: 68
End: 2018-05-14

## 2018-05-14 RX ORDER — MELOXICAM 15 MG/1
15 TABLET ORAL DAILY
Qty: 90 TABLET | Refills: 0 | Status: SHIPPED | OUTPATIENT
Start: 2018-05-14 | End: 2018-09-18 | Stop reason: SDUPTHER

## 2018-09-18 RX ORDER — MELOXICAM 15 MG/1
15 TABLET ORAL DAILY
Qty: 90 TABLET | Refills: 1 | Status: SHIPPED | OUTPATIENT
Start: 2018-09-18 | End: 2019-05-06

## 2018-10-10 RX ORDER — ATORVASTATIN CALCIUM 20 MG/1
TABLET, FILM COATED ORAL
Qty: 90 TABLET | Refills: 1 | Status: SHIPPED | OUTPATIENT
Start: 2018-10-10 | End: 2019-05-06

## 2018-11-05 ENCOUNTER — OFFICE VISIT (OUTPATIENT)
Dept: FAMILY MEDICINE CLINIC | Facility: CLINIC | Age: 68
End: 2018-11-05

## 2018-11-05 VITALS
HEART RATE: 74 BPM | DIASTOLIC BLOOD PRESSURE: 78 MMHG | HEIGHT: 67 IN | SYSTOLIC BLOOD PRESSURE: 124 MMHG | OXYGEN SATURATION: 98 % | WEIGHT: 130 LBS | BODY MASS INDEX: 20.4 KG/M2

## 2018-11-05 DIAGNOSIS — E78.00 PURE HYPERCHOLESTEROLEMIA: ICD-10-CM

## 2018-11-05 DIAGNOSIS — M15.0 PRIMARY GENERALIZED (OSTEO)ARTHRITIS: ICD-10-CM

## 2018-11-05 DIAGNOSIS — Z28.21 INFLUENZA VACCINE REFUSED: ICD-10-CM

## 2018-11-05 DIAGNOSIS — R12 HEARTBURN: ICD-10-CM

## 2018-11-05 DIAGNOSIS — K21.00 REFLUX ESOPHAGITIS: ICD-10-CM

## 2018-11-05 DIAGNOSIS — K29.00 OTHER ACUTE GASTRITIS WITHOUT HEMORRHAGE: ICD-10-CM

## 2018-11-05 DIAGNOSIS — Z86.39 HISTORY OF HYPOTHYROIDISM: ICD-10-CM

## 2018-11-05 DIAGNOSIS — E11.40 TYPE 2 DIABETES MELLITUS WITH DIABETIC NEUROPATHY, WITHOUT LONG-TERM CURRENT USE OF INSULIN (HCC): Primary | ICD-10-CM

## 2018-11-05 DIAGNOSIS — D72.818 OTHER DECREASED WHITE BLOOD CELL (WBC) COUNT: ICD-10-CM

## 2018-11-05 DIAGNOSIS — R10.13 EPIGASTRIC ABDOMINAL PAIN: ICD-10-CM

## 2018-11-05 LAB
EXPIRATION DATE: NORMAL
HBA1C MFR BLD: 5.6 %
Lab: NORMAL

## 2018-11-05 PROCEDURE — 99214 OFFICE O/P EST MOD 30 MIN: CPT | Performed by: FAMILY MEDICINE

## 2018-11-05 PROCEDURE — 83036 HEMOGLOBIN GLYCOSYLATED A1C: CPT | Performed by: FAMILY MEDICINE

## 2018-11-05 RX ORDER — OMEPRAZOLE 40 MG/1
40 CAPSULE, DELAYED RELEASE ORAL DAILY
Qty: 30 CAPSULE | Refills: 2 | Status: SHIPPED | OUTPATIENT
Start: 2018-11-05 | End: 2019-05-06

## 2018-11-05 RX ORDER — SUCRALFATE ORAL 1 G/10ML
1 SUSPENSION ORAL
Qty: 600 ML | Refills: 1 | Status: SHIPPED | OUTPATIENT
Start: 2018-11-05 | End: 2019-05-06

## 2018-11-05 NOTE — PATIENT INSTRUCTIONS
HOLD MOBIC    START CARAFATE AND PPI    REPORT SYMPTOM STATUS IN 2 WEEKS, SOONER IF GETTING WORSE      Gastritis, Adult  Gastritis is inflammation of the stomach. There are two kinds of gastritis:  · Acute gastritis. This kind develops suddenly.  · Chronic gastritis. This kind lasts for a long time.    Gastritis happens when the lining of the stomach becomes weak or gets damaged. Without treatment, gastritis can lead to stomach bleeding and ulcers.  What are the causes?  This condition may be caused by:  · An infection.  · Drinking too much alcohol.  · Certain medicines.  · Having too much acid in the stomach.  · A disease of the intestines or stomach.  · Stress.    What are the signs or symptoms?  Symptoms of this condition include:  · Pain or a burning in the upper abdomen.  · Nausea.  · Vomiting.  · An uncomfortable feeling of fullness after eating.    In some cases, there are no symptoms.  How is this diagnosed?  This condition may be diagnosed with:  · A description of your symptoms.  · A physical exam.  · Tests. These can include:  ? Blood tests.  ? Stool tests.  ? A test in which a thin, flexible instrument with a light and camera on the end is passed down the esophagus and into the stomach (upper endoscopy).  ? A test in which a sample of tissue is taken for testing (biopsy).    How is this treated?  This condition may be treated with medicines. If the condition is caused by a bacterial infection, you may be given antibiotic medicines. If it is caused by too much acid in the stomach, you may get medicines called H2 blockers, proton pump inhibitors, or antacids. Treatment may also involve stopping the use of certain medicines, such as aspirin, ibuprofen, or other nonsteroidal anti-inflammatory drugs (NSAIDs).  Follow these instructions at home:  · Take over-the-counter and prescription medicines only as told by your health care provider.  · If you were prescribed an antibiotic, take it as told by your  health care provider. Do not stop taking the antibiotic even if you start to feel better.  · Drink enough fluid to keep your urine clear or pale yellow.  · Eat small, frequent meals instead of large meals.  Contact a health care provider if:  · Your symptoms get worse.  · Your symptoms return after treatment.  Get help right away if:  · You vomit blood or material that looks like coffee grounds.  · You have black or dark red stools.  · You are unable to keep fluids down.  · Your abdominal pain gets worse.  · You have a fever.  · You do not feel better after 1 week.  This information is not intended to replace advice given to you by your health care provider. Make sure you discuss any questions you have with your health care provider.  Document Released: 12/12/2002 Document Revised: 08/16/2017 Document Reviewed: 09/10/2016  [employer] Interactive Patient Education © 2018 [employer]

## 2018-11-05 NOTE — PROGRESS NOTES
"Subjective   Marifer Ricardo is a 68 y.o. female.     History of Present Illness   Ms. Ricardo presents today for routine follow-up on several chronic medical problems.    She has non-insulin-dependent diabetes mellitus with associated diabetic neuropathy.  Blood glucose has recently been under control. Repeat A1c due today.  She is up-to-date on diabetic eye exam. Taking statin as prescribed.  Advised to take daily aspirin.     She has history of intermittent elevated blood pressure but not currently requiring antihypertensives.  Mild microalbuminuria in past. Renal function stable.  She does not check blood pressure at home. Generally does follow a low-salt diet.  Exercise at least twice weekly.     Followed by neurology for chronic mixed headache syndrome.  No new changes in severity, frequency or associated symptoms with headaches.     She has had abnormal TSH and thyroid levels previously.  Not currently on replacement.      Has hyperlipidemia for which she is currently on statin.  Denies side effects.    She has chronic joint pain due to diffuse generalized osteoarthritis.  Has been taking Mobic daily for well over a year.    Leukopenia noted on previous labs.  Due for recheck.  Denies recurrent infection.    Her main concern today is that of persistent heartburn.  She describes this as a pressure in her upper abdomen, lower chest as if it is a \"volcano ready to explode\".  Associated with frequent water brash.  No dysphagia, blood in stool or weight loss. She is not currently on an H2 blocker or PPI.  Of note she has been on chronic NSAIDs. assoc'd symptoms include increased belching.  Of note she had a normal upper GI endoscopy early 2018 per Dr. Morillo which revealed reflux esophagitis as well as acute gastritis.  She was given PPI at that time but discontinued after improvement of symptoms.    She also complains of persistent dry cough.  Intermittent, but seems worse when lying down at night.    The " following portions of the patient's history were reviewed and updated as appropriate: allergies, current medications, past family history, past medical history, past social history, past surgical history and problem list.    Review of Systems   Constitutional: Positive for appetite change and fatigue. Negative for activity change, chills, fever and unexpected weight change.   HENT: Negative for congestion, mouth sores, rhinorrhea, sore throat and trouble swallowing.    Eyes: Negative for pain and redness.   Respiratory: Positive for cough (mild, dry, intermittent, chronic). Negative for shortness of breath and wheezing.    Cardiovascular: Negative for chest pain, palpitations and leg swelling.   Gastrointestinal: Positive for abdominal pain and nausea. Negative for diarrhea and vomiting.        As per HPI   Endocrine: Positive for cold intolerance. Negative for polydipsia and polyuria.   Genitourinary: Negative for dysuria, hematuria and urgency.   Musculoskeletal: Positive for arthralgias and back pain.   Skin: Negative for rash and wound.   Neurological: Positive for weakness (generalized, non-focal) and headaches. Negative for dizziness.   Hematological: Negative for adenopathy. Bruises/bleeds easily.   Psychiatric/Behavioral: Positive for dysphoric mood and sleep disturbance. Negative for confusion and suicidal ideas.       Objective    Vitals:    11/05/18 0756   BP: 124/78   Pulse: 74   SpO2: 98%     Body mass index is 20.67 kg/m².  1    11/05/18  0756   Weight: 59 kg (130 lb)       Physical Exam   Constitutional: She is oriented to person, place, and time. She appears well-developed and well-nourished. She is cooperative. She does not appear ill. No distress.   Appears older than stated age   HENT:   Head: Normocephalic and atraumatic.   Right Ear: Tympanic membrane, external ear and ear canal normal.   Left Ear: Tympanic membrane, external ear and ear canal normal.   Nose: Nose normal.   Mouth/Throat: Mucous  membranes are dry. No oral lesions. No oropharyngeal exudate or posterior oropharyngeal erythema.   Eyes: Conjunctivae and lids are normal.   Neck: Phonation normal. Neck supple. Normal carotid pulses present. No thyromegaly present.   Cardiovascular: Normal rate, regular rhythm, S1 normal, S2 normal and intact distal pulses.  Exam reveals no gallop.    No murmur heard.  Pulmonary/Chest: Effort normal and breath sounds normal.   Abdominal: Soft. She exhibits no distension and no mass. Bowel sounds are increased. There is no hepatosplenomegaly. There is tenderness (mild) in the epigastric area. There is no rigidity, no rebound and no guarding.   Musculoskeletal: She exhibits no edema or tenderness.     Vascular Status -  Her right foot exhibits no edema. Her left foot exhibits no edema.  Lymphadenopathy:     She has no cervical adenopathy.        Right: No supraclavicular adenopathy present.        Left: No supraclavicular adenopathy present.   Neurological: She is alert and oriented to person, place, and time. She displays no tremor. Gait normal.   Skin: Skin is warm and dry. No ecchymosis and no rash noted. There is pallor.   Psychiatric: She has a normal mood and affect. Her speech is normal and behavior is normal. Judgment and thought content normal. Cognition and memory are normal.   Good eye contact. Answers questions appropriately. Good personal hygiene and grooming.   Nursing note and vitals reviewed.    Lab Results   Component Value Date    HGBA1C 5.6 11/05/2018     Assessment/Plan   Marifer was seen today for diabetes, hyperlipidemia, hypothyroidism, hand pain, headache and hip pain.    Diagnoses and all orders for this visit:    Type 2 diabetes mellitus with diabetic neuropathy, without long-term current use of insulin (CMS/[facility])  -     TSH Rfx On Abnormal To Free T4  -     Comprehensive Metabolic Panel  -     POC Glycated Hemoglobin, Total    Pure hypercholesterolemia  -     Lipid  Panel    Heartburn    Primary generalized (osteo)arthritis    History of hypothyroidism  -     TSH Rfx On Abnormal To Free T4    Epigastric abdominal pain  -     CBC & Differential  -     Comprehensive Metabolic Panel  -     Lipase    Other decreased white blood cell (WBC) count  -     CBC & Differential  -     TSH Rfx On Abnormal To Free T4    Influenza vaccine refused    Other acute gastritis without hemorrhage    Reflux esophagitis    Other orders  -     sucralfate (CARAFATE) 1 GM/10ML suspension; Take 10 mL by mouth 4 (Four) Times a Day With Meals & at Bedtime.  -     omeprazole (priLOSEC) 40 MG capsule; Take 1 capsule by mouth Daily.    Epigastric pain on exam associated with complaints of heartburn/water brash concerning for worsening reflux esophagitis and/or worsening acute gastritis.  Abdominal exam nonacute at this time.  I reviewed risk/benefits and potential side effects of various treatment/diagnostic options.  She wishes to treat medically and undergo further studies only as necessary.  For that reason have placed her back on PPI as well as Carafate.  She is advised to hold Mobic for now.    Type II non-insulin-dependent diabetes mellitus controlled.  Continue current management.    Dyslipidemia with good tolerance of statin.  Continue heart healthy diet and regular exercise.    Assess status of thyroid function and treat as indicated.    I will contact patient regarding test results and provide instructions regarding any necessary changes in plan of care.  Routine f/u in 3-6 months, sooner as needed/instructed.  Patient was encouraged to keep me informed of any acute changes, lack of improvement, or any new concerning symptoms.  Pt is aware of reasons to seek emergent care.  Patient voiced understanding of all instructions and denied further questions.

## 2018-11-06 PROBLEM — K21.00 REFLUX ESOPHAGITIS: Status: ACTIVE | Noted: 2018-11-06

## 2018-11-06 PROBLEM — K29.70 GASTRITIS: Status: ACTIVE | Noted: 2018-11-06

## 2018-11-08 ENCOUNTER — APPOINTMENT (OUTPATIENT)
Dept: LAB | Facility: HOSPITAL | Age: 68
End: 2018-11-08

## 2018-11-08 PROCEDURE — 36415 COLL VENOUS BLD VENIPUNCTURE: CPT | Performed by: FAMILY MEDICINE

## 2018-11-12 ENCOUNTER — TELEPHONE (OUTPATIENT)
Dept: FAMILY MEDICINE CLINIC | Facility: CLINIC | Age: 68
End: 2018-11-12

## 2018-11-12 NOTE — TELEPHONE ENCOUNTER
"Pt called sts that she was instructed to to R lab to have done since she was such a hard stick. Was told that they could use a \"light\" technique to find her vein. When she went to have labs done, the  at the hospital did not know what she was referring to. Pt is requesting we call the lab at the [facility] and explain since she has to go back on Thursday for for redraw of her labs.  "

## 2018-11-13 NOTE — TELEPHONE ENCOUNTER
Please contact pt and discuss further. I'm not sure what she means. She may have discussed this with Cassidy or Martha.

## 2018-11-15 ENCOUNTER — APPOINTMENT (OUTPATIENT)
Dept: LAB | Facility: HOSPITAL | Age: 68
End: 2018-11-15

## 2018-11-15 LAB
ALBUMIN SERPL-MCNC: 4.8 G/DL (ref 3.5–5)
ALBUMIN/GLOB SERPL: 1.6 G/DL (ref 1–2)
ALP SERPL-CCNC: 87 U/L (ref 38–126)
ALT SERPL W P-5'-P-CCNC: 40 U/L (ref 13–69)
ANION GAP SERPL CALCULATED.3IONS-SCNC: 11.6 MMOL/L (ref 10–20)
AST SERPL-CCNC: 27 U/L (ref 15–46)
BASOPHILS # BLD AUTO: 0.05 10*3/MM3 (ref 0–0.2)
BASOPHILS NFR BLD AUTO: 1.4 % (ref 0–2.5)
BILIRUB SERPL-MCNC: 0.8 MG/DL (ref 0.2–1.3)
BUN BLD-MCNC: 22 MG/DL (ref 7–20)
BUN/CREAT SERPL: 24.4 (ref 7.1–23.5)
CALCIUM SPEC-SCNC: 9.9 MG/DL (ref 8.4–10.2)
CHLORIDE SERPL-SCNC: 105 MMOL/L (ref 98–107)
CHOLEST SERPL-MCNC: 220 MG/DL (ref 0–199)
CO2 SERPL-SCNC: 30 MMOL/L (ref 26–30)
CREAT BLD-MCNC: 0.9 MG/DL (ref 0.6–1.3)
DEPRECATED RDW RBC AUTO: 41.2 FL (ref 37–54)
EOSINOPHIL # BLD AUTO: 0.08 10*3/MM3 (ref 0–0.7)
EOSINOPHIL NFR BLD AUTO: 2.2 % (ref 0–7)
ERYTHROCYTE [DISTWIDTH] IN BLOOD BY AUTOMATED COUNT: 12 % (ref 11.5–14.5)
GFR SERPL CREATININE-BSD FRML MDRD: 62 ML/MIN/1.73
GLOBULIN UR ELPH-MCNC: 3 GM/DL
GLUCOSE BLD-MCNC: 90 MG/DL (ref 74–98)
HCT VFR BLD AUTO: 45.8 % (ref 37–47)
HDLC SERPL-MCNC: 106 MG/DL (ref 40–60)
HGB BLD-MCNC: 15.4 G/DL (ref 12–16)
IMM GRANULOCYTES # BLD: 0.01 10*3/MM3 (ref 0–0.06)
IMM GRANULOCYTES NFR BLD: 0.3 % (ref 0–0.6)
LDLC SERPL CALC-MCNC: 103 MG/DL (ref 0–99)
LDLC/HDLC SERPL: 0.97 {RATIO}
LIPASE SERPL-CCNC: 89 U/L (ref 23–300)
LYMPHOCYTES # BLD AUTO: 1.34 10*3/MM3 (ref 0.6–3.4)
LYMPHOCYTES NFR BLD AUTO: 36.8 % (ref 10–50)
MCH RBC QN AUTO: 31.2 PG (ref 27–31)
MCHC RBC AUTO-ENTMCNC: 33.6 G/DL (ref 30–37)
MCV RBC AUTO: 92.9 FL (ref 81–99)
MONOCYTES # BLD AUTO: 0.24 10*3/MM3 (ref 0–0.9)
MONOCYTES NFR BLD AUTO: 6.6 % (ref 0–12)
NEUTROPHILS # BLD AUTO: 1.92 10*3/MM3 (ref 2–6.9)
NEUTROPHILS NFR BLD AUTO: 52.7 % (ref 37–80)
NRBC BLD MANUAL-RTO: 0 /100 WBC (ref 0–0)
PLATELET # BLD AUTO: 153 10*3/MM3 (ref 130–400)
PMV BLD AUTO: 10.4 FL (ref 6–12)
POTASSIUM BLD-SCNC: 4.6 MMOL/L (ref 3.5–5.1)
PROT SERPL-MCNC: 7.8 G/DL (ref 6.3–8.2)
RBC # BLD AUTO: 4.93 10*6/MM3 (ref 4.2–5.4)
SODIUM BLD-SCNC: 142 MMOL/L (ref 137–145)
TRIGL SERPL-MCNC: 57 MG/DL
TSH SERPL DL<=0.05 MIU/L-ACNC: 1.88 MIU/ML (ref 0.47–4.68)
VLDLC SERPL-MCNC: 11.4 MG/DL
WBC NRBC COR # BLD: 3.64 10*3/MM3 (ref 4.8–10.8)

## 2018-11-15 PROCEDURE — 80061 LIPID PANEL: CPT | Performed by: FAMILY MEDICINE

## 2018-11-15 PROCEDURE — 36415 COLL VENOUS BLD VENIPUNCTURE: CPT | Performed by: FAMILY MEDICINE

## 2018-11-15 PROCEDURE — 83690 ASSAY OF LIPASE: CPT | Performed by: FAMILY MEDICINE

## 2018-11-15 PROCEDURE — 80053 COMPREHEN METABOLIC PANEL: CPT | Performed by: FAMILY MEDICINE

## 2018-11-15 PROCEDURE — 85025 COMPLETE CBC W/AUTO DIFF WBC: CPT | Performed by: FAMILY MEDICINE

## 2018-11-15 PROCEDURE — 84443 ASSAY THYROID STIM HORMONE: CPT | Performed by: FAMILY MEDICINE

## 2018-11-21 NOTE — TELEPHONE ENCOUNTER
Unable to reach patient, spoke with Cassidy yesterday she does not remember talking to patient about this.

## 2019-04-12 ENCOUNTER — TELEPHONE (OUTPATIENT)
Dept: FAMILY MEDICINE CLINIC | Facility: CLINIC | Age: 69
End: 2019-04-12

## 2019-04-12 NOTE — TELEPHONE ENCOUNTER
Pt called req ref on gabapentin to [facility]. Pt has fu/[facility] appt on 5/6/19. Please advise.

## 2019-04-15 NOTE — TELEPHONE ENCOUNTER
Please confirm requested med with pt as I believe she requested tramadol not gabapentin (no prescription for that in over a year).

## 2019-04-19 RX ORDER — GABAPENTIN 100 MG/1
CAPSULE ORAL
Qty: 270 CAPSULE | Refills: 0 | Status: SHIPPED | OUTPATIENT
Start: 2019-04-19 | End: 2021-08-18

## 2019-05-06 ENCOUNTER — OFFICE VISIT (OUTPATIENT)
Dept: FAMILY MEDICINE CLINIC | Facility: CLINIC | Age: 69
End: 2019-05-06

## 2019-05-06 DIAGNOSIS — M15.0 PRIMARY GENERALIZED (OSTEO)ARTHRITIS: ICD-10-CM

## 2019-05-06 DIAGNOSIS — E11.42 DIABETIC PERIPHERAL NEUROPATHY (HCC): ICD-10-CM

## 2019-05-06 DIAGNOSIS — G44.89 CHRONIC MIXED HEADACHE SYNDROME: ICD-10-CM

## 2019-05-06 DIAGNOSIS — Z00.00 MEDICARE ANNUAL WELLNESS VISIT, SUBSEQUENT: Primary | ICD-10-CM

## 2019-05-06 DIAGNOSIS — E11.8 TYPE 2 DIABETES MELLITUS WITH COMPLICATION, WITHOUT LONG-TERM CURRENT USE OF INSULIN (HCC): ICD-10-CM

## 2019-05-06 DIAGNOSIS — Z78.9 DIFFICULT INTRAVENOUS ACCESS: ICD-10-CM

## 2019-05-06 DIAGNOSIS — E78.00 PURE HYPERCHOLESTEROLEMIA: ICD-10-CM

## 2019-05-06 DIAGNOSIS — K21.00 REFLUX ESOPHAGITIS: ICD-10-CM

## 2019-05-06 DIAGNOSIS — M85.89 OSTEOPENIA OF MULTIPLE SITES: ICD-10-CM

## 2019-05-06 DIAGNOSIS — Z12.31 ENCOUNTER FOR SCREENING MAMMOGRAM FOR BREAST CANCER: ICD-10-CM

## 2019-05-06 DIAGNOSIS — K31.84 GASTROPARESIS: ICD-10-CM

## 2019-05-06 DIAGNOSIS — Z01.84 IMMUNITY STATUS TESTING: ICD-10-CM

## 2019-05-06 DIAGNOSIS — D72.818 OTHER DECREASED WHITE BLOOD CELL (WBC) COUNT: ICD-10-CM

## 2019-05-06 DIAGNOSIS — Z78.0 POSTMENOPAUSAL: ICD-10-CM

## 2019-05-06 LAB
ALUMINUM/CREAT UR: <30
EXPIRATION DATE: NORMAL
HBA1C MFR BLD: 5.6 %
Lab: NORMAL
POC CREATININE URINE: 10
POC MICROALBUMIN URINE: 10

## 2019-05-06 PROCEDURE — G0439 PPPS, SUBSEQ VISIT: HCPCS | Performed by: FAMILY MEDICINE

## 2019-05-06 PROCEDURE — 83036 HEMOGLOBIN GLYCOSYLATED A1C: CPT | Performed by: FAMILY MEDICINE

## 2019-05-06 PROCEDURE — 99397 PER PM REEVAL EST PAT 65+ YR: CPT | Performed by: FAMILY MEDICINE

## 2019-05-06 PROCEDURE — 82044 UR ALBUMIN SEMIQUANTITATIVE: CPT | Performed by: FAMILY MEDICINE

## 2019-05-06 PROCEDURE — 96160 PT-FOCUSED HLTH RISK ASSMT: CPT | Performed by: FAMILY MEDICINE

## 2019-05-06 NOTE — PATIENT INSTRUCTIONS
Preventive Care 65 Years and Older, Female  Preventive care refers to lifestyle choices and visits with your health care provider that can promote health and wellness.  What does preventive care include?  · A yearly physical exam. This is also called an annual well check.  · Dental exams once or twice a year.  · Routine eye exams. Ask your health care provider how often you should have your eyes checked.  · Personal lifestyle choices, including:  ? Daily care of your teeth and gums.  ? Regular physical activity.  ? Eating a healthy diet.  ? Avoiding tobacco and drug use.  ? Limiting alcohol use.  ? Practicing safe sex.  ? Taking low-dose aspirin every day.  ? Taking vitamin and mineral supplements as recommended by your health care provider.  What happens during an annual well check?  The services and screenings done by your health care provider during your annual well check will depend on your age, overall health, lifestyle risk factors, and family history of disease.  Counseling  Your health care provider may ask you questions about your:  · Alcohol use.  · Tobacco use.  · Drug use.  · Emotional well-being.  · Home and relationship well-being.  · Sexual activity.  · Eating habits.  · History of falls.  · Memory and ability to understand (cognition).  · Work and work environment.  · Reproductive health.    Screening  You may have the following tests or measurements:  · Height, weight, and BMI.  · Blood pressure.  · Lipid and cholesterol levels. These may be checked every 5 years, or more frequently if you are over 50 years old.  · Skin check.  · Lung cancer screening. You may have this screening every year starting at age 55 if you have a 30-pack-year history of smoking and currently smoke or have quit within the past 15 years.  · Fecal occult blood test (FOBT) of the stool. You may have this test every year starting at age 50.  · Flexible sigmoidoscopy or colonoscopy. You may have a sigmoidoscopy every 5 years or  a colonoscopy every 10 years starting at age 50.  · Hepatitis C blood test.  · Hepatitis B blood test.  · Sexually transmitted disease (STD) testing.  · Diabetes screening. This is done by checking your blood sugar (glucose) after you have not eaten for a while (fasting). You may have this done every 1-3 years.  · Bone density scan. This is done to screen for osteoporosis. You may have this done starting at age 65.  · Mammogram. This may be done every 1-2 years. Talk to your health care provider about how often you should have regular mammograms.    Talk with your health care provider about your test results, treatment options, and if necessary, the need for more tests.  Vaccines  Your health care provider may recommend certain vaccines, such as:  · Influenza vaccine. This is recommended every year.  · Tetanus, diphtheria, and acellular pertussis (Tdap, Td) vaccine. You may need a Td booster every 10 years.  · Varicella vaccine. You may need this if you have not been vaccinated.  · Zoster vaccine. You may need this after age 60.  · Measles, mumps, and rubella (MMR) vaccine. You may need at least one dose of MMR if you were born in 1957 or later. You may also need a second dose.  · Pneumococcal 13-valent conjugate (PCV13) vaccine. One dose is recommended after age 65.  · Pneumococcal polysaccharide (PPSV23) vaccine. One dose is recommended after age 65.  · Meningococcal vaccine. You may need this if you have certain conditions.  · Hepatitis A vaccine. You may need this if you have certain conditions or if you travel or work in places where you may be exposed to hepatitis A.  · Hepatitis B vaccine. You may need this if you have certain conditions or if you travel or work in places where you may be exposed to hepatitis B.  · Haemophilus influenzae type b (Hib) vaccine. You may need this if you have certain conditions.    Talk to your health care provider about which screenings and vaccines you need and how often you  need them.  This information is not intended to replace advice given to you by your health care provider. Make sure you discuss any questions you have with your health care provider.  Document Released: 2017 Document Revised: 2018 Document Reviewed: 10/18/2016  [facility] Interactive Patient Education © 2019 [employer]        Medicare Wellness  Personal Prevention Plan of Service     Date of Office Visit:  2019  Encounter Provider:  Payal Edwards MD  Place of Service:  [facility]  Patient Name: Marifer Ricardo  :  1950    As part of the Medicare Wellness portion of your visit today, we are providing you with this personalized preventive plan of services (PPPS). This plan is based upon recommendations of the [place] Preventive Services Task Force (USPSTF) and the Advisory Committee on Immunization Practices (ACIP).    This lists the preventive care services that should be considered, and provides dates of when you are due. Items listed as completed are up-to-date and do not require any further intervention.    Health Maintenance   Topic Date Due   • DIABETIC FOOT EXAM  2018   • MAMMOGRAM  2019   • DXA SCAN  2019   • URINE MICROALBUMIN  2019   • MEDICARE ANNUAL WELLNESS  2019   • HEMOGLOBIN A1C  2019   • INFLUENZA VACCINE  10/01/2019 (Originally 2019)   • TDAP/TD VACCINES (1 - Tdap) 10/01/2019 (Originally 1968)   • LIPID PANEL  11/15/2019   • DIABETIC EYE EXAM  2020   • COLONOSCOPY  2028   • HEPATITIS C SCREENING  Completed   • PNEUMOCOCCAL VACCINES (65+ LOW/MEDIUM RISK)  Completed   • ZOSTER VACCINE  Discontinued       Orders Placed This Encounter   Procedures   • Measles / Mumps / Rubella Immunity   • CBC & Differential     Order Specific Question:   Manual Differential     Answer:   No       Return in about 6 months (around 2019).

## 2019-05-06 NOTE — PROGRESS NOTES
QUICK REFERENCE INFORMATION:  The ABCs of the Annual Wellness Visit    Subsequent Medicare Wellness Visit     HEALTH RISK ASSESSMENT    : 1950    Recent Hospitalizations:  No hospitalization(s) within the last year..  ccc      Current Medical Providers:  Patient Care Team:  Payal Edwards MD as PCP - General (Family Medicine)  Jonathan Mondragon MD as Consulting Physician (Neurology)  Ming Stiles MD, GARY (Nephrology)  Rosmery Nicholson DC as Consulting Physician (Chiropractic Medicine)  Taras Morillo MD as Consulting Physician (General Surgery)        Smoking Status:  Social History     Tobacco Use   Smoking Status Never Smoker   Smokeless Tobacco Never Used       Alcohol Consumption:  Social History     Substance and Sexual Activity   Alcohol Use No       Depression Screen:   PHQ-2/PHQ-9 Depression Screening 2019   Little interest or pleasure in doing things 0   Feeling down, depressed, or hopeless 0   Trouble falling or staying asleep, or sleeping too much -   Feeling tired or having little energy -   Poor appetite or overeating -   Feeling bad about yourself - or that you are a failure or have let yourself or your family down -   Trouble concentrating on things, such as reading the newspaper or watching television -   Moving or speaking so slowly that other people could have noticed. Or the opposite - being so fidgety or restless that you have been moving around a lot more than usual -   Thoughts that you would be better off dead, or of hurting yourself in some way -   Total Score 0   If you checked off any problems, how difficult have these problems made it for you to do your work, take care of things at home, or get along with other people? -       Health Habits and Functional and Cognitive Screening:  Functional & Cognitive Status 2019   Do you have difficulty preparing food and eating? No   Do you have difficulty bathing yourself, getting dressed or grooming yourself? No   Do you have  difficulty using the toilet? No   Do you have difficulty moving around from place to place? No   Do you have trouble with steps or getting out of a bed or a chair? No   In the past year have you fallen or experienced a near fall? Yes   Current Diet Well Balanced Diet   Dental Exam Up to date   Eye Exam Up to date   Exercise (times per week) 5 times per week   Current Exercise Activities Include (No Data)   Do you need help using the phone?  No   Are you deaf or do you have serious difficulty hearing?  No   Do you need help with transportation? No   Do you need help shopping? No   Do you need help preparing meals?  No   Do you need help with housework?  No   Do you need help with laundry? No   Do you need help taking your medications? No   Do you need help managing money? No   Do you ever drive or ride in a car without wearing a seat belt? No   Have you felt unusual stress, anger or loneliness in the last month? No   Who do you live with? Alone   If you need help, do you have trouble finding someone available to you? No   Have you been bothered in the last four weeks by sexual problems? No   Do you have difficulty concentrating, remembering or making decisions? No           Does the patient have evidence of cognitive impairment? No    Asiprin use counseling: Does not need ASA (and currently is not on it)      Recent Lab Results:    Lab Results   Component Value Date    GLU 98 01/10/2018     Lab Results   Component Value Date    HGBA1C 5.6 05/06/2019     Lab Results   Component Value Date    CHOL 220 (H) 11/15/2018    TRIG 57 11/15/2018     (H) 11/15/2018    VLDL 11.4 11/15/2018    LDLHDL 0.97 11/15/2018           Age-appropriate Screening Schedule:  Refer to the list below for future screening recommendations based on patient's age, sex and/or medical conditions. Orders for these recommended tests are listed in the plan section. The patient has been provided with a written plan.    Health Maintenance   Topic  Date Due   • DIABETIC FOOT EXAM  07/11/2018   • MAMMOGRAM  02/09/2019   • DXA SCAN  02/09/2019   • INFLUENZA VACCINE  10/01/2019 (Originally 8/1/2019)   • TDAP/TD VACCINES (1 - Tdap) 10/01/2019 (Originally 1/16/1969)   • HEMOGLOBIN A1C  11/06/2019   • LIPID PANEL  11/15/2019   • DIABETIC EYE EXAM  04/17/2020   • URINE MICROALBUMIN  05/06/2020   • COLONOSCOPY  02/07/2028   • PNEUMOCOCCAL VACCINES (65+ LOW/MEDIUM RISK)  Completed   • ZOSTER VACCINE  Discontinued        Subjective   History of Present Illness    Marifer Ricardo is a 69 y.o. female who presents for an Annual Wellness Visit.    She has non-insulin-dependent diabetes mellitus with associated diabetic neuropathy and gastroparesis.  Blood glucose his recently been under control.  A1c reviewed as above. She is up-to-date on diabetic eye exam. Has HLP but nNo longer taking statin as prescribed. .     Followed by neurology for chronic mixed headache syndrome.  No new changes in severity, frequency or associated symptoms with headaches.     Noted to have decreased WBC on previous labs. Due for recheck. No recent infections.    Pt would like to have immunity testing to see if MMR booster needed.    Has GERD with esophagitis but smptosm currently well controlled on PPI as needed.    Has osteopenia. Due for DEXA. takign rishi min GERI as directed.    Has OA with no recent flares in pain.    The following portions of the patient's history were reviewed and updated as appropriate: allergies, current medications, past family history, past medical history, past social history, past surgical history and problem list.    Outpatient Medications Prior to Visit   Medication Sig Dispense Refill   • Biotin ([employer] BIOTIN HIGH POTENCY) 1000 MCG tablet Take 1,000 mcg by mouth 3 (three) times a day.     • cetirizine (zyrTEC) 10 MG tablet Take 1 tablet by mouth Daily.     • cholecalciferol (VITAMIN D3) 1000 UNITS tablet Take 1,000 Units by mouth daily.     • Cinnamon 500 MG tablet  Take  by mouth 2 (two) times a day.     • Cyanocobalamin (VITAMIN B-12) 5000 MCG sublingual tablet Place 1 tablet under the tongue Daily.     • DEVILS CLAW PO Take 1 tablet by mouth Daily.     • gabapentin (NEURONTIN) 100 MG capsule 1 po tid as needed for pain 270 capsule 0   • Glucose Blood (BLOOD GLUCOSE TEST) strip 1 strip by In Vitro route Daily. CHECK BLOOD SUGAR DAILY. 50 each 5   • Lancets misc 1 each Daily. CHECK BLOOD SUGAR DAILY 100 each 5   • Menthol-Methyl Salicylate (MUSCLE RUB EX)      • Misc Natural Products (WHITE WILLOW BARK PO) Take 1 tablet by mouth Daily.     • Omega 3-6-9 Fatty Acids (OMEGA 3-6-9 COMPLEX PO) Take 1 capsule by mouth Daily.     • traMADol (ULTRAM) 50 MG tablet Take 50 mg by mouth Every 6 (Six) Hours As Needed.     • atorvastatin (LIPITOR) 20 MG tablet TAKE 1 TABLET EVERY DAY 90 tablet 1   • glucosamine sulfate 500 MG capsule capsule Take  by mouth 3 (three) times a day with meals.     • meloxicam (MOBIC) 15 MG tablet Take 1 tablet by mouth Daily. 90 tablet 1   • omeprazole (priLOSEC) 40 MG capsule Take 1 capsule by mouth Daily. 30 capsule 2   • sucralfate (CARAFATE) 1 GM/10ML suspension Take 10 mL by mouth 4 (Four) Times a Day With Meals & at Bedtime. 600 mL 1     No facility-administered medications prior to visit.        Patient Active Problem List   Diagnosis   • Type 2 diabetes mellitus (CMS/HCC)   • Intractable migraine   • Primary generalized (osteo)arthritis   • Difficult intravenous access   • Copy of advanced directive obtained from patient   • No blood products   • Hyperlipidemia   • History of hypothyroidism   • Chronic mixed headache syndrome   • Diabetic peripheral neuropathy (CMS/HCC)   • Gastroparesis   • Rosacea   • Osteopenia   • Reflux esophagitis   • Gastritis       Advance Care Planning:  Patient has an advance directive - a copy has been provided and is visible in patient header    Identification of Risk Factors:  Risk factors include: cardiovascular risk,  inactivity, chronic pain and depression.    Review of Systems   Constitutional: Positive for fatigue. Negative for diaphoresis, fever and unexpected weight change.   HENT: Negative for congestion, mouth sores, rhinorrhea, sore throat and trouble swallowing.    Eyes: Positive for visual disturbance (chronic stable). Negative for pain and redness.   Respiratory: Positive for cough (mild, dry, intermittent, chronic). Negative for shortness of breath and wheezing.    Cardiovascular: Negative for chest pain, palpitations and leg swelling.   Gastrointestinal: Positive for abdominal pain and nausea. Negative for diarrhea and vomiting.   Endocrine: Positive for cold intolerance. Negative for polydipsia and polyuria.   Genitourinary: Negative for dysuria and hematuria.   Musculoskeletal: Positive for arthralgias and back pain. Negative for myalgias.   Skin: Negative for rash and wound.   Neurological: Positive for headaches. Negative for dizziness, tremors, syncope, speech difficulty and weakness.   Hematological: Negative for adenopathy. Bruises/bleeds easily.   Psychiatric/Behavioral: Positive for dysphoric mood and sleep disturbance. Negative for confusion and suicidal ideas.       Compared to one year ago, the patient feels her physical health is the same.  Compared to one year ago, the patient feels her mental health is the same.    Objective     Physical Exam   Constitutional: She is oriented to person, place, and time. She appears well-developed and well-nourished. She is cooperative. She does not appear ill. No distress.   Appears older than stated age   HENT:   Head: Normocephalic and atraumatic.   Right Ear: Decreased hearing is noted.   Left Ear: Decreased hearing is noted.   Mouth/Throat: Oropharynx is clear and moist and mucous membranes are normal. Mucous membranes are not dry. No oral lesions. No oropharyngeal exudate or posterior oropharyngeal erythema.   Eyes: Conjunctivae and lids are normal.   Neck:  "Phonation normal. Neck supple. Normal carotid pulses present. Carotid bruit is not present. No thyromegaly present.   Cardiovascular: Normal rate, regular rhythm, S1 normal, S2 normal and intact distal pulses. Exam reveals no gallop.   No murmur heard.  Pulmonary/Chest: Effort normal and breath sounds normal.   Musculoskeletal: She exhibits no edema or tenderness.     Vascular Status -  Her right foot exhibits no edema. Her left foot exhibits no edema.  Lymphadenopathy:     She has no cervical adenopathy.   Neurological: She is alert and oriented to person, place, and time. She has normal strength. She displays no tremor. No cranial nerve deficit or sensory deficit. Gait normal.   Skin: Skin is warm and dry. No ecchymosis and no rash noted. There is pallor.   Psychiatric: Her speech is normal and behavior is normal. Judgment and thought content normal. Her affect is blunt. Cognition and memory are normal.   Good eye contact. Answers questions appropriately. Good personal hygiene and grooming.   Nursing note and vitals reviewed.      Vitals:    05/06/19 1055   BP: 120/70   Pulse: 74   Resp: 14   SpO2: 96%   Weight: 56.7 kg (125 lb)   Height: 168.9 cm (66.5\")       Patient's Body mass index is 19.87 kg/m². BMI is within normal parameters. No follow-up required..      Assessment/Plan   Patient Self-Management and Personalized Health Advice  The patient has been provided with information about: diet, exercise, prevention of cardiac or vascular disease, fall prevention and mental health concerns and preventive services including:   · Bone densitometry screening, Counseling for cardiovascular disease risk reduction, Exercise counseling provided, Fall Risk assessment done, Fall Risk plan of care done, Nutrition counseling provided, Screening mammography, referral placed.    Visit Diagnoses:    ICD-10-CM ICD-9-CM   1. Medicare annual wellness visit, subsequent Z00.00 V70.0   2. Other decreased white blood cell (WBC) count " D72.818 288.59   3. Immunity status testing Z01.84 V72.61   4. Type 2 diabetes mellitus with complication, without long-term current use of insulin (CMS/[facility]) E11.8 250.90   5. Chronic mixed headache syndrome G44.89 339.89   6. Difficult intravenous access Z78.9 V49.89   7. Pure hypercholesterolemia E78.00 272.0   8. Gastroparesis K31.84 536.3   9. Reflux esophagitis K21.0 530.11   10. Diabetic peripheral neuropathy (CMS/[facility]) E11.42 250.60     357.2   11. Osteopenia of multiple sites M85.89 733.90   12. Primary generalized (osteo)arthritis M15.0 715.09   13. Postmenopausal Z78.0 V49.81   14. Encounter for screening mammogram for breast cancer Z12.31 V76.12       Orders Placed This Encounter   Procedures   • DEXA Bone Density Axial     Standing Status:   Future     Standing Expiration Date:   5/6/2020     Order Specific Question:   Reason for Exam:     Answer:   postmenopausal, screening for osteoporosis, osteopenia   • Mammo Screening Digital Tomosynthesis Bilateral With CAD     Standing Status:   Future     Standing Expiration Date:   5/7/2020     Order Specific Question:   Reason for Exam:     Answer:   screening for breast cancer   • Measles / Mumps / Rubella Immunity   • POC Microalbumin   • POC Glycated Hemoglobin, Total   • CBC & Differential     Order Specific Question:   Manual Differential     Answer:   No       Outpatient Encounter Medications as of 5/6/2019   Medication Sig Dispense Refill   • Biotin ([employer] BIOTIN HIGH POTENCY) 1000 MCG tablet Take 1,000 mcg by mouth 3 (three) times a day.     • cetirizine (zyrTEC) 10 MG tablet Take 1 tablet by mouth Daily.     • cholecalciferol (VITAMIN D3) 1000 UNITS tablet Take 1,000 Units by mouth daily.     • Cinnamon 500 MG tablet Take  by mouth 2 (two) times a day.     • Cyanocobalamin (VITAMIN B-12) 5000 MCG sublingual tablet Place 1 tablet under the tongue Daily.     • DEVILS CLAW PO Take 1 tablet by mouth Daily.     • gabapentin (NEURONTIN) 100 MG capsule 1 po tid  as needed for pain 270 capsule 0   • Glucose Blood (BLOOD GLUCOSE TEST) strip 1 strip by In Vitro route Daily. CHECK BLOOD SUGAR DAILY. 50 each 5   • Lancets misc 1 each Daily. CHECK BLOOD SUGAR DAILY 100 each 5   • Menthol-Methyl Salicylate (MUSCLE RUB EX)      • Misc Natural Products (WHITE WILLOW BARK PO) Take 1 tablet by mouth Daily.     • Omega 3-6-9 Fatty Acids (OMEGA 3-6-9 COMPLEX PO) Take 1 capsule by mouth Daily.     • traMADol (ULTRAM) 50 MG tablet Take 50 mg by mouth Every 6 (Six) Hours As Needed.     • [DISCONTINUED] atorvastatin (LIPITOR) 20 MG tablet TAKE 1 TABLET EVERY DAY 90 tablet 1   • [DISCONTINUED] glucosamine sulfate 500 MG capsule capsule Take  by mouth 3 (three) times a day with meals.     • [DISCONTINUED] meloxicam (MOBIC) 15 MG tablet Take 1 tablet by mouth Daily. 90 tablet 1   • [DISCONTINUED] omeprazole (priLOSEC) 40 MG capsule Take 1 capsule by mouth Daily. 30 capsule 2   • [DISCONTINUED] sucralfate (CARAFATE) 1 GM/10ML suspension Take 10 mL by mouth 4 (Four) Times a Day With Meals & at Bedtime. 600 mL 1     No facility-administered encounter medications on file as of 5/6/2019.        Reviewed use of high risk medication in the elderly: yes  Reviewed for potential of harmful drug interactions in the elderly: yes    Multiple chronic medical conditions which appear stable at this time.    Age appropriate preventive care reviewed including cancer screenings, safety measures, mental health concerns, supplements, prevention of CV disease, etc. Handout provided.    Pt advised to eat a heart healthy diet and get regular aerobic exercise.    Follow Up:  Return in about 6 months (around 11/6/2019).   I will contact patient regarding test results and provide instructions regarding any necessary changes in plan of care.  F/u sooner as needed/instructed.  Patient was encouraged to keep me informed of any acute changes, or any new concerning symptoms.  Pt is aware of reasons to seek emergent  care.  Patient voiced understanding of all instructions and denied further questions.      An After Visit Summary and PPPS with all of these plans were given to the patient.

## 2019-05-07 VITALS
HEART RATE: 74 BPM | SYSTOLIC BLOOD PRESSURE: 120 MMHG | BODY MASS INDEX: 19.62 KG/M2 | DIASTOLIC BLOOD PRESSURE: 70 MMHG | HEIGHT: 67 IN | WEIGHT: 125 LBS | OXYGEN SATURATION: 96 % | RESPIRATION RATE: 14 BRPM

## 2019-05-07 PROBLEM — R12 HEARTBURN: Status: RESOLVED | Noted: 2018-01-22 | Resolved: 2019-05-07

## 2019-05-17 LAB
BASOPHILS # BLD AUTO: 0.01 10*3/MM3 (ref 0–0.2)
BASOPHILS NFR BLD AUTO: 0.2 % (ref 0–1.5)
EOSINOPHIL # BLD AUTO: 0.13 10*3/MM3 (ref 0–0.4)
EOSINOPHIL NFR BLD AUTO: 2.9 % (ref 0.3–6.2)
ERYTHROCYTE [DISTWIDTH] IN BLOOD BY AUTOMATED COUNT: 12.6 % (ref 12.3–15.4)
HCT VFR BLD AUTO: 38.5 % (ref 34–46.6)
HGB BLD-MCNC: 12.8 G/DL (ref 12–15.9)
IMM GRANULOCYTES # BLD AUTO: 0.01 10*3/MM3 (ref 0–0.05)
IMM GRANULOCYTES NFR BLD AUTO: 0.2 % (ref 0–0.5)
LYMPHOCYTES # BLD AUTO: 1.15 10*3/MM3 (ref 0.7–3.1)
LYMPHOCYTES NFR BLD AUTO: 25.6 % (ref 19.6–45.3)
MCH RBC QN AUTO: 30.5 PG (ref 26.6–33)
MCHC RBC AUTO-ENTMCNC: 33.2 G/DL (ref 31.5–35.7)
MCV RBC AUTO: 91.9 FL (ref 79–97)
MEV IGG SER IA-ACNC: >300 AU/ML
MONOCYTES # BLD AUTO: 0.25 10*3/MM3 (ref 0.1–0.9)
MONOCYTES NFR BLD AUTO: 5.6 % (ref 5–12)
MUV IGG SER IA-ACNC: >300 AU/ML
NEUTROPHILS # BLD AUTO: 2.95 10*3/MM3 (ref 1.7–7)
NEUTROPHILS NFR BLD AUTO: 65.5 % (ref 42.7–76)
NRBC BLD AUTO-RTO: 0 /100 WBC (ref 0–0.2)
PLATELET # BLD AUTO: 270 10*3/MM3 (ref 140–450)
RBC # BLD AUTO: 4.19 10*6/MM3 (ref 3.77–5.28)
RUBV IGG SERPL IA-ACNC: 22.7 INDEX
WBC # BLD AUTO: 4.5 10*3/MM3 (ref 3.4–10.8)

## 2019-06-26 ENCOUNTER — HOSPITAL ENCOUNTER (OUTPATIENT)
Dept: MAMMOGRAPHY | Facility: HOSPITAL | Age: 69
Discharge: HOME | End: 2019-06-26
Admitting: FAMILY MEDICINE

## 2019-06-26 ENCOUNTER — APPOINTMENT (OUTPATIENT)
Dept: BONE DENSITY | Facility: HOSPITAL | Age: 69
End: 2019-06-26

## 2019-06-26 DIAGNOSIS — Z78.0 POSTMENOPAUSAL: ICD-10-CM

## 2019-06-26 DIAGNOSIS — M85.89 OSTEOPENIA OF MULTIPLE SITES: ICD-10-CM

## 2019-06-26 DIAGNOSIS — Z12.31 ENCOUNTER FOR SCREENING MAMMOGRAM FOR BREAST CANCER: ICD-10-CM

## 2019-06-26 PROCEDURE — 77063 BREAST TOMOSYNTHESIS BI: CPT

## 2019-06-26 PROCEDURE — 77080 DXA BONE DENSITY AXIAL: CPT

## 2019-06-26 PROCEDURE — 77067 SCR MAMMO BI INCL CAD: CPT

## 2019-11-06 ENCOUNTER — OFFICE VISIT (OUTPATIENT)
Dept: FAMILY MEDICINE CLINIC | Facility: CLINIC | Age: 69
End: 2019-11-06

## 2019-11-06 VITALS
HEIGHT: 67 IN | DIASTOLIC BLOOD PRESSURE: 98 MMHG | OXYGEN SATURATION: 98 % | BODY MASS INDEX: 19.99 KG/M2 | HEART RATE: 85 BPM | TEMPERATURE: 98 F | WEIGHT: 127.38 LBS | SYSTOLIC BLOOD PRESSURE: 168 MMHG

## 2019-11-06 DIAGNOSIS — Z53.20 STATIN DECLINED: ICD-10-CM

## 2019-11-06 DIAGNOSIS — E11.8 TYPE 2 DIABETES MELLITUS WITH COMPLICATION, WITHOUT LONG-TERM CURRENT USE OF INSULIN (HCC): Primary | ICD-10-CM

## 2019-11-06 DIAGNOSIS — E78.00 PURE HYPERCHOLESTEROLEMIA: ICD-10-CM

## 2019-11-06 DIAGNOSIS — K21.00 REFLUX ESOPHAGITIS: ICD-10-CM

## 2019-11-06 DIAGNOSIS — M85.89 OSTEOPENIA OF MULTIPLE SITES: ICD-10-CM

## 2019-11-06 DIAGNOSIS — E11.42 DIABETIC PERIPHERAL NEUROPATHY (HCC): ICD-10-CM

## 2019-11-06 LAB — HBA1C MFR BLD: 5.3 %

## 2019-11-06 PROCEDURE — 99214 OFFICE O/P EST MOD 30 MIN: CPT | Performed by: FAMILY MEDICINE

## 2019-11-06 PROCEDURE — 83036 HEMOGLOBIN GLYCOSYLATED A1C: CPT | Performed by: FAMILY MEDICINE

## 2019-11-06 NOTE — PROGRESS NOTES
Subjective   Mraifer Ricardo is a 69 y.o. female.     History of Present Illness   Mrs. Ricardo presents today for routine f/u on several chronic med problems.    She has non-insulin-dependent diabetes mellitus with associated diabetic neuropathy and gastroparesis.  Blood glucose his recently been under control. A1c less than 6. She is up-to-date on diabetic eye exam. Has HLP but nNo longer taking statin as rec'd.     Followed by neurology for chronic mixed headache syndrome.  No new changes in severity, frequency or associated symptoms with headaches.     Has GERD with esophagitis but no longer taking PPI. Manages with dietary changes. Denies dysphagia, weight loss, blood in stool, no increased abd pain.      Has osteopenia. Taking vit D supplement. No recent falls.     Pt's previous HPI reviewed and updated as indicated.       The following portions of the patient's history were reviewed and updated as appropriate: allergies, current medications, past family history, past medical history, past social history, past surgical history and problem list.    Review of Systems   Constitutional: Positive for fatigue. Negative for diaphoresis, fever and unexpected weight change.   HENT: Negative for congestion, mouth sores, rhinorrhea, sore throat and trouble swallowing.    Eyes: Positive for visual disturbance (chronic stable). Negative for pain and redness.   Respiratory: Negative for cough, shortness of breath and wheezing.    Cardiovascular: Negative for chest pain, palpitations and leg swelling.   Gastrointestinal: Positive for abdominal pain (mild, intermittent, chronic) and nausea. Negative for diarrhea and vomiting.   Endocrine: Positive for cold intolerance. Negative for polydipsia and polyuria.   Genitourinary: Negative for dysuria and hematuria.   Musculoskeletal: Positive for arthralgias and back pain. Negative for myalgias.   Skin: Negative for rash and wound.   Neurological: Positive for headaches. Negative  for dizziness, tremors, syncope, speech difficulty and weakness.   Hematological: Negative for adenopathy. Bruises/bleeds easily.   Psychiatric/Behavioral: Positive for dysphoric mood and sleep disturbance. Negative for confusion and suicidal ideas.   Pt's previous ROS reviewed and updated as indicated.     Objective    Vitals:    11/06/19 0812   BP: 168/98   Pulse: 85   Temp: 98 °F (36.7 °C)   SpO2: 98%     Body mass index is 20.25 kg/m².      11/06/19 0812   Weight: 57.8 kg (127 lb 6 oz)       Physical Exam   Constitutional: She is oriented to person, place, and time. She appears well-developed and well-nourished. She is cooperative. She does not appear ill. No distress.   Appears older than stated age   HENT:   Head: Normocephalic and atraumatic.   Right Ear: Decreased hearing is noted.   Left Ear: Decreased hearing is noted.   Mouth/Throat: Mucous membranes are normal. Mucous membranes are not dry.   Eyes: Conjunctivae and lids are normal. No scleral icterus.   Neck: Phonation normal. Neck supple. Normal carotid pulses present. Carotid bruit is not present.   Cardiovascular: Normal rate, regular rhythm, S1 normal, S2 normal and intact distal pulses. Exam reveals no gallop.   No murmur heard.  Pulmonary/Chest: Effort normal and breath sounds normal.   Musculoskeletal: She exhibits no edema or tenderness.     Vascular Status -  Her right foot exhibits no edema. Her left foot exhibits no edema.  Neurological: She is alert and oriented to person, place, and time. She displays no tremor. Gait normal.   Skin: Skin is warm and dry. No ecchymosis and no rash noted. No cyanosis. Nails show no clubbing.   Psychiatric: Her speech is normal and behavior is normal. Judgment and thought content normal. Her affect is blunt. Cognition and memory are normal.   Good eye contact. Answers questions appropriately. Good personal hygiene and grooming.   Nursing note and vitals reviewed.  Pt's previous physical exam reviewed and  updated as indicated.    Lab Results   Component Value Date    WBC 4.50 05/16/2019    HGB 12.8 05/16/2019    HCT 38.5 05/16/2019    MCV 91.9 05/16/2019     05/16/2019       Lab Results   Component Value Date    GLUCOSE 90 11/15/2018    BUN 22 (H) 11/15/2018    CREATININE 0.90 11/15/2018    EGFRIFNONA 62 11/15/2018    EGFRIFAFRI 101 01/10/2018    BCR 24.4 (H) 11/15/2018    K 4.6 11/15/2018    CO2 30.0 11/15/2018    CALCIUM 9.9 11/15/2018    PROTENTOTREF 6.1 (L) 01/10/2018    ALBUMIN 4.80 11/15/2018    LABIL2 1.2 01/10/2018    AST 27 11/15/2018    ALT 40 11/15/2018       Lab Results   Component Value Date    CHOL 220 (H) 11/15/2018    CHLPL 252 (H) 05/03/2018    TRIG 57 11/15/2018     (H) 11/15/2018     (H) 11/15/2018       Lab Results   Component Value Date    TSH 1.880 11/15/2018       Lab Results   Component Value Date    HGBA1C 5.3 11/06/2019    HGBA1C 5.6 05/06/2019    HGBA1C 5.6 11/05/2018     Lab Results   Component Value Date    MICROALBUR <3.0 07/11/2017    CREATININE 0.90 11/15/2018         Assessment/Plan   Marifer was seen today for diabetes and hyperlipidemia.    Diagnoses and all orders for this visit:    Type 2 diabetes mellitus with complication, without long-term current use of insulin (CMS/[facility])  -     POC Glycosylated Hemoglobin (Hb A1C)    Pure hypercholesterolemia    Diabetic peripheral neuropathy (CMS/[facility])    Statin declined    Osteopenia of multiple sites    Reflux esophagitis       NIDDM diet controlled. Patient encouraged to follow diabetic appropriate diet, exercise daily, perform feet check daily, and have yearly diabetic eye exams.    HLP and pt declines statin tx. Pt advised to eat a heart healthy diet and get regular aerobic exercise.    Doing well without gabapentin at this time. Neuropathy symptoms stable.    Continue vitamin D and q 2 year DEXA.    Reflux esophagitis/GERD well managed without medication per pt at this time. Continue dietary mgnt. Report  exacerbation.    Routine f/u in 6 months as scheduled, f/u sooner as needed.  Patient was encouraged to keep me informed of any acute changes, or any new concerning symptoms.  Pt is aware of reasons to seek emergent care.  Patient voiced understanding of all instructions and denied further questions.

## 2020-05-07 ENCOUNTER — OFFICE VISIT (OUTPATIENT)
Dept: FAMILY MEDICINE CLINIC | Facility: CLINIC | Age: 70
End: 2020-05-07

## 2020-05-07 DIAGNOSIS — M15.0 PRIMARY GENERALIZED (OSTEO)ARTHRITIS: ICD-10-CM

## 2020-05-07 DIAGNOSIS — E78.00 PURE HYPERCHOLESTEROLEMIA: ICD-10-CM

## 2020-05-07 DIAGNOSIS — Z51.81 MEDICATION MONITORING ENCOUNTER: ICD-10-CM

## 2020-05-07 DIAGNOSIS — E11.42 DIABETIC PERIPHERAL NEUROPATHY (HCC): ICD-10-CM

## 2020-05-07 DIAGNOSIS — G44.89 CHRONIC MIXED HEADACHE SYNDROME: ICD-10-CM

## 2020-05-07 DIAGNOSIS — K21.00 REFLUX ESOPHAGITIS: ICD-10-CM

## 2020-05-07 DIAGNOSIS — E11.40 TYPE 2 DIABETES MELLITUS WITH DIABETIC NEUROPATHY, WITHOUT LONG-TERM CURRENT USE OF INSULIN (HCC): ICD-10-CM

## 2020-05-07 DIAGNOSIS — Z53.20 STATIN DECLINED: ICD-10-CM

## 2020-05-07 DIAGNOSIS — M85.89 OSTEOPENIA OF MULTIPLE SITES: ICD-10-CM

## 2020-05-07 DIAGNOSIS — Z86.39 HISTORY OF HYPOTHYROIDISM: ICD-10-CM

## 2020-05-07 DIAGNOSIS — Z00.00 MEDICARE ANNUAL WELLNESS VISIT, SUBSEQUENT: Primary | ICD-10-CM

## 2020-05-07 DIAGNOSIS — K31.84 GASTROPARESIS: ICD-10-CM

## 2020-05-07 PROCEDURE — 96160 PT-FOCUSED HLTH RISK ASSMT: CPT | Performed by: FAMILY MEDICINE

## 2020-05-07 PROCEDURE — G0439 PPPS, SUBSEQ VISIT: HCPCS | Performed by: FAMILY MEDICINE

## 2020-05-07 NOTE — PATIENT INSTRUCTIONS
Preventive Care 65 Years and Older, Female  Preventive care refers to lifestyle choices and visits with your health care provider that can promote health and wellness.  What does preventive care include?  · A yearly physical exam. This is also called an annual well check.  · Dental exams once or twice a year.  · Routine eye exams. Ask your health care provider how often you should have your eyes checked.  · Personal lifestyle choices, including:  ? Daily care of your teeth and gums.  ? Regular physical activity.  ? Eating a healthy diet.  ? Avoiding tobacco and drug use.  ? Limiting alcohol use.  ? Practicing safe sex.  ? Taking low-dose aspirin every day.  ? Taking vitamin and mineral supplements as recommended by your health care provider.  What happens during an annual well check?  The services and screenings done by your health care provider during your annual well check will depend on your age, overall health, lifestyle risk factors, and family history of disease.  Counseling  Your health care provider may ask you questions about your:  · Alcohol use.  · Tobacco use.  · Drug use.  · Emotional well-being.  · Home and relationship well-being.  · Sexual activity.  · Eating habits.  · History of falls.  · Memory and ability to understand (cognition).  · Work and work environment.  · Reproductive health.    Screening  You may have the following tests or measurements:  · Height, weight, and BMI.  · Blood pressure.  · Lipid and cholesterol levels. These may be checked every 5 years, or more frequently if you are over 50 years old.  · Skin check.  · Lung cancer screening. You may have this screening every year starting at age 55 if you have a 30-pack-year history of smoking and currently smoke or have quit within the past 15 years.  · Colorectal cancer screening. All adults should have this screening starting at age 50 and continuing until age 75. You will have tests every 1-10 years, depending on your results and the  type of screening test. People at increased risk should start screening at an earlier age. Screening tests may include:  ? Guaiac-based fecal occult blood testing.  ? Fecal immunochemical test (FIT).  ? Stool DNA test.  ? Virtual colonoscopy.  ? Sigmoidoscopy. During this test, a flexible tube with a tiny camera (sigmoidoscope) is used to examine your rectum and lower colon. The sigmoidoscope is inserted through your anus into your rectum and lower colon.  ? Colonoscopy. During this test, a long, thin, flexible tube with a tiny camera (colonoscope) is used to examine your entire colon and rectum.  · Hepatitis C blood test.  · Hepatitis B blood test.  · Sexually transmitted disease (STD) testing.  · Diabetes screening. This is done by checking your blood sugar (glucose) after you have not eaten for a while (fasting). You may have this done every 1-3 years.  · Bone density scan. This is done to screen for osteoporosis. You may have this done starting at age 65.  · Mammogram. This may be done every 1-2 years. Talk to your health care provider about how often you should have regular mammograms.  Talk with your health care provider about your test results, treatment options, and if necessary, the need for more tests.  Vaccines  Your health care provider may recommend certain vaccines, such as:  · Influenza vaccine. This is recommended every year.  · Tetanus, diphtheria, and acellular pertussis (Tdap, Td) vaccine. You may need a Td booster every 10 years.  · Varicella vaccine. You may need this if you have not been vaccinated.  · Zoster vaccine. You may need this after age 60.  · Measles, mumps, and rubella (MMR) vaccine. You may need at least one dose of MMR if you were born in 1957 or later. You may also need a second dose.  · Pneumococcal 13-valent conjugate (PCV13) vaccine. One dose is recommended after age 65.  · Pneumococcal polysaccharide (PPSV23) vaccine. One dose is recommended after age 65.  · Meningococcal  vaccine. You may need this if you have certain conditions.  · Hepatitis A vaccine. You may need this if you have certain conditions or if you travel or work in places where you may be exposed to hepatitis A.  · Hepatitis B vaccine. You may need this if you have certain conditions or if you travel or work in places where you may be exposed to hepatitis B.  · Haemophilus influenzae type b (Hib) vaccine. You may need this if you have certain conditions.  Talk to your health care provider about which screenings and vaccines you need and how often you need them.  This information is not intended to replace advice given to you by your health care provider. Make sure you discuss any questions you have with your health care provider.  Document Released: 2017 Document Revised: 2019 Document Reviewed: 10/18/2016  [employer] Interactive Patient Education © 2019 [employer]      Medicare Wellness  Personal Prevention Plan of Service     Date of Office Visit:  2020  Encounter Provider:  Payal Edwards MD  Place of Service:  [facility]  Patient Name: Marifer Ricardo  :  1950    As part of the Medicare Wellness portion of your visit today, we are providing you with this personalized preventive plan of services (PPPS). This plan is based upon recommendations of the [place] Preventive Services Task Force (USPSTF) and the Advisory Committee on Immunization Practices (ACIP).    This lists the preventive care services that should be considered, and provides dates of when you are due. Items listed as completed are up-to-date and do not require any further intervention.    Health Maintenance   Topic Date Due   • TDAP/TD VACCINES (1 - Tdap) 1960   • DIABETIC FOOT EXAM  2018   • LIPID PANEL  11/15/2019   • DIABETIC EYE EXAM  2020   • MEDICARE ANNUAL WELLNESS  2020   • HEMOGLOBIN A1C  2020   • URINE MICROALBUMIN  2020   • INFLUENZA  VACCINE  08/01/2020   • MAMMOGRAM  06/26/2021   • DXA SCAN  06/26/2021   • COLONOSCOPY  02/07/2028   • HEPATITIS C SCREENING  Completed   • Pneumococcal Vaccine Once at 65 Years Old  Completed   • ZOSTER VACCINE  Discontinued       No orders of the defined types were placed in this encounter.      No follow-ups on file.

## 2020-05-07 NOTE — PROGRESS NOTES
The ABCs of the Annual Wellness Visit  Subsequent Medicare Wellness Visit    Chief Complaint   Patient presents with   • Annual Exam       Subjective   History of Present Illness:  Marifer Ricardo is a 70 y.o. female who presents for a Subsequent Medicare Wellness Visit.    She has non-insulin-dependent diabetes mellitus with associated diabetic neuropathy and gastroparesis.  Blood glucose his recently been under control.  She is not up-to-date on diabetic eye exam as she delayed scheduling due to covid 19 restrictions.. Has HLP but no longer taking statin as prescribed as she declined tx. .     Followed by neurology for chronic mixed headache syndrome.  No new changes in severity, frequency or associated symptoms with headaches.     Has GERD with esophagitis but symptoms currently well controlled on PPI as needed.     Has osteopenia. UTD on DEXA. Taking vit D as directed.     Has OA with no recent flares in pain.       HEALTH RISK ASSESSMENT    Recent Hospitalizations:  No hospitalization(s) within the last year.    Current Medical Providers:  Patient Care Team:  Payal Edwards MD as PCP - General (Family Medicine)  Jonathan Mondragon MD as Consulting Physician (Neurology)  Ming Stiles MD, GARY (Nephrology)  Rosmery Nicholson DC as Consulting Physician (Chiropractic Medicine)  Taras Morillo MD as Consulting Physician (General Surgery)    Smoking Status:  Social History     Tobacco Use   Smoking Status Never Smoker   Smokeless Tobacco Never Used       Alcohol Consumption:  Social History     Substance and Sexual Activity   Alcohol Use No       Depression Screen:   PHQ-2/PHQ-9 Depression Screening 5/7/2020   Little interest or pleasure in doing things 0   Feeling down, depressed, or hopeless 0   Trouble falling or staying asleep, or sleeping too much -   Feeling tired or having little energy -   Poor appetite or overeating -   Feeling bad about yourself - or that you are a failure or have let yourself or your  family down -   Trouble concentrating on things, such as reading the newspaper or watching television -   Moving or speaking so slowly that other people could have noticed. Or the opposite - being so fidgety or restless that you have been moving around a lot more than usual -   Thoughts that you would be better off dead, or of hurting yourself in some way -   Total Score 0   If you checked off any problems, how difficult have these problems made it for you to do your work, take care of things at home, or get along with other people? -       Fall Risk Screen:  RYAN Fall Risk Assessment was completed, and patient is at LOW risk for falls.Assessment completed on:5/7/2020    Health Habits and Functional and Cognitive Screening:  Functional & Cognitive Status 5/7/2020   Do you have difficulty preparing food and eating? No   Do you have difficulty bathing yourself, getting dressed or grooming yourself? No   Do you have difficulty using the toilet? No   Do you have difficulty moving around from place to place? No   Do you have trouble with steps or getting out of a bed or a chair? No   Current Diet Well Balanced Diet   Dental Exam Up to date   Eye Exam Not up to date   Exercise (times per week) 7 times per week   Current Exercise Activities Include Housecleaning   Do you need help using the phone?  No   Are you deaf or do you have serious difficulty hearing?  Yes   Do you need help with transportation? No   Do you need help shopping? Yes   Do you need help preparing meals?  No   Do you need help with housework?  No   Do you need help with laundry? No   Do you need help taking your medications? No   Do you need help managing money? No   Do you ever drive or ride in a car without wearing a seat belt? No   Have you felt unusual stress, anger or loneliness in the last month? No   Who do you live with? Child   If you need help, do you have trouble finding someone available to you? No   Have you been bothered in the last four  weeks by sexual problems? -   Do you have difficulty concentrating, remembering or making decisions? No         Does the patient have evidence of cognitive impairment? l-no    Asprin use counseling:Does not need ASA (and currently is not on it)    Age-appropriate Screening Schedule:  Refer to the list below for future screening recommendations based on patient's age, sex and/or medical conditions. Orders for these recommended tests are listed in the plan section. The patient has been provided with a written plan.    Health Maintenance   Topic Date Due   • TDAP/TD VACCINES (1 - Tdap) 01/16/1961   • DIABETIC FOOT EXAM  07/11/2018   • LIPID PANEL  11/15/2019   • DIABETIC EYE EXAM  04/17/2020   • HEMOGLOBIN A1C  05/06/2020   • URINE MICROALBUMIN  05/06/2020   • INFLUENZA VACCINE  08/01/2020   • MAMMOGRAM  06/26/2021   • DXA SCAN  06/26/2021   • COLONOSCOPY  02/07/2028   • ZOSTER VACCINE  Discontinued          The following portions of the patient's history were reviewed and updated as appropriate: allergies, current medications, past family history, past medical history, past social history, past surgical history and problem list.    Outpatient Medications Prior to Visit   Medication Sig Dispense Refill   • Biotin ([employer] BIOTIN HIGH POTENCY) 1000 MCG tablet Take 1,000 mcg by mouth 3 (three) times a day.     • cetirizine (zyrTEC) 10 MG tablet Take 1 tablet by mouth Daily.     • cholecalciferol (VITAMIN D3) 1000 UNITS tablet Take 1,000 Units by mouth daily.     • Cinnamon 500 MG tablet Take  by mouth 2 (two) times a day.     • Cyanocobalamin (VITAMIN B-12) 5000 MCG sublingual tablet Place 1 tablet under the tongue Daily.     • DEVILS CLAW PO Take 1 tablet by mouth Daily.     • gabapentin (NEURONTIN) 100 MG capsule 1 po tid as needed for pain 270 capsule 0   • Glucose Blood (BLOOD GLUCOSE TEST) strip 1 strip by In Vitro route Daily. CHECK BLOOD SUGAR DAILY. 50 each 5   • Lancets misc 1 each Daily. CHECK BLOOD SUGAR DAILY 100  each 5   • Menthol-Methyl Salicylate (MUSCLE RUB EX)      • Misc Natural Products (WHITE WILLOW BARK PO) Take 1 tablet by mouth Daily.     • Omega 3-6-9 Fatty Acids (OMEGA 3-6-9 COMPLEX PO) Take 1 capsule by mouth Daily.       No facility-administered medications prior to visit.        Patient Active Problem List   Diagnosis   • Type 2 diabetes mellitus (CMS/HCC)   • Intractable migraine   • Primary generalized (osteo)arthritis   • Difficult intravenous access   • Copy of advanced directive obtained from patient   • No blood products   • Hyperlipidemia   • History of hypothyroidism   • Chronic mixed headache syndrome   • Diabetic peripheral neuropathy (CMS/HCC)   • Gastroparesis   • Rosacea   • Osteopenia   • Reflux esophagitis   • Gastritis   • Statin declined       Advanced Care Planning:  ACP discussion was held with the patient during this visit. Patient has an advance directive in EMR which is still valid.     Review of Systems   Constitutional: Positive for fatigue. Negative for diaphoresis, fever and unexpected weight change.   HENT: Negative for congestion, mouth sores, rhinorrhea, sore throat and trouble swallowing.    Eyes: Positive for visual disturbance (chronic stable). Negative for pain and redness.   Respiratory: Negative for cough, shortness of breath and wheezing.    Cardiovascular: Negative for chest pain, palpitations and leg swelling.   Gastrointestinal: Positive for abdominal pain (mild, intermittent, chronic) and nausea. Negative for diarrhea and vomiting.   Endocrine: Positive for cold intolerance. Negative for polydipsia and polyuria.   Genitourinary: Negative for dysuria and hematuria.   Musculoskeletal: Positive for arthralgias and back pain. Negative for myalgias.   Skin: Negative for rash and wound.   Neurological: Positive for headaches. Negative for dizziness, tremors, syncope, speech difficulty and weakness.   Hematological: Negative for adenopathy. Bruises/bleeds easily.    Psychiatric/Behavioral: Positive for dysphoric mood and sleep disturbance. Negative for confusion and suicidal ideas.   Pt's previous ROS reviewed and updated as indicated.       Compared to one year ago, the patient feels her physical health is the same.  Compared to one year ago, the patient feels her mental health is the same.    Reviewed chart for potential of high risk medication in the elderly: not applicable  Reviewed chart for potential of harmful drug interactions in the elderly:not applicable    Objective         Vitals:    05/07/20 1025   PainSc:   3       There is no height or weight on file to calculate BMI. Previous BMI 20  Discussed the patient's BMI with her. The BMI is in the acceptable range.    Physical Exam   Constitutional: She is oriented to person, place, and time. She appears well-developed and well-nourished. She is cooperative. She does not appear ill. No distress.   HENT:   Head: Normocephalic and atraumatic.   Mouth/Throat: Mucous membranes are normal. Mucous membranes are not dry.   Eyes: Conjunctivae and lids are normal. No scleral icterus.   Pulmonary/Chest: Effort normal. No respiratory distress.   Neurological: She is alert and oriented to person, place, and time.   Skin: She is not diaphoretic. No pallor.   Psychiatric: She has a normal mood and affect. Her speech is normal and behavior is normal. Judgment and thought content normal. Cognition and memory are normal.             Assessment/Plan   Medicare Risks and Personalized Health Plan  CMS Preventative Services Quick Reference  Cardiovascular risk  Depression/Dysphoria  Immunizations Discussed/Encouraged (specific immunizations; adacel Tdap )  Osteoprorosis Risk    The above risks/problems have been discussed with the patient.  Pertinent information has been shared with the patient in the After Visit Summary.  Follow up plans and orders are seen below in the Assessment/Plan Section.    Diagnoses and all orders for this  visit:    1. Medicare annual wellness visit, subsequent (Primary)    2. Type 2 diabetes mellitus with diabetic neuropathy, without long-term current use of insulin (CMS/HCC)  -     TSH Rfx On Abnormal To Free T4; Future  -     CBC (No Diff); Future  -     Comprehensive Metabolic Panel; Future  -     Hemoglobin A1c; Future  -     Microalbumin / Creatinine Urine Ratio - Urine, Clean Catch; Future    3. Pure hypercholesterolemia  -     Comprehensive Metabolic Panel; Future  -     Lipid Panel; Future    4. History of hypothyroidism    5. Reflux esophagitis    6. Medication monitoring encounter  -     CBC (No Diff); Future  -     Comprehensive Metabolic Panel; Future    7. Chronic mixed headache syndrome    8. Gastroparesis    9. Diabetic peripheral neuropathy (CMS/HCC)    10. Osteopenia of multiple sites    11. Primary generalized (osteo)arthritis    12. Statin declined      Chronic conditions appear stable.  Good lifestyle/risk factor mgnt.    Follow Up:  Return in about 6 months (around 11/7/2020).   F/u sooner as needed/instructed.  She will drop in for routine labs are her earliest convenience.  She will schedule diabetic eye exam once COVID restrictions lifted.  Patient was encouraged to keep me informed of any acute changes, or any new concerning symptoms.  Pt is aware of reasons to seek emergent care.  Patient voiced understanding of all instructions and denied further questions.    An After Visit Summary and PPPS were given to the patient.      Total of 10 minutes spent in face to face time via Video/telehealth.

## 2020-05-25 ENCOUNTER — RESULTS ENCOUNTER (OUTPATIENT)
Dept: FAMILY MEDICINE CLINIC | Facility: CLINIC | Age: 70
End: 2020-05-25

## 2020-05-25 DIAGNOSIS — Z51.81 MEDICATION MONITORING ENCOUNTER: ICD-10-CM

## 2020-05-25 DIAGNOSIS — E11.40 TYPE 2 DIABETES MELLITUS WITH DIABETIC NEUROPATHY, WITHOUT LONG-TERM CURRENT USE OF INSULIN (HCC): ICD-10-CM

## 2020-05-25 DIAGNOSIS — E78.00 PURE HYPERCHOLESTEROLEMIA: ICD-10-CM

## 2020-06-11 ENCOUNTER — LAB (OUTPATIENT)
Dept: LAB | Facility: HOSPITAL | Age: 70
End: 2020-06-11

## 2020-06-11 ENCOUNTER — LAB (OUTPATIENT)
Dept: FAMILY MEDICINE CLINIC | Facility: CLINIC | Age: 70
End: 2020-06-11

## 2020-06-11 PROCEDURE — 36415 COLL VENOUS BLD VENIPUNCTURE: CPT | Performed by: FAMILY MEDICINE

## 2020-06-12 LAB
ALBUMIN/CREAT UR: 27 MG/G CREAT (ref 0–29)
CREAT UR-MCNC: 19.9 MG/DL
MICROALBUMIN UR-MCNC: 5.4 UG/ML

## 2020-06-15 ENCOUNTER — LAB (OUTPATIENT)
Dept: LAB | Facility: HOSPITAL | Age: 70
End: 2020-06-15

## 2020-06-15 LAB
ALBUMIN SERPL-MCNC: 4.2 G/DL (ref 3.5–5.2)
ALBUMIN/GLOB SERPL: 1.6 G/DL
ALP SERPL-CCNC: 63 U/L (ref 39–117)
ALT SERPL W P-5'-P-CCNC: 13 U/L (ref 1–33)
ANION GAP SERPL CALCULATED.3IONS-SCNC: 10.7 MMOL/L (ref 5–15)
AST SERPL-CCNC: 23 U/L (ref 1–32)
BILIRUB SERPL-MCNC: 0.3 MG/DL (ref 0.2–1.2)
BUN BLD-MCNC: 15 MG/DL (ref 8–23)
BUN/CREAT SERPL: 17.9 (ref 7–25)
CALCIUM SPEC-SCNC: 9.1 MG/DL (ref 8.6–10.5)
CHLORIDE SERPL-SCNC: 106 MMOL/L (ref 98–107)
CHOLEST SERPL-MCNC: 276 MG/DL (ref 0–200)
CO2 SERPL-SCNC: 22.3 MMOL/L (ref 22–29)
CREAT BLD-MCNC: 0.84 MG/DL (ref 0.57–1)
DEPRECATED RDW RBC AUTO: 40.8 FL (ref 37–54)
ERYTHROCYTE [DISTWIDTH] IN BLOOD BY AUTOMATED COUNT: 12.1 % (ref 12.3–15.4)
GFR SERPL CREATININE-BSD FRML MDRD: 67 ML/MIN/1.73
GLOBULIN UR ELPH-MCNC: 2.7 GM/DL
GLUCOSE BLD-MCNC: 83 MG/DL (ref 65–99)
HBA1C MFR BLD: 5.7 % (ref 4.8–5.6)
HCT VFR BLD AUTO: 42.8 % (ref 34–46.6)
HDLC SERPL-MCNC: 97 MG/DL (ref 40–60)
HGB BLD-MCNC: 15 G/DL (ref 12–15.9)
LDLC SERPL CALC-MCNC: 168 MG/DL (ref 0–100)
LDLC/HDLC SERPL: 1.73 {RATIO}
MCH RBC QN AUTO: 31.8 PG (ref 26.6–33)
MCHC RBC AUTO-ENTMCNC: 35 G/DL (ref 31.5–35.7)
MCV RBC AUTO: 90.9 FL (ref 79–97)
PLATELET # BLD AUTO: 177 10*3/MM3 (ref 140–450)
PMV BLD AUTO: 10.7 FL (ref 6–12)
POTASSIUM BLD-SCNC: 5.2 MMOL/L (ref 3.5–5.2)
PROT SERPL-MCNC: 6.9 G/DL (ref 6–8.5)
RBC # BLD AUTO: 4.71 10*6/MM3 (ref 3.77–5.28)
SODIUM BLD-SCNC: 139 MMOL/L (ref 136–145)
TRIGL SERPL-MCNC: 54 MG/DL (ref 0–150)
TSH SERPL DL<=0.05 MIU/L-ACNC: 3.39 UIU/ML (ref 0.27–4.2)
VLDLC SERPL-MCNC: 10.8 MG/DL
WBC NRBC COR # BLD: 3.71 10*3/MM3 (ref 3.4–10.8)

## 2020-06-15 PROCEDURE — 80061 LIPID PANEL: CPT | Performed by: FAMILY MEDICINE

## 2020-06-15 PROCEDURE — 36415 COLL VENOUS BLD VENIPUNCTURE: CPT | Performed by: FAMILY MEDICINE

## 2020-06-15 PROCEDURE — 83036 HEMOGLOBIN GLYCOSYLATED A1C: CPT | Performed by: FAMILY MEDICINE

## 2020-06-15 PROCEDURE — 85027 COMPLETE CBC AUTOMATED: CPT | Performed by: FAMILY MEDICINE

## 2020-06-15 PROCEDURE — 80053 COMPREHEN METABOLIC PANEL: CPT | Performed by: FAMILY MEDICINE

## 2020-06-15 PROCEDURE — 84443 ASSAY THYROID STIM HORMONE: CPT | Performed by: FAMILY MEDICINE

## 2020-07-15 ENCOUNTER — TELEPHONE (OUTPATIENT)
Dept: FAMILY MEDICINE CLINIC | Facility: CLINIC | Age: 70
End: 2020-07-15

## 2020-07-15 NOTE — TELEPHONE ENCOUNTER
ZOYA AGARWAL AN APRN WITH WITH [employer] DID AN AN ASSESSMENT ON PATIENT THIS MORNING AND IS REPORTING THAT HER BLOOD PRESSURE /108 AT THE FIRST READING AND WAS  149/99 AT THE SECOND READING.    ZOYA STATES THAT PATIENT REPORTS THAT SHE HAS DECIDED TO TAKE CINNAMON ONLY TO TAKE A NATURAL APPROACH TO MEDICAL NEEDS AND ZOYA WANTED TO REPORT THESE BLOOD PRESSURE READINGS TO DR OCONNOR.    ANY QUESTIONS PLEASE CALL ZOYA [phone]

## 2020-07-17 ENCOUNTER — CLINICAL SUPPORT (OUTPATIENT)
Dept: FAMILY MEDICINE CLINIC | Facility: CLINIC | Age: 70
End: 2020-07-17

## 2020-07-17 VITALS — DIASTOLIC BLOOD PRESSURE: 80 MMHG | SYSTOLIC BLOOD PRESSURE: 130 MMHG

## 2020-09-18 ENCOUNTER — TELEPHONE (OUTPATIENT)
Dept: FAMILY MEDICINE CLINIC | Facility: CLINIC | Age: 70
End: 2020-09-18

## 2020-09-18 NOTE — TELEPHONE ENCOUNTER
PATIENT STATES [facility] DID A TEST ON HER LEFT FOOT IN July 2020. THE PATIENT IS WANTING TO FOLLOW UP ON THIS. PLEASE CONTACT.     CONTACT: [phone]

## 2020-09-30 ENCOUNTER — TELEPHONE (OUTPATIENT)
Dept: FAMILY MEDICINE CLINIC | Facility: CLINIC | Age: 70
End: 2020-09-30

## 2020-09-30 DIAGNOSIS — E11.40 TYPE 2 DIABETES MELLITUS WITH DIABETIC NEUROPATHY, WITHOUT LONG-TERM CURRENT USE OF INSULIN (HCC): ICD-10-CM

## 2020-09-30 DIAGNOSIS — R68.89 ABNORMALITY ON SCREENING TEST: ICD-10-CM

## 2020-09-30 DIAGNOSIS — I70.212 ATHEROSCLEROSIS OF NATIVE ARTERIES OF EXTREMITIES WITH INTERMITTENT CLAUDICATION, LEFT LEG (HCC): ICD-10-CM

## 2020-09-30 DIAGNOSIS — I73.9 PAD (PERIPHERAL ARTERY DISEASE) (HCC): Primary | ICD-10-CM

## 2020-09-30 NOTE — TELEPHONE ENCOUNTER
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 I recommend she have CT angio to further define severity of disease and if intervention necessary. Please let me know if she is willing to proceed with testing.

## 2020-10-02 ENCOUNTER — RESULTS ENCOUNTER (OUTPATIENT)
Dept: FAMILY MEDICINE CLINIC | Facility: CLINIC | Age: 70
End: 2020-10-02

## 2020-10-02 DIAGNOSIS — E11.40 TYPE 2 DIABETES MELLITUS WITH DIABETIC NEUROPATHY, WITHOUT LONG-TERM CURRENT USE OF INSULIN (HCC): ICD-10-CM

## 2020-10-23 DIAGNOSIS — I73.9 PAD (PERIPHERAL ARTERY DISEASE) (HCC): Primary | ICD-10-CM

## 2021-01-12 ENCOUNTER — TELEPHONE (OUTPATIENT)
Dept: CARDIAC SURGERY | Facility: CLINIC | Age: 71
End: 2021-01-12

## 2021-02-15 ENCOUNTER — TELEPHONE (OUTPATIENT)
Dept: CARDIAC SURGERY | Facility: CLINIC | Age: 71
End: 2021-02-15

## 2021-03-15 ENCOUNTER — TELEPHONE (OUTPATIENT)
Dept: CARDIAC SURGERY | Facility: CLINIC | Age: 71
End: 2021-03-15

## 2021-04-15 ENCOUNTER — TELEPHONE (OUTPATIENT)
Dept: FAMILY MEDICINE CLINIC | Facility: CLINIC | Age: 71
End: 2021-04-15

## 2021-04-15 NOTE — TELEPHONE ENCOUNTER
PATIENT IS REQUESTING A CALL BACK TO DISCUSS THE PROCESS ON GETTING HER DIABETIC SHOES.    PATIENT STATES WAYNE MAGUIRENER HAS SENT PAPERWORK TO OFFICE IN REGARDS TO DIABETIC SHOES.    PLEASE ADVISE     CALL BACK NUMBER [phone]

## 2021-07-06 ENCOUNTER — TELEPHONE (OUTPATIENT)
Dept: FAMILY MEDICINE CLINIC | Facility: CLINIC | Age: 71
End: 2021-07-06

## 2021-07-06 DIAGNOSIS — I73.9 PAD (PERIPHERAL ARTERY DISEASE) (HCC): Primary | ICD-10-CM

## 2021-08-05 PROBLEM — I73.9 PAD (PERIPHERAL ARTERY DISEASE): Status: ACTIVE | Noted: 2021-08-05

## 2021-08-05 NOTE — TELEPHONE ENCOUNTER
This patient called me about this referral today.  We have previously referred her back in October for a consultation, but she canceled those appointments.  I do not have a current order for her for a vascular consult. She said she would like to see Dr Gilman in [place]. Sts she is uncomfortable with driving in [place].

## 2021-08-09 ENCOUNTER — TELEPHONE (OUTPATIENT)
Dept: FAMILY MEDICINE CLINIC | Facility: CLINIC | Age: 71
End: 2021-08-09

## 2021-08-09 NOTE — TELEPHONE ENCOUNTER
Caller: Marifer Ricardo    Relationship: Self    Best call back number: [phone]    What is the best time to reach you: ALL DAY TODAY     Who are you requesting to speak with (clinical staff, provider,  specific staff member): CLINICAL     Do you know the name of the person who called: LAURA    What was the call regarding:  REFERRAL     Do you require a callback: YES

## 2021-08-18 ENCOUNTER — OFFICE VISIT (OUTPATIENT)
Dept: FAMILY MEDICINE CLINIC | Facility: CLINIC | Age: 71
End: 2021-08-18

## 2021-08-18 VITALS
DIASTOLIC BLOOD PRESSURE: 64 MMHG | SYSTOLIC BLOOD PRESSURE: 114 MMHG | BODY MASS INDEX: 20.37 KG/M2 | HEIGHT: 67 IN | TEMPERATURE: 97.1 F | WEIGHT: 129.8 LBS

## 2021-08-18 DIAGNOSIS — M25.551 CHRONIC HIP PAIN, BILATERAL: ICD-10-CM

## 2021-08-18 DIAGNOSIS — G89.29 CHRONIC BILATERAL LOW BACK PAIN WITHOUT SCIATICA: ICD-10-CM

## 2021-08-18 DIAGNOSIS — E78.00 PURE HYPERCHOLESTEROLEMIA: ICD-10-CM

## 2021-08-18 DIAGNOSIS — I73.9 PAD (PERIPHERAL ARTERY DISEASE) (HCC): ICD-10-CM

## 2021-08-18 DIAGNOSIS — Z53.20 STATIN DECLINED: ICD-10-CM

## 2021-08-18 DIAGNOSIS — Z13.820 SCREENING FOR OSTEOPOROSIS: ICD-10-CM

## 2021-08-18 DIAGNOSIS — Z00.00 MEDICARE ANNUAL WELLNESS VISIT, SUBSEQUENT: Primary | ICD-10-CM

## 2021-08-18 DIAGNOSIS — M25.552 CHRONIC HIP PAIN, BILATERAL: ICD-10-CM

## 2021-08-18 DIAGNOSIS — M54.50 CHRONIC BILATERAL LOW BACK PAIN WITHOUT SCIATICA: ICD-10-CM

## 2021-08-18 DIAGNOSIS — M85.89 OSTEOPENIA OF MULTIPLE SITES: ICD-10-CM

## 2021-08-18 DIAGNOSIS — M15.0 PRIMARY GENERALIZED (OSTEO)ARTHRITIS: ICD-10-CM

## 2021-08-18 DIAGNOSIS — G89.29 CHRONIC HIP PAIN, BILATERAL: ICD-10-CM

## 2021-08-18 DIAGNOSIS — Z12.31 ENCOUNTER FOR SCREENING MAMMOGRAM FOR MALIGNANT NEOPLASM OF BREAST: ICD-10-CM

## 2021-08-18 DIAGNOSIS — E11.42 DIABETIC PERIPHERAL NEUROPATHY (HCC): ICD-10-CM

## 2021-08-18 DIAGNOSIS — E11.40 TYPE 2 DIABETES MELLITUS WITH DIABETIC NEUROPATHY, WITHOUT LONG-TERM CURRENT USE OF INSULIN (HCC): ICD-10-CM

## 2021-08-18 DIAGNOSIS — Z78.0 POSTMENOPAUSAL: ICD-10-CM

## 2021-08-18 PROCEDURE — 99397 PER PM REEVAL EST PAT 65+ YR: CPT | Performed by: FAMILY MEDICINE

## 2021-08-18 PROCEDURE — 96160 PT-FOCUSED HLTH RISK ASSMT: CPT | Performed by: FAMILY MEDICINE

## 2021-08-18 PROCEDURE — 1160F RVW MEDS BY RX/DR IN RCRD: CPT | Performed by: FAMILY MEDICINE

## 2021-08-18 PROCEDURE — G0439 PPPS, SUBSEQ VISIT: HCPCS | Performed by: FAMILY MEDICINE

## 2021-08-18 PROCEDURE — 1170F FXNL STATUS ASSESSED: CPT | Performed by: FAMILY MEDICINE

## 2021-08-18 NOTE — PATIENT INSTRUCTIONS
Preventive Care 65 Years and Older, Female  Preventive care refers to lifestyle choices and visits with your health care provider that can promote health and wellness. This includes:  · A yearly physical exam. This is also called an annual well check.  · Regular dental and eye exams.  · Immunizations.  · Screening for certain conditions.  · Healthy lifestyle choices, such as diet and exercise.  What can I expect for my preventive care visit?  Physical exam  Your health care provider will check:  · Height and weight. These may be used to calculate body mass index (BMI), which is a measurement that tells if you are at a healthy weight.  · Heart rate and blood pressure.  · Your skin for abnormal spots.  Counseling  Your health care provider may ask you questions about:  · Alcohol, tobacco, and drug use.  · Emotional well-being.  · Home and relationship well-being.  · Sexual activity.  · Eating habits.  · History of falls.  · Memory and ability to understand (cognition).  · Work and work environment.  · Pregnancy and menstrual history.  What immunizations do I need?    Influenza (flu) vaccine  · This is recommended every year.  Tetanus, diphtheria, and pertussis (Tdap) vaccine  · You may need a Td booster every 10 years.  Varicella (chickenpox) vaccine  · You may need this vaccine if you have not already been vaccinated.  Zoster (shingles) vaccine  · You may need this after age 60.  Pneumococcal conjugate (PCV13) vaccine  · One dose is recommended after age 65.  Pneumococcal polysaccharide (PPSV23) vaccine  · One dose is recommended after age 65.  Measles, mumps, and rubella (MMR) vaccine  · You may need at least one dose of MMR if you were born in 1957 or later. You may also need a second dose.  Meningococcal conjugate (MenACWY) vaccine  · You may need this if you have certain conditions.  Hepatitis A vaccine  · You may need this if you have certain conditions or if you travel or work in places where you may be exposed  to hepatitis A.  Hepatitis B vaccine  · You may need this if you have certain conditions or if you travel or work in places where you may be exposed to hepatitis B.  Haemophilus influenzae type b (Hib) vaccine  · You may need this if you have certain conditions.  You may receive vaccines as individual doses or as more than one vaccine together in one shot (combination vaccines). Talk with your health care provider about the risks and benefits of combination vaccines.  What tests do I need?  Blood tests  · Lipid and cholesterol levels. These may be checked every 5 years, or more frequently depending on your overall health.  · Hepatitis C test.  · Hepatitis B test.  Screening  · Lung cancer screening. You may have this screening every year starting at age 55 if you have a 30-pack-year history of smoking and currently smoke or have quit within the past 15 years.  · Colorectal cancer screening. All adults should have this screening starting at age 50 and continuing until age 75. Your health care provider may recommend screening at age 45 if you are at increased risk. You will have tests every 1-10 years, depending on your results and the type of screening test.  · Diabetes screening. This is done by checking your blood sugar (glucose) after you have not eaten for a while (fasting). You may have this done every 1-3 years.  · Mammogram. This may be done every 1-2 years. Talk with your health care provider about how often you should have regular mammograms.  · BRCA-related cancer screening. This may be done if you have a family history of breast, ovarian, tubal, or peritoneal cancers.  Other tests  · Sexually transmitted disease (STD) testing.  · Bone density scan. This is done to screen for osteoporosis. You may have this done starting at age 65.  Follow these instructions at home:  Eating and drinking  · Eat a diet that includes fresh fruits and vegetables, whole grains, lean protein, and low-fat dairy products. Limit  your intake of foods with high amounts of sugar, saturated fats, and salt.  · Take vitamin and mineral supplements as recommended by your health care provider.  · Do not drink alcohol if your health care provider tells you not to drink.  · If you drink alcohol:  ? Limit how much you have to 0-1 drink a day.  ? Be aware of how much alcohol is in your drink. In the [place] one drink equals one 12 oz bottle of beer (355 mL), one 5 oz glass of wine (148 mL), or one 1½ oz glass of hard liquor (44 mL).  Lifestyle  · Take daily care of your teeth and gums.  · Stay active. Exercise for at least 30 minutes on 5 or more days each week.  · Do not use any products that contain nicotine or tobacco, such as cigarettes, e-cigarettes, and chewing tobacco. If you need help quitting, ask your health care provider.  · If you are sexually active, practice safe sex. Use a condom or other form of protection in order to prevent STIs (sexually transmitted infections).  · Talk with your health care provider about taking a low-dose aspirin or statin.  What's next?  · Go to your health care provider once a year for a well check visit.  · Ask your health care provider how often you should have your eyes and teeth checked.  · Stay up to date on all vaccines.  This information is not intended to replace advice given to you by your health care provider. Make sure you discuss any questions you have with your health care provider.  Document Revised: 2019 Document Reviewed: 2019  [employer] Patient Education 2020 [employer]      Medicare Wellness  Personal Prevention Plan of Service     Date of Office Visit:  2021  Encounter Provider:  Payal Edwards MD  Place of Service:  [facility]  Patient Name: Marifer Ricardo  :  1950    As part of the Medicare Wellness portion of your visit today, we are providing you with this personalized preventive plan of services (PPPS). This plan is based  upon recommendations of the [place] Preventive Services Task Force (USPSTF) and the Advisory Committee on Immunization Practices (ACIP).    This lists the preventive care services that should be considered, and provides dates of when you are due. Items listed as completed are up-to-date and do not require any further intervention.    Health Maintenance   Topic Date Due   • COVID-19 Vaccine (1) Never done   • DIABETIC FOOT EXAM  07/11/2018   • HEMOGLOBIN A1C  12/15/2020   • ANNUAL WELLNESS VISIT  05/07/2021   • URINE MICROALBUMIN  06/11/2021   • LIPID PANEL  06/15/2021   • MAMMOGRAM  06/26/2021   • DXA SCAN  06/26/2021   • INFLUENZA VACCINE  10/01/2021   • DIABETIC EYE EXAM  03/25/2022   • TDAP/TD VACCINES (2 - Tdap) 05/26/2022   • COLONOSCOPY  02/07/2028   • HEPATITIS C SCREENING  Completed   • Pneumococcal Vaccine 65+  Completed   • ZOSTER VACCINE  Discontinued       No orders of the defined types were placed in this encounter.      Return in about 6 months (around 2/18/2022).

## 2021-08-18 NOTE — PROGRESS NOTES
cbcThe ABCs of the Annual Wellness Visit  Subsequent Medicare Wellness Visit    Chief Complaint   Patient presents with   • Medicare Wellness-subsequent       Subjective   History of Present Illness:  Marifer Ricardo is a 71 y.o. female who presents for a Subsequent Medicare Wellness Visit.  Last seen in office over 1 year ago.    Recently found to have abnormal PAD screening through her insurance.  Not able to tolerate CT angiogram due to history of iodine allergy.  She has a upcoming appointment with Dr. Gilman.    She is overdue for mammogram, DEXA scan.    She declines Covid vaccination.    Feels that she is doing well other than increasing low back and hip pain.  Also has increasing leg pain with activity felt to be related to her PAD.  Previous diagnosis of lumbar DDD/DJD.  No recent imaging.  No previous consultation with pain management.  She generally uses topical therapies, herbals or occasionally Tylenol for her pain.    She denies poorly healing wound/sore or recent injury.  Increase back pain and hip pain      HEALTH RISK ASSESSMENT    Recent Hospitalizations:  No hospitalization(s) within the last year.    Current Medical Providers:  Patient Care Team:  Payal Edwards MD as PCP - General (Family Medicine)  Jonathan Mondragon MD as Consulting Physician (Neurology)  Ming Stiles MD, GARY (Nephrology)  Rosmery Nicholson DC as Consulting Physician (Chiropractic Medicine)  Taras Morillo MD as Consulting Physician (General Surgery)    Smoking Status:  Social History     Tobacco Use   Smoking Status Never Smoker   Smokeless Tobacco Never Used       Alcohol Consumption:  Social History     Substance and Sexual Activity   Alcohol Use No       Depression Screen:   PHQ-2/PHQ-9 Depression Screening 8/18/2021   Little interest or pleasure in doing things 0   Feeling down, depressed, or hopeless 0   Trouble falling or staying asleep, or sleeping too much 0   Feeling tired or having little energy 0   Poor  appetite or overeating 0   Feeling bad about yourself - or that you are a failure or have let yourself or your family down 0   Trouble concentrating on things, such as reading the newspaper or watching television 0   Moving or speaking so slowly that other people could have noticed. Or the opposite - being so fidgety or restless that you have been moving around a lot more than usual 0   Thoughts that you would be better off dead, or of hurting yourself in some way 0   Total Score 0   If you checked off any problems, how difficult have these problems made it for you to do your work, take care of things at home, or get along with other people? -       Fall Risk Screen:  RYAN Fall Risk Assessment has not been completed.    Health Habits and Functional and Cognitive Screening:  Functional & Cognitive Status 8/18/2021   Do you have difficulty preparing food and eating? No   Do you have difficulty bathing yourself, getting dressed or grooming yourself? No   Do you have difficulty using the toilet? No   Do you have difficulty moving around from place to place? No   Do you have trouble with steps or getting out of a bed or a chair? No   Current Diet Diabetic Diet   Dental Exam Up to date   Eye Exam Up to date   Exercise (times per week) 4 times per week   Current Exercises Include Walking;Yard Work;House Cleaning   Current Exercise Activities Include -   Do you need help using the phone?  No   Are you deaf or do you have serious difficulty hearing?  Yes   Do you need help with transportation? No   Do you need help shopping? No   Do you need help preparing meals?  No   Do you need help with housework?  No   Do you need help with laundry? No   Do you need help taking your medications? No   Do you need help managing money? No   Do you ever drive or ride in a car without wearing a seat belt? No   Have you felt unusual stress, anger or loneliness in the last month? No   Who do you live with? Child   If you need help, do you  have trouble finding someone available to you? No   Have you been bothered in the last four weeks by sexual problems? No   Do you have difficulty concentrating, remembering or making decisions? No         Does the patient have evidence of cognitive impairment? No    Asprin use counseling:Start ASA 81 mg daily     Age-appropriate Screening Schedule:  Refer to the list below for future screening recommendations based on patient's age, sex and/or medical conditions. Orders for these recommended tests are listed in the plan section. The patient has been provided with a written plan.    Health Maintenance   Topic Date Due   • DIABETIC FOOT EXAM  07/11/2018   • HEMOGLOBIN A1C  12/15/2020   • URINE MICROALBUMIN  06/11/2021   • LIPID PANEL  06/15/2021   • MAMMOGRAM  06/26/2021   • DXA SCAN  06/26/2021   • INFLUENZA VACCINE  10/01/2021   • DIABETIC EYE EXAM  03/25/2022   • TDAP/TD VACCINES (2 - Tdap) 05/26/2022   • COLONOSCOPY  02/07/2028   • ZOSTER VACCINE  Discontinued          The following portions of the patient's history were reviewed and updated as appropriate: allergies, current medications, past family history, past medical history, past social history, past surgical history and problem list.    Outpatient Medications Prior to Visit   Medication Sig Dispense Refill   • Biotin ([employer] BIOTIN HIGH POTENCY) 1000 MCG tablet Take 1,000 mcg by mouth 3 (three) times a day.     • cetirizine (zyrTEC) 10 MG tablet Take 1 tablet by mouth Daily.     • cholecalciferol (VITAMIN D3) 1000 UNITS tablet Take 1,000 Units by mouth daily.     • Cinnamon 500 MG tablet Take  by mouth 2 (two) times a day.     • Cyanocobalamin (VITAMIN B-12) 5000 MCG sublingual tablet Place 1 tablet under the tongue Daily.     • DEVILS CLAW PO Take 1 tablet by mouth Daily.     • Glucose Blood (BLOOD GLUCOSE TEST) strip 1 strip by In Vitro route Daily. CHECK BLOOD SUGAR DAILY. 50 each 5   • Lancets misc 1 each Daily. CHECK BLOOD SUGAR DAILY 100 each 5   •  Menthol-Methyl Salicylate (MUSCLE RUB EX)      • Misc Natural Products (WHITE WILLOW BARK PO) Take 1 tablet by mouth Daily.     • Omega 3-6-9 Fatty Acids (OMEGA 3-6-9 COMPLEX PO) Take 1 capsule by mouth Daily.     • gabapentin (NEURONTIN) 100 MG capsule 1 po tid as needed for pain 270 capsule 0     No facility-administered medications prior to visit.       Patient Active Problem List   Diagnosis   • Type 2 diabetes mellitus (CMS/HCC)   • Intractable migraine   • Primary generalized (osteo)arthritis   • Difficult intravenous access   • Copy of advanced directive obtained from patient   • No blood products   • History of hypothyroidism   • Chronic mixed headache syndrome   • Diabetic peripheral neuropathy (CMS/HCC)   • Gastroparesis   • Rosacea   • Osteopenia   • Reflux esophagitis   • Gastritis   • Statin declined   • PAD (peripheral artery disease) (CMS/HCC)   • Pure hypercholesterolemia       Advanced Care Planning:  ACP discussion was held with the patient during this visit. Patient has an advance directive in EMR which is still valid.     Review of Systems   Constitutional: Positive for fatigue. Negative for diaphoresis, fever and unexpected weight change.   HENT: Negative for congestion, mouth sores, rhinorrhea, sore throat and trouble swallowing.    Eyes: Positive for visual disturbance (chronic stable). Negative for pain and redness.   Respiratory: Negative for cough, shortness of breath and wheezing.    Cardiovascular: Negative for chest pain, palpitations and leg swelling.   Gastrointestinal: Positive for abdominal pain (mild, intermittent, chronic) and nausea. Negative for diarrhea and vomiting.   Endocrine: Positive for cold intolerance. Negative for polydipsia and polyuria.   Genitourinary: Negative for dysuria and hematuria.   Musculoskeletal: Positive for arthralgias, back pain and myalgias.   Skin: Negative for rash and wound.   Neurological: Positive for headaches. Negative for dizziness, tremors,  "syncope, speech difficulty and weakness.   Hematological: Negative for adenopathy. Bruises/bleeds easily.   Psychiatric/Behavioral: Positive for dysphoric mood and sleep disturbance. Negative for confusion and suicidal ideas.   Pt's previous ROS reviewed and updated as indicated.       Compared to one year ago, the patient feels her physical health is the same.  Compared to one year ago, the patient feels her mental health is the same.    Reviewed chart for potential of high risk medication in the elderly: yes  Reviewed chart for potential of harmful drug interactions in the elderly:yes    Objective         Vitals:    08/18/21 1023   BP: 114/64   Temp: 97.1 °F (36.2 °C)   Weight: 58.9 kg (129 lb 12.8 oz)   Height: 170.2 cm (67\")       Body mass index is 20.33 kg/m².  Discussed the patient's BMI with her. The BMI is in the acceptable range.    Physical Exam  Vitals and nursing note reviewed.   Constitutional:       General: She is not in acute distress.     Appearance: She is well-developed, well-groomed and normal weight. She is not ill-appearing.   HENT:      Head: Normocephalic and atraumatic.   Eyes:      General: No scleral icterus.     Extraocular Movements: Extraocular movements intact.      Conjunctiva/sclera: Conjunctivae normal.   Neck:      Thyroid: No thyroid mass.      Vascular: Normal carotid pulses. No carotid bruit.   Cardiovascular:      Rate and Rhythm: Normal rate and regular rhythm.      Pulses: Decreased pulses (pedal).      Heart sounds: Normal heart sounds.   Pulmonary:      Effort: Pulmonary effort is normal.      Breath sounds: Normal breath sounds.   Musculoskeletal:      Right lower leg: No edema.      Left lower leg: No edema.   Lymphadenopathy:      Cervical: No cervical adenopathy.   Skin:     General: Skin is warm and dry.      Coloration: Skin is not jaundiced or pale.   Neurological:      Mental Status: She is alert and oriented to person, place, and time.      Motor: No tremor.      " Gait: Gait is intact.   Psychiatric:         Mood and Affect: Mood and affect normal.         Speech: Speech normal.         Behavior: Behavior normal. Behavior is cooperative.         Thought Content: Thought content normal.         Cognition and Memory: Cognition and memory normal.         Judgment: Judgment normal.               Assessment/Plan   Medicare Risks and Personalized Health Plan  CMS Preventative Services Quick Reference  Advance Directive Discussion  Breast Cancer/Mammogram Screening  Cardiovascular risk  Colon Cancer Screening  Dementia/Memory   Depression/Dysphoria  Fall Risk  Immunizations Discussed/Encouraged (specific immunizations; Pneumococcal 23, Shingrix and COVID19 )  Osteoporosis Risk    The above risks/problems have been discussed with the patient.  Pertinent information has been shared with the patient in the After Visit Summary.  Follow up plans and orders are seen below in the Assessment/Plan Section.    Diagnoses and all orders for this visit:    1. Medicare annual wellness visit, subsequent (Primary)    2. Pure hypercholesterolemia  -     Comprehensive Metabolic Panel; Future  -     Lipid Panel; Future    3. PAD (peripheral artery disease) (CMS/[facility])  -     Comprehensive Metabolic Panel; Future  -     Lipid Panel; Future    4. Type 2 diabetes mellitus with diabetic neuropathy, without long-term current use of insulin (CMS/[facility])  -     CBC & Differential; Future  -     Comprehensive Metabolic Panel; Future  -     Hemoglobin A1c; Future  -     TSH Rfx On Abnormal To Free T4; Future  -     Microalbumin / Creatinine Urine Ratio - Urine, Clean Catch; Future    5. Diabetic peripheral neuropathy (CMS/[facility])    6. Primary generalized (osteo)arthritis  -     XR Spine Lumbar 2 or 3 View; Future  -     XR Hips Bilateral With or Without Pelvis 2 View; Future  -     Ambulatory Referral to Pain Management    7. Statin declined    8. Encounter for screening mammogram for malignant neoplasm of breast  -      Mammo Screening Digital Tomosynthesis Bilateral With CAD; Future    9. Osteopenia of multiple sites  -     DEXA Bone Density Axial; Future  -     TSH Rfx On Abnormal To Free T4; Future    10. Postmenopausal  -     DEXA Bone Density Axial; Future    11. Screening for osteoporosis  -     DEXA Bone Density Axial; Future    12. Chronic bilateral low back pain without sciatica  -     XR Spine Lumbar 2 or 3 View; Future  -     Ambulatory Referral to Pain Management  -     CBC & Differential; Future  -     Sedimentation Rate; Future  -     C-reactive protein; Future    13. Chronic hip pain, bilateral  -     XR Hips Bilateral With or Without Pelvis 2 View; Future  -     Ambulatory Referral to Pain Management  -     CBC & Differential; Future  -     Sedimentation Rate; Future  -     C-reactive protein; Future      Chronic conditions generally stable.  Routine surveillance labs as above.  Refer to pain management following imaging of hips and low back as she may benefit from procedural intervention, PT etc.    She will keep appointment Dr. Gilman in [place] for further eval of suspected worsening PAD.    Age appropriate preventive care reviewed including cancer screenings, safety measures, mental health concerns, supplements, prevention of CV etc. Handout provided.      Follow Up:  Return in about 6 months (around 2/18/2022).   Follow-up sooner as needed/instructed.  I will contact patient regarding test results and provide instructions regarding any necessary changes in plan of care.  Patient was encouraged to keep me informed of any acute changes, lack of improvement, or any new concerning symptoms.  Pt is aware of reasons to seek emergent care.  Patient voiced understanding of all instructions and denied further questions.    An After Visit Summary and PPPS were given to the patient.    Please note that portions of this note may have been completed with a voice recognition program. Efforts were made to edit the  dictations, but occasionally words are mistranscribed.

## 2021-08-20 ENCOUNTER — TELEPHONE (OUTPATIENT)
Dept: FAMILY MEDICINE CLINIC | Facility: CLINIC | Age: 71
End: 2021-08-20

## 2021-08-24 ENCOUNTER — HOSPITAL ENCOUNTER (OUTPATIENT)
Dept: GENERAL RADIOLOGY | Facility: HOSPITAL | Age: 71
Discharge: HOME | End: 2021-08-24

## 2021-08-24 ENCOUNTER — LAB (OUTPATIENT)
Dept: LAB | Facility: HOSPITAL | Age: 71
End: 2021-08-24

## 2021-08-24 DIAGNOSIS — G89.29 CHRONIC BILATERAL LOW BACK PAIN WITHOUT SCIATICA: ICD-10-CM

## 2021-08-24 DIAGNOSIS — G89.29 CHRONIC HIP PAIN, BILATERAL: ICD-10-CM

## 2021-08-24 DIAGNOSIS — M25.551 CHRONIC HIP PAIN, BILATERAL: ICD-10-CM

## 2021-08-24 DIAGNOSIS — M54.50 CHRONIC BILATERAL LOW BACK PAIN WITHOUT SCIATICA: ICD-10-CM

## 2021-08-24 DIAGNOSIS — M15.0 PRIMARY GENERALIZED (OSTEO)ARTHRITIS: ICD-10-CM

## 2021-08-24 DIAGNOSIS — M25.552 CHRONIC HIP PAIN, BILATERAL: ICD-10-CM

## 2021-08-24 PROCEDURE — 82043 UR ALBUMIN QUANTITATIVE: CPT | Performed by: FAMILY MEDICINE

## 2021-08-24 PROCEDURE — 80053 COMPREHEN METABOLIC PANEL: CPT | Performed by: FAMILY MEDICINE

## 2021-08-24 PROCEDURE — 72100 X-RAY EXAM L-S SPINE 2/3 VWS: CPT

## 2021-08-24 PROCEDURE — 73521 X-RAY EXAM HIPS BI 2 VIEWS: CPT

## 2021-08-24 PROCEDURE — 84443 ASSAY THYROID STIM HORMONE: CPT | Performed by: FAMILY MEDICINE

## 2021-08-24 PROCEDURE — 80061 LIPID PANEL: CPT | Performed by: FAMILY MEDICINE

## 2021-08-24 PROCEDURE — 85025 COMPLETE CBC W/AUTO DIFF WBC: CPT | Performed by: FAMILY MEDICINE

## 2021-08-24 PROCEDURE — 82570 ASSAY OF URINE CREATININE: CPT | Performed by: FAMILY MEDICINE

## 2021-08-24 PROCEDURE — 83036 HEMOGLOBIN GLYCOSYLATED A1C: CPT | Performed by: FAMILY MEDICINE

## 2021-08-24 PROCEDURE — 85652 RBC SED RATE AUTOMATED: CPT | Performed by: FAMILY MEDICINE

## 2021-08-24 PROCEDURE — 86140 C-REACTIVE PROTEIN: CPT | Performed by: FAMILY MEDICINE

## 2021-09-15 ENCOUNTER — APPOINTMENT (OUTPATIENT)
Dept: BONE DENSITY | Facility: HOSPITAL | Age: 71
End: 2021-09-15

## 2021-09-15 ENCOUNTER — HOSPITAL ENCOUNTER (OUTPATIENT)
Dept: MAMMOGRAPHY | Facility: HOSPITAL | Age: 71
Discharge: HOME | End: 2021-09-15

## 2021-09-15 DIAGNOSIS — M85.89 OSTEOPENIA OF MULTIPLE SITES: ICD-10-CM

## 2021-09-15 DIAGNOSIS — Z13.820 SCREENING FOR OSTEOPOROSIS: ICD-10-CM

## 2021-09-15 DIAGNOSIS — Z78.0 POSTMENOPAUSAL: ICD-10-CM

## 2021-09-15 DIAGNOSIS — Z12.31 ENCOUNTER FOR SCREENING MAMMOGRAM FOR MALIGNANT NEOPLASM OF BREAST: ICD-10-CM

## 2021-09-15 PROCEDURE — 77063 BREAST TOMOSYNTHESIS BI: CPT

## 2021-09-15 PROCEDURE — 77080 DXA BONE DENSITY AXIAL: CPT

## 2021-09-15 PROCEDURE — 77067 SCR MAMMO BI INCL CAD: CPT

## 2021-09-20 RX ORDER — HYDROGEN PEROXIDE 2.65 ML/100ML
81 LIQUID ORAL; TOPICAL DAILY
COMMUNITY
Start: 2021-09-01

## 2021-09-20 RX ORDER — ATORVASTATIN CALCIUM 40 MG/1
40 TABLET, FILM COATED ORAL DAILY
COMMUNITY
Start: 2021-09-01

## 2021-09-20 RX ORDER — CLOPIDOGREL BISULFATE 75 MG/1
75 TABLET ORAL DAILY
COMMUNITY
Start: 2021-09-01

## 2022-02-21 ENCOUNTER — OFFICE VISIT (OUTPATIENT)
Dept: FAMILY MEDICINE CLINIC | Facility: CLINIC | Age: 72
End: 2022-02-21

## 2022-02-21 VITALS
WEIGHT: 125 LBS | SYSTOLIC BLOOD PRESSURE: 110 MMHG | TEMPERATURE: 96.6 F | BODY MASS INDEX: 19.62 KG/M2 | DIASTOLIC BLOOD PRESSURE: 70 MMHG | HEART RATE: 67 BPM | HEIGHT: 67 IN

## 2022-02-21 DIAGNOSIS — Z51.81 MEDICATION MONITORING ENCOUNTER: ICD-10-CM

## 2022-02-21 DIAGNOSIS — E11.40 TYPE 2 DIABETES MELLITUS WITH DIABETIC NEUROPATHY, WITHOUT LONG-TERM CURRENT USE OF INSULIN: Primary | ICD-10-CM

## 2022-02-21 DIAGNOSIS — Z28.21 INFLUENZA VACCINATION DECLINED BY PATIENT: ICD-10-CM

## 2022-02-21 DIAGNOSIS — E11.42 DIABETIC PERIPHERAL NEUROPATHY: ICD-10-CM

## 2022-02-21 DIAGNOSIS — I73.9 PAD (PERIPHERAL ARTERY DISEASE): ICD-10-CM

## 2022-02-21 DIAGNOSIS — M15.0 PRIMARY GENERALIZED (OSTEO)ARTHRITIS: ICD-10-CM

## 2022-02-21 DIAGNOSIS — Z28.21 COVID-19 VACCINATION DECLINED: ICD-10-CM

## 2022-02-21 DIAGNOSIS — M85.89 OSTEOPENIA OF MULTIPLE SITES: ICD-10-CM

## 2022-02-21 DIAGNOSIS — E78.00 PURE HYPERCHOLESTEROLEMIA: ICD-10-CM

## 2022-02-21 DIAGNOSIS — G44.89 CHRONIC MIXED HEADACHE SYNDROME: ICD-10-CM

## 2022-02-21 PROCEDURE — 99214 OFFICE O/P EST MOD 30 MIN: CPT | Performed by: FAMILY MEDICINE

## 2022-02-21 RX ORDER — AMLODIPINE BESYLATE 5 MG/1
TABLET ORAL
COMMUNITY
Start: 2022-01-01 | End: 2022-02-21

## 2022-02-21 RX ORDER — VALSARTAN AND HYDROCHLOROTHIAZIDE 160; 12.5 MG/1; MG/1
1 TABLET, FILM COATED ORAL DAILY
COMMUNITY
Start: 2022-01-04

## 2022-02-21 RX ORDER — CARVEDILOL 12.5 MG/1
12.5 TABLET ORAL 2 TIMES DAILY WITH MEALS
COMMUNITY
Start: 2022-01-01

## 2022-02-21 NOTE — PROGRESS NOTES
Subjective   Marifer Ricardo is a 72 y.o. female.     History of Present Illness   She is here today for routine f/u on several chronic med problems.    She has non-insulin-dependent diabetes mellitus with associated diabetic neuropathy and gastroparesis.  Blood glucose his recently been under control.  She is up-to-date on diabetic eye exam. Has HLP currently on lipitor. Has had eval per Dr. Gilman for PAD. Not able to tolerate norvasc due to weakness, dizziness.  Taking diovan, plavix, coreg as rx'd.     Followed by neurology for chronic mixed headache syndrome.  No new changes in severity, frequency or associated symptoms with headaches.     Has GERD with esophagitis but symptoms currently well controlled on PPI as needed.     Has osteopenia. UTD on DEXA. Taking vit D as directed.     Has OA with intermittent flares of joint pain.     Likes the PT she is seeing for her chronic Left shoulder pain, getting Better ROM.    Pt's previous HPI reviewed and updated as indicated.     The following portions of the patient's history were reviewed and updated as appropriate: allergies, current medications, past family history, past medical history, past social history, past surgical history and problem list.    Review of Systems   Constitutional: Positive for fatigue. Negative for diaphoresis, fever and unexpected weight change.   HENT: Negative for congestion, mouth sores, rhinorrhea, sore throat and trouble swallowing.    Eyes: Positive for visual disturbance (chronic stable). Negative for pain and redness.   Respiratory: Negative for cough, shortness of breath and wheezing.    Cardiovascular: Negative for chest pain, palpitations and leg swelling.   Gastrointestinal: Positive for abdominal pain (mild, intermittent, chronic) and nausea. Negative for diarrhea and vomiting.   Endocrine: Positive for cold intolerance. Negative for polydipsia and polyuria.   Genitourinary: Negative for dysuria and hematuria.    Musculoskeletal: Positive for arthralgias, back pain and myalgias.   Skin: Negative for rash and wound.   Neurological: Positive for headaches. Negative for dizziness, tremors, syncope, speech difficulty and weakness.   Hematological: Negative for adenopathy. Bruises/bleeds easily.   Psychiatric/Behavioral: Positive for dysphoric mood and sleep disturbance. Negative for confusion and suicidal ideas.   Pt's previous ROS reviewed and updated as indicated.       Objective    Vitals:    02/21/22 0837   BP: 110/70   Pulse: 67   Temp: 96.6 °F (35.9 °C)     Body mass index is 19.58 kg/m².      02/21/22  0837   Weight: 56.7 kg (125 lb)       Physical Exam  Vitals and nursing note reviewed.   Constitutional:       General: She is not in acute distress.     Appearance: She is well-developed, well-groomed and normal weight. She is not ill-appearing.   HENT:      Head: Normocephalic and atraumatic.   Eyes:      General: No scleral icterus.     Conjunctiva/sclera: Conjunctivae normal.   Neck:      Thyroid: No thyroid mass.      Vascular: Normal carotid pulses. No carotid bruit.   Cardiovascular:      Rate and Rhythm: Normal rate and regular rhythm.      Pulses: Decreased pulses (pedal).      Heart sounds: Normal heart sounds.   Pulmonary:      Effort: Pulmonary effort is normal.      Breath sounds: Normal breath sounds.   Abdominal:      General: Abdomen is scaphoid. There is no distension.      Palpations: There is no hepatomegaly, splenomegaly or mass.      Tenderness: There is generalized abdominal tenderness (mild).   Musculoskeletal:      Right lower leg: No edema.      Left lower leg: No edema.   Lymphadenopathy:      Cervical: No cervical adenopathy.   Skin:     General: Skin is warm and dry.      Coloration: Skin is not jaundiced or pale.      Findings: No bruising or rash.   Neurological:      Mental Status: She is alert and oriented to person, place, and time.      Motor: No tremor.      Gait: Gait is intact.    Psychiatric:         Mood and Affect: Mood and affect normal.         Speech: Speech normal.         Behavior: Behavior normal. Behavior is cooperative.         Thought Content: Thought content normal.         Cognition and Memory: Cognition and memory normal.         Judgment: Judgment normal.     Pt's previous physical exam reviewed and updated as indicated.      Assessment/Plan   Diagnoses and all orders for this visit:    1. Type 2 diabetes mellitus with diabetic neuropathy, without long-term current use of insulin (HCC) (Primary)  -     Hemoglobin A1c  -     Comprehensive Metabolic Panel  -     CBC (No Diff)    2. Pure hypercholesterolemia  -     Comprehensive Metabolic Panel  -     Lipid Panel    3. PAD (peripheral artery disease) (HCC)    4. Osteopenia of multiple sites    5. Diabetic peripheral neuropathy (HCC)    6. Chronic mixed headache syndrome    7. Primary generalized (osteo)arthritis    8. Medication monitoring encounter  -     Comprehensive Metabolic Panel  -     CBC (No Diff)    9. Influenza vaccination declined by patient    10. COVID-19 vaccination declined       NIDDM previously well controlled. A1c as above. Continue statin, ASA, ARB. Patient encouraged to take meds as rx'd, follow diabetic appropriate diet, exercise daily, perform feet check daily, and have yearly diabetic eye exams.    HLP with good tolerance of statin.    Continue plavix for PAD.    Fall precautions reviewed.    F/u with neurology as scheduled.    F/u with Dr. Gilman as scheduled.    Pt advised to eat a heart healthy diet and get regular aerobic exercise.    Routine f/u in 6 months, sooner as needed/instructed.  I will contact patient regarding test results and provide instructions regarding any necessary changes in plan of care.  Patient was encouraged to keep me informed of any acute changes, lack of improvement, or any new concerning symptoms.  Pt is aware of reasons to seek emergent care.  Patient voiced understanding of  all instructions and denied further questions.    Please note that portions of this note may have been completed with a voice recognition program. Efforts were made to edit the dictations, but occasionally words are mistranscribed.

## 2022-02-22 LAB
ALBUMIN SERPL-MCNC: 4.3 G/DL (ref 3.5–5.2)
ALBUMIN/GLOB SERPL: 1.8 G/DL
ALP SERPL-CCNC: 66 U/L (ref 39–117)
ALT SERPL-CCNC: 17 U/L (ref 1–33)
AST SERPL-CCNC: 17 U/L (ref 1–32)
BILIRUB SERPL-MCNC: 0.7 MG/DL (ref 0–1.2)
BUN SERPL-MCNC: 27 MG/DL (ref 8–23)
BUN/CREAT SERPL: 25 (ref 7–25)
CALCIUM SERPL-MCNC: 9.4 MG/DL (ref 8.6–10.5)
CHLORIDE SERPL-SCNC: 102 MMOL/L (ref 98–107)
CHOLEST SERPL-MCNC: 179 MG/DL (ref 0–200)
CO2 SERPL-SCNC: 25.7 MMOL/L (ref 22–29)
CREAT SERPL-MCNC: 1.08 MG/DL (ref 0.57–1)
ERYTHROCYTE [DISTWIDTH] IN BLOOD BY AUTOMATED COUNT: 13.1 % (ref 12.3–15.4)
GLOBULIN SER CALC-MCNC: 2.4 GM/DL
GLUCOSE SERPL-MCNC: 88 MG/DL (ref 65–99)
HBA1C MFR BLD: 6 % (ref 4.8–5.6)
HCT VFR BLD AUTO: 39.3 % (ref 34–46.6)
HDLC SERPL-MCNC: 83 MG/DL (ref 40–60)
HGB BLD-MCNC: 13.1 G/DL (ref 12–15.9)
LDLC SERPL CALC-MCNC: 85 MG/DL (ref 0–100)
MCH RBC QN AUTO: 32 PG (ref 26.6–33)
MCHC RBC AUTO-ENTMCNC: 33.3 G/DL (ref 31.5–35.7)
MCV RBC AUTO: 95.9 FL (ref 79–97)
PLATELET # BLD AUTO: 179 10*3/MM3 (ref 140–450)
POTASSIUM SERPL-SCNC: 4.4 MMOL/L (ref 3.5–5.2)
PROT SERPL-MCNC: 6.7 G/DL (ref 6–8.5)
RBC # BLD AUTO: 4.1 10*6/MM3 (ref 3.77–5.28)
SODIUM SERPL-SCNC: 139 MMOL/L (ref 136–145)
TRIGL SERPL-MCNC: 55 MG/DL (ref 0–150)
VLDLC SERPL CALC-MCNC: 11 MG/DL (ref 5–40)
WBC # BLD AUTO: 4.19 10*3/MM3 (ref 3.4–10.8)

## 2022-02-24 DIAGNOSIS — E11.40 TYPE 2 DIABETES MELLITUS WITH DIABETIC NEUROPATHY, WITHOUT LONG-TERM CURRENT USE OF INSULIN: ICD-10-CM

## 2022-02-24 DIAGNOSIS — N28.9 MILD RENAL INSUFFICIENCY: Primary | ICD-10-CM

## 2022-02-24 PROBLEM — Z53.20 STATIN DECLINED: Status: RESOLVED | Noted: 2020-05-07 | Resolved: 2022-02-24

## 2022-04-25 ENCOUNTER — TELEPHONE (OUTPATIENT)
Dept: FAMILY MEDICINE CLINIC | Facility: CLINIC | Age: 72
End: 2022-04-25

## 2022-04-25 NOTE — TELEPHONE ENCOUNTER
She needs recheck of kidney function to see if it is safe for her to take arthritis medication.    Orders in chart from last month.

## 2022-04-25 NOTE — TELEPHONE ENCOUNTER
Caller: Marifer Ricardo    Relationship: Self    Best call back number: [phone]    What medication are you requesting: PAIN FROM ARTHRITIS    What are your current symptoms: ARTHRITIS    How long have you been experiencing symptoms: NA    Have you had these symptoms before:    [x] Yes  [] No    Have you been treated for these symptoms before:   [x] Yes  [] No    If a prescription is needed, what is your preferred pharmacy and phone number: [facility] [address] [phone] [facility] [phone]      Additional notes:  PATIENT STATES IN THE PAST, CELEBREX WAS NOT COVERED

## 2022-05-05 ENCOUNTER — TELEPHONE (OUTPATIENT)
Dept: FAMILY MEDICINE CLINIC | Facility: CLINIC | Age: 72
End: 2022-05-05

## 2022-05-05 DIAGNOSIS — M15.0 PRIMARY GENERALIZED (OSTEO)ARTHRITIS: Primary | ICD-10-CM

## 2022-05-05 NOTE — TELEPHONE ENCOUNTER
Caller: Marifer Ricardo    Relationship to patient: Self    Best call back number: [phone]    What is the call regarding:  PATIENT STATES THAT DR OCONNOR IS WANTING TO PUT HER ON CELEBREX.  SHE HAD A URINE TEST TO DETERMINE WHETHER IT WAS SAFE FOR HER TO BE ON IT.  SHE IS WANTING TO KNOW WHAT DR OCONNOR DECIDED.  PLEASE CALL AND ADVISE.

## 2022-05-05 NOTE — TELEPHONE ENCOUNTER
Her kidney function is fine but with a previous dx of esophagitis she will need to make sure she stays on a PPI like protonix.     Is she currently taking that type of med? I do not see it on med list.

## 2022-05-09 NOTE — TELEPHONE ENCOUNTER
She should be aware PPI meds can worsened osteoporosis, increase risk of C diff infection, etc.    Not sure it is worth her taking it just so she can take a daily NSAID.    Is she willing to consider other forms of pain medication?

## 2022-05-12 RX ORDER — AMLODIPINE BESYLATE 5 MG/1
TABLET ORAL
COMMUNITY
Start: 2022-04-01 | End: 2022-06-02 | Stop reason: HOSPADM

## 2022-05-12 RX ORDER — TRAMADOL HYDROCHLORIDE 50 MG/1
50 TABLET ORAL 2 TIMES DAILY PRN
Qty: 60 TABLET | Refills: 0 | Status: SHIPPED | OUTPATIENT
Start: 2022-05-12 | End: 2022-06-02 | Stop reason: HOSPADM

## 2022-05-12 NOTE — TELEPHONE ENCOUNTER
rx for tramadol sent in- she should read material from pharmacy and let me know if she has any concerns. She can take along with extra strength tylenol if needed

## 2022-05-13 NOTE — TELEPHONE ENCOUNTER
Caller: Marifer Ricardo    Relationship: Self    Best call back number: [phone]    What is the best time to reach you: anytime    Who are you requesting to speak with (clinical staff, provider,  specific staff member): Dr. Edwrads    Do you know the name of the person who called:     What was the call regarding: patient is unable to take tramadol due to it makes her mind race and she feel like she is going crazy.  She would like something else called in    Do you require a callback:   YES

## 2022-06-01 ENCOUNTER — APPOINTMENT (OUTPATIENT)
Dept: CT IMAGING | Facility: HOSPITAL | Age: 72
End: 2022-06-01

## 2022-06-01 ENCOUNTER — APPOINTMENT (OUTPATIENT)
Dept: GENERAL RADIOLOGY | Facility: HOSPITAL | Age: 72
End: 2022-06-01

## 2022-06-01 ENCOUNTER — HOSPITAL ENCOUNTER (INPATIENT)
Facility: HOSPITAL | Age: 72
LOS: 1 days | Discharge: HOME | End: 2022-06-02
Attending: EMERGENCY MEDICINE | Admitting: INTERNAL MEDICINE

## 2022-06-01 ENCOUNTER — TELEPHONE (OUTPATIENT)
Dept: FAMILY MEDICINE CLINIC | Facility: CLINIC | Age: 72
End: 2022-06-01

## 2022-06-01 DIAGNOSIS — R55 SYNCOPE, UNSPECIFIED SYNCOPE TYPE: Primary | ICD-10-CM

## 2022-06-01 DIAGNOSIS — R01.1 SYSTOLIC MURMUR: ICD-10-CM

## 2022-06-01 LAB
ALBUMIN SERPL-MCNC: 4.5 G/DL (ref 3.5–5.2)
ALBUMIN/GLOB SERPL: 1.9 G/DL
ALP SERPL-CCNC: 91 U/L (ref 39–117)
ALT SERPL W P-5'-P-CCNC: 21 U/L (ref 1–33)
ANION GAP SERPL CALCULATED.3IONS-SCNC: 14.1 MMOL/L (ref 5–15)
AST SERPL-CCNC: 22 U/L (ref 1–32)
BACTERIA UR QL AUTO: ABNORMAL /HPF
BASOPHILS # BLD AUTO: 0.04 10*3/MM3 (ref 0–0.2)
BASOPHILS NFR BLD AUTO: 0.7 % (ref 0–1.5)
BILIRUB SERPL-MCNC: 0.5 MG/DL (ref 0–1.2)
BILIRUB UR QL STRIP: NEGATIVE
BUN SERPL-MCNC: 30 MG/DL (ref 8–23)
BUN/CREAT SERPL: 20 (ref 7–25)
CALCIUM SPEC-SCNC: 9.3 MG/DL (ref 8.6–10.5)
CHLORIDE SERPL-SCNC: 95 MMOL/L (ref 98–107)
CLARITY UR: ABNORMAL
CO2 SERPL-SCNC: 25.9 MMOL/L (ref 22–29)
COLOR UR: YELLOW
CREAT SERPL-MCNC: 1.5 MG/DL (ref 0.57–1)
DEPRECATED RDW RBC AUTO: 41.5 FL (ref 37–54)
EGFRCR SERPLBLD CKD-EPI 2021: 36.6 ML/MIN/1.73
EOSINOPHIL # BLD AUTO: 0.09 10*3/MM3 (ref 0–0.4)
EOSINOPHIL NFR BLD AUTO: 1.6 % (ref 0.3–6.2)
ERYTHROCYTE [DISTWIDTH] IN BLOOD BY AUTOMATED COUNT: 12.2 % (ref 12.3–15.4)
GLOBULIN UR ELPH-MCNC: 2.4 GM/DL
GLUCOSE SERPL-MCNC: 137 MG/DL (ref 65–99)
GLUCOSE UR STRIP-MCNC: NEGATIVE MG/DL
HCT VFR BLD AUTO: 38 % (ref 34–46.6)
HGB BLD-MCNC: 13.2 G/DL (ref 12–15.9)
HGB UR QL STRIP.AUTO: NEGATIVE
HOLD SPECIMEN: NORMAL
HOLD SPECIMEN: NORMAL
HYALINE CASTS UR QL AUTO: ABNORMAL /LPF
IMM GRANULOCYTES # BLD AUTO: 0.01 10*3/MM3 (ref 0–0.05)
IMM GRANULOCYTES NFR BLD AUTO: 0.2 % (ref 0–0.5)
KETONES UR QL STRIP: ABNORMAL
LEUKOCYTE ESTERASE UR QL STRIP.AUTO: ABNORMAL
LYMPHOCYTES # BLD AUTO: 0.96 10*3/MM3 (ref 0.7–3.1)
LYMPHOCYTES NFR BLD AUTO: 17.4 % (ref 19.6–45.3)
MAGNESIUM SERPL-MCNC: 2.1 MG/DL (ref 1.6–2.4)
MCH RBC QN AUTO: 32 PG (ref 26.6–33)
MCHC RBC AUTO-ENTMCNC: 34.7 G/DL (ref 31.5–35.7)
MCV RBC AUTO: 92.2 FL (ref 79–97)
MONOCYTES # BLD AUTO: 0.45 10*3/MM3 (ref 0.1–0.9)
MONOCYTES NFR BLD AUTO: 8.1 % (ref 5–12)
MUCOUS THREADS URNS QL MICRO: ABNORMAL /HPF
NEUTROPHILS NFR BLD AUTO: 3.98 10*3/MM3 (ref 1.7–7)
NEUTROPHILS NFR BLD AUTO: 72 % (ref 42.7–76)
NITRITE UR QL STRIP: NEGATIVE
NRBC BLD AUTO-RTO: 0 /100 WBC (ref 0–0.2)
NT-PROBNP SERPL-MCNC: 527.8 PG/ML (ref 0–900)
PH UR STRIP.AUTO: 6 [PH] (ref 5–8)
PLATELET # BLD AUTO: 183 10*3/MM3 (ref 140–450)
PMV BLD AUTO: 10.6 FL (ref 6–12)
POTASSIUM SERPL-SCNC: 3.8 MMOL/L (ref 3.5–5.2)
PROT SERPL-MCNC: 6.9 G/DL (ref 6–8.5)
PROT UR QL STRIP: ABNORMAL
RBC # BLD AUTO: 4.12 10*6/MM3 (ref 3.77–5.28)
RBC # UR STRIP: ABNORMAL /HPF
REF LAB TEST METHOD: ABNORMAL
SARS-COV-2 RNA PNL SPEC NAA+PROBE: NOT DETECTED
SODIUM SERPL-SCNC: 135 MMOL/L (ref 136–145)
SP GR UR STRIP: 1.02 (ref 1–1.03)
SQUAMOUS #/AREA URNS HPF: ABNORMAL /HPF
TROPONIN T SERPL-MCNC: <0.01 NG/ML (ref 0–0.03)
UROBILINOGEN UR QL STRIP: ABNORMAL
WBC # UR STRIP: ABNORMAL /HPF
WBC NRBC COR # BLD: 5.53 10*3/MM3 (ref 3.4–10.8)
WHOLE BLOOD HOLD COAG: NORMAL
WHOLE BLOOD HOLD SPECIMEN: NORMAL

## 2022-06-01 PROCEDURE — 85025 COMPLETE CBC W/AUTO DIFF WBC: CPT

## 2022-06-01 PROCEDURE — 71275 CT ANGIOGRAPHY CHEST: CPT

## 2022-06-01 PROCEDURE — 99285 EMERGENCY DEPT VISIT HI MDM: CPT

## 2022-06-01 PROCEDURE — 83735 ASSAY OF MAGNESIUM: CPT

## 2022-06-01 PROCEDURE — 87086 URINE CULTURE/COLONY COUNT: CPT | Performed by: STUDENT IN AN ORGANIZED HEALTH CARE EDUCATION/TRAINING PROGRAM

## 2022-06-01 PROCEDURE — 25010000002 MORPHINE PER 10 MG: Performed by: EMERGENCY MEDICINE

## 2022-06-01 PROCEDURE — 84484 ASSAY OF TROPONIN QUANT: CPT | Performed by: PHYSICIAN ASSISTANT

## 2022-06-01 PROCEDURE — 25010000002 DIPHENHYDRAMINE PER 50 MG: Performed by: PHYSICIAN ASSISTANT

## 2022-06-01 PROCEDURE — 25010000002 IOPAMIDOL 61 % SOLUTION: Performed by: EMERGENCY MEDICINE

## 2022-06-01 PROCEDURE — 93005 ELECTROCARDIOGRAM TRACING: CPT | Performed by: PHYSICIAN ASSISTANT

## 2022-06-01 PROCEDURE — 81001 URINALYSIS AUTO W/SCOPE: CPT

## 2022-06-01 PROCEDURE — 87635 SARS-COV-2 COVID-19 AMP PRB: CPT | Performed by: PHYSICIAN ASSISTANT

## 2022-06-01 PROCEDURE — 84484 ASSAY OF TROPONIN QUANT: CPT

## 2022-06-01 PROCEDURE — 99223 1ST HOSP IP/OBS HIGH 75: CPT | Performed by: STUDENT IN AN ORGANIZED HEALTH CARE EDUCATION/TRAINING PROGRAM

## 2022-06-01 PROCEDURE — 70498 CT ANGIOGRAPHY NECK: CPT

## 2022-06-01 PROCEDURE — 80053 COMPREHEN METABOLIC PANEL: CPT

## 2022-06-01 PROCEDURE — 25010000002 METHYLPREDNISOLONE PER 125 MG: Performed by: PHYSICIAN ASSISTANT

## 2022-06-01 PROCEDURE — 71045 X-RAY EXAM CHEST 1 VIEW: CPT

## 2022-06-01 PROCEDURE — 83880 ASSAY OF NATRIURETIC PEPTIDE: CPT | Performed by: PHYSICIAN ASSISTANT

## 2022-06-01 PROCEDURE — 36415 COLL VENOUS BLD VENIPUNCTURE: CPT

## 2022-06-01 PROCEDURE — 25010000002 ENOXAPARIN PER 10 MG: Performed by: STUDENT IN AN ORGANIZED HEALTH CARE EDUCATION/TRAINING PROGRAM

## 2022-06-01 RX ORDER — SODIUM CHLORIDE 0.9 % (FLUSH) 0.9 %
10 SYRINGE (ML) INJECTION AS NEEDED
Status: DISCONTINUED | OUTPATIENT
Start: 2022-06-01 | End: 2022-06-02 | Stop reason: HOSPADM

## 2022-06-01 RX ORDER — MORPHINE SULFATE 4 MG/ML
4 INJECTION, SOLUTION INTRAMUSCULAR; INTRAVENOUS ONCE
Status: COMPLETED | OUTPATIENT
Start: 2022-06-01 | End: 2022-06-01

## 2022-06-01 RX ORDER — NICOTINE POLACRILEX 4 MG
1 LOZENGE BUCCAL
Status: DISCONTINUED | OUTPATIENT
Start: 2022-06-01 | End: 2022-06-02 | Stop reason: HOSPADM

## 2022-06-01 RX ORDER — ACETAMINOPHEN 325 MG/1
650 TABLET ORAL EVERY 4 HOURS PRN
Status: DISCONTINUED | OUTPATIENT
Start: 2022-06-01 | End: 2022-06-02 | Stop reason: HOSPADM

## 2022-06-01 RX ORDER — ACETAMINOPHEN 160 MG/5ML
650 SOLUTION ORAL EVERY 4 HOURS PRN
Status: DISCONTINUED | OUTPATIENT
Start: 2022-06-01 | End: 2022-06-02 | Stop reason: HOSPADM

## 2022-06-01 RX ORDER — DEXTROSE MONOHYDRATE 25 G/50ML
25 INJECTION, SOLUTION INTRAVENOUS
Status: DISCONTINUED | OUTPATIENT
Start: 2022-06-01 | End: 2022-06-02 | Stop reason: HOSPADM

## 2022-06-01 RX ORDER — ENOXAPARIN SODIUM 100 MG/ML
40 INJECTION SUBCUTANEOUS EVERY 24 HOURS
Status: DISCONTINUED | OUTPATIENT
Start: 2022-06-01 | End: 2022-06-02 | Stop reason: HOSPADM

## 2022-06-01 RX ORDER — NALOXONE HCL 0.4 MG/ML
0.4 VIAL (ML) INJECTION
Status: DISCONTINUED | OUTPATIENT
Start: 2022-06-01 | End: 2022-06-02 | Stop reason: HOSPADM

## 2022-06-01 RX ORDER — ATORVASTATIN CALCIUM 40 MG/1
40 TABLET, FILM COATED ORAL DAILY
Status: DISCONTINUED | OUTPATIENT
Start: 2022-06-02 | End: 2022-06-02 | Stop reason: HOSPADM

## 2022-06-01 RX ORDER — CLOPIDOGREL BISULFATE 75 MG/1
75 TABLET ORAL DAILY
Status: DISCONTINUED | OUTPATIENT
Start: 2022-06-02 | End: 2022-06-02 | Stop reason: HOSPADM

## 2022-06-01 RX ORDER — ASPIRIN 81 MG/1
81 TABLET ORAL DAILY
Status: DISCONTINUED | OUTPATIENT
Start: 2022-06-02 | End: 2022-06-02 | Stop reason: HOSPADM

## 2022-06-01 RX ORDER — MORPHINE SULFATE 2 MG/ML
2 INJECTION, SOLUTION INTRAMUSCULAR; INTRAVENOUS EVERY 4 HOURS PRN
Status: DISCONTINUED | OUTPATIENT
Start: 2022-06-01 | End: 2022-06-02 | Stop reason: HOSPADM

## 2022-06-01 RX ORDER — NITROFURANTOIN 25; 75 MG/1; MG/1
100 CAPSULE ORAL ONCE
Status: COMPLETED | OUTPATIENT
Start: 2022-06-01 | End: 2022-06-01

## 2022-06-01 RX ORDER — METHYLPREDNISOLONE SODIUM SUCCINATE 125 MG/2ML
125 INJECTION, POWDER, LYOPHILIZED, FOR SOLUTION INTRAMUSCULAR; INTRAVENOUS ONCE
Status: COMPLETED | OUTPATIENT
Start: 2022-06-01 | End: 2022-06-01

## 2022-06-01 RX ORDER — DIPHENHYDRAMINE HYDROCHLORIDE 50 MG/ML
25 INJECTION INTRAMUSCULAR; INTRAVENOUS ONCE
Status: COMPLETED | OUTPATIENT
Start: 2022-06-01 | End: 2022-06-01

## 2022-06-01 RX ORDER — SODIUM CHLORIDE, SODIUM LACTATE, POTASSIUM CHLORIDE, CALCIUM CHLORIDE 600; 310; 30; 20 MG/100ML; MG/100ML; MG/100ML; MG/100ML
75 INJECTION, SOLUTION INTRAVENOUS CONTINUOUS
Status: DISCONTINUED | OUTPATIENT
Start: 2022-06-01 | End: 2022-06-02 | Stop reason: HOSPADM

## 2022-06-01 RX ORDER — SODIUM CHLORIDE 0.9 % (FLUSH) 0.9 %
10 SYRINGE (ML) INJECTION EVERY 12 HOURS SCHEDULED
Status: DISCONTINUED | OUTPATIENT
Start: 2022-06-01 | End: 2022-06-02 | Stop reason: HOSPADM

## 2022-06-01 RX ORDER — ACETAMINOPHEN 650 MG/1
650 SUPPOSITORY RECTAL EVERY 4 HOURS PRN
Status: DISCONTINUED | OUTPATIENT
Start: 2022-06-01 | End: 2022-06-02 | Stop reason: HOSPADM

## 2022-06-01 RX ORDER — CARVEDILOL 6.25 MG/1
12.5 TABLET ORAL 2 TIMES DAILY WITH MEALS
Status: DISCONTINUED | OUTPATIENT
Start: 2022-06-01 | End: 2022-06-02 | Stop reason: HOSPADM

## 2022-06-01 RX ORDER — ONDANSETRON 2 MG/ML
4 INJECTION INTRAMUSCULAR; INTRAVENOUS EVERY 6 HOURS PRN
Status: DISCONTINUED | OUTPATIENT
Start: 2022-06-01 | End: 2022-06-02 | Stop reason: HOSPADM

## 2022-06-01 RX ORDER — INSULIN ASPART 100 [IU]/ML
0-7 INJECTION, SOLUTION INTRAVENOUS; SUBCUTANEOUS
Status: DISCONTINUED | OUTPATIENT
Start: 2022-06-02 | End: 2022-06-02 | Stop reason: HOSPADM

## 2022-06-01 RX ADMIN — Medication 10 ML: at 22:21

## 2022-06-01 RX ADMIN — SODIUM CHLORIDE 1000 ML: 9 INJECTION, SOLUTION INTRAVENOUS at 16:45

## 2022-06-01 RX ADMIN — IOPAMIDOL 100 ML: 612 INJECTION, SOLUTION INTRAVENOUS at 17:24

## 2022-06-01 RX ADMIN — METHYLPREDNISOLONE SODIUM SUCCINATE 125 MG: 125 INJECTION, POWDER, FOR SOLUTION INTRAMUSCULAR; INTRAVENOUS at 16:46

## 2022-06-01 RX ADMIN — DIPHENHYDRAMINE HYDROCHLORIDE 25 MG: 50 INJECTION INTRAMUSCULAR; INTRAVENOUS at 16:50

## 2022-06-01 RX ADMIN — IOPAMIDOL 50 ML: 612 INJECTION, SOLUTION INTRAVENOUS at 17:25

## 2022-06-01 RX ADMIN — MORPHINE SULFATE 4 MG: 4 INJECTION, SOLUTION INTRAMUSCULAR; INTRAVENOUS at 19:05

## 2022-06-01 RX ADMIN — NITROFURANTOIN MONOHYDRATE/MACROCRYSTALLINE 100 MG: 25; 75 CAPSULE ORAL at 19:42

## 2022-06-01 RX ADMIN — ENOXAPARIN SODIUM 40 MG: 40 INJECTION SUBCUTANEOUS at 22:21

## 2022-06-01 RX ADMIN — CARVEDILOL 12.5 MG: 6.25 TABLET, FILM COATED ORAL at 22:21

## 2022-06-01 NOTE — TELEPHONE ENCOUNTER
Patient called and said she is having black out spells when she is sitting or standing up. It's been happening all day and she is home alone. I asked if she needed to needed to go to the hospital/call 911, but she said no. She said she will call her daughter & let her know so she can take her to the hospital.

## 2022-06-01 NOTE — ED PROVIDER NOTES
Subjective   Patient is a 72-year-old female with a history of hypertension, chronic kidney disease, diabetes, GERD, and osteopenia who presents the emergency department with multiple episodes of near syncope and 1 episode of completed syncope.  This morning she went out to breakfast with family and friends and ate a normal meal.  She notes that she only drank a small bit of water.  They then left and were walking into a department store when she developed sudden and severe upper and mid back pain and then started to feel very lightheaded, nauseous, and her vision started to go black.  She went back to the car and states seated for the remainder of the day trip but continued to feel generally weak with ongoing upper back pain.  Her vision had returned to normal.  When she got home, she was able to get herself into the house but immediately started to feel lightheaded and again experienced tunnel vision before she can get to the couch.  She sat there for several hours before she tried to get up to go to the bathroom.  On her way to the bathroom, she again felt lightheaded, nauseous, diaphoretic, before being able to lower herself to the ground and eventually lose consciousness. She is unsure of how long she was unconscious.  When she was able to get to the phone after waking up, she called her family doctor who instructed her to go to the emergency department.  She asked her daughter to bring her here.  She did not have any preceding chest pain, palpitations, or shortness of breath.  She has a known systolic murmur but does not believe it has ever been worked up via echo.  She denies any history of heart failure or lower extremity edema.  She has a history of chronic back pain and has been tried on Celebrex and tramadol without significant relief and/or significant side effects of medications.  She is unsure of the etiology of her back pain.          Review of Systems   Constitutional: Positive for diaphoresis.    HENT: Negative.    Eyes: Negative.    Respiratory: Negative.    Cardiovascular: Negative.    Gastrointestinal: Positive for nausea.   Endocrine: Negative.    Genitourinary: Negative.    Musculoskeletal: Positive for back pain.   Skin: Negative.    Allergic/Immunologic: Negative.    Neurological: Positive for syncope and light-headedness.   Hematological: Negative.    Psychiatric/Behavioral: Negative.        Past Medical History:   Diagnosis Date   • Abdominal pain    • Abnormal bone density screening 2014    osteopenia   • Arthritis    • Asthma    • Belching    • Body piercing     BOTH EARS   • Cataract, bilateral    • Chronic renal insufficiency 2016   • Diabetes mellitus (HCC)    • Essential hypertension 2016   • GERD (gastroesophageal reflux disease)    • Hx of mammogram    • Hyperparathyroidism (HCC)    • Mastoiditis    • Pregnancy      s/p  x 3   • Renal insufficiency    • Urticaria    • Wears glasses    • Weight loss        Allergies   Allergen Reactions   • Penicillins Anaphylaxis   • Betadine [Povidone Iodine] Hives   • Codeine Hives   • Reglan [Metoclopramide] Hives       Past Surgical History:   Procedure Laterality Date   • CATARACT EXTRACTION, BILATERAL     • COLECTOMY PARTIAL / TOTAL      Pt reports multiple abd surgeries for ?pseudo-bowel obstruction   • COLONOSCOPY     • COLONOSCOPY N/A 2018    Procedure: COLONOSCOPY WITH RANDOM COLON BIOPSIES;  Surgeon: Taras Morillo MD;  Location: [facility] ENDOSCOPY;  Service:    • COLOSTOMY      and revision   • ENDOSCOPY     • ENDOSCOPY N/A 2018    Procedure: ESOPHAGOGASTRODUODENOSCOPY WITH BIOPSIES;  Surgeon: Taras Morillo MD;  Location: [facility] ENDOSCOPY;  Service:    • HYSTERECTOMY      age 31 for DUB, ovaries intact   • TONSILLECTOMY AND ADENOIDECTOMY         Family History   Problem Relation Age of Onset   • Arthritis Mother    • Migraines Mother    • Thyroid disease Mother    • Diabetes Mother    • Hypertension Mother     • Stroke Mother    • Tuberculosis Mother    • Heart attack Father    • Heart disease Father    • Diabetes Maternal Grandmother    • Cancer Maternal Aunt    • Cancer Paternal Aunt    • Cancer Cousin        Social History     Socioeconomic History   • Marital status: Single   Tobacco Use   • Smoking status: Never Smoker   • Smokeless tobacco: Never Used   Substance and Sexual Activity   • Alcohol use: No   • Drug use: No   • Sexual activity: Defer           Objective   Physical Exam  Vitals and nursing note reviewed.   Constitutional:       General: She is not in acute distress.     Appearance: Normal appearance.   HENT:      Head: Normocephalic.      Right Ear: External ear normal.      Left Ear: External ear normal.      Nose: Nose normal.      Mouth/Throat:      Mouth: Mucous membranes are moist.   Eyes:      Extraocular Movements: Extraocular movements intact.   Cardiovascular:      Rate and Rhythm: Normal rate.      Heart sounds: Murmur heard.   Pulmonary:      Effort: Pulmonary effort is normal.      Breath sounds: Normal breath sounds.   Abdominal:      General: Abdomen is flat.      Palpations: Abdomen is soft.      Tenderness: There is no abdominal tenderness.   Musculoskeletal:         General: Normal range of motion.      Cervical back: Normal range of motion.      Comments: No point tenderness in the thoracic or lumbar spine   Skin:     General: Skin is warm.   Neurological:      General: No focal deficit present.      Mental Status: She is alert and oriented to person, place, and time.      Cranial Nerves: No cranial nerve deficit.      Sensory: No sensory deficit.      Gait: Gait normal.   Psychiatric:         Mood and Affect: Mood normal.         Behavior: Behavior normal.         Procedures           ED Course  ED Course as of 06/01/22 1939 Wed Jun 01, 2022   1739 EKG interpreted by me reveals sinus rhythm with a rate of 84 bpm.  There are some nonspecific T wave changes but no obvious acute  ischemic findings.  This is an atypical appearing EKG. [TB]      ED Course User Index  [TB] Belia Connelly MD                                                 MDM  Number of Diagnoses or Management Options  Syncope, unspecified syncope type  Systolic murmur  Diagnosis management comments: Patient is a 72-year-old female who presents to the emergency department for multiple near syncopal events with preceding upper back pain as well as 1 episode of completed syncope just prior to arrival.  On arrival, her vitals are reassuring and within normal limits.  Differential considered included vascular emergencies including dissection, vasovagal syncope, arrhythmia, valvular disease including aortic stenosis, PE, etc.  Given that I am concerned for dissection as well as PE, will obtain CTA aorta in addition to basic labs, BNP, troponin, EKG, UA, and COVID testing.  Patient given a fluid bolus in the emergency department.  Upon standing, she remained very symptomatic with near syncope.  She thought it was related to her upper back pain so she was given some pain medication for this without improvement on reevaluation.  Labs and imaging reviewed.  Urinalysis has white cells and 1+ bacteria without any significant squamous cells.  She denies any urinary symptoms including flank pain or fever.  However her urinalysis is impressive so we will send for culture and start on Macrobid pending this culture given her significant penicillin related allergies.  Creatinine is 1.5 with an elevated BUN.  She does admit she has not drank more than 2 bottles of water today.  Troponin is normal.  BNP is normal.  EKG shows nonspecific ST abnormalities but no evidence of block.  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  She did have multiple venous collaterals.  I consulted with Dr. Cartagena at [facility] with CT surgery.  Given her significant  collaterals, he suspects that this has been occluded for quite some time and is not related to her syncope.  However after discussion with him about her symptoms today including her systolic murmur that has not been significantly evaluated, he agreed that she needed admission for echo.  Hospitalist consulted for admission who requested orthostatics.  She has already had 1 L of fluids so this will likely be skewed.  I personally checked these and showed 132/90 lying down with a heart rate of 79, 130/72 sitting up with a heart rate of 83, and blood pressure of 146/76 with heart rate of 87 standing.  However, when she stands despite her normal blood pressure, she is very symptomatic and began to have lightheadedness and felt very unsteady on her feet.  Will admit for echo and telemetry monitoring.       Amount and/or Complexity of Data Reviewed  Decide to obtain previous medical records or to obtain history from someone other than the patient: yes        Final diagnoses:   Syncope, unspecified syncope type   Systolic murmur       ED Disposition  ED Disposition     ED Disposition   Decision to Admit    Condition   --    Comment   Level of Care: Telemetry [5]   Diagnosis: Syncope, unspecified syncope type [[id number]]   Admitting Physician: CLAUDIA HALE [[id number]]   Attending Physician: CLAUDIA ALFRED [[id number]]   Certification: I Certify That Inpatient Hospital Services Are Medically Necessary For Greater Than 2 Midnights               No follow-up provider specified.       Medication List      No changes were made to your prescriptions during this visit.          Sneha Salgado PA  06/01/22 1939

## 2022-06-01 NOTE — ED NOTES
At this time, JIMENEZ Hoover requested to speak to [facility]. Awaiting call back from Dr. Cartagena.

## 2022-06-01 NOTE — H&P
"  [facility]      Name:  Marifer Ricardo   Age:  72 y.o.  Sex:  female  :  1950  MRN:  [id number]   Visit Number:  [id number]  Admission Date:  2022  Date Of Service:  22  Primary Care Physician:  Payal Edwards MD    Chief Complaint:     Dizziness, Syncope    History Of Presenting Illness:      Patient is a 72-year-old female with past medical history of hypertension, type 2 diabetes mellitus, chronic kidney disease, hyperparathyroidism, osteopenia, GERD, and persistent episodes of dizziness that presents to the hospital following syncopal episode. Patient states that for the last few months she has had intermittent episodes of dizziness/light-headedness resulting in falls. She states that she has not discussed this with her primary care physician and has not had any workup of this syncope. This morning, she was in her normal state of health until she got out of her car to walk into a department store and became severely light-headed with associated upper back pain and nausea. This was relieved upon sitting, but the patient experienced this again upon standing. She did have a couple episodes of near syncope, where she \"fell into the couch\" and was able to relieve the light-headedness.  Later in the day she got up from the couch to go the restroom when she again became light-headed, with associated tunnel vision, nausea, diaphoresis, and ultimately lost consciousness falling to the ground. Upon awakening, she called her family physician, Dr. Edwards, who recommended she be seen in the ED.    Upon arrival to the ED, blood pressure 136/70, pulse 87, oxygen saturation 97% on room air, and temp of 98.4F. Labs revealed glucose 137, sodium 135, creatinine 1.50. CBC unremarkable. UA cloudy with 2+ leukocytes, 13-20 WBC, 1+ bacteria. Chest xray with left lung basilar/retrocardiac opacity, likely atelectasis, no definite acute findings. 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 CTA Neck with no evidence of vascular injury, aneurysm, hemodynamically significant stenosis or major vessel occlusion; 0% stenosis of the carotid bulbs, patent vertebral arteries. Patient was given 1L normal saline, macrobid, 4mg morphine, and 125mg solumedrol in the ED.    ED provider called and spoke with Dr. 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 Patient agreeable to admission and further syncopal workup.    Review Of Systems:    All systems were reviewed and negative except as mentioned in history of presenting illness, assessment and plan.    Past Medical History: Patient  has a past medical history of Abdominal pain, Abnormal bone density screening (03/27/2014), Arthritis, Asthma, Belching, Body piercing, Cataract, bilateral, Chronic renal insufficiency (8/4/2016), Diabetes mellitus (HCC), Essential hypertension (8/4/2016), GERD (gastroesophageal reflux disease), mammogram (2009), Hyperparathyroidism (HCC), Mastoiditis, Pregnancy, Renal insufficiency, Urticaria, Wears glasses, and Weight loss.    Past Surgical History: Patient  has a past surgical history that includes Hysterectomy; Colectomy partial / total; Tonsillectomy and adenoidectomy; Colostomy; Colonoscopy (2005); Cataract extraction, bilateral; Esophagogastroduodenoscopy; Esophagogastroduodenoscopy (N/A, 1/25/2018); and Colonoscopy (N/A, 2/7/2018).    Social History: Patient  reports that she has never smoked. She has never used smokeless tobacco. She reports that she does not drink  alcohol and does not use drugs.    Family History: Patient's family history includes Arthritis in her mother; Cancer in her cousin, maternal aunt, and paternal aunt; Diabetes in her maternal grandmother and mother; Heart attack in her father; Heart disease in her father; Hypertension in her mother; Migraines in her mother; Stroke in her mother; Thyroid disease in her mother; Tuberculosis in her mother.    Allergies:      Penicillins, Betadine [povidone iodine], Codeine, and Reglan [metoclopramide]    Home Medications:    Prior to Admission Medications     Prescriptions Last Dose Informant Patient Reported? Taking?    amLODIPine (NORVASC) 5 MG tablet   Yes No    TAKE 1 TABLET BY MOUTH EVERY DAY AT BEDTIME FOR 30 DAYS    atorvastatin (LIPITOR) 40 MG tablet   Yes No    Take 40 mg by mouth Daily.    Biotin ([employer] BIOTIN HIGH POTENCY) 1000 MCG tablet   Yes No    Take 1,000 mcg by mouth 3 (three) times a day.    carvedilol (COREG) 12.5 MG tablet   Yes No    Take 12.5 mg by mouth 2 (Two) Times a Day With Meals.    cetirizine (zyrTEC) 10 MG tablet   Yes No    Take 1 tablet by mouth Daily.    cholecalciferol (VITAMIN D3) 1000 UNITS tablet   Yes No    Take 1,000 Units by mouth daily.    Cinnamon 500 MG tablet   Yes No    Take  by mouth 2 (two) times a day.    clopidogrel (PLAVIX) 75 MG tablet   Yes No    Take 75 mg by mouth Daily.    Cyanocobalamin (VITAMIN B-12) 5000 MCG sublingual tablet   Yes No    Place 1 tablet under the tongue Daily.    DEVILS CLAW PO   Yes No    Take 1 tablet by mouth Daily.    EQ Aspirin Adult Low Dose 81 MG EC tablet   Yes No    Take 81 mg by mouth Daily.    Glucose Blood (BLOOD GLUCOSE TEST) strip   No No    1 strip by In Vitro route Daily. CHECK BLOOD SUGAR DAILY.    Lancets misc   No No    1 each Daily. CHECK BLOOD SUGAR DAILY    Menthol-Methyl Salicylate (MUSCLE RUB EX)   Yes No    Misc Natural Products (WHITE WILLOW BARK PO)   Yes No    Take 1 tablet by mouth Daily.    Omega 3-6-9 Fatty Acids  "(OMEGA 3-6-9 COMPLEX PO)   Yes No    Take 1 capsule by mouth Daily.    traMADol (ULTRAM) 50 MG tablet   No No    Take 1 tablet by mouth 2 (Two) Times a Day As Needed for Severe Pain .    valsartan-hydrochlorothiazide (DIOVAN-HCT) 160-12.5 MG per tablet   Yes No    Take 1 tablet by mouth Daily.        ED Medications:    Medications   sodium chloride 0.9 % flush 10 mL (has no administration in time range)   sodium chloride 0.9 % bolus 1,000 mL (0 mL Intravenous Stopped 6/1/22 1722)   diphenhydrAMINE (BENADRYL) injection 25 mg (25 mg Intravenous Given 6/1/22 1650)   methylPREDNISolone sodium succinate (SOLU-Medrol) injection 125 mg (125 mg Intravenous Given 6/1/22 1646)   iopamidol (ISOVUE-300) 61 % injection 50 mL (50 mL Intravenous Given 6/1/22 1725)   iopamidol (ISOVUE-300) 61 % injection 100 mL (100 mL Intravenous Given 6/1/22 1724)   Morphine sulfate (PF) injection 4 mg (4 mg Intravenous Given 6/1/22 1905)     Vital Signs:  Temp:  [98.4 °F (36.9 °C)] 98.4 °F (36.9 °C)  Heart Rate:  [76-88] 84  Resp:  [16-17] 16  BP: (116-136)/(68-91) 130/91        06/01/22  1537 06/01/22  1544   Weight: 59 kg (130 lb) 59 kg (130 lb)     Body mass index is 20.36 kg/m².    Physical Exam:     Most recent vital Signs: /91   Pulse 84   Temp 98.4 °F (36.9 °C) (Oral)   Resp 16   Ht 170.2 cm (67\")   Wt 59 kg (130 lb)   LMP  (LMP Unknown)   SpO2 93%   BMI 20.36 kg/m²     Physical Exam  Constitutional:       Appearance: Normal appearance. She is normal weight.   HENT:      Mouth/Throat:      Mouth: Mucous membranes are moist.      Pharynx: Oropharynx is clear.   Eyes:      Extraocular Movements: Extraocular movements intact.      Pupils: Pupils are equal, round, and reactive to light.   Cardiovascular:      Rate and Rhythm: Normal rate and regular rhythm.      Pulses: Normal pulses.      Heart sounds: Murmur heard.    Systolic murmur is present with a grade of 3/6.  Pulmonary:      Effort: Pulmonary effort is normal.      " Breath sounds: Normal breath sounds.   Abdominal:      General: Abdomen is flat. Bowel sounds are normal.      Palpations: Abdomen is soft.   Skin:     General: Skin is warm and dry.   Neurological:      General: No focal deficit present.      Mental Status: She is alert and oriented to person, place, and time. Mental status is at baseline.   Psychiatric:         Mood and Affect: Mood normal.         Behavior: Behavior normal.         Thought Content: Thought content normal.         Laboratory data:    I have reviewed the labs done in the emergency room.    Results from last 7 days   Lab Units 06/01/22  1610   SODIUM mmol/L 135*   POTASSIUM mmol/L 3.8   CHLORIDE mmol/L 95*   CO2 mmol/L 25.9   BUN mg/dL 30*   CREATININE mg/dL 1.50*   CALCIUM mg/dL 9.3   BILIRUBIN mg/dL 0.5   ALK PHOS U/L 91   ALT (SGPT) U/L 21   AST (SGOT) U/L 22   GLUCOSE mg/dL 137*     Results from last 7 days   Lab Units 06/01/22  1610   WBC 10*3/mm3 5.53   HEMOGLOBIN g/dL 13.2   HEMATOCRIT % 38.0   PLATELETS 10*3/mm3 183         Results from last 7 days   Lab Units 06/01/22  1820 06/01/22  1610   TROPONIN T ng/mL <0.010 <0.010  <0.010     Results from last 7 days   Lab Units 06/01/22  1610   PROBNP pg/mL 527.8                 Results from last 7 days   Lab Units 06/01/22  1641   COLOR UA  Yellow   GLUCOSE UA  Negative   KETONES UA  Trace*   LEUKOCYTES UA  Moderate (2+)*   PH, URINE  6.0   BILIRUBIN UA  Negative   UROBILINOGEN UA  0.2 E.U./dL   RBC UA /HPF None Seen   WBC UA /HPF 13-20*       Pain Management Panel     Pain Management Panel Latest Ref Rng & Units 4/26/2022 8/24/2021    CREATININE UR Not Estab. mg/dL 37.8 48.0          Radiology:    CT Angiogram Neck    Result Date: 6/1/2022  FINAL REPORT TECHNIQUE: Thin section axial CT images were performed through the neck after IV contrast.  3-D reconstruction images were submitted. This study was performed with techniques to keep radiation doses as low as reasonably achievable (ALARA).  Individualized dose reduction techniques using automated exposure control or adjustment of mA and/or kV according to the patient's size were employed. CLINICAL HISTORY: severe upper back pain, syncope FINDINGS: CTA neck:  Aortic arch:   Arch shows no significant narrowing. Right vessel origins are widely patent.   Right carotid:   There is moderate atherosclerosis of the right carotid bulb.   0% stenosis according to the NASCET criteria.  Left carotid:  There is moderate atherosclerosis of the right carotid bulb.   0% stenosis according to the NASCET criteria.     Vertebrals:   The vertebrals are patent.  No significant stenosis is present.     No evidence of vascular injury, aneurysm, hemodynamically significant stenosis or major vessel occlusion.  0% stenosis of the carotid bulbs by NASCET criteria.Patent vertebral arteries. Authenticated and Electronically Signed by Mahin Garber MD on 06/01/2022 06:06:56 PM    XR Chest 1 View    Result Date: 6/1/2022  CLINICAL INDICATION:  Weak/Dizzy/AMS triage protocol weakness.  EXAMINATION TECHNIQUE: XR CHEST 1 VW-  COMPARISON: None.  FINDINGS: Retrocardiac opacity, likely atelectasis. No focal airspace consolidation. No pneumothorax or pleural effusion. Heart is normal in size. Aortic atherosclerosis and tortuosity. Surgical clips in the right hilar region. Scattered calcified granuloma. Multiple surgical clips in the left upper quadrant. Ill-defined sclerotic lesion in the left proximal humerus.      Left lung basilar/retrocardiac opacity, likely atelectasis. No definite acute findings.  Images personally reviewed, interpreted and dictated by LAURENT Grullon.       This report was signed and finalized on 6/1/2022 4:32 PM by LAURENT Grullon.    CT Angiogram Chest    Result Date: 6/1/2022  FINAL REPORT TECHNIQUE: Multiple axial CT images were obtained through the chest following IV contrast using a CTA/PE protocol.  3D/MIP reconstruction images were also  performed. This study was performed with techniques to keep radiation doses as low as reasonably achievable (ALARA). Individualized dose reduction techniques using automated exposure control or adjustment of mA and/or kV according to the patient's size were employed. CLINICAL HISTORY: upper back pain, severe, syncope FINDINGS: PAs and aorta: There is no acute aortic pathology.  No pulmonary embolus.  Thoracic aorta is unremarkable.  No significant coronary artery calcifications.  Heart/mediastinum: There are multiple mediastinal venous collaterals.  No evidence for right heart strain.  No pericardial effusion.  The heart is normal in size.  Lungs: Mild atelectasis.  Otherwise the lungs are clear. Lymph nodes: No pathologically enlarged thoracic lymph nodes. Pleura: No pneumothorax or pleural effusion.  Chest Wall: There is a likely venous graft from the left subclavian vein through the anterior chest wall to the right atrium which appears entirely occluded and thrombosed.  Bones: No acute fracture. Upper abdomen: No acute findings in the upper abdomen.     No pulmonary embolus.   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 Authenticated and Electronically Signed by Mahin Garber MD on 06/01/2022 06:09:03 PM      Assessment:    1. Syncope, POA  2. Systolic Murmur, POA  3. JIMBO on CKD stage II, POA  4. Acute Urinary Tract Infection, POA  5. Volume Depletion  6. Hypertension  7. Type 2 Diabetes Mellitus  8. Hyperparathyroidism  9. Osteopenia  10. GERD  11. Impaired Mobility and ADLs    Plan:    Syncope  Systolic Murmur  -Patient is visibly volume deplete, and has improved some with IVF administration. Unfortunately, orthostatic vital signs were not obtained prior to IVF administration, but I do suspect some component of orthostatics contributing to the patient's syncope.  -Will admit on telemetry to assess for arrhythmias that may be contributing    -Echocardiogram ordered per CT surgery recommendation given systolic murmur  -Continue IVF administration  -Holding any medications that may be contributing    JIMBO on CKD Stage II  Acute UTI  -UA significant for bacteruria and given patient's feelings of generalized fatigue, weakness, and dizziness, will treat as symptomatic UTI  -Urine culture ordered  -Continue Rocephin    Volume Depletion  -Continue IVF administration    Type 2 Diabetes Mellitus  -Sliding Scale insulin  -Accuchecks tidac    Hypertension  Hyperparathyroidism  Osteopenia  GERD  -Continue home medications as warranted    Impaired Mobility and ADLs  -PT/OT    -Further recommendations as clinical course dictates  -Discussion held with patient and family at bedside. Patient is a DNR/DNI and is to not receive any blood products.    Risk Assessment: Moderate  DVT Prophylaxis: Lovenox  Code Status: DNR/DNI  Diet: Carb consistent, cardiac    Advance Care Planning   ACP discussion was held with the patient during this visit. Patient has an advance directive in EMR which is still valid.            Belia Vargas,   06/01/22  19:16 EDT    Dictated utilizing Dragon dictation.

## 2022-06-02 ENCOUNTER — READMISSION MANAGEMENT (OUTPATIENT)
Dept: CALL CENTER | Facility: HOSPITAL | Age: 72
End: 2022-06-02

## 2022-06-02 ENCOUNTER — TELEPHONE (OUTPATIENT)
Dept: FAMILY MEDICINE CLINIC | Facility: CLINIC | Age: 72
End: 2022-06-02

## 2022-06-02 ENCOUNTER — APPOINTMENT (OUTPATIENT)
Dept: CARDIOLOGY | Facility: HOSPITAL | Age: 72
End: 2022-06-02

## 2022-06-02 VITALS
TEMPERATURE: 97.5 F | HEART RATE: 68 BPM | DIASTOLIC BLOOD PRESSURE: 59 MMHG | WEIGHT: 135 LBS | SYSTOLIC BLOOD PRESSURE: 124 MMHG | BODY MASS INDEX: 21.19 KG/M2 | RESPIRATION RATE: 18 BRPM | OXYGEN SATURATION: 96 % | HEIGHT: 67 IN

## 2022-06-02 LAB
ANION GAP SERPL CALCULATED.3IONS-SCNC: 14.2 MMOL/L (ref 5–15)
BASOPHILS # BLD AUTO: 0 10*3/MM3 (ref 0–0.2)
BASOPHILS NFR BLD AUTO: 0 % (ref 0–1.5)
BH CV ECHO MEAS - ACS: 1.3 CM
BH CV ECHO MEAS - AO MAX PG: 9.5 MMHG
BH CV ECHO MEAS - AO MEAN PG: 4 MMHG
BH CV ECHO MEAS - AO ROOT DIAM: 2.5 CM
BH CV ECHO MEAS - AO V2 MAX: 154 CM/SEC
BH CV ECHO MEAS - AO V2 VTI: 26.6 CM
BH CV ECHO MEAS - AVA(I,D): 2.29 CM2
BH CV ECHO MEAS - EDV(CUBED): 56.2 ML
BH CV ECHO MEAS - EDV(MOD-SP2): 49.4 ML
BH CV ECHO MEAS - EDV(MOD-SP4): 37.1 ML
BH CV ECHO MEAS - EF(MOD-BP): 70.8 %
BH CV ECHO MEAS - EF(MOD-SP2): 72.5 %
BH CV ECHO MEAS - EF(MOD-SP4): 69.5 %
BH CV ECHO MEAS - ESV(CUBED): 9.3 ML
BH CV ECHO MEAS - ESV(MOD-SP2): 13.6 ML
BH CV ECHO MEAS - ESV(MOD-SP4): 11.3 ML
BH CV ECHO MEAS - FS: 45.2 %
BH CV ECHO MEAS - IVS/LVPW: 1.03 CM
BH CV ECHO MEAS - IVSD: 1.15 CM
BH CV ECHO MEAS - LA DIMENSION: 2.8 CM
BH CV ECHO MEAS - LAT PEAK E' VEL: 7.7 CM/SEC
BH CV ECHO MEAS - LV MASS(C)D: 142.7 GRAMS
BH CV ECHO MEAS - LV MAX PG: 3.8 MMHG
BH CV ECHO MEAS - LV MEAN PG: 2 MMHG
BH CV ECHO MEAS - LV V1 MAX: 97.9 CM/SEC
BH CV ECHO MEAS - LV V1 VTI: 21.5 CM
BH CV ECHO MEAS - LVIDD: 3.8 CM
BH CV ECHO MEAS - LVIDS: 2.1 CM
BH CV ECHO MEAS - LVOT AREA: 2.8 CM2
BH CV ECHO MEAS - LVOT DIAM: 1.9 CM
BH CV ECHO MEAS - LVPWD: 1.12 CM
BH CV ECHO MEAS - MED PEAK E' VEL: 6.5 CM/SEC
BH CV ECHO MEAS - MV A MAX VEL: 85.2 CM/SEC
BH CV ECHO MEAS - MV DEC SLOPE: 340 CM/SEC2
BH CV ECHO MEAS - MV DEC TIME: 0.28 MSEC
BH CV ECHO MEAS - MV E MAX VEL: 70.4 CM/SEC
BH CV ECHO MEAS - MV E/A: 0.83
BH CV ECHO MEAS - MV MAX PG: 3.9 MMHG
BH CV ECHO MEAS - MV MEAN PG: 2 MMHG
BH CV ECHO MEAS - MV P1/2T: 78.3 MSEC
BH CV ECHO MEAS - MV V2 VTI: 28.6 CM
BH CV ECHO MEAS - MVA(P1/2T): 2.8 CM2
BH CV ECHO MEAS - MVA(VTI): 2.13 CM2
BH CV ECHO MEAS - PA V2 MAX: 102 CM/SEC
BH CV ECHO MEAS - PULM A REVS DUR: 0.12 SEC
BH CV ECHO MEAS - PULM A REVS VEL: 33.5 CM/SEC
BH CV ECHO MEAS - PULM DIAS VEL: 33.5 CM/SEC
BH CV ECHO MEAS - PULM S/D: 1.3
BH CV ECHO MEAS - PULM SYS VEL: 43.6 CM/SEC
BH CV ECHO MEAS - RAP SYSTOLE: 3 MMHG
BH CV ECHO MEAS - RV MAX PG: 2.08 MMHG
BH CV ECHO MEAS - RV V1 MAX: 72.1 CM/SEC
BH CV ECHO MEAS - RV V1 VTI: 14.7 CM
BH CV ECHO MEAS - RVSP: 19.3 MMHG
BH CV ECHO MEAS - SV(LVOT): 61 ML
BH CV ECHO MEAS - SV(MOD-SP2): 35.8 ML
BH CV ECHO MEAS - SV(MOD-SP4): 25.8 ML
BH CV ECHO MEAS - TAPSE (>1.6): 1.5 CM
BH CV ECHO MEAS - TR MAX PG: 16.3 MMHG
BH CV ECHO MEAS - TR MAX VEL: 200.3 CM/SEC
BH CV ECHO MEASUREMENTS AVERAGE E/E' RATIO: 9.92
BH CV XLRA - RV BASE: 3.4 CM
BH CV XLRA - RV LENGTH: 6 CM
BH CV XLRA - RV MID: 2.8 CM
BH CV XLRA - TDI S': 11.7 CM/SEC
BUN SERPL-MCNC: 32 MG/DL (ref 8–23)
BUN/CREAT SERPL: 27.4 (ref 7–25)
CALCIUM SPEC-SCNC: 8.5 MG/DL (ref 8.6–10.5)
CHLORIDE SERPL-SCNC: 97 MMOL/L (ref 98–107)
CO2 SERPL-SCNC: 21.8 MMOL/L (ref 22–29)
CREAT SERPL-MCNC: 1.17 MG/DL (ref 0.57–1)
DEPRECATED RDW RBC AUTO: 40.7 FL (ref 37–54)
EGFRCR SERPLBLD CKD-EPI 2021: 49.4 ML/MIN/1.73
EOSINOPHIL # BLD AUTO: 0 10*3/MM3 (ref 0–0.4)
EOSINOPHIL NFR BLD AUTO: 0 % (ref 0.3–6.2)
ERYTHROCYTE [DISTWIDTH] IN BLOOD BY AUTOMATED COUNT: 12 % (ref 12.3–15.4)
GLUCOSE BLDC GLUCOMTR-MCNC: 192 MG/DL (ref 70–130)
GLUCOSE BLDC GLUCOMTR-MCNC: 260 MG/DL (ref 70–130)
GLUCOSE SERPL-MCNC: 264 MG/DL (ref 65–99)
HCT VFR BLD AUTO: 36.3 % (ref 34–46.6)
HGB BLD-MCNC: 12.4 G/DL (ref 12–15.9)
IMM GRANULOCYTES # BLD AUTO: 0.02 10*3/MM3 (ref 0–0.05)
IMM GRANULOCYTES NFR BLD AUTO: 0.3 % (ref 0–0.5)
LEFT ATRIUM VOLUME INDEX: 15.1 ML/M2
LYMPHOCYTES # BLD AUTO: 0.59 10*3/MM3 (ref 0.7–3.1)
LYMPHOCYTES NFR BLD AUTO: 8.6 % (ref 19.6–45.3)
MAXIMAL PREDICTED HEART RATE: 147 BPM
MCH RBC QN AUTO: 31.5 PG (ref 26.6–33)
MCHC RBC AUTO-ENTMCNC: 34.2 G/DL (ref 31.5–35.7)
MCV RBC AUTO: 92.1 FL (ref 79–97)
MONOCYTES # BLD AUTO: 0.1 10*3/MM3 (ref 0.1–0.9)
MONOCYTES NFR BLD AUTO: 1.5 % (ref 5–12)
NEUTROPHILS NFR BLD AUTO: 6.17 10*3/MM3 (ref 1.7–7)
NEUTROPHILS NFR BLD AUTO: 89.6 % (ref 42.7–76)
NRBC BLD AUTO-RTO: 0 /100 WBC (ref 0–0.2)
PLATELET # BLD AUTO: 167 10*3/MM3 (ref 140–450)
PMV BLD AUTO: 10.5 FL (ref 6–12)
POTASSIUM SERPL-SCNC: 3.9 MMOL/L (ref 3.5–5.2)
RBC # BLD AUTO: 3.94 10*6/MM3 (ref 3.77–5.28)
SODIUM SERPL-SCNC: 133 MMOL/L (ref 136–145)
STRESS TARGET HR: 125 BPM
WBC NRBC COR # BLD: 6.88 10*3/MM3 (ref 3.4–10.8)

## 2022-06-02 PROCEDURE — 80048 BASIC METABOLIC PNL TOTAL CA: CPT | Performed by: STUDENT IN AN ORGANIZED HEALTH CARE EDUCATION/TRAINING PROGRAM

## 2022-06-02 PROCEDURE — 63710000001 INSULIN ASPART PER 5 UNITS: Performed by: STUDENT IN AN ORGANIZED HEALTH CARE EDUCATION/TRAINING PROGRAM

## 2022-06-02 PROCEDURE — 93306 TTE W/DOPPLER COMPLETE: CPT | Performed by: INTERNAL MEDICINE

## 2022-06-02 PROCEDURE — 82962 GLUCOSE BLOOD TEST: CPT

## 2022-06-02 PROCEDURE — 85025 COMPLETE CBC W/AUTO DIFF WBC: CPT | Performed by: STUDENT IN AN ORGANIZED HEALTH CARE EDUCATION/TRAINING PROGRAM

## 2022-06-02 PROCEDURE — 93306 TTE W/DOPPLER COMPLETE: CPT

## 2022-06-02 PROCEDURE — 97165 OT EVAL LOW COMPLEX 30 MIN: CPT

## 2022-06-02 PROCEDURE — 99239 HOSP IP/OBS DSCHRG MGMT >30: CPT | Performed by: INTERNAL MEDICINE

## 2022-06-02 PROCEDURE — 25010000002 MORPHINE PER 10 MG: Performed by: STUDENT IN AN ORGANIZED HEALTH CARE EDUCATION/TRAINING PROGRAM

## 2022-06-02 PROCEDURE — 97161 PT EVAL LOW COMPLEX 20 MIN: CPT

## 2022-06-02 RX ORDER — HYDROCODONE BITARTRATE AND ACETAMINOPHEN 5; 325 MG/1; MG/1
1 TABLET ORAL EVERY 4 HOURS PRN
Status: DISCONTINUED | OUTPATIENT
Start: 2022-06-02 | End: 2022-06-02 | Stop reason: HOSPADM

## 2022-06-02 RX ADMIN — CLOPIDOGREL BISULFATE 75 MG: 75 TABLET ORAL at 09:03

## 2022-06-02 RX ADMIN — HYDROCODONE BITARTRATE AND ACETAMINOPHEN 1 TABLET: 5; 325 TABLET ORAL at 06:40

## 2022-06-02 RX ADMIN — ASPIRIN 81 MG: 81 TABLET, COATED ORAL at 09:03

## 2022-06-02 RX ADMIN — INSULIN ASPART 2 UNITS: 100 INJECTION, SOLUTION INTRAVENOUS; SUBCUTANEOUS at 10:59

## 2022-06-02 RX ADMIN — HYDROCODONE BITARTRATE AND ACETAMINOPHEN 1 TABLET: 5; 325 TABLET ORAL at 10:56

## 2022-06-02 RX ADMIN — INSULIN ASPART 4 UNITS: 100 INJECTION, SOLUTION INTRAVENOUS; SUBCUTANEOUS at 06:40

## 2022-06-02 RX ADMIN — MORPHINE SULFATE 2 MG: 2 INJECTION, SOLUTION INTRAMUSCULAR; INTRAVENOUS at 04:05

## 2022-06-02 RX ADMIN — ATORVASTATIN CALCIUM 40 MG: 40 TABLET, FILM COATED ORAL at 09:03

## 2022-06-02 RX ADMIN — SODIUM CHLORIDE, POTASSIUM CHLORIDE, SODIUM LACTATE AND CALCIUM CHLORIDE 75 ML/HR: 600; 310; 30; 20 INJECTION, SOLUTION INTRAVENOUS at 00:24

## 2022-06-02 RX ADMIN — MORPHINE SULFATE 2 MG: 2 INJECTION, SOLUTION INTRAMUSCULAR; INTRAVENOUS at 13:12

## 2022-06-02 RX ADMIN — CARVEDILOL 12.5 MG: 6.25 TABLET, FILM COATED ORAL at 10:56

## 2022-06-02 RX ADMIN — Medication 10 ML: at 09:03

## 2022-06-02 RX ADMIN — MORPHINE SULFATE 2 MG: 2 INJECTION, SOLUTION INTRAMUSCULAR; INTRAVENOUS at 00:23

## 2022-06-02 NOTE — PLAN OF CARE
Goal Outcome Evaluation:  Plan of Care Reviewed With: patient        Progress: improving  Outcome Evaluation: OT eval completed. Patient completed all bed mobility with mod ind, completed tf and functional mobility x 400' with SBA and no AD needed. Patient is able to perform ADLs with S/U-SBA. No further OT needs identified at this time. Patient to return home with her daughter at discharge .

## 2022-06-02 NOTE — CASE MANAGEMENT/SOCIAL WORK
Case Management Discharge Note                Selected Continued Care - Discharged on 6/2/2022 Admission date: 6/1/2022 - Discharge disposition: Home or Self Care     Transportation Services  Private: Car    Final Discharge Disposition Code: 01 - home or self-care

## 2022-06-02 NOTE — THERAPY DISCHARGE NOTE
Acute Care - Occupational Therapy Discharge  [facility]    Patient Name: Marifer Ricardo  : 1950    MRN: [id number]                              Today's Date: 2022       Admit Date: 2022    Visit Dx:     ICD-10-CM ICD-9-CM   1. Syncope, unspecified syncope type  R55 780.2   2. Systolic murmur  R01.1 785.2     Patient Active Problem List   Diagnosis   • Type 2 diabetes mellitus ([facility])   • Intractable migraine   • Primary generalized (osteo)arthritis   • Difficult intravenous access   • Copy of advanced directive obtained from patient   • No blood products   • History of hypothyroidism   • Chronic mixed headache syndrome   • Diabetic peripheral neuropathy ([facility])   • Gastroparesis   • Rosacea   • Osteopenia   • Reflux esophagitis   • Gastritis   • PAD (peripheral artery disease) ([facility])   • Pure hypercholesterolemia   • Syncope     Past Medical History:   Diagnosis Date   • Abdominal pain    • Abnormal bone density screening 2014    osteopenia   • Arthritis    • Asthma    • Belching    • Body piercing     BOTH EARS   • Cataract, bilateral    • Chronic renal insufficiency 2016   • Diabetes mellitus ([facility])    • Essential hypertension 2016   • GERD (gastroesophageal reflux disease)    • Hx of mammogram    • Hyperparathyroidism ([facility])    • Mastoiditis    • Pregnancy      s/p  x 3   • Renal insufficiency    • Urticaria    • Wears glasses    • Weight loss      Past Surgical History:   Procedure Laterality Date   • CATARACT EXTRACTION, BILATERAL     • COLECTOMY PARTIAL / TOTAL      Pt reports multiple abd surgeries for ?pseudo-bowel obstruction   • COLONOSCOPY     • COLONOSCOPY N/A 2018    Procedure: COLONOSCOPY WITH RANDOM COLON BIOPSIES;  Surgeon: Taras Morillo MD;  Location: [facility] ENDOSCOPY;  Service:    • COLOSTOMY      and revision   • ENDOSCOPY     • ENDOSCOPY N/A 2018    Procedure: ESOPHAGOGASTRODUODENOSCOPY WITH BIOPSIES;  Surgeon: Taras Morillo MD;  Location:   [facility];  Service:    • HYSTERECTOMY      age 31 for DUB, ovaries intact   • TONSILLECTOMY AND ADENOIDECTOMY        General Information     Row Name 06/02/22 1148          OT Time and Intention    Document Type discharge evaluation/summary  -SD     Mode of Treatment occupational therapy  -SD     Row Name 06/02/22 1148          General Information    Patient Profile Reviewed yes  -SD     Prior Level of Function independent:;all household mobility;community mobility  Lives with her daughter no AD/DME  -SD     Existing Precautions/Restrictions no known precautions/restrictions  -SD     Barriers to Rehab none identified  -SD     Row Name 06/02/22 1148          Living Environment    People in Home child(desi), adult  -SD     Row Name 06/02/22 1148          Home Main Entrance    Number of Stairs, Main Entrance other (see comments)  ramp entry  -SD     Row Name 06/02/22 1148          Stairs Within Home, Primary    Stairs, Within Home, Primary basement  -SD     Number of Stairs, Within Home, Primary twelve  -SD     Stair Railings, Within Home, Primary railings on both sides of stairs  -SD     Row Name 06/02/22 1148          Cognition    Orientation Status (Cognition) oriented x 4  -SD     Row Name 06/02/22 1148          Safety Issues, Functional Mobility    Safety Issues Affecting Function (Mobility) safety precautions follow-through/compliance  -SD     Impairments Affecting Function (Mobility) endurance/activity tolerance  -SD           User Key  (r) = Recorded By, (t) = Taken By, (c) = Cosigned By    Initials Name Provider Type    SD Olga Bee OT Occupational Therapist               Mobility/ADL's     Row Name 06/02/22 1149          Bed Mobility    Bed Mobility supine-sit;sit-supine  -SD     Supine-Sit [place] (Bed Mobility) modified independence  -SD     Sit-Supine [place] (Bed Mobility) modified independence  -SD     Assistive Device (Bed Mobility) head of bed elevated  -SD     Row Name  06/02/22 1149          Transfers    Transfers sit-stand transfer  -SD     Sit-Stand [place] (Transfers) standby assist  -SD     [facility] Name 06/02/22 1149          Functional Mobility    Functional Mobility- Ind. Level standby assist  -SD     Functional Mobility-Distance (Feet) 400  -SD     [facility] Name 06/02/22 1149          Activities of Daily Living    BADL Assessment/Intervention bathing;upper body dressing;lower body dressing;grooming;feeding;toileting  -SD     [facility] Name 06/02/22 1149          Bathing Assessment/Intervention    [place] Level (Bathing) standby assist  -SD     [facility] Name 06/02/22 1149          Upper Body Dressing Assessment/Training    [place] Level (Upper Body Dressing) set up  -SD     Row Name 06/02/22 1149          Lower Body Dressing Assessment/Training    [place] Level (Lower Body Dressing) standby assist  -SD     Row Name 06/02/22 1149          Grooming Assessment/Training    [place] Level (Grooming) set up  -SD     Row Name 06/02/22 1149          Self-Feeding Assessment/Training    [place] Level (Feeding) set up  -SD     Row Name 06/02/22 1149          Toileting Assessment/Training    [place] Level (Toileting) standby assist  -SD           User Key  (r) = Recorded By, (t) = Taken By, (c) = Cosigned By    Initials Name Provider Type    Olga Lobo OT Occupational Therapist               Obj/Interventions     [facility] Name 06/02/22 1150          Range of Motion Comprehensive    General Range of Motion bilateral upper extremity ROM WFL  -SD     [facility] Name 06/02/22 1150          Strength Comprehensive (MMT)    General Manual Muscle Testing (MMT) Assessment no strength deficits identified  -SD           User Key  (r) = Recorded By, (t) = Taken By, (c) = Cosigned By    Initials Name Provider Type    Olga Lobo OT Occupational Therapist               Goals/Plan    No documentation.                Clinical Impression     Row Name 06/02/22 1150          Pain  Assessment    Pretreatment Pain Rating 7/10  -SD     Posttreatment Pain Rating 7/10  -SD     Pain Location - head  -SD     Row Name 06/02/22 1150          Plan of Care Review    Plan of Care Reviewed With patient  -SD     Progress improving  -SD     Outcome Evaluation OT eval completed. Patient completed all bed mobility with mod ind, completed tf and functional mobility x 400' with SBA and no AD needed. Patient is able to perform ADLs with S/U-SBA. No further OT needs identified at this time. Patient to return home with her daughter at discharge .  -SD     Row Name 06/02/22 1150          Therapy Assessment/Plan (OT)    Patient/Family Therapy Goal Statement (OT) home  -SD     Rehab Potential (OT) good, to achieve stated therapy goals  -SD     Criteria for Skilled Therapeutic Interventions Met (OT) no problems identified which require skilled intervention  -SD     Therapy Frequency (OT) evaluation only  -SD     Row Name 06/02/22 1150          Therapy Plan Review/Discharge Plan (OT)    Anticipated Discharge Disposition (OT) home with assist  -SD     Row Name 06/02/22 1150          Positioning and Restraints    Pre-Treatment Position in bed  -SD     Post Treatment Position bed  -SD     In Bed supine;call light within reach;encouraged to call for assist  -SD           User Key  (r) = Recorded By, (t) = Taken By, (c) = Cosigned By    Initials Name Provider Type    Olga Lobo OT Occupational Therapist               Outcome Measures     Row Name 06/02/22 1152          How much help from another is currently needed...    Putting on and taking off regular lower body clothing? 4  -SD     Bathing (including washing, rinsing, and drying) 3  -SD     Toileting (which includes using toilet bed pan or urinal) 3  -SD     Putting on and taking off regular upper body clothing 4  -SD     Taking care of personal grooming (such as brushing teeth) 4  -SD     Eating meals 4  -SD     AM-PAC 6 Clicks Score (OT) 22  -SD     Row Name  06/02/22 1152          Functional Assessment    Outcome Measure Options AM-PAC 6 Clicks Daily Activity (OT)  -SD           User Key  (r) = Recorded By, (t) = Taken By, (c) = Cosigned By    Initials Name Provider Type    SD Olga Bee OT Occupational Therapist              Occupational Therapy Education                 Title: PT OT SLP Therapies (In Progress)     Topic: Occupational Therapy (In Progress)     Point: ADL training (Done)     Description:   Instruct learner(s) on proper safety adaptation and remediation techniques during self care or transfers.   Instruct in proper use of assistive devices.              Learning Progress Summary           Patient Acceptance, E,TB, VU by SD at 6/2/2022 1152    Comment: No further OT needs.                   Point: Home exercise program (Not Started)     Description:   Instruct learner(s) on appropriate technique for monitoring, assisting and/or progressing therapeutic exercises/activities.              Learner Progress:  Not documented in this visit.          Point: Precautions (Not Started)     Description:   Instruct learner(s) on prescribed precautions during self-care and functional transfers.              Learner Progress:  Not documented in this visit.          Point: Body mechanics (Not Started)     Description:   Instruct learner(s) on proper positioning and spine alignment during self-care, functional mobility activities and/or exercises.              Learner Progress:  Not documented in this visit.                      User Key     Initials Effective Dates Name Provider Type Discipline    SD 06/16/21 -  Olga Bee OT Occupational Therapist OT              OT Recommendation and Plan  Therapy Frequency (OT): evaluation only  Plan of Care Review  Plan of Care Reviewed With: patient  Progress: improving  Outcome Evaluation: OT eval completed. Patient completed all bed mobility with mod ind, completed tf and functional mobility x 400' with SBA and no AD  needed. Patient is able to perform ADLs with S/U-SBA. No further OT needs identified at this time. Patient to return home with her daughter at discharge .  Plan of Care Reviewed With: patient  Outcome Evaluation: OT eval completed. Patient completed all bed mobility with mod ind, completed tf and functional mobility x 400' with SBA and no AD needed. Patient is able to perform ADLs with S/U-SBA. No further OT needs identified at this time. Patient to return home with her daughter at discharge .     Time Calculation:    Time Calculation- OT     Row Name 06/02/22 1153             Time Calculation- OT    OT Start Time 1005  -SD      OT Received On 06/02/22  -SD              Untimed Charges    OT Eval/Re-eval Minutes 45  -SD              Total Minutes    Untimed Charges Total Minutes 45  -SD       Total Minutes 45  -SD            User Key  (r) = Recorded By, (t) = Taken By, (c) = Cosigned By    Initials Name Provider Type    Olga Lobo OT Occupational Therapist              Therapy Charges for Today     Code Description Service Date Service Provider Modifiers Qty    89253094038  OT EVAL LOW COMPLEXITY 3 6/2/2022 Olga Bee OT GO 1             OT Discharge Summary  Anticipated Discharge Disposition (OT): home with assist    Olga Bee OT  6/2/2022

## 2022-06-02 NOTE — OUTREACH NOTE
Prep Survey    Flowsheet Row Responses   [facility] facility patient discharged from? [facility]   Is LACE score < 7 ? No   Emergency Room discharge w/ pulse ox? No   Eligibility [facility]   Date of Admission 06/01/22   Date of Discharge 06/02/22   Discharge Disposition Home or Self Care   Discharge diagnosis Syncope   Does the patient have one of the following disease processes/diagnoses(primary or secondary)? Other   Does the patient have Home health ordered? No   Is there a DME ordered? No   Prep survey completed? Yes          LUKAS ERNANDEZ - Registered Nurse

## 2022-06-02 NOTE — TELEPHONE ENCOUNTER
Caller: Yajaira Espinoza    Relationship: Emergency Contact    Best call back number:     Requested Prescriptions: HYDROcodone-acetaminophen (NORCO) 5-325 MG per tablet 1 tablet     Pharmacy where request should be sent: [address] [phone] [facility] [phone]      Additional details provided by patient: PATIENT IS BEING DISCHARGED FROM THE HOSPITAL TODAY. SHE HAS A FOLLOW UP SCHEDULED FOR 6/15 AND HER BACK PAIN IS BEING MAINTAINED WITH THIS PRESCRIPTION. CAN JOSHUA SEND IN A FILL UNTIL HER APPOINTMENT?    Does the patient have less than a 3 day supply:  [x] Yes  [] No    Alfredo Guillory, PCT   06/02/22 13:58 EDT

## 2022-06-02 NOTE — THERAPY DISCHARGE NOTE
Patient Name: Marifer Ricardo  : 1950    MRN: [id number]                              Today's Date: 2022       Admit Date: 2022    Visit Dx:     ICD-10-CM ICD-9-CM   1. Syncope, unspecified syncope type  R55 780.2   2. Systolic murmur  R01.1 785.2     Patient Active Problem List   Diagnosis   • Type 2 diabetes mellitus ([facility])   • Intractable migraine   • Primary generalized (osteo)arthritis   • Difficult intravenous access   • Copy of advanced directive obtained from patient   • No blood products   • History of hypothyroidism   • Chronic mixed headache syndrome   • Diabetic peripheral neuropathy ([facility])   • Gastroparesis   • Rosacea   • Osteopenia   • Reflux esophagitis   • Gastritis   • PAD (peripheral artery disease) ([facility])   • Pure hypercholesterolemia   • Syncope     Past Medical History:   Diagnosis Date   • Abdominal pain    • Abnormal bone density screening 2014    osteopenia   • Arthritis    • Asthma    • Belching    • Body piercing     BOTH EARS   • Cataract, bilateral    • Chronic renal insufficiency 2016   • Diabetes mellitus ([facility])    • Essential hypertension 2016   • GERD (gastroesophageal reflux disease)    • Hx of mammogram    • Hyperparathyroidism ([facility])    • Mastoiditis    • Pregnancy      s/p  x 3   • Renal insufficiency    • Urticaria    • Wears glasses    • Weight loss      Past Surgical History:   Procedure Laterality Date   • CATARACT EXTRACTION, BILATERAL     • COLECTOMY PARTIAL / TOTAL      Pt reports multiple abd surgeries for ?pseudo-bowel obstruction   • COLONOSCOPY     • COLONOSCOPY N/A 2018    Procedure: COLONOSCOPY WITH RANDOM COLON BIOPSIES;  Surgeon: Taras Morillo MD;  Location: [facility] ENDOSCOPY;  Service:    • COLOSTOMY      and revision   • ENDOSCOPY     • ENDOSCOPY N/A 2018    Procedure: ESOPHAGOGASTRODUODENOSCOPY WITH BIOPSIES;  Surgeon: Taras Morillo MD;  Location: [facility] ENDOSCOPY;  Service:    • HYSTERECTOMY      age 31 for  DUB, ovaries intact   • TONSILLECTOMY AND ADENOIDECTOMY        General Information     [facility] Name 06/02/22 1004          Physical Therapy Time and Intention    Document Type discharge evaluation/summary (P)   -     Mode of Treatment physical therapy (P)   -MK     Row Name 06/02/22 1004          General Information    Patient Profile Reviewed yes (P)   -     Prior Level of Function independent:;all household mobility;community mobility (P)   -MK     Row Name 06/02/22 1004          Living Environment    People in Home child(desi), adult (P)   -MK     Row Name 06/02/22 1004          Home Main Entrance    Number of Stairs, Main Entrance other (see comments) (P)   ramp  -MK     Row Name 06/02/22 1004          Stairs Within Home, Primary    Stairs, Within Home, Primary basement (P)   -     Number of Stairs, Within Home, Primary twelve (P)   -     Stair Railings, Within Home, Primary railings on both sides of stairs (P)   -MK     Row Name 06/02/22 1004          Cognition    Orientation Status (Cognition) oriented x 4 (P)   -MK     Row Name 06/02/22 1004          Safety Issues, Functional Mobility    Safety Issues Affecting Function (Mobility) safety precautions follow-through/compliance (P)   -     Impairments Affecting Function (Mobility) endurance/activity tolerance (P)   -           User Key  (r) = Recorded By, (t) = Taken By, (c) = Cosigned By    Initials Name Provider Type    Joshua Larsen, PT Student PT Student               Mobility     Row Name 06/02/22 1004          Bed Mobility    Bed Mobility supine-sit;sit-supine (P)   -     Supine-Sit [place] (Bed Mobility) modified independence (P)   -     Sit-Supine [place] (Bed Mobility) modified independence (P)   -     Assistive Device (Bed Mobility) head of bed elevated (P)   -[facility] Name 06/02/22 1004          Sit-Stand Transfer    Sit-Stand [place] (Transfers) standby assist (P)   -MK     Row Name 06/02/22 1004          Gait/Stairs  (Locomotion)    [place] Level (Gait) standby assist (P)   -     Distance in Feet (Gait) 400 ft (P)   -           User Key  (r) = Recorded By, (t) = Taken By, (c) = Cosigned By    Initials Name Provider Type    Joshua Larsen, PT Student PT Student               Obj/Interventions     Row Name 06/02/22 1004          Balance    Balance Assessment sitting static balance;sitting dynamic balance;sit to stand dynamic balance;standing static balance;standing dynamic balance (P)   -     Static Sitting Balance modified independence (P)   -MK     Dynamic Sitting Balance modified independence (P)   -MK     Position, Sitting Balance unsupported;sitting edge of bed (P)   -MK     Sit to Stand Dynamic Balance standby assist (P)   -MK     Static Standing Balance standby assist (P)   -     Dynamic Standing Balance standby assist (P)   -     Position/Device Used, Standing Balance unsupported (P)   -MK     Balance Interventions sitting;standing;sit to stand (P)   -           User Key  (r) = Recorded By, (t) = Taken By, (c) = Cosigned By    Initials Name Provider Type    Joshua Larsen, PT Student PT Student               Goals/Plan    No documentation.                Clinical Impression     Row Name 06/02/22 1004          Pain    Pretreatment Pain Rating 7/10 (P)   -     Posttreatment Pain Rating 7/10 (P)   -     Pain Intervention(s) Ambulation/increased activity (P)   -     Additional Documentation Pain Scale: Numbers Pre/Post-Treatment (Group) (P)   -     Row Name 06/02/22 1004          Plan of Care Review    Plan of Care Reviewed With patient (P)   -     Progress improving (P)   -     Outcome Evaluation Pt participated well in PT evaluation. Pt completed all bed mobility mod I, completed STS and ambulated 400 ft SBA with no AD. Pt has no further needs for PT at this time. Pt will return home with daughter at discharge. (P)   -     Row Name 06/02/22 1004          Therapy Assessment/Plan (PT)     Criteria for Skilled Interventions Met (PT) no problems identified which require skilled intervention (P)   -     Row Name 06/02/22 1004          Vital Signs    O2 Delivery Pre Treatment room air (P)   -MK     O2 Delivery Intra Treatment room air (P)   -MK     O2 Delivery Post Treatment room air (P)   -MK     Pre Patient Position Supine (P)   -MK     Intra Patient Position Standing (P)   -MK     Post Patient Position Supine (P)   -     Row Name 06/02/22 1004          Positioning and Restraints    Pre-Treatment Position in bed (P)   -MK     Post Treatment Position bed (P)   -MK     In Bed supine;with family/caregiver;encouraged to call for assist;call light within reach (P)   -           User Key  (r) = Recorded By, (t) = Taken By, (c) = Cosigned By    Initials Name Provider Type    Joshua Larsen, PT Student PT Student               Outcome Measures     Row Name 06/02/22 1004          How much help from another person do you currently need...    Turning from your back to your side while in flat bed without using bedrails? 4 (P)   -MK     Moving from lying on back to sitting on the side of a flat bed without bedrails? 4 (P)   -MK     Moving to and from a bed to a chair (including a wheelchair)? 4 (P)   -MK     Standing up from a chair using your arms (e.g., wheelchair, bedside chair)? 4 (P)   -MK     Climbing 3-5 steps with a railing? 4 (P)   -MK     To walk in hospital room? 4 (P)   -     AM-PAC 6 Clicks Score (PT) 24 (P)   -MK     Highest level of mobility 8 --> Walked 250 feet or more (P)   -     Row Name 06/02/22 1152 06/02/22 1004       Functional Assessment    Outcome Measure Options AM-PAC 6 Clicks Daily Activity (OT)  -SD AM-PAC 6 Clicks Basic Mobility (PT) (P)   -          User Key  (r) = Recorded By, (t) = Taken By, (c) = Cosigned By    Initials Name Provider Type    Olga Lobo, OT Occupational Therapist    Joshua Larsen, PT Student PT Student              Physical Therapy  Education                 Title: PT OT SLP Therapies (In Progress)     Topic: Physical Therapy (Resolved)     Point: Mobility training (Resolved)     Learning Progress Summary           Patient Acceptance, E, VU by  at 6/2/2022 1335                   Point: Home exercise program (Resolved)     Learner Progress:  Not documented in this visit.          Point: Body mechanics (Resolved)     Learner Progress:  Not documented in this visit.          Point: Precautions (Resolved)     Learner Progress:  Not documented in this visit.                      User Key     Initials Effective Dates Name Provider Type Discipline     05/11/22 -  Joshua Cruz, PT Student PT Student PT              PT Recommendation and Plan     Plan of Care Reviewed With: (P) patient  Progress: (P) improving  Outcome Evaluation: (P) Pt participated well in PT evaluation. Pt completed all bed mobility mod I, completed STS and ambulated 400 ft SBA with no AD. Pt has no further needs for PT at this time. Pt will return home with daughter at discharge.     Time Calculation:    PT Charges     Row Name 06/02/22 1003             Time Calculation    Start Time 1003 (P)   -      PT Received On 06/02/22 (P)   -              Untimed Charges    PT Eval/Re-eval Minutes 45 (P)   -              Total Minutes    Untimed Charges Total Minutes 45 (P)   -       Total Minutes 45 (P)   -            User Key  (r) = Recorded By, (t) = Taken By, (c) = Cosigned By    Initials Name Provider Type     Joshua Cruz, PT Student PT Student              Therapy Charges for Today     Code Description Service Date Service Provider Modifiers Qty    16117680819 HC PT EVAL LOW COMPLEXITY 3 6/2/2022 Joshua Cruz, PT Student GP 1          PT G-Codes  Outcome Measure Options: AM-PAC 6 Clicks Daily Activity (OT)  AM-PAC 6 Clicks Score (PT): (P) 24  AM-PAC 6 Clicks Score (OT): 22    PT Discharge Summary  Anticipated Discharge Disposition (PT): (P) home, home with  madelaine Cruz, PT Student  6/2/2022

## 2022-06-02 NOTE — PLAN OF CARE
Goal Outcome Evaluation:  Plan of Care Reviewed With: (P) patient        Progress: (P) improving  Outcome Evaluation: (P) Pt participated well in PT evaluation. Pt completed all bed mobility mod I, completed STS and ambulated 400 ft SBA with no AD. Pt has no further needs for PT at this time. Pt will return home with daughter at discharge.

## 2022-06-02 NOTE — CASE MANAGEMENT/SOCIAL WORK
Discharge Planning Assessment   [place]     Patient Name: Marifer Ricardo  MRN: [id number]  Today's Date: 6/2/2022    Admit Date: 6/1/2022     Discharge Needs Assessment    No documentation.                Discharge Plan     Row Name 06/02/22 1417       Plan    Plan pt resting in bed this am; plans home on d/c; daughter lives with her and will transport; daughter in room and pt gave permission to speak with her also; pt still drives and cares for self, zero medical equipment utilized; stated lw in chart; cm info card given and explained; no cm needs noted              Continued Care and Services - Discharged on 6/2/2022 Admission date: 6/1/2022 - Discharge disposition: Home or Self Care   Coordination has not been started for this encounter.       Expected Discharge Date and Time     Expected Discharge Date Expected Discharge Time    Jun 2, 2022          Demographic Summary     Row Name 06/02/22 1415       General Information    Admission Type inpatient    Arrived From emergency department    Referral Source admission list    Reason for Consult discharge planning    Preferred Language English       Contact Information    Permission Granted to Share Info With family/designee  daughter in roomYajaira               Functional Status     Row Name 06/02/22 1415       Functional Status    Usual Activity Tolerance moderate    Current Activity Tolerance moderate       Functional Status, IADL    Medications independent    Meal Preparation independent    Housekeeping independent    Laundry independent    Shopping independent       Mental Status    General Appearance WDL WDL       Mental Status Summary    Recent Changes in Mental Status/Cognitive Functioning no changes               Psychosocial    No documentation.                Abuse/Neglect    No documentation.                Legal    No documentation.                Substance Abuse    No documentation.                Patient Forms    No documentation.                    Micheline Weaver RN

## 2022-06-02 NOTE — PROGRESS NOTES
Nutrition Services    Patient Name:  Marifer Ricardo  YOB: 1950  MRN: [id number]  Admit Date:  6/1/2022    RD mailed DM education materials including Heart Healthy and Consistent Carbohydrate Nutrition Therapy packet from [facility], Diabetes Basics from [facility], and RD contact information due to recent discharge.      Electronically signed by:  Kate Sosa RD  06/02/22 15:29 EDT

## 2022-06-03 ENCOUNTER — TRANSITIONAL CARE MANAGEMENT TELEPHONE ENCOUNTER (OUTPATIENT)
Dept: CALL CENTER | Facility: HOSPITAL | Age: 72
End: 2022-06-03

## 2022-06-03 LAB — BACTERIA SPEC AEROBE CULT: NORMAL

## 2022-06-03 NOTE — OUTREACH NOTE
Call Center TCM Note    Flowsheet Row Responses   [facility], [place], operated by [facility] patient discharged from? [facility]   Does the patient have one of the following disease processes/diagnoses(primary or secondary)? Other   TCM attempt successful? Yes  [molina Gates on verbal release]   Call start time 1618   Call end time 1624   Discharge diagnosis Syncope   Person spoke with today (if not patient) and relationship molina Gates   Meds reviewed with patient/caregiver? Yes   Does the patient have all medications ordered at discharge? N/A   Is the patient taking all medications as directed (includes completed medication regime)? Yes   Medication comments Dtr communicates still awaiting prescription, call back from PCP office regarding refill on Norco--will route a note   Does the patient have a primary care provider?  Yes   Does the patient have an appointment with their PCP within 7 days of discharge? Greater than 7 days   Comments regarding PCP Hospital PCP FOLLOW UP APPOINTMENT IS 6/15/22@1015am   Nursing Interventions Verified appointment date/time/provider   Has the patient kept scheduled appointments due by today? N/A   Psychosocial issues? No   Did the patient receive a copy of their discharge instructions? Yes   Nursing interventions Reviewed instructions with patient   What is the patient's perception of their health status since discharge? Improving  [Dtr reports no further issues w/syncope, dizziness at this time.  Dtr reports pt is still experiencing back/neck pain. Requests refill on Norco.]   Is the patient/caregiver able to teach back the hierarchy of who to call/visit for symptoms/problems? PCP, Specialist, Home health nurse, Urgent Care, ED, 911 Yes   TCM call completed? Yes   Wrap up additional comments Unable to fully complete call as dtr driving in traffic          Yudelka Mckeon RN    6/3/2022, 16:29 EDT

## 2022-06-03 NOTE — DISCHARGE SUMMARY
[facility]   DISCHARGE SUMMARY      Name:  Marifer Ricardo   Age:  72 y.o.  Sex:  female  :  1950  MRN:  [id number]   Visit Number:  [id number]    Admission Date:  2022  Date of Discharge:  22  Primary Care Physician:  Payal Edwards MD    Discharge Diagnoses:     1. Dizziness/Pre-Syncope,   2. Systolic Murmur,   3. JIMBO on CKD stage II,   4. Volume Depletion  5. Hypertension  6. Type 2 Diabetes Mellitus  7. Hyperparathyroidism  8. Osteopenia  9. GERD  10. Impaired Mobility and ADLs      Problem List:     Active Hospital Problems    Diagnosis  POA   • Syncope [R55]  Yes      Resolved Hospital Problems   No resolved problems to display.     Presenting Problem:    Chief Complaint   Patient presents with   • Syncope   • Dizziness      Consults:     Consulting Physician(s)             None          Procedures Performed:        History of presenting illness/Hospital Course:    Patient is a 72-year-old female with past medical history of hypertension, type 2 diabetes mellitus, chronic kidney disease, hyperparathyroidism, osteopenia, GERD, and persistent episodes of dizziness that presents to the hospital following presyncopal episode.  Patient states that she did not lose consciousness but only felt like she was going to pass out.  This occurs with changing in position from sitting to standing and sometimes even with ambulation.  Upon arrival to the ED, blood pressure 136/70, pulse 87, oxygen saturation 97% on room air, and temp of 98.4F. Labs revealed glucose 137, sodium 135, creatinine 1.50. CBC unremarkable. UA cloudy with 2+ leukocytes, 13-20 WBC, 1+ bacteria. Chest xray with left lung basilar/retrocardiac opacity, likely atelectasis, no definite acute findings. 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 CTA Neck with no evidence of vascular injury, aneurysm,  hemodynamically significant stenosis or major vessel occlusion; 0% stenosis of the carotid bulbs, patent vertebral arteries. Patient was given 1L normal saline, macrobid, 4mg morphine, and 125mg solumedrol in the ED.     ED provider called and spoke with Dr. 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  Fortunately orthostatics were evaluated and prior to fluid administration.  Patient did have resolution in her symptoms and had no further signs or symptoms of dizziness or presyncope event.  JMIBO also resolved with administration of IV fluids.  2D echocardiogram showed EF 65%, normal diastolic function, mild tricuspid regurg but otherwise completely unremarkable.  Patient felt well and requested to be discharged home.  She was arranged outpatient follow-up with her primary physician.  Patient was started on Rocephin upon admission however she denied any dysuria or increased urinary frequency.  Do not feel as though patient had true acute UTI and therefore antibiotics were not continued upon discharge.    Vital Signs:    Heart Rate:  [68] 68    Physical Exam:    General Appearance:  Alert and cooperative.    Head:  Atraumatic and normocephalic.   Eyes: Conjunctivae and sclerae normal, no icterus. No pallor.   Ears:  Ears with no abnormalities noted.   Throat: No oral lesions, no thrush, oral mucosa moist.   Neck: Supple, trachea midline, no thyromegaly.       Lungs:   Breath sounds heard bilaterally equally.  No crackles or wheezing. No Pleural rub or bronchial breathing.   Heart:  Normal S1 and S2, no murmur, no gallop, no rub. No JVD.   Abdomen:   Normal bowel sounds, no masses, no organomegaly. Soft, nontender,  nondistended, no rebound tenderness.   Extremities: Supple, no edema, no cyanosis, no clubbing.   Pulses: Pulses palpable bilaterally.   Skin: No bleeding or rash.   Neurologic: Alert and oriented x 3. No facial asymmetry. Moves all four limbs. No tremors.     Pertinent Lab Results:     Results from last 7 days   Lab Units 06/02/22  0705 06/01/22  1610   SODIUM mmol/L 133* 135*   POTASSIUM mmol/L 3.9 3.8   CHLORIDE mmol/L 97* 95*   CO2 mmol/L 21.8* 25.9   BUN mg/dL 32* 30*   CREATININE mg/dL 1.17* 1.50*   CALCIUM mg/dL 8.5* 9.3   BILIRUBIN mg/dL  --  0.5   ALK PHOS U/L  --  91   ALT (SGPT) U/L  --  21   AST (SGOT) U/L  --  22   GLUCOSE mg/dL 264* 137*     Results from last 7 days   Lab Units 06/02/22  0705 06/01/22  1610   WBC 10*3/mm3 6.88 5.53   HEMOGLOBIN g/dL 12.4 13.2   HEMATOCRIT % 36.3 38.0   PLATELETS 10*3/mm3 167 183         Results from last 7 days   Lab Units 06/01/22  1820 06/01/22  1610   TROPONIN T ng/mL <0.010 <0.010  <0.010     Results from last 7 days   Lab Units 06/01/22  1610   PROBNP pg/mL 527.8                 Results from last 7 days   Lab Units 06/01/22  1641   URINECX  >100,000 CFU/mL Normal Urogenital Luna       Pertinent Radiology Results:    Imaging Results (All)     Procedure Component Value Units Date/Time    CT Angiogram Chest [[id number]] Collected: 06/01/22 1809     Updated: 06/01/22 1911    Narrative:      FINAL REPORT    TECHNIQUE:  Multiple axial CT images were obtained through the chest  following IV contrast using a CTA/PE protocol.  3D/MIP  reconstruction images were also performed. This study was  performed with techniques to keep radiation doses as low as  reasonably achievable (ALARA). Individualized dose reduction  techniques using automated exposure control or adjustment of mA  and/or kV according to the patient's size were employed.    CLINICAL HISTORY:  upper back pain, severe, syncope    FINDINGS:  PAs and aorta: There is no acute aortic pathology.  No  pulmonary  embolus.  Thoracic aorta is unremarkable.  No significant  coronary artery calcifications.  Heart/mediastinum: There are  multiple mediastinal venous collaterals.  No evidence for right  heart strain.  No pericardial effusion.  The heart is normal in  size.  Lungs: Mild atelectasis.  Otherwise the lungs are clear.  Lymph nodes: No pathologically enlarged thoracic lymph nodes.  Pleura: No pneumothorax or pleural effusion.  Chest Wall: There  is a likely venous graft from the left subclavian vein through  the anterior chest wall to the right atrium which appears  entirely occluded and thrombosed.  Bones: No acute fracture.  Upper abdomen: No acute findings in the upper abdomen.      Impression:      No pulmonary embolus.   Left subclavian to right atrium venous  graft appears entirely occluded with multiple mediastinal venous  collaterals.    Authenticated and Electronically Signed by Mahin Garber MD on  06/01/2022 06:09:03 PM    CT Angiogram Neck [[id number]] Collected: 06/01/22 1806     Updated: 06/01/22 1911    Narrative:      FINAL REPORT    TECHNIQUE:  Thin section axial CT images were performed through the neck  after IV contrast.  3-D reconstruction images were submitted.  This study was performed with techniques to keep radiation doses  as low as reasonably achievable (ALARA). Individualized dose  reduction techniques using automated exposure control or  adjustment of mA and/or kV according to the patient's size were  employed.    CLINICAL HISTORY:  severe upper back pain, syncope    FINDINGS:  CTA neck:  Aortic arch:   Arch shows no significant narrowing.  Right vessel origins are widely patent.   Right carotid:   There  is moderate atherosclerosis of the right carotid bulb.   0%  stenosis according to the NASCET criteria.  Left carotid:  There  is moderate atherosclerosis of the right carotid bulb.   0%  stenosis according to the NASCET criteria.     Vertebrals:   The  vertebrals are patent.  No  significant stenosis is present.      Impression:      No evidence of vascular injury, aneurysm, hemodynamically  significant stenosis or major vessel occlusion.  0% stenosis of  the carotid bulbs by NASCET criteria.Patent vertebral arteries.    Authenticated and Electronically Signed by Mahin Garber MD on  06/01/2022 06:06:56 PM    XR Chest 1 View [[id number]] Collected: 06/01/22 1617     Updated: 06/01/22 1634    Narrative:      CLINICAL INDICATION:    Weak/Dizzy/AMS triage protocol weakness.      EXAMINATION TECHNIQUE:   XR CHEST 1 VW-     COMPARISON:  None.     FINDINGS:  Retrocardiac opacity, likely atelectasis. No focal airspace  consolidation. No pneumothorax or pleural effusion. Heart is normal in  size. Aortic atherosclerosis and tortuosity. Surgical clips in the right  hilar region. Scattered calcified granuloma. Multiple surgical clips in  the left upper quadrant. Ill-defined sclerotic lesion in the left  proximal humerus.       Impression:      Left lung basilar/retrocardiac opacity, likely atelectasis. No definite  acute findings.     Images personally reviewed, interpreted and dictated by LAURENT Grullon.                This report was signed and finalized on 6/1/2022 4:32 PM by LAURENT Grullon.          Echo:    Results for orders placed during the hospital encounter of 06/01/22    Adult Transthoracic Echo Complete w/ Color, Spectral and Contrast if necessary per protocol    Interpretation Summary  1.  Normal left ventricular size and systolic function, LVEF 65-70%.  2.  Normal LV diastolic filling pattern.  3.  Normal right ventricular size and systolic function.  4.  Normal left atrial volume index.  5.  Mild tricuspid regurgitation, RVSP 19 mmHg.    Condition on Discharge:      Stable.    Code status during the hospital stay:    Code Status and Medical Interventions:   Ordered at: 06/01/22 2033     Medical Intervention Limits:    NO intubation (DNI)    NO cardioversion    NO blood  products     Level Of Support Discussed With:    Patient     Code Status (Patient has no pulse and is not breathing):    No CPR (Do Not Attempt to Resuscitate)     Medical Interventions (Patient has pulse or is breathing):    Limited Support     Discharge Disposition:    Home or Self Care    Discharge Medications:       Discharge Medications      Continue These Medications      Instructions Start Date   atorvastatin 40 MG tablet  Commonly known as: LIPITOR   40 mg, Oral, Daily      Biotin 1000 MCG tablet   1,000 mcg, Oral, 3 Times Daily      Blood Glucose Test strip   1 strip, In Vitro, Daily, CHECK BLOOD SUGAR DAILY.      carvedilol 12.5 MG tablet  Commonly known as: COREG   12.5 mg, Oral, 2 Times Daily With Meals      cetirizine 10 MG tablet  Commonly known as: zyrTEC   1 tablet, Oral, Daily      cholecalciferol 25 MCG (1000 UT) tablet  Commonly known as: VITAMIN D3   1,000 Units, Oral, Daily      Cinnamon 500 MG tablet   Oral, 2 Times Daily      clopidogrel 75 MG tablet  Commonly known as: PLAVIX   75 mg, Oral, Daily      EQ Aspirin Adult Low Dose 81 MG EC tablet  Generic drug: aspirin   81 mg, Oral, Daily      Lancets misc   1 each, Does not apply, Daily, CHECK BLOOD SUGAR DAILY      MUSCLE RUB EX   1 application, Daily PRN      OMEGA 3-6-9 COMPLEX PO   1 capsule, Oral, Daily      valsartan-hydrochlorothiazide 160-12.5 MG per tablet  Commonly known as: DIOVAN-HCT   1 tablet, Oral, Daily      Vitamin B-12 5000 MCG sublingual tablet   1 tablet, Sublingual, Daily         Stop These Medications    amLODIPine 5 MG tablet  Commonly known as: NORVASC     DEVILS CLAW PO     traMADol 50 MG tablet  Commonly known as: ULTRAM     WHITE [place] BARK PO          Discharge Diet:     Diet Instructions     Diet: Cardiac, Consistent Carbohydrate      Discharge Diet:  Cardiac  Consistent Carbohydrate           Activity at Discharge:     Activity Instructions    As tolerated         Follow-up Appointments:    Additional Instructions  for the Follow-ups that You Need to Schedule     Discharge Follow-up with PCP   As directed       Currently Documented PCP:    Payal Edwards MD    PCP Phone Number:    [phone]     Follow Up Details: 1-2 weeks            Follow-up Information     Payal Edwards MD .    Specialties: Family Medicine, Emergency Medicine  Why: 1-2 weeks  Contact information:  [address] [place] [address]  [phone]                       Future Appointments   Date Time Provider Department Center   6/15/2022 10:15 AM Payal Edwards MD [facility]   8/23/2022  8:15 AM Payal Edwards MD [facility]     Test Results Pending at Discharge:           Joshua Barber DO  06/03/22  12:48 EDT    Time: I spent >30 minutes on this discharge activity which included: face-to-face encounter with the patient, reviewing the data in the system, coordination of the care with the nursing staff as well as consultants, documentation, and entering orders.     Dictated utilizing Dragon dictation.

## 2022-06-06 ENCOUNTER — OFFICE VISIT (OUTPATIENT)
Dept: FAMILY MEDICINE CLINIC | Facility: CLINIC | Age: 72
End: 2022-06-06

## 2022-06-06 VITALS
TEMPERATURE: 98 F | HEART RATE: 95 BPM | DIASTOLIC BLOOD PRESSURE: 90 MMHG | SYSTOLIC BLOOD PRESSURE: 150 MMHG | OXYGEN SATURATION: 96 % | WEIGHT: 126 LBS | BODY MASS INDEX: 19.78 KG/M2 | HEIGHT: 67 IN

## 2022-06-06 DIAGNOSIS — M85.89 OSTEOPENIA OF MULTIPLE SITES: ICD-10-CM

## 2022-06-06 DIAGNOSIS — M79.18 LEFT BUTTOCK PAIN: ICD-10-CM

## 2022-06-06 DIAGNOSIS — E86.0 DEHYDRATION: ICD-10-CM

## 2022-06-06 DIAGNOSIS — R79.89 PRERENAL AZOTEMIA: ICD-10-CM

## 2022-06-06 DIAGNOSIS — M54.6 ACUTE LEFT-SIDED THORACIC BACK PAIN: Primary | ICD-10-CM

## 2022-06-06 PROCEDURE — 99214 OFFICE O/P EST MOD 30 MIN: CPT | Performed by: FAMILY MEDICINE

## 2022-06-06 RX ORDER — HYDROCODONE BITARTRATE AND ACETAMINOPHEN 5; 325 MG/1; MG/1
TABLET ORAL
Qty: 40 TABLET | Refills: 0 | Status: SHIPPED | OUTPATIENT
Start: 2022-06-06 | End: 2022-06-15 | Stop reason: SDUPTHER

## 2022-06-06 NOTE — PROGRESS NOTES
"Subjective   Marifer Ricardo is a 72 y.o. female.     History of Present Illness   Mrs. Ricardo presents today with her grand-daughter for hospital f/u. Seen at [facility] 6/1/22 for syncope and upper back pain. dx'd with dehydration. W/u otherwise negative. Did not have imaging of T spine at time of visit.    Her main c/o is that of severe left mid/upper back pain, burning/aching. Non-radiating. Worse with deep breath, cough, twisting, bending, sitting up straight, better with lying down. No hemoptysis, SOA, palpitations. Pain constant but at times so severe it \"takes her breath away\", makes her feels he will pass out. Denies vertigo or erika dizziness otherwise.    Reports \"head feels heavy/full\". Also with some burning/aching pain left lower back, buttock area. Non-radiating.     Has had chronic low/mid back pain for well over a year but worse x 3 months. Severe over past week or two. No known fall or injury.    Pain relieved by morphine in ER. Was supposed to be given rx at discharge but it was not at pharmacy.    The following portions of the patient's history were reviewed and updated as appropriate: allergies, current medications, past family history, past medical history, past social history, past surgical history and problem list.    Review of Systems   Constitutional: Positive for fatigue. Negative for fever and unexpected weight change.   Respiratory: Positive for cough (mild, intermittent). Negative for shortness of breath and wheezing.    Cardiovascular: Negative for chest pain, palpitations and leg swelling.   Gastrointestinal: Positive for constipation and nausea. Negative for abdominal pain, blood in stool and vomiting.   Genitourinary: Negative for dysuria, flank pain, hematuria and pelvic pain.   Musculoskeletal: Positive for back pain.   Skin: Negative for rash.   Neurological: Positive for weakness and headaches. Negative for numbness.   Hematological: Bruises/bleeds easily.   Psychiatric/Behavioral: " Positive for confusion (mild, intermittent), dysphoric mood and sleep disturbance. Negative for suicidal ideas. The patient is nervous/anxious.        Objective    Vitals:    06/06/22 1620   BP: 150/90   Pulse: 95   Temp: 98 °F (36.7 °C)   SpO2: 96%     Body mass index is 19.73 kg/m².      06/06/22  1620   Weight: 57.2 kg (126 lb)       Physical Exam  Vitals and nursing note reviewed.   Constitutional:       General: She is in acute distress (mild, due to pain during exam).      Appearance: She is well-developed, well-groomed and normal weight. She is not ill-appearing.   HENT:      Head: Normocephalic and atraumatic.   Eyes:      General: No scleral icterus.     Conjunctiva/sclera: Conjunctivae normal.   Cardiovascular:      Rate and Rhythm: Normal rate and regular rhythm.      Pulses: Decreased pulses (pedal).      Heart sounds: Normal heart sounds.   Pulmonary:      Effort: Pulmonary effort is normal.      Breath sounds: Normal breath sounds.   Musculoskeletal:      Thoracic back: Deformity (kyphosis), spasms and bony tenderness (T7-T10) present. Decreased range of motion (due to pain). Scoliosis present.      Lumbar back: Deformity (loss of normal lordosis) and bony tenderness (left SI area) present. Decreased range of motion. Negative right straight leg raise test and negative left straight leg raise test.      Right lower leg: No edema.      Left lower leg: No edema.   Skin:     General: Skin is warm and dry.      Findings: No rash.   Neurological:      Mental Status: She is alert and oriented to person, place, and time.      Sensory: Sensation is intact.      Motor: Motor function is intact. No tremor.      Gait: Gait abnormal (antalgic).      Deep Tendon Reflexes: Reflexes are normal and symmetric.   Psychiatric:         Mood and Affect: Mood normal. Affect is flat.         Speech: Speech normal.         Behavior: Behavior normal. Behavior is cooperative.         Cognition and Memory: Cognition normal.      Pt's previous physical exam reviewed and updated as indicated.    CT Angiogram Neck  Result Date: 6/1/2022  No evidence of vascular injury, aneurysm, hemodynamically significant stenosis or major vessel occlusion.  0% stenosis of the carotid bulbs by NASCET criteria.Patent vertebral arteries. Authenticated and Electronically Signed by Mahin Garber MD on 06/01/2022 06:06:56 PM    XR Chest 1 View  Result Date: 6/1/2022  Left lung basilar/retrocardiac opacity, likely atelectasis. No definite acute findings.  Images personally reviewed, interpreted and dictated by LAURENT Grullon.       This report was signed and finalized on 6/1/2022 4:32 PM by LAURENT Grullon.    CT Angiogram Chest  Result Date: 6/1/2022  No pulmonary embolus.   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 Authenticated and Electronically Signed by Mahin Garber MD on 06/01/2022 06:09:03 PM    Lab Results   Component Value Date    GLUCOSE 264 (H) 06/02/2022    BUN 32 (H) 06/02/2022    CREATININE 1.17 (H) 06/02/2022    EGFRIFNONA 50 (L) 02/21/2022    EGFRIFAFRI 60 (L) 02/21/2022    BCR 27.4 (H) 06/02/2022    K 3.9 06/02/2022    CO2 21.8 (L) 06/02/2022    CALCIUM 8.5 (L) 06/02/2022    PROTENTOTREF 6.7 02/21/2022    ALBUMIN 4.50 06/01/2022    LABIL2 1.8 02/21/2022    AST 22 06/01/2022    ALT 21 06/01/2022     Lab Results   Component Value Date    WBC 6.88 06/02/2022    HGB 12.4 06/02/2022    HCT 36.3 06/02/2022    MCV 92.1 06/02/2022     06/02/2022     Lab Results   Component Value Date    TROPONINI <0.012 01/18/2018    TROPONINT <0.010 06/01/2022       Assessment & Plan   Diagnoses and all orders for this visit:    1. Acute left-sided thoracic back pain (Primary)  -     HYDROcodone-acetaminophen (Norco) 5-325 MG per tablet; 1-2 po q 6 hrs prn severe pain  Dispense: 40 tablet; Refill: 0  -     XR spine thoracic 2 vw; Future    2. Left buttock pain  -     XR  sacroiliac joints 1 or 2 vw; Future    3. Dehydration    4. Prerenal azotemia    5. Osteopenia of multiple sites       Mid/left sided back pain with point TTP along several vertebral vertebra. Suspect possible vertebral compression fx. Imaging as above, refer as indicated. She is having severe pain not responsive to conservative measures. Not able to take NSAIDs as she is on plavix. Therefore, given short term supply of norco.     Also with left SI pain, imaging as above.     Encouraged to increase fluid intake to limit dizziness, weakness, and risk of fall.    I will contact patient regarding test results and provide instructions regarding any necessary changes in plan of care.  Patient was encouraged to keep me informed of any acute changes, lack of improvement, or any new concerning symptoms.  Pt is aware of reasons to seek emergent care.  Patient voiced understanding of all instructions and denied further questions.  As part of patient's treatment plan I am prescribing a controlled substance.  The patient has been made aware of the appropriate use of such medications, including potential risk of somnolence, limited ability to drive and/or work safely, and potential for dependence and/or overdose.  It has also been made clear that these medications are for use by this patient only, without concomitant use of alcohol or other substances, unless prescribed.  [place]umar reviewed.    Please note that portions of this note may have been completed with a voice recognition program. Efforts were made to edit the dictations, but occasionally words are mistranscribed.

## 2022-06-06 NOTE — TELEPHONE ENCOUNTER
Spoke with patients daughter.  She said that Marifer was given [place] in the ER.  The Tramadol does not help and she never got the last Tramadol RX filled.  Patient has been scheduled to come in this PM

## 2022-06-06 NOTE — TELEPHONE ENCOUNTER
Patient's daughter (Yajaira) has called the office requesting Norco to be called in for patient's pain. When patient's pain escalates to a high level she begins to black out.    Please contact daughter at:    [phone]

## 2022-06-07 ENCOUNTER — TELEPHONE (OUTPATIENT)
Dept: FAMILY MEDICINE CLINIC | Facility: CLINIC | Age: 72
End: 2022-06-07

## 2022-06-08 DIAGNOSIS — M24.7 ACETABULAR PROTRUSION: ICD-10-CM

## 2022-06-08 DIAGNOSIS — M79.18 LEFT BUTTOCK PAIN: ICD-10-CM

## 2022-06-08 DIAGNOSIS — M47.818 SI JOINT ARTHRITIS: ICD-10-CM

## 2022-06-08 DIAGNOSIS — S22.000D COMPRESSION FRACTURE OF THORACIC VERTEBRA WITH ROUTINE HEALING, UNSPECIFIED THORACIC VERTEBRAL LEVEL, SUBSEQUENT ENCOUNTER: ICD-10-CM

## 2022-06-08 DIAGNOSIS — M54.6 ACUTE LEFT-SIDED THORACIC BACK PAIN: Primary | ICD-10-CM

## 2022-06-08 NOTE — PROGRESS NOTES
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 Needs to have consultation with orthopedist about possible hip replacement. Referral placed. They may wish to do f/u imaging.  3) mid spine xray revealed suspected compression fracture but MRI needed to further evaluate, order placed    ALSO:  She is already constipated with large stool build up on xray. She is very high risk for obstruction. Needs to be on colace and miralax as we discussed while taking pain medication.

## 2022-06-09 ENCOUNTER — TELEPHONE (OUTPATIENT)
Dept: FAMILY MEDICINE CLINIC | Facility: CLINIC | Age: 72
End: 2022-06-09

## 2022-06-09 DIAGNOSIS — M54.6 ACUTE LEFT-SIDED THORACIC BACK PAIN: ICD-10-CM

## 2022-06-09 RX ORDER — HYDROCODONE BITARTRATE AND ACETAMINOPHEN 5; 325 MG/1; MG/1
TABLET ORAL
Qty: 40 TABLET | Refills: 0 | Status: CANCELLED | OUTPATIENT
Start: 2022-06-09

## 2022-06-09 NOTE — TELEPHONE ENCOUNTER
I called patient with her appointment information for her MRI. She is currently scheduled for her MRI next Saturday,  6/18 at [facility]. Pt and granddaughter expressed concern about getting another rx for Norco since she will not be having her MRI or ortho appointments until after her meds run out. Wanted to know if Dr Edwards would be willing to send in another rx for pain medication since she will be out of pain meds on Monday.  Also wanted to know if she should keep her follow up appointment on 6/15 or move out to the following week to allow for her MRI results to be received.

## 2022-06-09 NOTE — TELEPHONE ENCOUNTER
She said she couldn't tolerate taking 2 at one time so she's been taking 1 every 6 hours.  From my understanding she just wanted to make you aware she will need a refill before her nex scheduled appointment.

## 2022-06-09 NOTE — TELEPHONE ENCOUNTER
Patient is scheduled for MRI 06/18/2022  She is scheduled for follow up with you on 06/15/2022  Would you like to reschedule patient for after her MRI?

## 2022-06-09 NOTE — TELEPHONE ENCOUNTER
If she is taking 1 every 6 hours, she needs 4 per day. She was given 40 on 6/6 so that should last her 10 days and she will need refill on 6/16

## 2022-06-15 DIAGNOSIS — M54.6 ACUTE LEFT-SIDED THORACIC BACK PAIN: ICD-10-CM

## 2022-06-15 NOTE — TELEPHONE ENCOUNTER
Caller: Yajaira Espinoza    Relationship: Emergency Contact    Best call back number: [phone]    Requested Prescriptions:   Requested Prescriptions     Pending Prescriptions Disp Refills   • HYDROcodone-acetaminophen (Norco) 5-325 MG per tablet 40 tablet 0     Si-2 po q 6 hrs prn severe pain        Pharmacy where request should be sent: [address] [phone] [facility] [phone]      Additional details provided by patient: PATIENT OUT OF MEDICATION 22- ASKING TO REFILL- BUT WANTS TO INCREASE THE DOSE. TAKING THE 2 PILLS AT A TIME. SHE IS RESPONDING TO THE PAIN MEDICATION IT JUST ISN'T STRONG ENOUGH.    Does the patient have less than a 3 day supply:  [x] Yes  [] No    Aleshia Melissa Rep   06/15/22 10:03 EDT

## 2022-06-16 ENCOUNTER — TELEPHONE (OUTPATIENT)
Dept: FAMILY MEDICINE CLINIC | Facility: CLINIC | Age: 72
End: 2022-06-16

## 2022-06-16 RX ORDER — HYDROCODONE BITARTRATE AND ACETAMINOPHEN 5; 325 MG/1; MG/1
1 TABLET ORAL EVERY 6 HOURS PRN
Qty: 30 TABLET | Refills: 0 | Status: SHIPPED | OUTPATIENT
Start: 2022-06-16 | End: 2022-06-17

## 2022-06-16 NOTE — TELEPHONE ENCOUNTER
Not sure why this was routed back to me.    Still need to know if she has had her MRI scheduled 6/8?

## 2022-06-16 NOTE — TELEPHONE ENCOUNTER
DAUGHTER CALLED TODAY 6.16.22 TO CHECK UP ON ANASTASIA DOWNEY PAIN MEDICINE AT 10.58AM.   HUB 6.16.22

## 2022-06-17 DIAGNOSIS — M54.6 ACUTE LEFT-SIDED THORACIC BACK PAIN: ICD-10-CM

## 2022-06-17 RX ORDER — HYDROCODONE BITARTRATE AND ACETAMINOPHEN 5; 325 MG/1; MG/1
1 TABLET ORAL EVERY 6 HOURS PRN
Qty: 30 TABLET | Refills: 0 | Status: SHIPPED | OUTPATIENT
Start: 2022-06-17 | End: 2022-06-28 | Stop reason: SDUPTHER

## 2022-06-17 NOTE — TELEPHONE ENCOUNTER
Pharmacy told PT there was an issue with the instructions for her Narco and could not fill prescription. PT has been out of meds for 2 days.

## 2022-06-20 DIAGNOSIS — M54.9 MID BACK PAIN ON LEFT SIDE: Primary | ICD-10-CM

## 2022-06-20 DIAGNOSIS — N28.89 LEFT RENAL MASS: ICD-10-CM

## 2022-06-20 NOTE — PROGRESS NOTES
Please inform pt her MRI of T spine did not show acute compression fracture. She does have diffuse chronic changes in which could be contributing to her pain.    I recommend she have consultation with neurosurgery/spine specialist. Does she have preference on where she is seen ([place] [facility])? Also she needs to make sure she has a copy of her MRI to take with her.    Also the MRI caught a small portion of the left kidney and saw a possible lesion there. I don't believe she has had any recent imaging of her kidney. As that is the side where her back pain is worst I recommend further eval of that kidney. Imaging order entered.

## 2022-06-21 ENCOUNTER — TELEPHONE (OUTPATIENT)
Dept: FAMILY MEDICINE CLINIC | Facility: CLINIC | Age: 72
End: 2022-06-21

## 2022-06-21 DIAGNOSIS — M51.34 DDD (DEGENERATIVE DISC DISEASE), THORACIC: Primary | ICD-10-CM

## 2022-06-21 DIAGNOSIS — M47.814 OSTEOARTHRITIS OF THORACIC SPINE, UNSPECIFIED SPINAL OSTEOARTHRITIS COMPLICATION STATUS: ICD-10-CM

## 2022-06-21 DIAGNOSIS — M51.44 SCHMORL'S NODES OF THE THORACIC REGION: ICD-10-CM

## 2022-06-21 DIAGNOSIS — M54.9 MID BACK PAIN ON LEFT SIDE: ICD-10-CM

## 2022-06-21 NOTE — TELEPHONE ENCOUNTER
I was unable to attach to the result note, but patient/daugher prefer to see neurosurgeon in [place].  She wanted to know if you recommend the Spine specialist at [facility]

## 2022-06-28 ENCOUNTER — TELEPHONE (OUTPATIENT)
Dept: FAMILY MEDICINE CLINIC | Facility: CLINIC | Age: 72
End: 2022-06-28

## 2022-06-28 DIAGNOSIS — M54.6 ACUTE LEFT-SIDED THORACIC BACK PAIN: ICD-10-CM

## 2022-06-28 RX ORDER — HYDROCODONE BITARTRATE AND ACETAMINOPHEN 5; 325 MG/1; MG/1
1 TABLET ORAL EVERY 6 HOURS PRN
Qty: 30 TABLET | Refills: 0 | Status: SHIPPED | OUTPATIENT
Start: 2022-06-28 | End: 2022-07-05 | Stop reason: SDUPTHER

## 2022-06-28 NOTE — TELEPHONE ENCOUNTER
Caller: Marifer Ricardo    Relationship: Self    Best call back number: [phone]    What is the best time to reach you: ANY TIME    Who are you requesting to speak with (clinical staff, provider,  specific staff member): NURSE    Do you know the name of the person who called: SELF    What was the call regarding: PATIENT CALLED BACK WITH MRI APPOINTMENT INFORMATION IT IS SCHEDULED FOR 07/18/2022 @ 8:30AM.    Do you require a callback: NO

## 2022-06-28 NOTE — TELEPHONE ENCOUNTER
According to the scheduling comments Auth was sent with the referral.  I am not sure has to see auth information to resend.  Thanks

## 2022-06-28 NOTE — TELEPHONE ENCOUNTER
Caller: Marifer Ricardo    Relationship: Self    Best call back number:  [phone]    What orders are you requesting (i.e. lab or imaging): MRI    In what timeframe would the patient need to come in: AS SOON AS POSSIBLE     Where will you receive your lab/imaging services: [facility]     Additional notes: THE PATIENT STATE THAT SHE NEED PRE APPROVAL BEFORE SHE CAN HAVE THE MRI DONE SHE WOULD LIKE THE PRE APPROVAL FAX TO [facility]

## 2022-06-28 NOTE — TELEPHONE ENCOUNTER
MILENA pt re: MRI appointment details. She states she is unable to complete MRI on  & is going to call [facility] to reschedule. I advised her that her insurance auth will  on , so she will need to complete imaging before that date. She said she will call back with new appointment information for her records.

## 2022-07-05 DIAGNOSIS — M54.6 ACUTE LEFT-SIDED THORACIC BACK PAIN: ICD-10-CM

## 2022-07-05 NOTE — TELEPHONE ENCOUNTER
Rx Refill Note  Requested Prescriptions     Pending Prescriptions Disp Refills   • HYDROcodone-acetaminophen (Norco) 5-325 MG per tablet 30 tablet 0     Sig: Take 1 tablet by mouth Every 6 (Six) Hours As Needed for Severe Pain .      Last office visit with prescribing clinician: 6/6/2022      Next office visit with prescribing clinician: 8/23/2022            Araceli Rowell MA  07/05/22, 16:26 EDT

## 2022-07-05 NOTE — TELEPHONE ENCOUNTER
Caller: Marifer Ricardo    Relationship: Self    Best call back number: [phone]    Requested Prescriptions:   Requested Prescriptions     Pending Prescriptions Disp Refills   • HYDROcodone-acetaminophen (Norco) 5-325 MG per tablet 30 tablet 0     Sig: Take 1 tablet by mouth Every 6 (Six) Hours As Needed for Severe Pain .        Pharmacy where request should be sent: [address] [phone] [facility] [phone]      Additional details provided by patient:     Does the patient have less than a 3 day supply:  [x] Yes  [] No    Aleshia Lee Rep   07/05/22 14:48 EDT

## 2022-07-06 RX ORDER — HYDROCODONE BITARTRATE AND ACETAMINOPHEN 5; 325 MG/1; MG/1
1 TABLET ORAL EVERY 6 HOURS PRN
Qty: 30 TABLET | Refills: 0 | Status: SHIPPED | OUTPATIENT
Start: 2022-07-06 | End: 2022-07-18 | Stop reason: SDUPTHER

## 2022-07-06 NOTE — TELEPHONE ENCOUNTER
DIONNA reviewed. Refill approved. Medication E rx'd to pharmacy as requested. Pt to keep f/u apt as scheduled.

## 2022-07-13 DIAGNOSIS — M54.6 ACUTE LEFT-SIDED THORACIC BACK PAIN: ICD-10-CM

## 2022-07-13 RX ORDER — HYDROCODONE BITARTRATE AND ACETAMINOPHEN 5; 325 MG/1; MG/1
1 TABLET ORAL EVERY 6 HOURS PRN
Qty: 30 TABLET | Refills: 0 | Status: CANCELLED | OUTPATIENT
Start: 2022-07-13

## 2022-07-14 ENCOUNTER — TELEPHONE (OUTPATIENT)
Dept: FAMILY MEDICINE CLINIC | Facility: CLINIC | Age: 72
End: 2022-07-14

## 2022-07-14 DIAGNOSIS — M54.6 ACUTE LEFT-SIDED THORACIC BACK PAIN: ICD-10-CM

## 2022-07-14 NOTE — TELEPHONE ENCOUNTER
Please remind pt there is a 24-48 hour turn around on refill requests.    I believe she sees ortho today. They will need to make medication recommendations.

## 2022-07-18 DIAGNOSIS — M54.6 ACUTE LEFT-SIDED THORACIC BACK PAIN: ICD-10-CM

## 2022-07-18 NOTE — TELEPHONE ENCOUNTER
Rx Refill Note  Requested Prescriptions     Pending Prescriptions Disp Refills   • HYDROcodone-acetaminophen (Norco) 5-325 MG per tablet 30 tablet 0     Sig: Take 1 tablet by mouth Every 6 (Six) Hours As Needed for Severe Pain .      Last office visit with prescribing clinician: 6/6/2022      Next office visit with prescribing clinician: 8/23/2022            Rubi Ybarra MA  07/18/22, 16:12 EDT

## 2022-07-19 RX ORDER — HYDROCODONE BITARTRATE AND ACETAMINOPHEN 5; 325 MG/1; MG/1
1 TABLET ORAL EVERY 8 HOURS PRN
Qty: 21 TABLET | Refills: 0 | Status: SHIPPED | OUTPATIENT
Start: 2022-07-19

## 2022-07-19 NOTE — TELEPHONE ENCOUNTER
I spoke with ortho office.  Patient rescheduled her appointment.  She is scheduled to be seen tomorrow 07/20/2022

## 2022-07-27 DIAGNOSIS — M54.6 ACUTE LEFT-SIDED THORACIC BACK PAIN: ICD-10-CM

## 2022-08-01 ENCOUNTER — TELEPHONE (OUTPATIENT)
Dept: FAMILY MEDICINE CLINIC | Facility: CLINIC | Age: 72
End: 2022-08-01

## 2022-08-01 DIAGNOSIS — M54.9 MID BACK PAIN ON LEFT SIDE: ICD-10-CM

## 2022-08-01 DIAGNOSIS — K86.89 PANCREATIC MASS: ICD-10-CM

## 2022-08-01 DIAGNOSIS — N28.89 LEFT RENAL MASS: Primary | ICD-10-CM

## 2022-08-01 DIAGNOSIS — Z78.9 POOR INTRAVENOUS ACCESS: ICD-10-CM

## 2022-08-01 NOTE — TELEPHONE ENCOUNTER
Caller: Marifer Ricardo    Relationship to patient: Self    Best call back number: [phone]    What is the call regarding:  PATIENT STATES THAT SHE WENT IN FOR A MRI OF HER KIDNEY'S, BUT THE COULD NOT FIND A VEIN, SO THEY COULD NOT PERFORM IT.  SHE NEEDS TO KNOW IF SHE NEEDS TO HAVE IT RESCHEDULED.  IF SO, SHE NEEDS TO DO THIS BY AUGUST 18 OR BEFORE OR SHE WILL HAVE TO PAY ANOTHER $180.

## 2022-08-01 NOTE — TELEPHONE ENCOUNTER
Caller: Marifer Ricardo    Relationship to patient: Self    Best call back number: [phone]    What is the call regarding:  PATIENT IS CALLING BACK TO SEE IF DR OCONNOR WOULD REFILL HER HYDROCODONE.

## 2022-08-02 RX ORDER — HYDROCODONE BITARTRATE AND ACETAMINOPHEN 5; 325 MG/1; MG/1
1 TABLET ORAL EVERY 8 HOURS PRN
Qty: 21 TABLET | Refills: 0 | OUTPATIENT
Start: 2022-08-02

## 2022-08-02 NOTE — TELEPHONE ENCOUNTER
Attempted to contact patient by phone.  Patient answered but she kept saying she could not hear me.

## 2022-08-15 ENCOUNTER — OFFICE VISIT (OUTPATIENT)
Dept: FAMILY MEDICINE CLINIC | Facility: CLINIC | Age: 72
End: 2022-08-15

## 2022-08-15 VITALS
WEIGHT: 131 LBS | SYSTOLIC BLOOD PRESSURE: 120 MMHG | DIASTOLIC BLOOD PRESSURE: 80 MMHG | OXYGEN SATURATION: 99 % | HEIGHT: 67 IN | BODY MASS INDEX: 20.56 KG/M2 | HEART RATE: 82 BPM | TEMPERATURE: 97.9 F

## 2022-08-15 DIAGNOSIS — I87.8 POOR VENOUS ACCESS: ICD-10-CM

## 2022-08-15 DIAGNOSIS — N28.1 RENAL CYST, LEFT: ICD-10-CM

## 2022-08-15 DIAGNOSIS — K86.2 PANCREATIC CYST: Primary | ICD-10-CM

## 2022-08-15 PROCEDURE — 99213 OFFICE O/P EST LOW 20 MIN: CPT | Performed by: FAMILY MEDICINE

## 2022-08-15 NOTE — PROGRESS NOTES
Subjective   Marifer Ricardo is a 72 y.o. female.     History of Present Illness   Mrs. Ricardo presents today for follow-up on recent imaging.  Was found on abdominal CT to have pancreatic and renal masses.  We attempted to obtain MRI with and without contrast.  Due to poor venous access contrast not able to be administered.  Noncontrasted MRI confirmed previous findings of pancreatic mass, left renal cystic mass.    She is currently participating in physical therapy for DDD/DJD.  Still needing narcotic analgesic on PT days due to severe exacerbation of pain.  Has had consultation with orthopedics.    The following portions of the patient's history were reviewed and updated as appropriate: allergies, current medications, past family history, past medical history, past social history, past surgical history and problem list.    Review of Systems   Constitutional: Positive for fatigue. Negative for fever and unexpected weight change.   Respiratory: Positive for cough (mild, intermittent). Negative for shortness of breath and wheezing.    Cardiovascular: Negative for chest pain, palpitations and leg swelling.   Gastrointestinal: Positive for constipation and nausea. Negative for abdominal pain, blood in stool and vomiting.   Genitourinary: Negative for dysuria, flank pain, hematuria and pelvic pain.   Musculoskeletal: Positive for back pain.   Skin: Negative for rash.   Neurological: Positive for weakness and headaches. Negative for numbness.   Hematological: Bruises/bleeds easily.   Psychiatric/Behavioral: Positive for confusion (mild, intermittent), dysphoric mood and sleep disturbance. Negative for suicidal ideas. The patient is nervous/anxious.    Pt's previous ROS reviewed and updated as indicated.       Objective    Vitals:    08/15/22 0925   BP: 120/80   Pulse: 82   Temp: 97.9 °F (36.6 °C)   SpO2: 99%     Body mass index is 20.52 kg/m².      08/15/22  0925   Weight: 59.4 kg (131 lb)       Physical Exam  Vitals  and nursing note reviewed.   Constitutional:       General: She is not in acute distress.     Appearance: She is well-developed, well-groomed and normal weight. She is not ill-appearing.   HENT:      Head: Atraumatic.   Pulmonary:      Effort: Pulmonary effort is normal.   Skin:     Coloration: Skin is not jaundiced or pale.   Neurological:      Mental Status: She is alert and oriented to person, place, and time.      Gait: Gait is intact.   Psychiatric:         Behavior: Behavior normal. Behavior is cooperative.         Cognition and Memory: Cognition normal.         Assessment & Plan   Diagnoses and all orders for this visit:    1. Pancreatic cyst (Primary)  -     MRI abdomen w wo contrast; Future  -     Basic Metabolic Panel; Future    2. Renal cyst, left  -     MRI abdomen w wo contrast; Future  -     Basic Metabolic Panel; Future    3. Poor venous access       We have discussed diagnostic, referral options in regard to pancreatic and renal masses.  Prior to consulting with gastroenterology, urology/nephrology etc. she wishes to try to repeat MRI with contrast as originally intended.  F/u per routine, sooner as needed/instructed.  I will contact patient regarding test results and provide instructions regarding any necessary changes in plan of care.  Patient was encouraged to keep me informed of any acute changes, lack of improvement, or any new concerning symptoms.  Pt is aware of reasons to seek emergent care.  Patient voiced understanding of all instructions and denied further questions.    Please note that portions of this note may have been completed with a voice recognition program. Efforts were made to edit the dictations, but occasionally words are mistranscribed.

## 2022-08-23 ENCOUNTER — OFFICE VISIT (OUTPATIENT)
Dept: FAMILY MEDICINE CLINIC | Facility: CLINIC | Age: 72
End: 2022-08-23

## 2022-08-23 VITALS
OXYGEN SATURATION: 96 % | SYSTOLIC BLOOD PRESSURE: 118 MMHG | HEART RATE: 73 BPM | TEMPERATURE: 97.9 F | HEIGHT: 67 IN | WEIGHT: 132 LBS | BODY MASS INDEX: 20.72 KG/M2 | DIASTOLIC BLOOD PRESSURE: 70 MMHG

## 2022-08-23 DIAGNOSIS — G44.89 CHRONIC MIXED HEADACHE SYNDROME: ICD-10-CM

## 2022-08-23 DIAGNOSIS — Z00.00 ENCOUNTER FOR PREVENTATIVE ADULT HEALTH CARE EXAMINATION: ICD-10-CM

## 2022-08-23 DIAGNOSIS — E78.00 PURE HYPERCHOLESTEROLEMIA: ICD-10-CM

## 2022-08-23 DIAGNOSIS — K31.84 GASTROPARESIS: ICD-10-CM

## 2022-08-23 DIAGNOSIS — Z00.00 MEDICARE ANNUAL WELLNESS VISIT, SUBSEQUENT: Primary | ICD-10-CM

## 2022-08-23 DIAGNOSIS — K29.30 CHRONIC SUPERFICIAL GASTRITIS WITHOUT BLEEDING: ICD-10-CM

## 2022-08-23 DIAGNOSIS — E11.42 DIABETIC PERIPHERAL NEUROPATHY: ICD-10-CM

## 2022-08-23 DIAGNOSIS — Z78.9 DIFFICULT INTRAVENOUS ACCESS: ICD-10-CM

## 2022-08-23 DIAGNOSIS — M15.0 PRIMARY GENERALIZED (OSTEO)ARTHRITIS: ICD-10-CM

## 2022-08-23 DIAGNOSIS — I73.9 PAD (PERIPHERAL ARTERY DISEASE): ICD-10-CM

## 2022-08-23 DIAGNOSIS — K21.00 GASTROESOPHAGEAL REFLUX DISEASE WITH ESOPHAGITIS WITHOUT HEMORRHAGE: ICD-10-CM

## 2022-08-23 DIAGNOSIS — E11.40 TYPE 2 DIABETES MELLITUS WITH DIABETIC NEUROPATHY, WITHOUT LONG-TERM CURRENT USE OF INSULIN: ICD-10-CM

## 2022-08-23 DIAGNOSIS — M81.0 AGE-RELATED OSTEOPOROSIS WITHOUT CURRENT PATHOLOGICAL FRACTURE: ICD-10-CM

## 2022-08-23 PROCEDURE — 99397 PER PM REEVAL EST PAT 65+ YR: CPT | Performed by: FAMILY MEDICINE

## 2022-08-23 PROCEDURE — 1125F AMNT PAIN NOTED PAIN PRSNT: CPT | Performed by: FAMILY MEDICINE

## 2022-08-23 PROCEDURE — 1160F RVW MEDS BY RX/DR IN RCRD: CPT | Performed by: FAMILY MEDICINE

## 2022-08-23 PROCEDURE — 1170F FXNL STATUS ASSESSED: CPT | Performed by: FAMILY MEDICINE

## 2022-08-23 PROCEDURE — 96160 PT-FOCUSED HLTH RISK ASSMT: CPT | Performed by: FAMILY MEDICINE

## 2022-08-23 PROCEDURE — G0439 PPPS, SUBSEQ VISIT: HCPCS | Performed by: FAMILY MEDICINE

## 2022-08-23 NOTE — PATIENT INSTRUCTIONS
Medicare Wellness  Personal Prevention Plan of Service     Date of Office Visit:    Encounter Provider:  Payal Edwards MD  Place of Service:  [facility]  Patient Name: Marifer Ricardo  :  1950    As part of the Medicare Wellness portion of your visit today, we are providing you with this personalized preventive plan of services (PPPS). This plan is based upon recommendations of the [place] Preventive Services Task Force (USPSTF) and the Advisory Committee on Immunization Practices (ACIP).    This lists the preventive care services that should be considered, and provides dates of when you are due. Items listed as completed are up-to-date and do not require any further intervention.    Health Maintenance   Topic Date Due    COVID-19 Vaccine (1) Never done    TDAP/TD VACCINES (2 - Tdap) 2022    ANNUAL WELLNESS VISIT  2022    HEMOGLOBIN A1C  2022    INFLUENZA VACCINE  10/01/2022    LIPID PANEL  2023    DIABETIC FOOT EXAM  2023    URINE MICROALBUMIN  2023    DIABETIC EYE EXAM  2023    MAMMOGRAM  09/15/2023    DXA SCAN  09/15/2023    COLONOSCOPY  2028    HEPATITIS C SCREENING  Completed    Pneumococcal Vaccine 65+  Completed    ZOSTER VACCINE  Discontinued       No orders of the defined types were placed in this encounter.      Return in about 6 months (around 2023).

## 2022-08-23 NOTE — PROGRESS NOTES
The ABCs of the Annual Wellness Visit  Subsequent Medicare Wellness Visit    Chief Complaint   Patient presents with   • Medicare Wellness-subsequent   • Encounter for preventive health examination      Subjective    History of Present Illness:  Marifer Ricardo is a 72 y.o. female who presents for a Subsequent Medicare Wellness Visit and preventive health examination.    She has non-insulin-dependent diabetes mellitus with associated diabetic neuropathy and gastroparesis.  Blood glucose his recently been under control.  She is up-to-date on diabetic eye exam. Has HLP currently on lipitor. Has had eval per Dr. Gilman for PAD. Not able to tolerate norvasc due to weakness, dizziness.  Taking diovan, plavix, coreg, statin as rx'd.     Followed by neurology for chronic mixed headache syndrome.  No new changes in severity, frequency or associated symptoms with headaches.     Has GERD with esophagitis but symptoms currently well controlled without medication.     Has osteoporosis. UTD on DEXA. Taking vit D as directed.     Has OA with intermittent flares of joint pain. F/u with ortho on 31st for further eval/mgnt of mid/upper back pain.     Pt's previous HPI reviewed and updated as indicated.     The following portions of the patient's history were reviewed and   updated as appropriate: allergies, current medications, past family history, past medical history, past social history, past surgical history and problem list.    Compared to one year ago, the patient feels her physical   health is worse.    Compared to one year ago, the patient feels her mental   health is the same.    Recent Hospitalizations:  This patient has had a [facility] admission record on file within the last 365 days.    Current Medical Providers:  Patient Care Team:  Payal Edwards MD as PCP - General (Family Medicine)  Jonathan Mondragon MD as Consulting Physician (Neurology)  Ming Stiles MD, GARY (Nephrology)  Rosmery Nicholson DC as  Consulting Physician (Chiropractic Medicine)  Taras Morillo MD as Consulting Physician (General Surgery)  Malcom Gilman MD as Consulting Physician (Interventional Cardiology)  Camilo Sunshine MD as Surgeon (Orthopedic Surgery)    Outpatient Medications Prior to Visit   Medication Sig Dispense Refill   • atorvastatin (LIPITOR) 40 MG tablet Take 40 mg by mouth Daily.     • Biotin 1000 MCG tablet Take 1,000 mcg by mouth 3 (three) times a day.     • carvedilol (COREG) 12.5 MG tablet Take 12.5 mg by mouth 2 (Two) Times a Day With Meals.     • cetirizine (zyrTEC) 10 MG tablet Take 1 tablet by mouth Daily.     • cholecalciferol (VITAMIN D3) 1000 UNITS tablet Take 1,000 Units by mouth daily.     • Cinnamon 500 MG tablet Take  by mouth 2 (two) times a day.     • clopidogrel (PLAVIX) 75 MG tablet Take 75 mg by mouth Daily.     • Cyanocobalamin (VITAMIN B-12) 5000 MCG sublingual tablet Place 1 tablet under the tongue Daily.     • EQ Aspirin Adult Low Dose 81 MG EC tablet Take 81 mg by mouth Daily.     • Glucose Blood (BLOOD GLUCOSE TEST) strip 1 strip by In Vitro route Daily. CHECK BLOOD SUGAR DAILY. 50 each 5   • HYDROcodone-acetaminophen (Norco) 5-325 MG per tablet Take 1 tablet by mouth Every 8 (Eight) Hours As Needed for Severe Pain . 21 tablet 0   • Lancets misc 1 each Daily. CHECK BLOOD SUGAR DAILY 100 each 5   • Omega 3-6-9 Fatty Acids (OMEGA 3-6-9 COMPLEX PO) Take 1 capsule by mouth Daily.     • TURMERIC PO Take  by mouth.     • valsartan-hydrochlorothiazide (DIOVAN-HCT) 160-12.5 MG per tablet Take 1 tablet by mouth Daily.       No facility-administered medications prior to visit.       Opioid medication/s are on active medication list.  and I have evaluated her active treatment plan and pain score trends (see table).  Vitals:    08/23/22 0806   PainSc:   5     I have reviewed the chart for potential of high risk medication and harmful drug interactions in the elderly.            Aspirin is on active  medication list. Aspirin use is indicated based on review of current medical condition/s. Pros and cons of this therapy have been discussed today. Benefits of this medication outweigh potential harm.  Patient has been encouraged to continue taking this medication.  .      Patient Active Problem List   Diagnosis   • Type 2 diabetes mellitus (HCC)   • Intractable migraine   • Primary generalized (osteo)arthritis   • Difficult intravenous access   • Copy of advanced directive obtained from patient   • No blood products   • History of hypothyroidism   • Chronic mixed headache syndrome   • Diabetic peripheral neuropathy ([facility])   • Gastroparesis   • Rosacea   • Age related osteoporosis   • Reflux esophagitis   • Gastritis   • PAD (peripheral artery disease) ([facility])   • Pure hypercholesterolemia   • Syncope     Advance Care Planning  Advance Directive is on file.  ACP discussion was held with the patient during this visit. Patient has an advance directive in EMR which is still valid.     Review of Systems   Constitutional: Positive for fatigue. Negative for fever.   HENT: Negative for mouth sores, nosebleeds, sore throat and trouble swallowing.    Eyes: Positive for visual disturbance (chronic).   Respiratory: Positive for cough (mild, intermittent, stable). Negative for shortness of breath and wheezing.    Cardiovascular: Negative for chest pain, palpitations and leg swelling.   Gastrointestinal: Positive for constipation and nausea. Negative for abdominal pain, blood in stool, diarrhea and vomiting.   Endocrine: Positive for cold intolerance. Negative for polydipsia and polyuria.   Genitourinary: Negative for difficulty urinating, dysuria, hematuria and vaginal bleeding.   Musculoskeletal: Positive for back pain.   Skin: Negative for rash and wound.   Neurological: Positive for weakness and confusion (mild, intermittent).   Hematological: Negative for adenopathy. Bruises/bleeds easily.   Psychiatric/Behavioral: Positive for  "dysphoric mood and sleep disturbance. Negative for suicidal ideas. The patient is nervous/anxious.         Objective    Vitals:    08/23/22 0806   BP: 118/70   Pulse: 73   Temp: 97.9 °F (36.6 °C)   SpO2: 96%   Weight: 59.9 kg (132 lb)   Height: 170.2 cm (67\")   PainSc:   5     Estimated body mass index is 20.67 kg/m² as calculated from the following:    Height as of this encounter: 170.2 cm (67\").    Weight as of this encounter: 59.9 kg (132 lb).    BMI is within normal parameters. No other follow-up for BMI required.      Does the patient have evidence of cognitive impairment? No    Physical Exam  Vitals and nursing note reviewed.   Constitutional:       General: She is not in acute distress.     Appearance: She is well-developed, well-groomed and normal weight. She is not ill-appearing.   HENT:      Head: Atraumatic.   Eyes:      General: No scleral icterus.     Conjunctiva/sclera: Conjunctivae normal.   Neck:      Thyroid: No thyroid mass.   Cardiovascular:      Rate and Rhythm: Normal rate and regular rhythm.      Pulses: Decreased pulses (pedal).      Heart sounds: Normal heart sounds.   Pulmonary:      Effort: Pulmonary effort is normal.      Breath sounds: Normal breath sounds.   Abdominal:      General: Bowel sounds are decreased. There is no distension.      Palpations: Abdomen is soft. There is no hepatomegaly, splenomegaly or mass.      Tenderness: There is no abdominal tenderness.   Musculoskeletal:      Thoracic back: Deformity (kyphosis) present. Scoliosis present.      Lumbar back: Deformity (loss of normal lordosis) present. Negative right straight leg raise test and negative left straight leg raise test.      Right lower leg: No edema.      Left lower leg: No edema.   Lymphadenopathy:      Cervical: No cervical adenopathy.   Skin:     General: Skin is warm and dry.      Coloration: Skin is not jaundiced or pale.      Findings: No rash.   Neurological:      Mental Status: She is alert and oriented to " person, place, and time.      Motor: No tremor.   Psychiatric:         Mood and Affect: Mood normal. Affect is flat.         Behavior: Behavior normal. Behavior is cooperative.         Cognition and Memory: Cognition normal.     Pt's previous physical exam reviewed and updated as indicated.      Lab Results   Component Value Date    HGBA1C 5.90 (H) 08/25/2022            HEALTH RISK ASSESSMENT    Smoking Status:  Social History     Tobacco Use   Smoking Status Never Smoker   Smokeless Tobacco Never Used     Alcohol Consumption:  Social History     Substance and Sexual Activity   Alcohol Use No     Fall Risk Screen:    [facility].    Depression Screening:  PHQ-2/PHQ-9 Depression Screening 8/15/2022   Retired PHQ-9 Total Score -   Retired Total Score -   Feeling Down, Depressed or Hopeless 0-->not at all   PHQ-9: Brief Depression Severity Measure Score 0       Health Habits and Functional and Cognitive Screening:  Functional & Cognitive Status 8/23/2022   Do you have difficulty preparing food and eating? No   Do you have difficulty bathing yourself, getting dressed or grooming yourself? No   Do you have difficulty using the toilet? No   Do you have difficulty moving around from place to place? No   Do you have trouble with steps or getting out of a bed or a chair? No   Current Diet Well Balanced Diet   Dental Exam Up to date   Eye Exam Up to date   Exercise (times per week) 3 times per week   Current Exercises Include Walking   Current Exercise Activities Include -   Do you need help using the phone?  No   Are you deaf or do you have serious difficulty hearing?  Yes   Do you need help with transportation? No   Do you need help shopping? No   Do you need help preparing meals?  No   Do you need help with housework?  No   Do you need help with laundry? No   Do you need help taking your medications? No   Do you need help managing money? No   Do you ever drive or ride in a car without  wearing a seat belt? No   Have you felt unusual stress, anger or loneliness in the last month? No   Who do you live with? Child   If you need help, do you have trouble finding someone available to you? No   Have you been bothered in the last four weeks by sexual problems? -   Do you have difficulty concentrating, remembering or making decisions? No       Age-appropriate Screening Schedule:  Refer to the list below for future screening recommendations based on patient's age, sex and/or medical conditions. Orders for these recommended tests are listed in the plan section. The patient has been provided with a written plan.    Health Maintenance   Topic Date Due   • TDAP/TD VACCINES (2 - Tdap) 05/26/2022   • INFLUENZA VACCINE  10/01/2022   • LIPID PANEL  02/21/2023   • HEMOGLOBIN A1C  02/25/2023   • DIABETIC FOOT EXAM  04/26/2023   • URINE MICROALBUMIN  04/26/2023   • DIABETIC EYE EXAM  05/27/2023   • MAMMOGRAM  09/15/2023   • DXA SCAN  09/15/2023   • COLONOSCOPY  02/07/2028   • ZOSTER VACCINE  Discontinued              Assessment & Plan   CMS Preventative Services Quick Reference  Risk Factors Identified During Encounter  Cardiovascular Disease  Chronic Pain   Depression/Dysphoria  Fall Risk-High or Moderate  Immunizations Discussed/Encouraged (specific Immunizations; Tdap, Influenza, Pneumococcal 23, Shingrix and COVID19  The above risks/problems have been discussed with the patient.  Follow up actions/plans if indicated are seen below in the Assessment/Plan Section.  Pertinent information has been shared with the patient in the After Visit Summary.    Diagnoses and all orders for this visit:    1. Medicare annual wellness visit, subsequent (Primary)    2. Type 2 diabetes mellitus with diabetic neuropathy, without long-term current use of insulin (HCC)  -     Basic Metabolic Panel; Future  -     Hemoglobin A1c; Future    3. Diabetic peripheral neuropathy (HCC)    4. Chronic mixed headache syndrome    5. Age-related  osteoporosis without current pathological fracture    6. Primary generalized (osteo)arthritis    7. Gastroesophageal reflux disease with esophagitis without hemorrhage    8. Chronic superficial gastritis without bleeding    9. Gastroparesis    10. Pure hypercholesterolemia    11. PAD (peripheral artery disease) (HCC)    12. Difficult intravenous access    13. Encounter for preventative adult health care examination      Age appropriate preventive care reviewed including cancer screenings, safety measures, mental health concerns, supplements, prevention of CV and other chronic disease etc. Handout provided.    Chronic conditions appear generally stable.  Continued f/u with [facility] (Jong and ) for further eval/tx of back pain.  Continue statin, ASA, coreg, plavix, diovan/hct. Pt advised to eat a heart healthy diet and get regular aerobic exercise.  Lab eval as above.    Follow Up:   Return in about 6 months (around 2/23/2023).   F/u sooner as needed/instructed.  I will contact patient regarding test results and provide instructions regarding any necessary changes in plan of care.  Patient was encouraged to keep me informed of any acute changes, lack of improvement, or any new concerning symptoms.  Pt is aware of reasons to seek emergent care.  Patient voiced understanding of all instructions and denied further questions.    An After Visit Summary and PPPS were made available to the patient.                   Please note that portions of this note may have been completed with a voice recognition program. Efforts were made to edit the dictations, but occasionally words are mistranscribed.

## 2022-08-25 ENCOUNTER — LAB (OUTPATIENT)
Dept: LAB | Facility: HOSPITAL | Age: 72
End: 2022-08-25

## 2022-08-25 DIAGNOSIS — K86.2 PANCREATIC CYST: ICD-10-CM

## 2022-08-25 DIAGNOSIS — N28.1 RENAL CYST, LEFT: ICD-10-CM

## 2022-08-25 LAB
ANION GAP SERPL CALCULATED.3IONS-SCNC: 10.1 MMOL/L (ref 5–15)
BUN SERPL-MCNC: 36 MG/DL (ref 8–23)
BUN/CREAT SERPL: 35 (ref 7–25)
CALCIUM SPEC-SCNC: 9.4 MG/DL (ref 8.6–10.5)
CHLORIDE SERPL-SCNC: 98 MMOL/L (ref 98–107)
CO2 SERPL-SCNC: 26.9 MMOL/L (ref 22–29)
CREAT SERPL-MCNC: 1.03 MG/DL (ref 0.57–1)
EGFRCR SERPLBLD CKD-EPI 2021: 57.5 ML/MIN/1.73
GLUCOSE SERPL-MCNC: 95 MG/DL (ref 65–99)
POTASSIUM SERPL-SCNC: 4.7 MMOL/L (ref 3.5–5.2)
SODIUM SERPL-SCNC: 135 MMOL/L (ref 136–145)

## 2022-08-25 PROCEDURE — 36415 COLL VENOUS BLD VENIPUNCTURE: CPT

## 2022-08-25 PROCEDURE — 80048 BASIC METABOLIC PNL TOTAL CA: CPT

## 2022-08-25 PROCEDURE — 83036 HEMOGLOBIN GLYCOSYLATED A1C: CPT | Performed by: FAMILY MEDICINE

## 2022-08-26 PROBLEM — Z00.00 ENCOUNTER FOR PREVENTATIVE ADULT HEALTH CARE EXAMINATION: Status: RESOLVED | Noted: 2022-08-26 | Resolved: 2022-08-26

## 2022-08-26 PROBLEM — Z00.00 ENCOUNTER FOR PREVENTATIVE ADULT HEALTH CARE EXAMINATION: Status: ACTIVE | Noted: 2022-08-26

## 2022-09-17 ENCOUNTER — HOSPITAL ENCOUNTER (OUTPATIENT)
Dept: MRI IMAGING | Facility: HOSPITAL | Age: 72
Discharge: HOME | End: 2022-09-17

## 2022-09-17 DIAGNOSIS — K86.2 PANCREATIC CYST: ICD-10-CM

## 2022-09-17 DIAGNOSIS — N28.1 RENAL CYST, LEFT: ICD-10-CM

## 2022-09-22 ENCOUNTER — TELEPHONE (OUTPATIENT)
Dept: FAMILY MEDICINE CLINIC | Facility: CLINIC | Age: 72
End: 2022-09-22

## 2022-09-22 NOTE — TELEPHONE ENCOUNTER
Caller: Marifer Ricardo    Relationship: Self    Best call back number: [phone]     What orders are you requesting (i.e. lab or imaging): MRI ABDOMEN W WO CONTRAST    In what timeframe would the patient need to come in: ASAP    Where will you receive your lab/imaging services:  [facility] (AS THEY HAVE THE MACHINE TO FIND VEINS)    Additional notes: PATIENTS ORIGINAL ORDER WILL  10/17/22. PATIENT TRIED TO GET HER MRI ON 22 BUT THEY WERE UNSUCCESSFUL WITH FINDING A VEIN. THEY CAN NOT RESCHEDULE HER UNTIL THE END OF October OR NOVEMBER SO PATIENT NEEDS A NEW ORDER ASAP. PLEASE ADVISE AND CALL PATIENT

## 2022-09-26 ENCOUNTER — TELEPHONE (OUTPATIENT)
Dept: FAMILY MEDICINE CLINIC | Facility: CLINIC | Age: 72
End: 2022-09-26

## 2022-09-26 NOTE — TELEPHONE ENCOUNTER
Caller: Marifer Ricardo    Relationship: Self    Best call back number:[phone]    What is the medical concern/diagnosis:  MRI  ORDER RENEWAL     What specialty or service is being requested: MRI    What is the provider, practice or medical service name:DR. JOSHUA OCONNOR    What is the office location: [address]     What is the office phone number: [phone]    Any additional details: THE ORDER NEEDS TO BE RE WRITTEN WITH THE CONTRAST DIE   PLEASE CONTACT PATIENT ASAP. WITH THE IMAGING

## 2022-09-26 NOTE — TELEPHONE ENCOUNTER
I called scheduling and got this rescheduled for the patient. The current order is already entered as with and without contrast. Afer speaking with patient, [facility] was having trouble getting her in when the proper nursing staff would be available to assist with her venous access for the contrast dye.  I spoke with patient and notified her of the new appointment date/time and instructions. They have also noted that US Guided IV placement may be needed for the patient that day.

## 2022-09-29 ENCOUNTER — APPOINTMENT (OUTPATIENT)
Dept: MRI IMAGING | Facility: HOSPITAL | Age: 72
End: 2022-09-29

## 2022-10-07 ENCOUNTER — LAB (OUTPATIENT)
Dept: LAB | Facility: HOSPITAL | Age: 72
End: 2022-10-07

## 2022-10-07 ENCOUNTER — TRANSCRIBE ORDERS (OUTPATIENT)
Dept: LAB | Facility: HOSPITAL | Age: 72
End: 2022-10-07

## 2022-10-07 ENCOUNTER — HOSPITAL ENCOUNTER (OUTPATIENT)
Dept: MRI IMAGING | Facility: HOSPITAL | Age: 72
Discharge: HOME | End: 2022-10-07

## 2022-10-07 DIAGNOSIS — N28.89 URETERAL FISTULA: ICD-10-CM

## 2022-10-07 DIAGNOSIS — M54.6 PAIN IN THORACIC SPINE: ICD-10-CM

## 2022-10-07 DIAGNOSIS — M54.6 PAIN IN THORACIC SPINE: Primary | ICD-10-CM

## 2022-10-07 PROCEDURE — 84165 PROTEIN E-PHORESIS SERUM: CPT

## 2022-10-07 PROCEDURE — 36415 COLL VENOUS BLD VENIPUNCTURE: CPT

## 2022-10-07 PROCEDURE — 74183 MRI ABD W/O CNTR FLWD CNTR: CPT

## 2022-10-07 PROCEDURE — 0 GADOBENATE DIMEGLUMINE 529 MG/ML SOLUTION: Performed by: FAMILY MEDICINE

## 2022-10-07 PROCEDURE — 84156 ASSAY OF PROTEIN URINE: CPT

## 2022-10-07 PROCEDURE — 84155 ASSAY OF PROTEIN SERUM: CPT

## 2022-10-07 PROCEDURE — 84166 PROTEIN E-PHORESIS/URINE/CSF: CPT

## 2022-10-07 PROCEDURE — A9577 INJ MULTIHANCE: HCPCS | Performed by: FAMILY MEDICINE

## 2022-10-07 RX ADMIN — GADOBENATE DIMEGLUMINE 12 ML: 529 INJECTION, SOLUTION INTRAVENOUS at 11:01

## 2022-10-10 ENCOUNTER — TELEPHONE (OUTPATIENT)
Dept: FAMILY MEDICINE CLINIC | Facility: CLINIC | Age: 72
End: 2022-10-10

## 2022-10-10 LAB
ALBUMIN SERPL ELPH-MCNC: 3.6 G/DL (ref 2.9–4.4)
ALBUMIN/GLOB SERPL: 1.3 {RATIO} (ref 0.7–1.7)
ALPHA1 GLOB SERPL ELPH-MCNC: 0.2 G/DL (ref 0–0.4)
ALPHA2 GLOB SERPL ELPH-MCNC: 0.9 G/DL (ref 0.4–1)
B-GLOBULIN SERPL ELPH-MCNC: 0.8 G/DL (ref 0.7–1.3)
GAMMA GLOB SERPL ELPH-MCNC: 0.8 G/DL (ref 0.4–1.8)
GLOBULIN SER CALC-MCNC: 2.7 G/DL (ref 2.2–3.9)
LABORATORY COMMENT REPORT: NORMAL
M PROTEIN SERPL ELPH-MCNC: NORMAL G/DL
PROT SERPL-MCNC: 6.3 G/DL (ref 6–8.5)

## 2022-10-10 RX ORDER — HYDROXYZINE HYDROCHLORIDE 10 MG/1
10 TABLET, FILM COATED ORAL 3 TIMES DAILY PRN
Qty: 15 TABLET | Refills: 0 | Status: SHIPPED | OUTPATIENT
Start: 2022-10-10

## 2022-10-10 NOTE — TELEPHONE ENCOUNTER
Caller: Marifer Ricardo    Relationship: Self    Best call back number:      [phone]     What medication are you requesting:     PATIENT STATED SHE COMPLETED MRI, Friday, 10/7/22, AND HAD AN ALLERGIC REACTION TO THE CONTRAST    PATIENT REQUESTED A MEDICATION TO HELP WITH THE BURNING AND ITCHING FROM THE RASH    Have you had these symptoms before:    [x] Yes  [] No    Have you been treated for these symptoms before:   [x] Yes  [] No    If a prescription is needed, what is your preferred pharmacy and phone number:      [facility] - [place], [place]    TELEPHONE CONTACT:    [phone]    DR OCONNOR

## 2022-10-11 LAB
ALBUMIN MFR UR ELPH: 32.2 %
ALPHA1 GLOB MFR UR ELPH: 5.4 %
ALPHA2 GLOB MFR UR ELPH: 17.2 %
B-GLOBULIN MFR UR ELPH: 23.1 %
GAMMA GLOB MFR UR ELPH: 22 %
LABORATORY COMMENT REPORT: NORMAL
M PROTEIN MFR UR ELPH: NORMAL %
PROT UR-MCNC: 8.8 MG/DL

## 2022-10-15 ENCOUNTER — APPOINTMENT (OUTPATIENT)
Dept: MRI IMAGING | Facility: HOSPITAL | Age: 72
End: 2022-10-15

## 2023-02-27 ENCOUNTER — OFFICE VISIT (OUTPATIENT)
Dept: FAMILY MEDICINE CLINIC | Facility: CLINIC | Age: 73
End: 2023-02-27
Payer: MEDICARE

## 2023-02-27 VITALS
SYSTOLIC BLOOD PRESSURE: 112 MMHG | OXYGEN SATURATION: 97 % | HEART RATE: 89 BPM | TEMPERATURE: 98.2 F | BODY MASS INDEX: 19.93 KG/M2 | WEIGHT: 127 LBS | HEIGHT: 67 IN | DIASTOLIC BLOOD PRESSURE: 70 MMHG

## 2023-02-27 DIAGNOSIS — I73.9 PAD (PERIPHERAL ARTERY DISEASE): ICD-10-CM

## 2023-02-27 DIAGNOSIS — I10 ESSENTIAL HYPERTENSION: ICD-10-CM

## 2023-02-27 DIAGNOSIS — M15.0 PRIMARY GENERALIZED (OSTEO)ARTHRITIS: ICD-10-CM

## 2023-02-27 DIAGNOSIS — E78.00 PURE HYPERCHOLESTEROLEMIA: ICD-10-CM

## 2023-02-27 DIAGNOSIS — M81.0 AGE-RELATED OSTEOPOROSIS WITHOUT CURRENT PATHOLOGICAL FRACTURE: ICD-10-CM

## 2023-02-27 DIAGNOSIS — K31.84 GASTROPARESIS: ICD-10-CM

## 2023-02-27 DIAGNOSIS — E11.42 DIABETIC PERIPHERAL NEUROPATHY: ICD-10-CM

## 2023-02-27 DIAGNOSIS — G44.89 CHRONIC MIXED HEADACHE SYNDROME: ICD-10-CM

## 2023-02-27 DIAGNOSIS — E11.40 TYPE 2 DIABETES MELLITUS WITH DIABETIC NEUROPATHY, WITHOUT LONG-TERM CURRENT USE OF INSULIN: Primary | ICD-10-CM

## 2023-02-27 DIAGNOSIS — R55 SYNCOPE, UNSPECIFIED SYNCOPE TYPE: ICD-10-CM

## 2023-02-27 PROCEDURE — 99214 OFFICE O/P EST MOD 30 MIN: CPT | Performed by: FAMILY MEDICINE

## 2023-02-27 RX ORDER — AMLODIPINE BESYLATE 5 MG/1
5 TABLET ORAL
COMMUNITY
Start: 2022-12-27

## 2023-03-01 LAB
ALBUMIN SERPL-MCNC: 4.3 G/DL (ref 3.5–5.2)
ALBUMIN/CREAT UR: 65 MG/G CREAT (ref 0–29)
ALBUMIN/GLOB SERPL: 2.2 G/DL
ALP SERPL-CCNC: 81 U/L (ref 39–117)
ALT SERPL-CCNC: 17 U/L (ref 1–33)
AST SERPL-CCNC: 18 U/L (ref 1–32)
BASOPHILS # BLD AUTO: 0.03 10*3/MM3 (ref 0–0.2)
BASOPHILS NFR BLD AUTO: 0.7 % (ref 0–1.5)
BILIRUB SERPL-MCNC: 0.5 MG/DL (ref 0–1.2)
BUN SERPL-MCNC: 26 MG/DL (ref 8–23)
BUN/CREAT SERPL: 29.2 (ref 7–25)
CALCIUM SERPL-MCNC: 9.3 MG/DL (ref 8.6–10.5)
CHLORIDE SERPL-SCNC: 97 MMOL/L (ref 98–107)
CHOLEST SERPL-MCNC: 214 MG/DL (ref 0–200)
CO2 SERPL-SCNC: 28.1 MMOL/L (ref 22–29)
CREAT SERPL-MCNC: 0.89 MG/DL (ref 0.57–1)
CREAT UR-MCNC: 57.3 MG/DL
EGFRCR SERPLBLD CKD-EPI 2021: 68.1 ML/MIN/1.73
EOSINOPHIL # BLD AUTO: 0.07 10*3/MM3 (ref 0–0.4)
EOSINOPHIL NFR BLD AUTO: 1.6 % (ref 0.3–6.2)
ERYTHROCYTE [DISTWIDTH] IN BLOOD BY AUTOMATED COUNT: 13.2 % (ref 12.3–15.4)
GLOBULIN SER CALC-MCNC: 2 GM/DL
GLUCOSE SERPL-MCNC: 85 MG/DL (ref 65–99)
HBA1C MFR BLD: 6.1 % (ref 4.8–5.6)
HCT VFR BLD AUTO: 39.7 % (ref 34–46.6)
HDLC SERPL-MCNC: 96 MG/DL (ref 40–60)
HGB BLD-MCNC: 13.7 G/DL (ref 12–15.9)
IMM GRANULOCYTES # BLD AUTO: 0.01 10*3/MM3 (ref 0–0.05)
IMM GRANULOCYTES NFR BLD AUTO: 0.2 % (ref 0–0.5)
LDLC SERPL CALC-MCNC: 108 MG/DL (ref 0–100)
LYMPHOCYTES # BLD AUTO: 0.94 10*3/MM3 (ref 0.7–3.1)
LYMPHOCYTES NFR BLD AUTO: 21.1 % (ref 19.6–45.3)
MCH RBC QN AUTO: 32.2 PG (ref 26.6–33)
MCHC RBC AUTO-ENTMCNC: 34.5 G/DL (ref 31.5–35.7)
MCV RBC AUTO: 93.4 FL (ref 79–97)
MICROALBUMIN UR-MCNC: 37.4 UG/ML
MONOCYTES # BLD AUTO: 0.37 10*3/MM3 (ref 0.1–0.9)
MONOCYTES NFR BLD AUTO: 8.3 % (ref 5–12)
NEUTROPHILS # BLD AUTO: 3.04 10*3/MM3 (ref 1.7–7)
NEUTROPHILS NFR BLD AUTO: 68.1 % (ref 42.7–76)
NRBC BLD AUTO-RTO: 0 /100 WBC (ref 0–0.2)
PLATELET # BLD AUTO: 171 10*3/MM3 (ref 140–450)
POTASSIUM SERPL-SCNC: 4.3 MMOL/L (ref 3.5–5.2)
PROT SERPL-MCNC: 6.3 G/DL (ref 6–8.5)
RBC # BLD AUTO: 4.25 10*6/MM3 (ref 3.77–5.28)
SODIUM SERPL-SCNC: 135 MMOL/L (ref 136–145)
TRIGL SERPL-MCNC: 55 MG/DL (ref 0–150)
TSH SERPL DL<=0.005 MIU/L-ACNC: 3.31 UIU/ML (ref 0.27–4.2)
VLDLC SERPL CALC-MCNC: 10 MG/DL (ref 5–40)
WBC # BLD AUTO: 4.46 10*3/MM3 (ref 3.4–10.8)

## 2023-03-24 ENCOUNTER — TELEPHONE (OUTPATIENT)
Dept: SURGERY | Facility: CLINIC | Age: 73
End: 2023-03-24
Payer: MEDICARE

## 2023-03-24 NOTE — TELEPHONE ENCOUNTER
Patient on 5 year recall for colonoscopy. Called and spoke with patient she stated she didn't want to schedule at  This time but when is ready she will call and schedule

## 2023-05-03 ENCOUNTER — OFFICE VISIT (OUTPATIENT)
Dept: FAMILY MEDICINE CLINIC | Facility: CLINIC | Age: 73
End: 2023-05-03
Payer: MEDICARE

## 2023-05-03 VITALS
TEMPERATURE: 97.4 F | HEIGHT: 67 IN | DIASTOLIC BLOOD PRESSURE: 68 MMHG | SYSTOLIC BLOOD PRESSURE: 120 MMHG | OXYGEN SATURATION: 96 % | WEIGHT: 130 LBS | BODY MASS INDEX: 20.4 KG/M2 | HEART RATE: 96 BPM

## 2023-05-03 DIAGNOSIS — Z91.09 ALLERGY TO PLANT: ICD-10-CM

## 2023-05-03 DIAGNOSIS — L50.9 URTICARIA: Primary | ICD-10-CM

## 2023-05-03 PROCEDURE — 99213 OFFICE O/P EST LOW 20 MIN: CPT | Performed by: FAMILY MEDICINE

## 2023-05-03 PROCEDURE — 1160F RVW MEDS BY RX/DR IN RCRD: CPT | Performed by: FAMILY MEDICINE

## 2023-05-03 PROCEDURE — 3074F SYST BP LT 130 MM HG: CPT | Performed by: FAMILY MEDICINE

## 2023-05-03 PROCEDURE — 3044F HG A1C LEVEL LT 7.0%: CPT | Performed by: FAMILY MEDICINE

## 2023-05-03 PROCEDURE — 1159F MED LIST DOCD IN RCRD: CPT | Performed by: FAMILY MEDICINE

## 2023-05-03 PROCEDURE — 3078F DIAST BP <80 MM HG: CPT | Performed by: FAMILY MEDICINE

## 2023-05-03 RX ORDER — CETIRIZINE HYDROCHLORIDE 10 MG/1
10 TABLET ORAL DAILY
Qty: 30 TABLET | Refills: 0 | Status: SHIPPED | OUTPATIENT
Start: 2023-05-03

## 2023-05-03 RX ORDER — FAMOTIDINE 40 MG/1
40 TABLET, FILM COATED ORAL 2 TIMES DAILY
Qty: 20 TABLET | Refills: 0 | Status: SHIPPED | OUTPATIENT
Start: 2023-05-03 | End: 2023-05-13

## 2023-05-03 RX ORDER — FLUOROMETHOLONE 0.1 %
SUSPENSION, DROPS(FINAL DOSAGE FORM)(ML) OPHTHALMIC (EYE)
COMMUNITY
Start: 2023-03-29

## 2023-05-03 RX ORDER — DEXAMETHASONE SODIUM PHOSPHATE 4 MG/ML
4 INJECTION, SOLUTION INTRA-ARTICULAR; INTRALESIONAL; INTRAMUSCULAR; INTRAVENOUS; SOFT TISSUE ONCE
Status: SHIPPED | OUTPATIENT
Start: 2023-05-03

## 2023-05-03 NOTE — PROGRESS NOTES
Subjective   Marifer Ricardo is a 73 y.o. female.     History of Present Illness  She c/o burning, itching rash  Rash  This is a new problem. The current episode started in the past 7 days (next day after working with tomato plants). The problem has been waxing and waning since onset. The affected locations include the scalp, face, lips, torso and back. The rash is characterized by burning, redness, itchiness and swelling. She was exposed to plant contact. Associated symptoms include facial edema and fatigue. Pertinent negatives include no congestion, cough, diarrhea, eye pain, fever, rhinorrhea, shortness of breath, sore throat or vomiting. Past treatments include antihistamine and anti-itch cream (seen at  over weekend, received CS shot. Facial symptoms much improved). The treatment provided moderate relief.     Previously had similar reaction to tomato plants in past.    The following portions of the patient's history were reviewed and updated as appropriate: allergies, current medications, past family history, past medical history, past social history, past surgical history and problem list.    Review of Systems   Constitutional: Positive for fatigue. Negative for fever.   HENT: Negative for congestion, mouth sores, rhinorrhea, sore throat and trouble swallowing.    Eyes: Negative for pain and redness.   Respiratory: Negative for cough, shortness of breath and wheezing.    Cardiovascular: Negative for chest pain.   Gastrointestinal: Negative for diarrhea and vomiting.   Skin: Positive for rash.   Neurological: Negative for headaches.   Hematological: Negative for adenopathy.   Psychiatric/Behavioral: Negative for confusion.       Objective    Vitals:    05/03/23 1313   BP: 120/68   Pulse: 96   Temp: 97.4 °F (36.3 °C)   SpO2: 96%     Body mass index is 20.36 kg/m².      05/03/23  1313   Weight: 59 kg (130 lb)       Physical Exam  Vitals and nursing note reviewed.   Constitutional:       General: She is not  in acute distress.     Appearance: She is well-developed, well-groomed and normal weight. She is not ill-appearing.   Eyes:      Conjunctiva/sclera: Conjunctivae normal.   Cardiovascular:      Rate and Rhythm: Normal rate and regular rhythm.      Heart sounds: Normal heart sounds.   Pulmonary:      Effort: Pulmonary effort is normal.      Breath sounds: Normal breath sounds.   Musculoskeletal:      Right lower leg: No edema.      Left lower leg: No edema.   Skin:     General: Skin is warm and dry.      Findings: Rash present. Rash is urticarial (affecting face, neck, back, torso/abd).   Neurological:      Mental Status: She is alert and oriented to person, place, and time.      Gait: Gait is intact.   Psychiatric:         Behavior: Behavior normal. Behavior is cooperative.         Cognition and Memory: Cognition normal.         Assessment & Plan   Diagnoses and all orders for this visit:    1. Urticaria (Primary)  -     dexamethasone (DECADRON) injection 4 mg  -     famotidine (Pepcid) 40 MG tablet; Take 1 tablet by mouth 2 (Two) Times a Day for 10 days.  Dispense: 20 tablet; Refill: 0    2. Allergy to plant    Other orders  -     cetirizine (zyrTEC) 10 MG tablet; Take 1 tablet by mouth Daily.  Dispense: 30 tablet; Refill: 0       F/u as scheduled, f/u sooner as needed.  Continue benadryl qhs. Add morning zyrtec, pepcid bid.  Avoid tight clothing, hot water, sun exposure, allergen exposure, etc.  Patient was encouraged to keep me informed of any acute changes, lack of improvement, or any new concerning symptoms.  Pt is aware of reasons to seek emergent care.  Patient voiced understanding of all instructions and denied further questions.    Please note that portions of this note may have been completed with a voice recognition program.

## 2023-07-21 PROBLEM — K21.9 GERD WITHOUT ESOPHAGITIS: Chronic | Status: ACTIVE | Noted: 2018-11-06

## 2023-08-28 ENCOUNTER — OFFICE VISIT (OUTPATIENT)
Dept: FAMILY MEDICINE CLINIC | Facility: CLINIC | Age: 73
End: 2023-08-28
Payer: MEDICARE

## 2023-08-28 VITALS
WEIGHT: 128.8 LBS | HEART RATE: 65 BPM | TEMPERATURE: 98.1 F | BODY MASS INDEX: 20.21 KG/M2 | HEIGHT: 67 IN | OXYGEN SATURATION: 99 % | SYSTOLIC BLOOD PRESSURE: 112 MMHG | DIASTOLIC BLOOD PRESSURE: 72 MMHG

## 2023-08-28 DIAGNOSIS — I73.9 PAD (PERIPHERAL ARTERY DISEASE): ICD-10-CM

## 2023-08-28 DIAGNOSIS — Z78.0 POSTMENOPAUSAL: ICD-10-CM

## 2023-08-28 DIAGNOSIS — Z00.00 ENCOUNTER FOR PREVENTIVE HEALTH EXAMINATION: ICD-10-CM

## 2023-08-28 DIAGNOSIS — E78.00 PURE HYPERCHOLESTEROLEMIA: ICD-10-CM

## 2023-08-28 DIAGNOSIS — G44.89 CHRONIC MIXED HEADACHE SYNDROME: ICD-10-CM

## 2023-08-28 DIAGNOSIS — E87.1 HYPONATREMIA: ICD-10-CM

## 2023-08-28 DIAGNOSIS — E11.44: Chronic | ICD-10-CM

## 2023-08-28 DIAGNOSIS — Z12.31 ENCOUNTER FOR SCREENING MAMMOGRAM FOR MALIGNANT NEOPLASM OF BREAST: ICD-10-CM

## 2023-08-28 DIAGNOSIS — Z78.9: ICD-10-CM

## 2023-08-28 DIAGNOSIS — I10 ESSENTIAL HYPERTENSION: ICD-10-CM

## 2023-08-28 DIAGNOSIS — K31.84 GASTROPARESIS: ICD-10-CM

## 2023-08-28 DIAGNOSIS — E11.40 TYPE 2 DIABETES MELLITUS WITH DIABETIC NEUROPATHY, WITHOUT LONG-TERM CURRENT USE OF INSULIN: ICD-10-CM

## 2023-08-28 DIAGNOSIS — K21.9 GERD WITHOUT ESOPHAGITIS: Chronic | ICD-10-CM

## 2023-08-28 DIAGNOSIS — M81.0 AGE-RELATED OSTEOPOROSIS WITHOUT CURRENT PATHOLOGICAL FRACTURE: ICD-10-CM

## 2023-08-28 DIAGNOSIS — Z00.00 MEDICARE ANNUAL WELLNESS VISIT, SUBSEQUENT: Primary | ICD-10-CM

## 2023-08-28 DIAGNOSIS — M15.0 PRIMARY GENERALIZED (OSTEO)ARTHRITIS: ICD-10-CM

## 2023-08-28 LAB
EXPIRATION DATE: NORMAL
HBA1C MFR BLD: 5.9 %
Lab: NORMAL

## 2023-08-28 RX ORDER — TRAMADOL HYDROCHLORIDE 50 MG/1
50 TABLET ORAL EVERY 12 HOURS PRN
Qty: 60 TABLET | Refills: 0 | Status: SHIPPED | OUTPATIENT
Start: 2023-08-28

## 2023-08-28 NOTE — PATIENT INSTRUCTIONS
Medicare Wellness  Personal Prevention Plan of Service     Date of Office Visit:    Encounter Provider:  Payal Edwards MD  Place of Service:  [facility]  Patient Name: Marifer Ricardo  :  1950    As part of the Medicare Wellness portion of your visit today, we are providing you with this personalized preventive plan of services (PPPS). This plan is based upon recommendations of the [place] Preventive Services Task Force (USPSTF) and the Advisory Committee on Immunization Practices (ACIP).    This lists the preventive care services that should be considered, and provides dates of when you are due. Items listed as completed are up-to-date and do not require any further intervention.    Health Maintenance   Topic Date Due    TDAP/TD VACCINES (2 - Tdap) 2022    DIABETIC FOOT EXAM  2023    ANNUAL WELLNESS VISIT  2023    HEMOGLOBIN A1C  2023    COVID-19 Vaccine (1) 2023 (Originally 1950)    MAMMOGRAM  09/15/2023    DXA SCAN  09/15/2023    INFLUENZA VACCINE  10/01/2023    LIPID PANEL  2024    URINE MICROALBUMIN  2024    DIABETIC EYE EXAM  2024    COLONOSCOPY  2028    HEPATITIS C SCREENING  Completed    Pneumococcal Vaccine 65+  Completed    ZOSTER VACCINE  Discontinued       Orders Placed This Encounter   Procedures    Basic Metabolic Panel     Order Specific Question:   Release to patient     Answer:   Routine Release [[id number]]    Hemoglobin A1c     Order Specific Question:   Release to patient     Answer:   Routine Release [[id number]]       Return in about 6 months (around 2024).

## 2023-08-28 NOTE — PROGRESS NOTES
The ABCs of the Annual Wellness Visit  Subsequent Medicare Wellness Visit    Subjective    Marifer Ricardo is a 73 y.o. female who presents for a Subsequent Medicare Wellness Visit and preventive health exam    Needs A1c, bmp  NEEDS MAMMOGRAM, DEXA    The following portions of the patient's history were reviewed and   updated as appropriate: allergies, current medications, past family history, past medical history, past social history, past surgical history, and problem list.    Compared to one year ago, the patient feels her physical   health is the same.    Compared to one year ago, the patient feels her mental   health is the same.    Recent Hospitalizations:  She was not admitted to the hospital during the last year.       Current Medical Providers:  Patient Care Team:  Payal Edwards MD as PCP - General (Family Medicine)  Jonathan Mondragon MD as Consulting Physician (Neurology)  Ming Stiles MD, GARY (Nephrology)  Rosmery Nicholson DC as Consulting Physician (Chiropractic Medicine)  Taras Morillo MD as Consulting Physician (General Surgery)  Malcom Gilman MD as Consulting Physician (Interventional Cardiology)  Camilo Snushine MD as Surgeon (Orthopedic Surgery)    Outpatient Medications Prior to Visit   Medication Sig Dispense Refill    amLODIPine (NORVASC) 5 MG tablet Take 1 tablet by mouth every night at bedtime.      atorvastatin (LIPITOR) 40 MG tablet Take 1 tablet by mouth Daily.      Biotin 1000 MCG tablet Take 1,000 mcg by mouth 3 (three) times a day.      carvedilol (COREG) 12.5 MG tablet Take 1 tablet by mouth 2 (Two) Times a Day With Meals.      cetirizine (zyrTEC) 10 MG tablet Take 1 tablet by mouth Daily. 30 tablet 0    cholecalciferol (VITAMIN D3) 1000 UNITS tablet Take 1 tablet by mouth Daily.      Cinnamon 500 MG tablet Take  by mouth 2 (two) times a day.      clopidogrel (PLAVIX) 75 MG tablet Take 1 tablet by mouth Daily.      Cyanocobalamin (VITAMIN B-12) 5000 MCG sublingual  tablet Place 1 tablet under the tongue Daily.      EQ Aspirin Adult Low Dose 81 MG EC tablet Take 1 tablet by mouth Daily.      Glucose Blood (BLOOD GLUCOSE TEST) strip 1 strip by In Vitro route Daily. CHECK BLOOD SUGAR DAILY. 50 each 5    Lancets misc 1 each Daily. CHECK BLOOD SUGAR DAILY 100 each 5    Omega 3-6-9 Fatty Acids (OMEGA 3-6-9 COMPLEX PO) Take 1 capsule by mouth Daily.      TURMERIC PO Take  by mouth.      valsartan-hydrochlorothiazide (DIOVAN-HCT) 160-12.5 MG per tablet Take 1 tablet by mouth Daily.      fluorometholone (FML) 0.1 % ophthalmic suspension  (Patient not taking: Reported on 8/28/2023)      HYDROcodone-acetaminophen (Norco) 5-325 MG per tablet Take 1 tablet by mouth Every 8 (Eight) Hours As Needed for Severe Pain . (Patient not taking: Reported on 8/28/2023) 21 tablet 0    hydrOXYzine (ATARAX) 10 MG tablet Take 1 tablet by mouth 3 (Three) Times a Day As Needed for Itching. (Patient not taking: Reported on 8/28/2023) 15 tablet 0     Facility-Administered Medications Prior to Visit   Medication Dose Route Frequency Provider Last Rate Last Admin    dexamethasone (DECADRON) injection 4 mg  4 mg Intramuscular Once Payal Edwards MD           Opioid medication/s are on active medication list.  and I have evaluated her active treatment plan and pain score trends (see table).  Vitals:    08/28/23 0819   PainSc:   5   PainLoc: Back     I have reviewed the chart for potential of high risk medication and harmful drug interactions in the elderly.          Aspirin is on active medication list. Aspirin use is indicated based on review of current medical condition/s. Pros and cons of this therapy have been discussed today. Benefits of this medication outweigh potential harm.  Patient has been encouraged to continue taking this medication.  .      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  ACP discussion was held with the patient during this visit. Patient has an advance directive in EMR which is still valid.      Objective    Vitals:    23 0819   BP: 112/72   BP Location: Left arm   Patient Position: Sitting   Cuff Size: Adult   Pulse: 65   Temp: 98.1 øF (36.7 øC)   TempSrc: Temporal   SpO2: 99%   Weight: 58.4 kg (128 lb 12.8 oz)   Height: 170.2 cm (67\")  Comment: patient stated   PainSc:   5   PainLoc: Back     Estimated body mass index is 20.17 kg/mý as calculated from the following:    Height as of this encounter: 170.2 cm (67\").    Weight as of this encounter: 58.4 kg (128 lb 12.8 oz).    BMI is within normal parameters. No other follow-up for BMI required.      Does the patient have evidence of cognitive impairment? No    Lab Results   Component Value Date    HGBA1C 5.9 2023        HEALTH RISK ASSESSMENT    Smoking Status:  Social History     Tobacco Use   Smoking Status Never    Passive exposure: Never   Smokeless Tobacco Never     Alcohol Consumption:  Social History     Substance and Sexual Activity   Alcohol Use No     Fall Risk Screen:    STEADI Fall Risk Assessment was completed, and patient is at HIGH risk for falls. Assessment completed on:2023    Depression Screenin/28/2023     8:17 AM   PHQ-2/PHQ-9 Depression Screening   Little Interest or Pleasure in Doing Things 0-->not at all   Feeling Down, Depressed or Hopeless 0-->not at all   PHQ-9: Brief Depression Severity Measure Score " 0       Health Habits and Functional and Cognitive Screenin/28/2023     8:14 AM   Functional & Cognitive Status   Do you have difficulty preparing food and eating? No   Do you have difficulty bathing yourself, getting dressed or grooming yourself? No   Do you have difficulty using the toilet? No   Do you have difficulty moving around from place to place? Yes   Do you have trouble with steps or getting out of a bed or a chair? No   Current Diet Well Balanced Diet   Dental Exam Up to date   Eye Exam Up to date   Exercise (times per week) 7 times per week   Current Exercises Include Walking   Do you need help using the phone?  No   Are you deaf or do you have serious difficulty hearing?  Yes   Do you need help to go to places out of walking distance? No   Do you need help shopping? No   Do you need help preparing meals?  No   Do you need help with housework?  No   Do you need help with laundry? No   Do you need help taking your medications? No   Do you need help managing money? No   Do you ever drive or ride in a car without wearing a seat belt? No   Have you felt unusual stress, anger or loneliness in the last month? No   Who do you live with? Child   If you need help, do you have trouble finding someone available to you? No   Have you been bothered in the last four weeks by sexual problems? No   Do you have difficulty concentrating, remembering or making decisions? No       Age-appropriate Screening Schedule:  Refer to the list below for future screening recommendations based on patient's age, sex and/or medical conditions. Orders for these recommended tests are listed in the plan section. The patient has been provided with a written plan.    Health Maintenance   Topic Date Due    TDAP/TD VACCINES (2 - Tdap) 2022    DIABETIC FOOT EXAM  2023    ANNUAL WELLNESS VISIT  2023    MAMMOGRAM  09/15/2023    DXA SCAN  09/15/2023    INFLUENZA VACCINE  10/01/2023    LIPID PANEL  2024     HEMOGLOBIN A1C  02/28/2024    URINE MICROALBUMIN  02/28/2024    DIABETIC EYE EXAM  03/29/2024    COLONOSCOPY  02/07/2028    HEPATITIS C SCREENING  Completed    Pneumococcal Vaccine 65+  Completed    COVID-19 Vaccine  Discontinued    ZOSTER VACCINE  Discontinued                  CMS Preventative Services Quick Reference  Risk Factors Identified During Encounter  Chronic Pain: Natural history and expected course discussed. Questions answered.  Fall Risk-High or Moderate: Discussed Fall Prevention in the home and Information on Fall Prevention Shared in After Visit Summary  Immunizations Discussed/Encouraged: Tdap, Pneumococcal 23, Prevnar 20 (Pneumococcal 20-valent conjugate), and COVID19  The above risks/problems have been discussed with the patient.  Pertinent information has been shared with the patient in the After Visit Summary.  An After Visit Summary and PPPS were made available to the patient.    Follow Up:   Next Medicare Wellness visit to be scheduled in 1 year.       Additional E&M Note during same encounter follows:  Patient has multiple medical problems which are significant and separately identifiable that require additional work above and beyond the Medicare Wellness Visit.      Chief Complaint  Medicare Wellness-subsequent and Encounter for PReventive health exam    Subjective        HPI  Marifer Crista Ricardo is also being seen today for routine f/u on chronic med problems.    She has non-insulin-dependent diabetes mellitus with associated diabetic neuropathy and gastroparesis.  Blood glucose his recently been under control.  She is up-to-date on diabetic eye exam. Has HLP, HTN currently on lipitor, amlodipine, coreg. Has had eval per Dr. Gilman for PAD. Taking plavix, statin as rx'd.     Followed by neurology for chronic mixed headache syndrome.  No new changes in severity, frequency or associated symptoms with headaches.     Has osteoporosis. Taking vit D as directed. Has declined bisphophonates.    "  Has OA with intermittent flares of joint pain. followed by ortho    Has chronic neck and back pain due to DJD, previous compression fracture. Advised to avoid NSAIDs. Tylenol hleps some. Would like to know other options. Pain affecting sleep, ADLs.     Pt's previous HPI reviewed and updated as indicated.     Review of Systems   Constitutional:  Positive for fatigue. Negative for fever.   HENT:  Negative for mouth sores, nosebleeds, sore throat and trouble swallowing.    Eyes:  Positive for visual disturbance (chronic).   Respiratory:  Positive for cough (mild, intermittent, stable). Negative for shortness of breath and wheezing.    Cardiovascular:  Negative for chest pain, palpitations and leg swelling.   Gastrointestinal:  Positive for constipation and nausea. Negative for abdominal pain, blood in stool, diarrhea and vomiting.   Endocrine: Positive for cold intolerance. Negative for polydipsia and polyuria.   Genitourinary:  Negative for difficulty urinating, dysuria, hematuria and vaginal bleeding.   Musculoskeletal:  Positive for back pain.   Skin:  Negative for rash and wound.   Neurological:  Positive for weakness and confusion (mild, intermittent).   Hematological:  Negative for adenopathy. Bruises/bleeds easily.   Psychiatric/Behavioral:  Positive for dysphoric mood and sleep disturbance. Negative for suicidal ideas. The patient is nervous/anxious.    Pt's previous ROS reviewed and updated as indicated.     Objective   Vital Signs:  /72 (BP Location: Left arm, Patient Position: Sitting, Cuff Size: Adult)   Pulse 65   Temp 98.1 øF (36.7 øC) (Temporal)   Ht 170.2 cm (67\") Comment: patient stated  Wt 58.4 kg (128 lb 12.8 oz)   SpO2 99%   BMI 20.17 kg/mý     Physical Exam  Vitals and nursing note reviewed.   Constitutional:       General: She is not in acute distress.     Appearance: She is well-developed, well-groomed and normal weight. She is not ill-appearing.   HENT:      Head: Atraumatic.      " Mouth/Throat:      Mouth: Mucous membranes are moist.   Eyes:      General: No scleral icterus.     Conjunctiva/sclera: Conjunctivae normal.   Neck:      Thyroid: No thyroid mass.      Vascular: Normal carotid pulses. No carotid bruit.   Cardiovascular:      Rate and Rhythm: Normal rate and regular rhythm.      Pulses: Decreased pulses (pedal).      Heart sounds: Normal heart sounds.   Pulmonary:      Effort: Pulmonary effort is normal.      Breath sounds: Normal breath sounds.   Musculoskeletal:      Thoracic back: Deformity (kyphosis) present. Scoliosis present.      Lumbar back: Deformity (loss of normal lordosis) present. Negative right straight leg raise test and negative left straight leg raise test.      Right lower leg: No edema.      Left lower leg: No edema.   Lymphadenopathy:      Cervical: No cervical adenopathy.   Skin:     General: Skin is warm and dry.      Coloration: Skin is not jaundiced or pale.      Findings: No rash.   Neurological:      Mental Status: She is alert and oriented to person, place, and time. Mental status is at baseline.      Motor: No tremor.   Psychiatric:         Mood and Affect: Mood normal. Affect is flat.         Behavior: Behavior normal. Behavior is cooperative.         Cognition and Memory: Cognition normal.    Pt's previous physical exam reviewed and updated as indicated.    Lab Results   Component Value Date    HGBA1C 5.9 08/28/2023    HGBA1C 6.10 (H) 02/28/2023    HGBA1C 5.90 (H) 08/25/2022     Lab Results   Component Value Date    MICROALBUR 37.4 02/28/2023    CREATININE 1.00 08/28/2023     Lab Results   Component Value Date    WBC 4.46 02/28/2023    HGB 13.7 02/28/2023    HCT 39.7 02/28/2023    MCV 93.4 02/28/2023     02/28/2023       Lab Results   Component Value Date    GLUCOSE 94 08/28/2023    BUN 26 (H) 08/28/2023    CREATININE 1.00 08/28/2023    EGFRIFNONA 50 (L) 02/21/2022    EGFRIFAFRI 60 (L) 02/21/2022    BCR 26.0 (H) 08/28/2023    K 4.9 08/28/2023     CO2 23.7 08/28/2023    CALCIUM 9.0 08/28/2023    PROTENTOTREF 6.3 02/28/2023    ALBUMIN 4.3 02/28/2023    LABIL2 2.2 02/28/2023    AST 18 02/28/2023    ALT 17 02/28/2023       Lab Results   Component Value Date    CHOL 263 (H) 08/24/2021    CHLPL 214 (H) 02/28/2023    TRIG 55 02/28/2023    HDL 96 (H) 02/28/2023     (H) 02/28/2023     Lab Results   Component Value Date    TSH 3.310 02/28/2023                          Assessment and Plan   Diagnoses and all orders for this visit:    1. Medicare annual wellness visit, subsequent (Primary)    2. Primary generalized (osteo)arthritis  -     traMADol (ULTRAM) 50 MG tablet; Take 1 tablet by mouth Every 12 (Twelve) Hours As Needed for Severe Pain.  Dispense: 60 tablet; Refill: 0    3. Type 2 diabetes mellitus with diabetic neuropathy, without long-term current use of insulin  -     Basic Metabolic Panel  -     Cancel: Hemoglobin A1c  -     POC Glycosylated Hemoglobin (Hb A1C)    4. Hyponatremia  -     Basic Metabolic Panel    5. Encounter for screening mammogram for malignant neoplasm of breast  -     Mammo Screening Digital Tomosynthesis Bilateral With CAD; Future    6. Postmenopausal  -     DEXA Bone Density Axial; Future    7. Age-related osteoporosis without current pathological fracture  -     DEXA Bone Density Axial; Future    8. Chronic mixed headache syndrome    9. Gastroparesis    10. GERD without esophagitis    11. 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 Essential hypertension    13. PAD (peripheral artery disease)    14. Pure hypercholesterolemia    15. Copy of advanced directive obtained from patient    16. Encounter for preventive health examination      I have reviewed risks/benefits and potential side effects of various treatment options for chronic pain due to diffuse spinal DDD/DJD, comp fracture, etc. Pt voices understanding and wishes to proceed with tramadol as well as her usual  tylenol.  As part of patient's treatment plan I am prescribing a controlled substance.  The patient has been made aware of the appropriate use of such medications, including potential risk of somnolence, limited ability to drive and/or work safely, and potential for dependence and/or overdose.  It has also been made clear that these medications are for use by this patient only, without concomitant use of alcohol or other substances, unless prescribed.  Oumar reviewed.    Other chronic condition appear stable.    Age appropriate preventive care reviewed including cancer screenings, safety measures, mental health concerns, supplements, prevention of worsening CV disease and DM, etc. Handout provided.    Pt advised to eat a heart healthy diet and get regular aerobic exercise.         Follow Up   Return in about 6 months (around 2/28/2024).  F/u sooner as needed/instructed.  I will contact patient regarding test results and provide instructions regarding any necessary changes in plan of care.  Patient was encouraged to keep me informed of any acute changes, lack of improvement, or any new concerning symptoms..  Pt is aware of reasons to seek emergent care..  Patient voiced understanding of all instructions and denied further questions.    Patient was given instructions and counseling regarding her condition or for health maintenance advice. Please see specific information pulled into the AVS if appropriate.       Please note that portions of this note may have been completed with a voice recognition program.

## 2023-08-29 ENCOUNTER — TELEPHONE (OUTPATIENT)
Dept: FAMILY MEDICINE CLINIC | Facility: CLINIC | Age: 73
End: 2023-08-29

## 2023-08-29 LAB
BUN SERPL-MCNC: 26 MG/DL (ref 8–23)
BUN/CREAT SERPL: 26 (ref 7–25)
CALCIUM SERPL-MCNC: 9 MG/DL (ref 8.6–10.5)
CHLORIDE SERPL-SCNC: 91 MMOL/L (ref 98–107)
CO2 SERPL-SCNC: 23.7 MMOL/L (ref 22–29)
CREAT SERPL-MCNC: 1 MG/DL (ref 0.57–1)
EGFRCR SERPLBLD CKD-EPI 2021: 59.2 ML/MIN/1.73
GLUCOSE SERPL-MCNC: 94 MG/DL (ref 65–99)
POTASSIUM SERPL-SCNC: 4.9 MMOL/L (ref 3.5–5.2)
SODIUM SERPL-SCNC: 127 MMOL/L (ref 136–145)

## 2023-08-29 NOTE — TELEPHONE ENCOUNTER
Caller: Marifer Ricardo    Relationship: Self    Best call back number: [phone]    Requested Prescriptions:   Requested Prescriptions      No prescriptions requested or ordered in this encounter      traMADol (ULTRAM) 50 MG tablet     Pharmacy where request should be sent:      [id number] - [place], [place] - [address] - [phone]  - [phone] FX      Last office visit with prescribing clinician: 8/28/2023   Last telemedicine visit with prescribing clinician: Visit date not found   Next office visit with prescribing clinician: 2/28/2024     Additional details provided by patient:     [facility] STATES THEY DID NOT RECEIVE THE PRESCRIPTION YESTERDAY MORNING    Does the patient have less than a 3 day supply:  [] Yes  [x] No    Would you like a call back once the refill request has been completed: [] Yes [x] No    If the office needs to give you a call back, can they leave a voicemail: [] Yes [x] No    Jihan Tang, PCT   08/29/23 14:25 EDT

## 2023-08-30 NOTE — TELEPHONE ENCOUNTER
PATIENT STILL HAS NOT RECEIVED TRAMODOL DUE TO [facility] NOT HAVING MEDICATION IN STOCK, SO PATIENT IS REQUESTING ANOTHER MEDICATION TO LAST UNTIL [facility] GETS MEDICATION BACK IN STOCK    [facility] [address] [place] [address], [address] [phone] [facility] [phone]  [phone]

## 2023-09-05 DIAGNOSIS — E87.1 HYPONATREMIA: ICD-10-CM

## 2023-09-05 DIAGNOSIS — I10 ESSENTIAL HYPERTENSION: Primary | ICD-10-CM

## 2023-09-05 RX ORDER — VALSARTAN 160 MG/1
160 TABLET ORAL DAILY
Qty: 90 TABLET | Refills: 3 | Status: SHIPPED | OUTPATIENT
Start: 2023-09-05

## 2023-10-09 DIAGNOSIS — M15.0 PRIMARY GENERALIZED (OSTEO)ARTHRITIS: ICD-10-CM

## 2023-10-09 RX ORDER — TRAMADOL HYDROCHLORIDE 50 MG/1
50 TABLET ORAL EVERY 12 HOURS PRN
Qty: 60 TABLET | Refills: 1 | Status: SHIPPED | OUTPATIENT
Start: 2023-10-09

## 2023-12-19 DIAGNOSIS — M15.0 PRIMARY GENERALIZED (OSTEO)ARTHRITIS: ICD-10-CM

## 2023-12-19 DIAGNOSIS — I10 ESSENTIAL HYPERTENSION: ICD-10-CM

## 2023-12-19 DIAGNOSIS — I73.9 PAD (PERIPHERAL ARTERY DISEASE): Primary | ICD-10-CM

## 2023-12-19 DIAGNOSIS — E78.00 PURE HYPERCHOLESTEROLEMIA: ICD-10-CM

## 2023-12-19 RX ORDER — ATORVASTATIN CALCIUM 40 MG/1
40 TABLET, FILM COATED ORAL DAILY
Qty: 90 TABLET | Refills: 3 | Status: SHIPPED | OUTPATIENT
Start: 2023-12-19

## 2023-12-19 RX ORDER — ASPIRIN 81 MG/1
81 TABLET ORAL DAILY
Qty: 90 TABLET | Refills: 3 | Status: SHIPPED | OUTPATIENT
Start: 2023-12-19

## 2023-12-19 RX ORDER — CLOPIDOGREL BISULFATE 75 MG/1
75 TABLET ORAL DAILY
Qty: 90 TABLET | Refills: 3 | Status: SHIPPED | OUTPATIENT
Start: 2023-12-19

## 2023-12-19 RX ORDER — AMLODIPINE BESYLATE 5 MG/1
5 TABLET ORAL
Qty: 90 TABLET | Refills: 3 | Status: SHIPPED | OUTPATIENT
Start: 2023-12-19

## 2023-12-19 RX ORDER — CARVEDILOL 12.5 MG/1
12.5 TABLET ORAL 2 TIMES DAILY WITH MEALS
Qty: 180 TABLET | Refills: 3 | Status: SHIPPED | OUTPATIENT
Start: 2023-12-19

## 2023-12-19 RX ORDER — VALSARTAN 160 MG/1
160 TABLET ORAL DAILY
Qty: 90 TABLET | Refills: 3 | Status: SHIPPED | OUTPATIENT
Start: 2023-12-19

## 2023-12-19 NOTE — TELEPHONE ENCOUNTER
Caller: Marifer Ricardo    Relationship: Self    Best call back number: [phone]     Requested Prescriptions:   Requested Prescriptions     Pending Prescriptions Disp Refills    carvedilol (COREG) 12.5 MG tablet       Sig: Take 1 tablet by mouth 2 (Two) Times a Day With Meals.    clopidogrel (PLAVIX) 75 MG tablet 30 tablet      Sig: Take 1 tablet by mouth Daily.    valsartan (Diovan) 160 MG tablet 90 tablet 3     Sig: Take 1 tablet by mouth Daily.    amLODIPine (NORVASC) 5 MG tablet       Sig: Take 1 tablet by mouth every night at bedtime.    atorvastatin (LIPITOR) 40 MG tablet 90 tablet      Sig: Take 1 tablet by mouth Daily.    EQ Aspirin Adult Low Dose 81 MG EC tablet       Sig: Take 1 tablet by mouth Daily.        Pharmacy where request should be sent: [id number] [place] [address] [address] AT [facility] [address] - [phone]  - [phone] FX     Last office visit with prescribing clinician: 8/28/2023   Last telemedicine visit with prescribing clinician: Visit date not found   Next office visit with prescribing clinician: 2/28/2024       Does the patient have less than a 3 day supply:  [x] Yes  [] No    Would you like a call back once the refill request has been completed: [] Yes [x] No    If the office needs to give you a call back, can they leave a voicemail: [] Yes [x] No    Aleshia Phan Rep   12/19/23 14:48 EST

## 2023-12-19 NOTE — TELEPHONE ENCOUNTER
Rx Refill Note  Requested Prescriptions     Pending Prescriptions Disp Refills    carvedilol (COREG) 12.5 MG tablet       Sig: Take 1 tablet by mouth 2 (Two) Times a Day With Meals.    clopidogrel (PLAVIX) 75 MG tablet 30 tablet      Sig: Take 1 tablet by mouth Daily.    valsartan (Diovan) 160 MG tablet 90 tablet 3     Sig: Take 1 tablet by mouth Daily.    amLODIPine (NORVASC) 5 MG tablet       Sig: Take 1 tablet by mouth every night at bedtime.    atorvastatin (LIPITOR) 40 MG tablet 90 tablet      Sig: Take 1 tablet by mouth Daily.    EQ Aspirin Adult Low Dose 81 MG EC tablet       Sig: Take 1 tablet by mouth Daily.      Last office visit with prescribing clinician: 8/28/2023   Last telemedicine visit with prescribing clinician: Visit date not found   Next office visit with prescribing clinician: 2/28/2024                         Would you like a call back once the refill request has been completed: [] Yes [] No    If the office needs to give you a call back, can they leave a voicemail: [] Yes [] No    Araceli Royal MA  12/19/23, 15:36 EST

## 2024-05-17 NOTE — TELEPHONE ENCOUNTER
Patient stated she is wanting to restart the metformin for her jaw pain and headache.  She would like it sent to [facility] mail order.   Sent Wait List Letter VIA Mail. Verify patients need of services from wait list after 15 days. If no communication remove from NON-REFERRAL wait list.

## 2024-08-12 ENCOUNTER — APPOINTMENT (OUTPATIENT)
Dept: GENERAL RADIOLOGY | Facility: HOSPITAL | Age: 74
DRG: 392 | End: 2024-08-12
Payer: MEDICARE

## 2024-08-12 ENCOUNTER — APPOINTMENT (OUTPATIENT)
Dept: CT IMAGING | Facility: HOSPITAL | Age: 74
DRG: 392 | End: 2024-08-12
Payer: MEDICARE

## 2024-08-12 ENCOUNTER — HOSPITAL ENCOUNTER (INPATIENT)
Facility: HOSPITAL | Age: 74
LOS: 2 days | Discharge: HOME | DRG: 392 | End: 2024-08-16
Attending: EMERGENCY MEDICINE | Admitting: PEDIATRICS
Payer: MEDICARE

## 2024-08-12 DIAGNOSIS — I20.2 REFRACTORY ANGINA PECTORIS: Primary | ICD-10-CM

## 2024-08-12 DIAGNOSIS — R10.13 DYSPEPSIA: ICD-10-CM

## 2024-08-12 DIAGNOSIS — I24.81 ACUTE CORONARY MICROVASCULAR DYSFUNCTION: ICD-10-CM

## 2024-08-12 DIAGNOSIS — I10 ESSENTIAL HYPERTENSION: ICD-10-CM

## 2024-08-12 DIAGNOSIS — M15.0 PRIMARY GENERALIZED (OSTEO)ARTHRITIS: ICD-10-CM

## 2024-08-12 DIAGNOSIS — R55 SYNCOPE, UNSPECIFIED SYNCOPE TYPE: ICD-10-CM

## 2024-08-12 DIAGNOSIS — K21.9 GASTROESOPHAGEAL REFLUX DISEASE WITHOUT ESOPHAGITIS: ICD-10-CM

## 2024-08-12 DIAGNOSIS — R07.9 CHEST PAIN, UNSPECIFIED TYPE: ICD-10-CM

## 2024-08-12 DIAGNOSIS — I20.0 UNSTABLE ANGINA PECTORIS: ICD-10-CM

## 2024-08-12 DIAGNOSIS — I73.9 PAD (PERIPHERAL ARTERY DISEASE): ICD-10-CM

## 2024-08-12 LAB
ALBUMIN SERPL-MCNC: 3.9 G/DL (ref 3.5–5.2)
ALBUMIN/GLOB SERPL: 1.6 G/DL
ALP SERPL-CCNC: 78 U/L (ref 39–117)
ALT SERPL W P-5'-P-CCNC: 12 U/L (ref 1–33)
ANION GAP SERPL CALCULATED.3IONS-SCNC: 11 MMOL/L (ref 5–15)
AST SERPL-CCNC: 16 U/L (ref 1–32)
BASOPHILS # BLD AUTO: 0.05 10*3/MM3 (ref 0–0.2)
BASOPHILS NFR BLD AUTO: 0.9 % (ref 0–1.5)
BILIRUB SERPL-MCNC: 0.4 MG/DL (ref 0–1.2)
BUN SERPL-MCNC: 21 MG/DL (ref 8–23)
BUN/CREAT SERPL: 21.4 (ref 7–25)
CALCIUM SPEC-SCNC: 9.1 MG/DL (ref 8.6–10.5)
CHLORIDE SERPL-SCNC: 107 MMOL/L (ref 98–107)
CO2 SERPL-SCNC: 24 MMOL/L (ref 22–29)
CREAT SERPL-MCNC: 0.98 MG/DL (ref 0.57–1)
D DIMER PPP FEU-MCNC: <0.27 MCGFEU/ML (ref 0–0.75)
DEPRECATED RDW RBC AUTO: 46.3 FL (ref 37–54)
EGFRCR SERPLBLD CKD-EPI 2021: 60.3 ML/MIN/1.73
EOSINOPHIL # BLD AUTO: 0.09 10*3/MM3 (ref 0–0.4)
EOSINOPHIL NFR BLD AUTO: 1.7 % (ref 0.3–6.2)
ERYTHROCYTE [DISTWIDTH] IN BLOOD BY AUTOMATED COUNT: 12.6 % (ref 12.3–15.4)
GEN 5 2HR TROPONIN T REFLEX: 24 NG/L
GLOBULIN UR ELPH-MCNC: 2.5 GM/DL
GLUCOSE BLDC GLUCOMTR-MCNC: 76 MG/DL (ref 70–130)
GLUCOSE SERPL-MCNC: 158 MG/DL (ref 65–99)
HBA1C MFR BLD: 5.8 % (ref 4.8–5.6)
HCT VFR BLD AUTO: 42.4 % (ref 34–46.6)
HGB BLD-MCNC: 13.7 G/DL (ref 12–15.9)
HOLD SPECIMEN: NORMAL
IMM GRANULOCYTES # BLD AUTO: 0.01 10*3/MM3 (ref 0–0.05)
IMM GRANULOCYTES NFR BLD AUTO: 0.2 % (ref 0–0.5)
LIPASE SERPL-CCNC: 35 U/L (ref 13–60)
LYMPHOCYTES # BLD AUTO: 0.96 10*3/MM3 (ref 0.7–3.1)
LYMPHOCYTES NFR BLD AUTO: 18.1 % (ref 19.6–45.3)
MCH RBC QN AUTO: 31.8 PG (ref 26.6–33)
MCHC RBC AUTO-ENTMCNC: 32.3 G/DL (ref 31.5–35.7)
MCV RBC AUTO: 98.4 FL (ref 79–97)
MONOCYTES # BLD AUTO: 0.44 10*3/MM3 (ref 0.1–0.9)
MONOCYTES NFR BLD AUTO: 8.3 % (ref 5–12)
NEUTROPHILS NFR BLD AUTO: 3.75 10*3/MM3 (ref 1.7–7)
NEUTROPHILS NFR BLD AUTO: 70.8 % (ref 42.7–76)
NRBC BLD AUTO-RTO: 0 /100 WBC (ref 0–0.2)
NT-PROBNP SERPL-MCNC: 808.5 PG/ML (ref 0–1800)
PLATELET # BLD AUTO: 165 10*3/MM3 (ref 140–450)
PMV BLD AUTO: 10.1 FL (ref 6–12)
POTASSIUM SERPL-SCNC: 4.1 MMOL/L (ref 3.5–5.2)
PROT SERPL-MCNC: 6.4 G/DL (ref 6–8.5)
RBC # BLD AUTO: 4.31 10*6/MM3 (ref 3.77–5.28)
SODIUM SERPL-SCNC: 142 MMOL/L (ref 136–145)
TROPONIN T DELTA: 4 NG/L
TROPONIN T SERPL HS-MCNC: 20 NG/L
WBC NRBC COR # BLD AUTO: 5.3 10*3/MM3 (ref 3.4–10.8)
WHOLE BLOOD HOLD COAG: NORMAL
WHOLE BLOOD HOLD SPECIMEN: NORMAL

## 2024-08-12 PROCEDURE — 93005 ELECTROCARDIOGRAM TRACING: CPT | Performed by: EMERGENCY MEDICINE

## 2024-08-12 PROCEDURE — 83690 ASSAY OF LIPASE: CPT | Performed by: EMERGENCY MEDICINE

## 2024-08-12 PROCEDURE — G0378 HOSPITAL OBSERVATION PER HR: HCPCS

## 2024-08-12 PROCEDURE — 82948 REAGENT STRIP/BLOOD GLUCOSE: CPT

## 2024-08-12 PROCEDURE — 71045 X-RAY EXAM CHEST 1 VIEW: CPT

## 2024-08-12 PROCEDURE — 93005 ELECTROCARDIOGRAM TRACING: CPT

## 2024-08-12 PROCEDURE — 85025 COMPLETE CBC W/AUTO DIFF WBC: CPT | Performed by: EMERGENCY MEDICINE

## 2024-08-12 PROCEDURE — 80053 COMPREHEN METABOLIC PANEL: CPT | Performed by: EMERGENCY MEDICINE

## 2024-08-12 PROCEDURE — 99285 EMERGENCY DEPT VISIT HI MDM: CPT

## 2024-08-12 PROCEDURE — 83880 ASSAY OF NATRIURETIC PEPTIDE: CPT | Performed by: EMERGENCY MEDICINE

## 2024-08-12 PROCEDURE — 36415 COLL VENOUS BLD VENIPUNCTURE: CPT

## 2024-08-12 PROCEDURE — 99222 1ST HOSP IP/OBS MODERATE 55: CPT | Performed by: STUDENT IN AN ORGANIZED HEALTH CARE EDUCATION/TRAINING PROGRAM

## 2024-08-12 PROCEDURE — 83036 HEMOGLOBIN GLYCOSYLATED A1C: CPT | Performed by: STUDENT IN AN ORGANIZED HEALTH CARE EDUCATION/TRAINING PROGRAM

## 2024-08-12 PROCEDURE — 85379 FIBRIN DEGRADATION QUANT: CPT | Performed by: EMERGENCY MEDICINE

## 2024-08-12 PROCEDURE — 84484 ASSAY OF TROPONIN QUANT: CPT | Performed by: EMERGENCY MEDICINE

## 2024-08-12 PROCEDURE — 70450 CT HEAD/BRAIN W/O DYE: CPT

## 2024-08-12 RX ORDER — NITROGLYCERIN 0.4 MG/1
0.4 TABLET SUBLINGUAL
Status: DISCONTINUED | OUTPATIENT
Start: 2024-08-12 | End: 2024-08-16 | Stop reason: HOSPADM

## 2024-08-12 RX ORDER — VALSARTAN 160 MG/1
160 TABLET ORAL DAILY
Status: DISCONTINUED | OUTPATIENT
Start: 2024-08-13 | End: 2024-08-16 | Stop reason: HOSPADM

## 2024-08-12 RX ORDER — OXYCODONE HYDROCHLORIDE AND ACETAMINOPHEN 5; 325 MG/1; MG/1
1 TABLET ORAL EVERY 6 HOURS PRN
Status: DISCONTINUED | OUTPATIENT
Start: 2024-08-12 | End: 2024-08-16 | Stop reason: HOSPADM

## 2024-08-12 RX ORDER — CARVEDILOL 12.5 MG/1
25 TABLET ORAL EVERY 12 HOURS SCHEDULED
Status: DISCONTINUED | OUTPATIENT
Start: 2024-08-12 | End: 2024-08-16 | Stop reason: HOSPADM

## 2024-08-12 RX ORDER — ATORVASTATIN CALCIUM 40 MG/1
40 TABLET, FILM COATED ORAL NIGHTLY
Status: DISCONTINUED | OUTPATIENT
Start: 2024-08-12 | End: 2024-08-16 | Stop reason: HOSPADM

## 2024-08-12 RX ORDER — TURMERIC 400 MG
400 CAPSULE ORAL DAILY
COMMUNITY

## 2024-08-12 RX ORDER — SODIUM CHLORIDE 0.9 % (FLUSH) 0.9 %
10 SYRINGE (ML) INJECTION AS NEEDED
Status: DISCONTINUED | OUTPATIENT
Start: 2024-08-12 | End: 2024-08-16 | Stop reason: HOSPADM

## 2024-08-12 RX ORDER — ASPIRIN 81 MG/1
81 TABLET ORAL DAILY
Status: DISCONTINUED | OUTPATIENT
Start: 2024-08-13 | End: 2024-08-16 | Stop reason: HOSPADM

## 2024-08-12 RX ORDER — CARVEDILOL 6.25 MG/1
12.5 TABLET ORAL EVERY 12 HOURS SCHEDULED
Status: DISCONTINUED | OUTPATIENT
Start: 2024-08-12 | End: 2024-08-12

## 2024-08-12 RX ORDER — BISACODYL 10 MG
10 SUPPOSITORY, RECTAL RECTAL DAILY PRN
Status: DISCONTINUED | OUTPATIENT
Start: 2024-08-12 | End: 2024-08-16 | Stop reason: HOSPADM

## 2024-08-12 RX ORDER — IBUPROFEN 600 MG/1
1 TABLET ORAL
Status: DISCONTINUED | OUTPATIENT
Start: 2024-08-12 | End: 2024-08-16 | Stop reason: HOSPADM

## 2024-08-12 RX ORDER — NICOTINE POLACRILEX 4 MG
15 LOZENGE BUCCAL
Status: DISCONTINUED | OUTPATIENT
Start: 2024-08-12 | End: 2024-08-16 | Stop reason: HOSPADM

## 2024-08-12 RX ORDER — POLYETHYLENE GLYCOL 3350 17 G/17G
17 POWDER, FOR SOLUTION ORAL DAILY PRN
Status: DISCONTINUED | OUTPATIENT
Start: 2024-08-12 | End: 2024-08-16 | Stop reason: HOSPADM

## 2024-08-12 RX ORDER — ALENDRONATE SODIUM 70 MG/1
70 TABLET ORAL
COMMUNITY

## 2024-08-12 RX ORDER — FAMOTIDINE 20 MG/1
20 TABLET, FILM COATED ORAL
Status: DISCONTINUED | OUTPATIENT
Start: 2024-08-12 | End: 2024-08-14

## 2024-08-12 RX ORDER — BISACODYL 5 MG/1
5 TABLET, DELAYED RELEASE ORAL DAILY PRN
Status: DISCONTINUED | OUTPATIENT
Start: 2024-08-12 | End: 2024-08-16 | Stop reason: HOSPADM

## 2024-08-12 RX ORDER — SODIUM CHLORIDE 9 MG/ML
40 INJECTION, SOLUTION INTRAVENOUS AS NEEDED
Status: DISCONTINUED | OUTPATIENT
Start: 2024-08-12 | End: 2024-08-16 | Stop reason: HOSPADM

## 2024-08-12 RX ORDER — INSULIN LISPRO 100 [IU]/ML
2-7 INJECTION, SOLUTION INTRAVENOUS; SUBCUTANEOUS
Status: DISCONTINUED | OUTPATIENT
Start: 2024-08-12 | End: 2024-08-16 | Stop reason: HOSPADM

## 2024-08-12 RX ORDER — ASPIRIN 81 MG/1
324 TABLET, CHEWABLE ORAL ONCE
Status: COMPLETED | OUTPATIENT
Start: 2024-08-12 | End: 2024-08-12

## 2024-08-12 RX ORDER — CLOPIDOGREL BISULFATE 75 MG/1
75 TABLET ORAL DAILY
Status: DISCONTINUED | OUTPATIENT
Start: 2024-08-13 | End: 2024-08-16 | Stop reason: HOSPADM

## 2024-08-12 RX ORDER — DEXTROSE MONOHYDRATE 25 G/50ML
25 INJECTION, SOLUTION INTRAVENOUS
Status: DISCONTINUED | OUTPATIENT
Start: 2024-08-12 | End: 2024-08-16 | Stop reason: HOSPADM

## 2024-08-12 RX ORDER — AMOXICILLIN 250 MG
2 CAPSULE ORAL 2 TIMES DAILY PRN
Status: DISCONTINUED | OUTPATIENT
Start: 2024-08-12 | End: 2024-08-16 | Stop reason: HOSPADM

## 2024-08-12 RX ORDER — OXYCODONE HYDROCHLORIDE AND ACETAMINOPHEN 5; 325 MG/1; MG/1
1 TABLET ORAL EVERY 12 HOURS PRN
COMMUNITY

## 2024-08-12 RX ORDER — AMLODIPINE BESYLATE 5 MG/1
5 TABLET ORAL
Status: DISCONTINUED | OUTPATIENT
Start: 2024-08-13 | End: 2024-08-12

## 2024-08-12 RX ORDER — PANTOPRAZOLE SODIUM 40 MG/1
40 TABLET, DELAYED RELEASE ORAL
Status: DISCONTINUED | OUTPATIENT
Start: 2024-08-12 | End: 2024-08-14

## 2024-08-12 RX ORDER — HYDRALAZINE HYDROCHLORIDE 50 MG/1
50 TABLET, FILM COATED ORAL ONCE
Status: COMPLETED | OUTPATIENT
Start: 2024-08-12 | End: 2024-08-12

## 2024-08-12 RX ORDER — AMLODIPINE BESYLATE 2.5 MG/1
2.5 TABLET ORAL
Status: DISCONTINUED | OUTPATIENT
Start: 2024-08-13 | End: 2024-08-12

## 2024-08-12 RX ORDER — AMLODIPINE BESYLATE 5 MG/1
5 TABLET ORAL
Status: DISCONTINUED | OUTPATIENT
Start: 2024-08-13 | End: 2024-08-16 | Stop reason: HOSPADM

## 2024-08-12 RX ORDER — SODIUM CHLORIDE 0.9 % (FLUSH) 0.9 %
10 SYRINGE (ML) INJECTION EVERY 12 HOURS SCHEDULED
Status: DISCONTINUED | OUTPATIENT
Start: 2024-08-12 | End: 2024-08-16 | Stop reason: HOSPADM

## 2024-08-12 RX ADMIN — CARVEDILOL 25 MG: 12.5 TABLET, FILM COATED ORAL at 18:44

## 2024-08-12 RX ADMIN — HYDRALAZINE HYDROCHLORIDE 50 MG: 50 TABLET ORAL at 22:12

## 2024-08-12 RX ADMIN — ASPIRIN 81 MG 324 MG: 81 TABLET ORAL at 12:06

## 2024-08-12 RX ADMIN — ATORVASTATIN CALCIUM 40 MG: 40 TABLET, FILM COATED ORAL at 22:12

## 2024-08-12 RX ADMIN — FAMOTIDINE 20 MG: 20 TABLET, FILM COATED ORAL at 18:44

## 2024-08-12 RX ADMIN — PANTOPRAZOLE SODIUM 40 MG: 40 TABLET, DELAYED RELEASE ORAL at 18:44

## 2024-08-12 RX ADMIN — Medication 10 ML: at 22:12

## 2024-08-12 NOTE — H&P
[facility] Medicine Services  HISTORY AND PHYSICAL    Patient Name: Marifer Ricardo  : 1950  MRN: [id number]  Primary Care Physician: Norma Spangler APRN  Date of admission: 2024      Subjective   Subjective     Chief Complaint:  Chest pain, indigestion    HPI:  Marifer Ricardo is a 74 y.o. female with PMHx of hypertension, hyperlipidemia, GERD, OA, PAD, diabetes mellitus type 2 who presented to the ED with complaint of squeezing chest pain radiating into the left neck with left arm numbness, indigestion, belching.    History obtained from patient in the ER, patient is a very good historian. Patient reports that she was in [place] last week visiting her grandson. Patient also notes that she felt very lightheaded that day every time that she got up from a seated position. Had an episode of indigestion/chest pain that preceded a syncopal episode. She was seated on the couch and passed out. Daughter and grandson were present during this episode. Patient did not get evaluated at that time.     Felt well over the last few days. This morning around 9 AM, patient started having indigestion, loud belching which turned into squeezing chest pain that radiated into her neck on the left side. Left arm became numb during this episode. Also had an episode of lightheadedness with rising from seated position. Symptoms persisted so she came to the ED to be evaluated. Outside of last week, has never had symptoms like this before.       Personal History     Past Medical History:   Diagnosis Date    Abdominal pain     Abnormal bone density screening 2014    osteopenia    Arthritis     Asthma     Belching     Body piercing     BOTH EARS    Cataract, bilateral     Chronic renal insufficiency 2016    Diabetes mellitus     Essential hypertension 2016    GERD (gastroesophageal reflux disease)     Hx of mammogram     Hyperparathyroidism     Mastoiditis     Pregnancy       s/p  x 3    Renal insufficiency     Urticaria     Wears glasses     Weight loss            Past Surgical History:   Procedure Laterality Date    BYPASS GRAFT SUBCLAVIAN      LEFT SUBCLAVIAN TO RIGHT ATRIUM VENOUS GRAFT in past for ?TPN    CATARACT EXTRACTION, BILATERAL      COLECTOMY PARTIAL / TOTAL      Pt reports multiple abd surgeries for ?pseudo-bowel obstruction    COLONOSCOPY      COLONOSCOPY N/A 2018    Procedure: COLONOSCOPY WITH RANDOM COLON BIOPSIES;  Surgeon: Taras Morillo MD;  Location: [facility] ENDOSCOPY;  Service:     COLOSTOMY      and revision    ENDOSCOPY N/A 2018    Procedure: ESOPHAGOGASTRODUODENOSCOPY WITH BIOPSIES;  Surgeon: Taras Morillo MD;  Location: [facility] ENDOSCOPY;  Service:     HYSTERECTOMY      age 31 for DUB, ovaries intact    TONSILLECTOMY AND ADENOIDECTOMY         Family History: family history includes Arthritis in her mother; Cancer in her cousin, maternal aunt, and paternal aunt; Diabetes in her maternal grandmother and mother; Heart attack in her father; Heart disease in her father; Hypertension in her mother; Migraines in her mother; Stroke in her mother; Thyroid disease in her mother; Tuberculosis in her mother.     Social History:  reports that she has never smoked. She has never been exposed to tobacco smoke. She has never used smokeless tobacco. She reports that she does not drink alcohol and does not use drugs.  Social History     Social History Narrative    Not on file       Medications:  Available home medication information reviewed.  Biotin, Cinnamon, Omega 3-6-9 Fatty Acids, Turmeric, Vitamin B-12, alendronate, amLODIPine, aspirin, atorvastatin, carvedilol, cetirizine, cholecalciferol, clopidogrel, oxyCODONE-acetaminophen, and valsartan    Allergies   Allergen Reactions    Penicillins Anaphylaxis    Betadine [Povidone Iodine] Hives    Codeine Hives    Contrast Dye (Echo Or Unknown Ct/Mr) Hives    Metoclopramide Hives    Norco  [Hydrocodone-Acetaminophen] GI Intolerance       Objective   Objective     Vital Signs:   Temp:  [97.9 °F (36.6 °C)] 97.9 °F (36.6 °C)  Heart Rate:  [68-88] 81  Resp:  [16] 16  BP: (143-203)/() 203/110       Physical Exam   Constitutional: Awake, alert, resting comfortably  HENT: NCAT, mucous membranes dry  Respiratory: Clear to auscultation bilaterally, respiratory effort normal   Cardiovascular: RRR, no murmurs, rubs, or gallops  Gastrointestinal: soft, nontender, nondistended  Musculoskeletal: No bilateral ankle edema  Psychiatric: Appropriate affect, cooperative  Neurologic: Alert and oriented x 3, no focal deficits, speech clear  Skin: No rashes      Result Review:  I have personally reviewed the results from the time of this admission to 8/12/2024 18:56 EDT and agree with these findings:  [x]  Laboratory list / accordion  []  Microbiology  [x]  Radiology  [x]  EKG/Telemetry   []  Cardiology/Vascular   []  Pathology  []  Old records  []  Other:      LAB RESULTS:      Lab 08/12/24  1228   WBC 5.30   HEMOGLOBIN 13.7   HEMATOCRIT 42.4   PLATELETS 165   NEUTROS ABS 3.75   IMMATURE GRANS (ABS) 0.01   LYMPHS ABS 0.96   MONOS ABS 0.44   EOS ABS 0.09   MCV 98.4*   D DIMER QUANT <0.27         Lab 08/12/24  1228   SODIUM 142   POTASSIUM 4.1   CHLORIDE 107   CO2 24.0   ANION GAP 11.0   BUN 21   CREATININE 0.98   EGFR 60.3   GLUCOSE 158*   CALCIUM 9.1         Lab 08/12/24  1228   TOTAL PROTEIN 6.4   ALBUMIN 3.9   GLOBULIN 2.5   ALT (SGPT) 12   AST (SGOT) 16   BILIRUBIN 0.4   ALK PHOS 78   LIPASE 35         Lab 08/12/24  1513 08/12/24  1228   PROBNP  --  808.5   HSTROP T 24* 20*                     Microbiology Results (last 10 days)       ** No results found for the last 240 hours. **            CT Head Without Contrast    Result Date: 8/12/2024  CT HEAD WO CONTRAST Date of Exam: 8/12/2024 3:25 PM EDT Indication: syncope. Comparison: None available. Technique: Axial CT images were obtained of the head without  contrast administration.  Automated exposure control and iterative construction methods were used. Findings: No acute intracranial hemorrhage or extra-axial collection is identified. The ventricles appear normal in caliber, with no evidence of mass effect or midline shift. The basal cisterns appear patent. The gray-white differentiation appears preserved. The calvarium appear intact. The paranasal sinuses are clear. The mastoid air cells are well-aerated.     Impression: Impression: No acute intracranial process identified. Electronically Signed: Daniel Gross MD  8/12/2024 3:35 PM EDT  Workstation ID: [id number]    XR Chest 1 View    Result Date: 8/12/2024  XR CHEST 1 VW Date of Exam: 8/12/2024 11:42 AM EDT Indication: Chest Pain Triage Protocol Comparison: Chest CT 6/1/2022, chest radiograph 6/1/2022. Findings: Cardiomediastinal silhouette is within normal limits. Benign calcified granuloma in the right lower lobe. Mild chronic/senescent changes of the lungs. No focal consolidation or overt pulmonary edema. No pleural effusion or pneumothorax. Osseous structures are unchanged. Surgical clips project over the right hilar region.     Impression: Impression: No evidence of acute cardiopulmonary disease. Electronically Signed: Yovani Jones MD  8/12/2024 12:22 PM EDT  Workstation ID: [id number]     Results for orders placed during the hospital encounter of 06/01/22    Adult Transthoracic Echo Complete w/ Color, Spectral and Contrast if necessary per protocol    Interpretation Summary  1.  Normal left ventricular size and systolic function, LVEF 65-70%.  2.  Normal LV diastolic filling pattern.  3.  Normal right ventricular size and systolic function.  4.  Normal left atrial volume index.  5.  Mild tricuspid regurgitation, RVSP 19 mmHg.      Assessment & Plan   Assessment & Plan       Chest pain    Syncope    Marifer Ricardo is a 74 y.o. female with PMHx of hypertension, hyperlipidemia, GERD, OA, PAD, diabetes  mellitus type 2 who presented to the ED with complaint of squeezing chest pain radiating into the left neck with left arm numbness, indigestion, belching.    Atypical chest pain  Indigestion, belching   -squeezing chest pain with left neck pain and left arm numbness, indigestion, belching - worse with rising from seated/laying position   -ER discussed with Dr. Harrington who recommend to trend troponins overnight and will see in AM.  -EKG without acute ST-T changes  -Will update echocardiogram. Cards to see in AM. Continue home ASA, Plavix, atorvastatin. Start PPI and Pepcid.     Hypertensive Urgency  -Asymptomatic, took all of her blood pressure medications 8/12 AM  -was 190s/90s on manual cuff in ED  -Will give oral hydralazine 50 mg x1 now. Continue home carvedilol 25 mg BID, valsartan 160 mg daily. Increased home amlodipine to 5 mg daily. Cards to see in AM.    Elevated troponin  -likely secondary to uncontrolled HTN  -Troponin 20->24, no acute EKG changes   -Will trend troponin overnight.     Recent syncopal episode  Ongoing lightheadedness   -Unclear etiology, syncope x1 last week with chest pain/indigestion before episode   -Check orthostatic vital signs and echo. Hold off on fluids given blood pressure significantly elevated on admission.     PAD   -Continue ASA, Plavix, atorvastatin.    DM2 with hyperglycemia  -not on home medications  -Check A1c. Low dose SSI while admitted.     Chronic pain on chronic opioids  -Continue home Percocet.       VTE Prophylaxis:  No VTE prophylaxis order currently exists.      CODE STATUS:    Code Status and Medical Interventions: No CPR (Do Not Attempt to Resuscitate); Limited Support; No intubation (DNI)   Ordered at: 08/12/24 1824     Medical Intervention Limits:    No intubation (DNI)     Code Status (Patient has no pulse and is not breathing):    No CPR (Do Not Attempt to Resuscitate)     Medical Interventions (Patient has pulse or is breathing):    Limited Support        Expected Discharge   Expected Discharge Date: 8/13/2024; Expected Discharge Time:      Jennyfer Haney DO  08/12/24

## 2024-08-12 NOTE — ED NOTES
Marifer Ricardo    Nursing Report ED to Floor:  Mental status: A&O  Ambulatory status: up with one  Oxygen Therapy:  RA  Cardiac Rhythm: NSR  Admitted from: ER  Safety Concerns:  None  Social Issues: None  ED Room #:  9    ED Nurse Phone Extension - [phone] or may call [phone].      HPI:   Chief Complaint   Patient presents with    Chest Pain       Past Medical History:  Past Medical History:   Diagnosis Date    Abdominal pain     Abnormal bone density screening 2014    osteopenia    Arthritis     Asthma     Belching     Body piercing     BOTH EARS    Cataract, bilateral     Chronic renal insufficiency 2016    Diabetes mellitus     Essential hypertension 2016    GERD (gastroesophageal reflux disease)     Hx of mammogram     Hyperparathyroidism     Mastoiditis     Pregnancy      s/p  x 3    Renal insufficiency     Urticaria     Wears glasses     Weight loss         Past Surgical History:  Past Surgical History:   Procedure Laterality Date    BYPASS GRAFT SUBCLAVIAN      LEFT SUBCLAVIAN TO RIGHT ATRIUM VENOUS GRAFT in past for ?TPN    CATARACT EXTRACTION, BILATERAL      COLECTOMY PARTIAL / TOTAL      Pt reports multiple abd surgeries for ?pseudo-bowel obstruction    COLONOSCOPY      COLONOSCOPY N/A 2018    Procedure: COLONOSCOPY WITH RANDOM COLON BIOPSIES;  Surgeon: Taras Morillo MD;  Location: [facility] ENDOSCOPY;  Service:     COLOSTOMY      and revision    ENDOSCOPY N/A 2018    Procedure: ESOPHAGOGASTRODUODENOSCOPY WITH BIOPSIES;  Surgeon: Taras Morillo MD;  Location: [facility] ENDOSCOPY;  Service:     HYSTERECTOMY      age 31 for DUB, ovaries intact    TONSILLECTOMY AND ADENOIDECTOMY          Admitting Doctor:   Jennyfer Haney DO    Consulting Provider(s):  Consults       Date and Time Order Name Status Description    2024  6:24 PM Inpatient Cardiology Consult               Admitting Diagnosis:   The primary encounter diagnosis was Chest pain, unspecified type. A  diagnosis of Syncope, unspecified syncope type was also pertinent to this visit.    Most Recent Vitals:   Vitals:    08/12/24 1644 08/12/24 1700 08/12/24 1800 08/12/24 1900   BP: (!) 189/108 175/95 (!) 203/110 (!) 184/119   BP Location:       Patient Position:       Pulse: 73 68 81 72   Resp:       Temp:       TempSrc:       SpO2: 96% 97% 96% 95%   Weight:       Height:           Active LDAs/IV Access:   Lines, Drains & Airways       Active LDAs       Name Placement date Placement time Site Days    Peripheral IV Left;Posterior Arm --  --  Arm  --                    Labs (abnormal labs have a star):   Labs Reviewed   TROPONIN - Abnormal; Notable for the following components:       Result Value    HS Troponin T 20 (*)     All other components within normal limits    Narrative:     High Sensitive Troponin T Reference Range:  <14.0 ng/L- Negative Female for AMI  <22.0 ng/L- Negative Male for AMI  >=14 - Abnormal Female indicating possible myocardial injury.  >=22 - Abnormal Male indicating possible myocardial injury.   Clinicians would have to utilize clinical acumen, EKG, Troponin, and serial changes to determine if it is an Acute Myocardial Infarction or myocardial injury due to an underlying chronic condition.        COMPREHENSIVE METABOLIC PANEL - Abnormal; Notable for the following components:    Glucose 158 (*)     All other components within normal limits    Narrative:     GFR Normal >60  Chronic Kidney Disease <60  Kidney Failure <15    The GFR formula is only valid for adults with stable renal function between ages 18 and 70.   CBC WITH AUTO DIFFERENTIAL - Abnormal; Notable for the following components:    MCV 98.4 (*)     Lymphocyte % 18.1 (*)     All other components within normal limits   HIGH SENSITIVITIY TROPONIN T 2HR - Abnormal; Notable for the following components:    HS Troponin T 24 (*)     Troponin T Delta 4 (*)     All other components within normal limits    Narrative:     High Sensitive Troponin T  Reference Range:  <14.0 ng/L- Negative Female for AMI  <22.0 ng/L- Negative Male for AMI  >=14 - Abnormal Female indicating possible myocardial injury.  >=22 - Abnormal Male indicating possible myocardial injury.   Clinicians would have to utilize clinical acumen, EKG, Troponin, and serial changes to determine if it is an Acute Myocardial Infarction or myocardial injury due to an underlying chronic condition.        LIPASE - Normal   BNP (IN-HOUSE) - Normal    Narrative:     This assay is used as an aid in the diagnosis of individuals suspected of having heart failure. It can be used as an aid in the diagnosis of acute decompensated heart failure (ADHF) in patients presenting with signs and symptoms of ADHF to the emergency department (ED). In addition, NT-proBNP of <300 pg/mL indicates ADHF is not likely.    Age Range Result Interpretation  NT-proBNP Concentration (pg/mL:      <50             Positive            >450                   Gray                 300-450                    Negative             <300    50-75           Positive            >900                  Gray                300-900                  Negative            <300      >75             Positive            >1800                  Gray                300-1800                  Negative            <300   D-DIMER, QUANTITATIVE - Normal    Narrative:     According to the assay 's published package insert, a normal (<0.50 MCGFEU/mL) D-dimer result in conjunction with a non-high clinical probability assessment, excludes deep vein thrombosis (DVT) and pulmonary embolism (PE) with high sensitivity.    D-dimer values increase with age and this can make VTE exclusion of an older population difficult. To address this, the [employer], based on best available evidence and recent guidelines, recommends that clinicians use age-adjusted D-dimer thresholds in patients greater than 50 years of age with: a) a low probability of PE who  "do not meet all Pulmonary Embolism Rule Out Criteria, or b) in those with intermediate probability of PE.   The formula for an age-adjusted D-dimer cut-off is \"age/100\".  For example, a 60 year old patient would have an age-adjusted cut-off of 0.60 MCGFEU/mL and an 80 year old 0.80 MCGFEU/mL.   RAINBOW DRAW    Narrative:     The following orders were created for panel order [place] Draw.  Procedure                               Abnormality         Status                     ---------                               -----------         ------                     Green Top (Gel)[[id number]]                                  Final result               Lavender Top[[id number]]                                     Final result               Gold Top - SST[[id number]]                                   Final result               Verduzco Top[[id number]]                                         Final result               Light Blue Top[[id number]]                                   Final result                 Please view results for these tests on the individual orders.   HEMOGLOBIN A1C   TROPONIN   TROPONIN   POCT GLUCOSE FINGERSTICK   POCT GLUCOSE FINGERSTICK   POCT GLUCOSE FINGERSTICK   POCT GLUCOSE FINGERSTICK   CBC AND DIFFERENTIAL    Narrative:     The following orders were created for panel order CBC & Differential.  Procedure                               Abnormality         Status                     ---------                               -----------         ------                     CBC Auto Differential[[id number]]        Abnormal            Final result                 Please view results for these tests on the individual orders.   GREEN TOP   LAVENDER TOP   GOLD TOP - SST   GRAY TOP   LIGHT BLUE TOP       Meds Given in ED:   Medications   sodium chloride 0.9 % flush 10 mL (has no administration in time range)   sodium chloride 0.9 % flush 10 mL (has no administration in time range)   sodium chloride 0.9 % flush 10 mL (has " no administration in time range)   sodium chloride 0.9 % infusion 40 mL (has no administration in time range)   nitroglycerin (NITROSTAT) SL tablet 0.4 mg (has no administration in time range)   Potassium Replacement - Follow Nurse / BPA Driven Protocol (has no administration in time range)   Magnesium Standard Dose Replacement - Follow Nurse / BPA Driven Protocol (has no administration in time range)   Phosphorus Replacement - Follow Nurse / BPA Driven Protocol (has no administration in time range)   Calcium Replacement - Follow Nurse / BPA Driven Protocol (has no administration in time range)   sennosides-docusate (PERICOLACE) 8.6-50 MG per tablet 2 tablet (has no administration in time range)     And   polyethylene glycol (MIRALAX) packet 17 g (has no administration in time range)     And   bisacodyl (DULCOLAX) EC tablet 5 mg (has no administration in time range)     And   bisacodyl (DULCOLAX) suppository 10 mg (has no administration in time range)   aspirin EC tablet 81 mg (has no administration in time range)   atorvastatin (LIPITOR) tablet 40 mg (has no administration in time range)   clopidogrel (PLAVIX) tablet 75 mg (has no administration in time range)   valsartan (DIOVAN) tablet 160 mg (has no administration in time range)   carvedilol (COREG) tablet 25 mg (25 mg Oral Given 8/12/24 1844)   oxyCODONE-acetaminophen (PERCOCET) 5-325 MG per tablet 1 tablet (has no administration in time range)   amLODIPine (NORVASC) tablet 5 mg (has no administration in time range)   pantoprazole (PROTONIX) EC tablet 40 mg (40 mg Oral Given 8/12/24 1844)   famotidine (PEPCID) tablet 20 mg (20 mg Oral Given 8/12/24 1844)   hydrALAZINE (APRESOLINE) tablet 50 mg (has no administration in time range)   dextrose (GLUTOSE) oral gel 15 g (has no administration in time range)   dextrose (D50W) (25 g/50 mL) IV injection 25 g (has no administration in time range)   glucagon (GLUCAGEN) injection 1 mg (has no administration in time range)    Insulin Lispro (humaLOG) injection 2-7 Units (has no administration in time range)   aspirin chewable tablet 324 mg (324 mg Oral Given 8/12/24 1206)           Last NIH score:                                                          Dysphagia screening results:        [place] Coma Scale:  No data recorded     CIWA:        Restraint Type:            Isolation Status:  No active isolations

## 2024-08-12 NOTE — Clinical Note
Level of Care: Telemetry [5]   Diagnosis: Syncope [206001]   Admitting Physician: ALTFA NUR [[id number]]   Attending Physician: ALTAF NUR [[id number]]   Bed Request Comments: CDU

## 2024-08-12 NOTE — ED PROVIDER NOTES
Subjective   History of Present Illness  74-year-old female presents for evaluation of chest pain with associated syncope.  The patient has a history of multiple syncopal episodes.  Discussed concern for possible vasovagal episode.  However she has had 2 syncopal episodes within the last week which is much more frequent and irregular relative to her previous history.  With both episodes she would have pain in the center of her chestWith radiation into her back and into her left upper extremity.  Her most recent episode prior to the day was roughly 2 weeks ago.  She has wore a Holter monitor evaluation in the past due to these episodes but they have not yet obtained the results.  She also has been diagnosed with peripheral artery disease of the lower extremities and therefore does take Plavix and aspirin on daily basis but does not take an anticoagulant.  She has not been diagnosed with an abnormal rhythm such as A-fib.  No previous history of DVT or PE.  No history of coronary artery disease or previous coronary intervention.  She is awake and alert upon current presentation.  No fever or infectious symptoms.  No sick contacts.  She provides detailed history and has a normal intact neurological exam.  No current chest pain or abdominal pain.  No other acute complaints.      Review of Systems   Constitutional:  Negative for chills, fatigue and fever.   HENT:  Negative for congestion, ear pain, postnasal drip, sinus pressure and sore throat.    Eyes:  Negative for pain, redness and visual disturbance.   Respiratory:  Negative for cough, chest tightness and shortness of breath.    Cardiovascular:  Positive for chest pain. Negative for palpitations and leg swelling.   Gastrointestinal:  Negative for abdominal pain, anal bleeding, blood in stool, diarrhea, nausea and vomiting.   Endocrine: Negative for polydipsia and polyuria.   Genitourinary:  Negative for difficulty urinating, dysuria, frequency and urgency.    Musculoskeletal:  Negative for arthralgias, back pain and neck pain.   Skin:  Negative for pallor and rash.   Allergic/Immunologic: Negative for environmental allergies and immunocompromised state.   Neurological:  Positive for syncope. Negative for dizziness, weakness and headaches.   Hematological:  Negative for adenopathy.   Psychiatric/Behavioral:  Negative for confusion, self-injury and suicidal ideas. The patient is not nervous/anxious.    All other systems reviewed and are negative.      Past Medical History:   Diagnosis Date    Abdominal pain     Abnormal bone density screening 2014    osteopenia    Arthritis     Asthma     Belching     Body piercing     BOTH EARS    Cataract, bilateral     Chronic renal insufficiency 2016    Diabetes mellitus     Essential hypertension 2016    GERD (gastroesophageal reflux disease)     Hx of mammogram 2009    Hyperparathyroidism     Mastoiditis     Pregnancy      s/p  x 3    Renal insufficiency     Urticaria     Wears glasses     Weight loss        Allergies   Allergen Reactions    Penicillins Anaphylaxis    Betadine [Povidone Iodine] Hives    Codeine Hives    Contrast Dye (Echo Or Unknown Ct/Mr) Hives    Metoclopramide Hives    Norco [Hydrocodone-Acetaminophen] GI Intolerance       Past Surgical History:   Procedure Laterality Date    BYPASS GRAFT SUBCLAVIAN      LEFT SUBCLAVIAN TO RIGHT ATRIUM VENOUS GRAFT in past for ?TPN    CARDIAC CATHETERIZATION N/A 2024    Procedure: Left Heart Cath;  Surgeon: Isaac Whalen MD;  Location:  [facility];  Service: Cardiology;  Laterality: N/A;    CATARACT EXTRACTION, BILATERAL      COLECTOMY PARTIAL / TOTAL      Pt reports multiple abd surgeries for ?pseudo-bowel obstruction    COLONOSCOPY      COLONOSCOPY N/A 2018    Procedure: COLONOSCOPY WITH RANDOM COLON BIOPSIES;  Surgeon: Taras Morillo MD;  Location: [facility] ENDOSCOPY;  Service:     COLOSTOMY      and revision    ENDOSCOPY  N/A 01/25/2018    Procedure: ESOPHAGOGASTRODUODENOSCOPY WITH BIOPSIES;  Surgeon: Taras Morillo MD;  Location: [facility] ENDOSCOPY;  Service:     HYSTERECTOMY      age 31 for DUB, ovaries intact    TONSILLECTOMY AND ADENOIDECTOMY         Family History   Problem Relation Age of Onset    Arthritis Mother     Migraines Mother     Thyroid disease Mother     Diabetes Mother     Hypertension Mother     Stroke Mother     Tuberculosis Mother     Heart attack Father     Heart disease Father     Diabetes Maternal Grandmother     Cancer Maternal Aunt     Cancer Paternal Aunt     Cancer Cousin        Social History     Socioeconomic History    Marital status: Single   Tobacco Use    Smoking status: Never     Passive exposure: Never    Smokeless tobacco: Never   Vaping Use    Vaping status: Never Used   Substance and Sexual Activity    Alcohol use: No    Drug use: No    Sexual activity: Defer           Objective   Physical Exam  Vitals and nursing note reviewed.   Constitutional:       General: She is not in acute distress.     Appearance: Normal appearance. She is well-developed. She is not toxic-appearing or diaphoretic.   HENT:      Head: Normocephalic and atraumatic.      Right Ear: External ear normal.      Left Ear: External ear normal.      Nose: Nose normal.   Eyes:      General: Lids are normal.      Pupils: Pupils are equal, round, and reactive to light.   Neck:      Trachea: No tracheal deviation.   Cardiovascular:      Rate and Rhythm: Normal rate and regular rhythm.      Pulses: No decreased pulses.      Heart sounds: Normal heart sounds. No murmur heard.     No friction rub. No gallop.   Pulmonary:      Effort: Pulmonary effort is normal. No respiratory distress.      Breath sounds: Normal breath sounds. No decreased breath sounds, wheezing, rhonchi or rales.   Abdominal:      General: Bowel sounds are normal.      Palpations: Abdomen is soft.      Tenderness: There is no abdominal tenderness. There is no guarding  or rebound.   Musculoskeletal:         General: No deformity. Normal range of motion.      Cervical back: Normal range of motion and neck supple.   Lymphadenopathy:      Cervical: No cervical adenopathy.   Skin:     General: Skin is warm and dry.      Findings: No rash.   Neurological:      Mental Status: She is alert and oriented to person, place, and time.      Cranial Nerves: No cranial nerve deficit.      Sensory: No sensory deficit.   Psychiatric:         Speech: Speech normal.         Behavior: Behavior normal.         Thought Content: Thought content normal.         Judgment: Judgment normal.         Procedures           ED Course  ED Course as of 08/14/24 1914   Mon Aug 12, 2024   1649 EKG independently interpreted by myself shows sinus rhythm without acute ischemic changes. [NS]      ED Course User Index  [NS] Liz Roche MD                                             Medical Decision Making  Differential diagnosis includes intracranial hemorrhage, TIA, stroke, dysrhythmia, acute coronary syndrome, renal insufficiency, electrolyte abnormality, infectious process, other unspecified etiology.    CT scan of the head without contrast and chest x-ray showed no acute disease.    Right evaluation shows normal kidney function electrolytes.  Normal white count.  Normal H&H.  Troponin that was slightly outside the normal range and trended up by 4 on recheck.  D-dimer is normal effectively ruling out DVT or PE.    The patient continues to express chest pain with minimal activity.    Given the trending up enzymes, the patient's advanced age, and the multiple recent syncopal episodes I feel admission to the hospital for further workup is warranted.    I discussed the patient with the hospitalist, who will consult on the patient to determine status of admission.    Problems Addressed:  Chest pain, unspecified type: complicated acute illness or injury with systemic symptoms  Syncope, unspecified syncope type:  complicated acute illness or injury with systemic symptoms that poses a threat to life or bodily functions    Amount and/or Complexity of Data Reviewed  External Data Reviewed: labs, radiology and ECG.  Labs: ordered. Decision-making details documented in ED Course.  Radiology: ordered and independent interpretation performed. Decision-making details documented in ED Course.  ECG/medicine tests: ordered and independent interpretation performed. Decision-making details documented in ED Course.     Details: EKG independently interpreted by myself shows sinus rhythm with no acute ischemic changes.    Risk  OTC drugs.  Prescription drug management.  Decision regarding hospitalization.        Final diagnoses:   Chest pain, unspecified type   Syncope, unspecified syncope type       ED Disposition  ED Disposition       ED Disposition   Decision to Admit    Condition   --    Comment   Level of Care: Telemetry [5]   Diagnosis: Syncope [215592]   Admitting Physician: ALTAF NUR [[id number]]   Attending Physician: ALTAF NUR [[id number]]                 No follow-up provider specified.       Medication List        ASK your doctor about these medications      Turmeric 400 MG capsule  Ask about: Which instructions should I use?                 Liz Roche MD  08/14/24 7787

## 2024-08-13 ENCOUNTER — APPOINTMENT (OUTPATIENT)
Dept: CARDIOLOGY | Facility: HOSPITAL | Age: 74
DRG: 392 | End: 2024-08-13
Payer: MEDICARE

## 2024-08-13 PROBLEM — I24.81: Status: ACTIVE | Noted: 2024-08-12

## 2024-08-13 LAB
ANION GAP SERPL CALCULATED.3IONS-SCNC: 12 MMOL/L (ref 5–15)
ASCENDING AORTA: 2.5 CM
BH CV ECHO MEAS - AO MAX PG: 7.6 MMHG
BH CV ECHO MEAS - AO MEAN PG: 4 MMHG
BH CV ECHO MEAS - AO ROOT DIAM: 3 CM
BH CV ECHO MEAS - AO V2 MAX: 138 CM/SEC
BH CV ECHO MEAS - AO V2 VTI: 31.1 CM
BH CV ECHO MEAS - AVA(I,D): 1.6 CM2
BH CV ECHO MEAS - EDV(CUBED): 64 ML
BH CV ECHO MEAS - EDV(MOD-SP2): 77.6 ML
BH CV ECHO MEAS - EDV(MOD-SP4): 62 ML
BH CV ECHO MEAS - EF(MOD-BP): 60.8 %
BH CV ECHO MEAS - EF(MOD-SP2): 63.5 %
BH CV ECHO MEAS - EF(MOD-SP4): 57.9 %
BH CV ECHO MEAS - ESV(CUBED): 15.6 ML
BH CV ECHO MEAS - ESV(MOD-SP2): 28.3 ML
BH CV ECHO MEAS - ESV(MOD-SP4): 26.1 ML
BH CV ECHO MEAS - FS: 37.5 %
BH CV ECHO MEAS - IVS/LVPW: 1 CM
BH CV ECHO MEAS - IVSD: 0.7 CM
BH CV ECHO MEAS - LA DIMENSION: 3.5 CM
BH CV ECHO MEAS - LAT PEAK E' VEL: 8.2 CM/SEC
BH CV ECHO MEAS - LV MASS(C)D: 78.4 GRAMS
BH CV ECHO MEAS - LV MAX PG: 2.11 MMHG
BH CV ECHO MEAS - LV MEAN PG: 1 MMHG
BH CV ECHO MEAS - LV V1 MAX: 72.7 CM/SEC
BH CV ECHO MEAS - LV V1 VTI: 17.5 CM
BH CV ECHO MEAS - LVIDD: 4 CM
BH CV ECHO MEAS - LVIDS: 2.5 CM
BH CV ECHO MEAS - LVOT AREA: 2.8 CM2
BH CV ECHO MEAS - LVOT DIAM: 1.9 CM
BH CV ECHO MEAS - LVPWD: 0.7 CM
BH CV ECHO MEAS - MED PEAK E' VEL: 6.6 CM/SEC
BH CV ECHO MEAS - MV A MAX VEL: 68.7 CM/SEC
BH CV ECHO MEAS - MV DEC SLOPE: 264 CM/SEC2
BH CV ECHO MEAS - MV DEC TIME: 0.26 SEC
BH CV ECHO MEAS - MV E MAX VEL: 58.7 CM/SEC
BH CV ECHO MEAS - MV E/A: 0.85
BH CV ECHO MEAS - MV MAX PG: 2.43 MMHG
BH CV ECHO MEAS - MV MEAN PG: 1 MMHG
BH CV ECHO MEAS - MV P1/2T: 93.3 MSEC
BH CV ECHO MEAS - MV V2 VTI: 21 CM
BH CV ECHO MEAS - MVA(P1/2T): 2.36 CM2
BH CV ECHO MEAS - MVA(VTI): 2.36 CM2
BH CV ECHO MEAS - PA ACC TIME: 0.05 SEC
BH CV ECHO MEAS - RAP SYSTOLE: 3 MMHG
BH CV ECHO MEAS - RVSP: 22 MMHG
BH CV ECHO MEAS - SV(LVOT): 49.6 ML
BH CV ECHO MEAS - SV(MOD-SP2): 49.3 ML
BH CV ECHO MEAS - SV(MOD-SP4): 35.9 ML
BH CV ECHO MEAS - TAPSE (>1.6): 1.64 CM
BH CV ECHO MEAS - TR MAX PG: 18.5 MMHG
BH CV ECHO MEAS - TR MAX VEL: 214 CM/SEC
BH CV ECHO MEASUREMENTS AVERAGE E/E' RATIO: 7.93
BH CV XLRA - RV BASE: 3.1 CM
BH CV XLRA - RV LENGTH: 5.9 CM
BH CV XLRA - RV MID: 2.1 CM
BH CV XLRA - TDI S': 10.4 CM/SEC
BUN SERPL-MCNC: 17 MG/DL (ref 8–23)
BUN/CREAT SERPL: 19.3 (ref 7–25)
CALCIUM SPEC-SCNC: 8.9 MG/DL (ref 8.6–10.5)
CHLORIDE SERPL-SCNC: 104 MMOL/L (ref 98–107)
CO2 SERPL-SCNC: 24 MMOL/L (ref 22–29)
CREAT SERPL-MCNC: 0.88 MG/DL (ref 0.57–1)
EGFRCR SERPLBLD CKD-EPI 2021: 68.6 ML/MIN/1.73
GLUCOSE BLDC GLUCOMTR-MCNC: 126 MG/DL (ref 70–130)
GLUCOSE BLDC GLUCOMTR-MCNC: 137 MG/DL (ref 70–130)
GLUCOSE BLDC GLUCOMTR-MCNC: 94 MG/DL (ref 70–130)
GLUCOSE SERPL-MCNC: 85 MG/DL (ref 65–99)
LEFT ATRIUM VOLUME INDEX: 23.3 ML/M2
POTASSIUM SERPL-SCNC: 4.2 MMOL/L (ref 3.5–5.2)
SODIUM SERPL-SCNC: 140 MMOL/L (ref 136–145)
TROPONIN T SERPL HS-MCNC: 22 NG/L

## 2024-08-13 PROCEDURE — 63710000001 DIPHENHYDRAMINE PER 50 MG: Performed by: INTERNAL MEDICINE

## 2024-08-13 PROCEDURE — 25510000001 IOPAMIDOL PER 1 ML: Performed by: INTERNAL MEDICINE

## 2024-08-13 PROCEDURE — 80048 BASIC METABOLIC PNL TOTAL CA: CPT | Performed by: STUDENT IN AN ORGANIZED HEALTH CARE EDUCATION/TRAINING PROGRAM

## 2024-08-13 PROCEDURE — 93306 TTE W/DOPPLER COMPLETE: CPT | Performed by: INTERNAL MEDICINE

## 2024-08-13 PROCEDURE — 25010000002 NICARDIPINE 2.5 MG/ML SOLUTION: Performed by: INTERNAL MEDICINE

## 2024-08-13 PROCEDURE — 25010000002 HEPARIN (PORCINE) PER 1000 UNITS: Performed by: INTERNAL MEDICINE

## 2024-08-13 PROCEDURE — 4A023N7 MEASUREMENT OF CARDIAC SAMPLING AND PRESSURE, LEFT HEART, PERCUTANEOUS APPROACH: ICD-10-PCS | Performed by: INTERNAL MEDICINE

## 2024-08-13 PROCEDURE — 93005 ELECTROCARDIOGRAM TRACING: CPT | Performed by: PEDIATRICS

## 2024-08-13 PROCEDURE — G0378 HOSPITAL OBSERVATION PER HR: HCPCS

## 2024-08-13 PROCEDURE — C1894 INTRO/SHEATH, NON-LASER: HCPCS | Performed by: INTERNAL MEDICINE

## 2024-08-13 PROCEDURE — B2111ZZ FLUOROSCOPY OF MULTIPLE CORONARY ARTERIES USING LOW OSMOLAR CONTRAST: ICD-10-PCS | Performed by: INTERNAL MEDICINE

## 2024-08-13 PROCEDURE — 99232 SBSQ HOSP IP/OBS MODERATE 35: CPT | Performed by: PEDIATRICS

## 2024-08-13 PROCEDURE — 25810000003 LACTATED RINGERS SOLUTION: Performed by: INTERNAL MEDICINE

## 2024-08-13 PROCEDURE — 25010000002 MIDAZOLAM PER 1 MG: Performed by: INTERNAL MEDICINE

## 2024-08-13 PROCEDURE — 99204 OFFICE O/P NEW MOD 45 MIN: CPT | Performed by: INTERNAL MEDICINE

## 2024-08-13 PROCEDURE — C1769 GUIDE WIRE: HCPCS | Performed by: INTERNAL MEDICINE

## 2024-08-13 PROCEDURE — 25010000002 FENTANYL CITRATE (PF) 50 MCG/ML SOLUTION: Performed by: INTERNAL MEDICINE

## 2024-08-13 PROCEDURE — 82384 ASSAY THREE CATECHOLAMINES: CPT | Performed by: INTERNAL MEDICINE

## 2024-08-13 PROCEDURE — 93458 L HRT ARTERY/VENTRICLE ANGIO: CPT | Performed by: INTERNAL MEDICINE

## 2024-08-13 PROCEDURE — 84484 ASSAY OF TROPONIN QUANT: CPT | Performed by: STUDENT IN AN ORGANIZED HEALTH CARE EDUCATION/TRAINING PROGRAM

## 2024-08-13 PROCEDURE — 82948 REAGENT STRIP/BLOOD GLUCOSE: CPT

## 2024-08-13 PROCEDURE — 63710000001 DEXAMETHASONE PER 0.25 MG: Performed by: INTERNAL MEDICINE

## 2024-08-13 PROCEDURE — 93306 TTE W/DOPPLER COMPLETE: CPT

## 2024-08-13 PROCEDURE — 25810000003 SODIUM CHLORIDE 0.9 % SOLUTION: Performed by: INTERNAL MEDICINE

## 2024-08-13 PROCEDURE — B2151ZZ FLUOROSCOPY OF LEFT HEART USING LOW OSMOLAR CONTRAST: ICD-10-PCS | Performed by: INTERNAL MEDICINE

## 2024-08-13 RX ORDER — FENTANYL CITRATE 50 UG/ML
INJECTION, SOLUTION INTRAMUSCULAR; INTRAVENOUS
Status: DISCONTINUED | OUTPATIENT
Start: 2024-08-13 | End: 2024-08-13 | Stop reason: HOSPADM

## 2024-08-13 RX ORDER — LIDOCAINE HYDROCHLORIDE 10 MG/ML
INJECTION, SOLUTION EPIDURAL; INFILTRATION; INTRACAUDAL; PERINEURAL
Status: DISCONTINUED | OUTPATIENT
Start: 2024-08-13 | End: 2024-08-13 | Stop reason: HOSPADM

## 2024-08-13 RX ORDER — ACETAMINOPHEN 325 MG/1
650 TABLET ORAL EVERY 6 HOURS PRN
Status: DISCONTINUED | OUTPATIENT
Start: 2024-08-13 | End: 2024-08-13 | Stop reason: SDUPTHER

## 2024-08-13 RX ORDER — ACETAMINOPHEN 325 MG/1
650 TABLET ORAL EVERY 4 HOURS PRN
Status: DISCONTINUED | OUTPATIENT
Start: 2024-08-13 | End: 2024-08-16 | Stop reason: HOSPADM

## 2024-08-13 RX ORDER — NICARDIPINE HCL-0.9% SOD CHLOR 1 MG/10 ML
SYRINGE (ML) INTRAVENOUS
Status: DISCONTINUED | OUTPATIENT
Start: 2024-08-13 | End: 2024-08-13 | Stop reason: HOSPADM

## 2024-08-13 RX ORDER — NITROGLYCERIN 0.4 MG/1
0.4 TABLET SUBLINGUAL
Status: CANCELLED | OUTPATIENT
Start: 2024-08-13

## 2024-08-13 RX ORDER — DEXAMETHASONE 4 MG/1
4 TABLET ORAL ONCE
Status: COMPLETED | OUTPATIENT
Start: 2024-08-13 | End: 2024-08-13

## 2024-08-13 RX ORDER — HEPARIN SODIUM 1000 [USP'U]/ML
INJECTION, SOLUTION INTRAVENOUS; SUBCUTANEOUS
Status: DISCONTINUED | OUTPATIENT
Start: 2024-08-13 | End: 2024-08-13 | Stop reason: HOSPADM

## 2024-08-13 RX ORDER — MIDAZOLAM HYDROCHLORIDE 1 MG/ML
INJECTION INTRAMUSCULAR; INTRAVENOUS
Status: DISCONTINUED | OUTPATIENT
Start: 2024-08-13 | End: 2024-08-13 | Stop reason: HOSPADM

## 2024-08-13 RX ORDER — DIPHENHYDRAMINE HCL 50 MG
50 CAPSULE ORAL ONCE
Status: COMPLETED | OUTPATIENT
Start: 2024-08-13 | End: 2024-08-13

## 2024-08-13 RX ADMIN — DIPHENHYDRAMINE HYDROCHLORIDE 50 MG: 50 CAPSULE ORAL at 09:57

## 2024-08-13 RX ADMIN — FAMOTIDINE 20 MG: 20 TABLET, FILM COATED ORAL at 08:35

## 2024-08-13 RX ADMIN — SODIUM CHLORIDE, POTASSIUM CHLORIDE, SODIUM LACTATE AND CALCIUM CHLORIDE 1000 ML: 600; 310; 30; 20 INJECTION, SOLUTION INTRAVENOUS at 23:49

## 2024-08-13 RX ADMIN — CARVEDILOL 25 MG: 12.5 TABLET, FILM COATED ORAL at 08:36

## 2024-08-13 RX ADMIN — DEXAMETHASONE 4 MG: 4 TABLET ORAL at 13:58

## 2024-08-13 RX ADMIN — SODIUM CHLORIDE, POTASSIUM CHLORIDE, SODIUM LACTATE AND CALCIUM CHLORIDE 500 ML: 600; 310; 30; 20 INJECTION, SOLUTION INTRAVENOUS at 22:39

## 2024-08-13 RX ADMIN — DEXAMETHASONE 4 MG: 4 TABLET ORAL at 09:57

## 2024-08-13 RX ADMIN — OXYCODONE HYDROCHLORIDE AND ACETAMINOPHEN 1 TABLET: 5; 325 TABLET ORAL at 20:31

## 2024-08-13 RX ADMIN — ASPIRIN 81 MG: 81 TABLET, COATED ORAL at 08:35

## 2024-08-13 RX ADMIN — Medication 10 ML: at 20:31

## 2024-08-13 RX ADMIN — CARVEDILOL 25 MG: 12.5 TABLET, FILM COATED ORAL at 20:31

## 2024-08-13 RX ADMIN — AMLODIPINE BESYLATE 5 MG: 5 TABLET ORAL at 08:36

## 2024-08-13 RX ADMIN — ATORVASTATIN CALCIUM 40 MG: 40 TABLET, FILM COATED ORAL at 20:31

## 2024-08-13 RX ADMIN — CLOPIDOGREL BISULFATE 75 MG: 75 TABLET ORAL at 08:35

## 2024-08-13 RX ADMIN — Medication 10 ML: at 08:36

## 2024-08-13 RX ADMIN — PANTOPRAZOLE SODIUM 40 MG: 40 TABLET, DELAYED RELEASE ORAL at 06:27

## 2024-08-13 RX ADMIN — VALSARTAN 160 MG: 160 TABLET, FILM COATED ORAL at 08:35

## 2024-08-13 NOTE — PLAN OF CARE
Problem: Adult Inpatient Plan of Care  Goal: Absence of Hospital-Acquired Illness or Injury  Intervention: Identify and Manage Fall Risk  Recent Flowsheet Documentation  Taken 8/12/2024 2200 by María Elena Hinojosa RN  Safety Promotion/Fall Prevention:   activity supervised   assistive device/personal items within reach   clutter free environment maintained   fall prevention program maintained   lighting adjusted  Taken 8/12/2024 2053 by María Elena Hinojosa, RN  Safety Promotion/Fall Prevention:   activity supervised   assistive device/personal items within reach   clutter free environment maintained   fall prevention program maintained   lighting adjusted   nonskid shoes/slippers when out of bed   room organization consistent   safety round/check completed  Intervention: Prevent Skin Injury  Recent Flowsheet Documentation  Taken 8/12/2024 2200 by María Elena Hinojosa, RN  Body Position: position changed independently  Skin Protection:   adhesive use limited   tubing/devices free from skin contact  Taken 8/12/2024 2053 by María Elena Hinojosa RN  Body Position: position changed independently  Skin Protection:   adhesive use limited   tubing/devices free from skin contact  Intervention: Prevent and Manage VTE (Venous Thromboembolism) Risk  Recent Flowsheet Documentation  Taken 8/12/2024 2200 by María Elena Hinojosa, RN  Activity Management: back to bed  Taken 8/12/2024 2053 by María Elena Hinojosa, RN  Activity Management: back to bed  Range of Motion: active ROM (range of motion) encouraged  Intervention: Prevent Infection  Recent Flowsheet Documentation  Taken 8/12/2024 2200 by María Elena Hinojosa, RN  Infection Prevention:   cohorting utilized   environmental surveillance performed   equipment surfaces disinfected   hand hygiene promoted   personal protective equipment utilized   rest/sleep promoted   single patient room provided   visitors restricted/screened  Taken 8/12/2024 2053 by María Elena Hinojosa  RN  Infection Prevention: environmental surveillance performed  Goal: Optimal Comfort and Wellbeing  Intervention: Provide Person-Centered Care  Recent Flowsheet Documentation  Taken 8/12/2024 2053 by María Elena Hinojosa RN  Trust Relationship/Rapport:   care explained   choices provided   emotional support provided   empathic listening provided   questions answered   questions encouraged   reassurance provided   thoughts/feelings acknowledged  Goal: Readiness for Transition of Care  Intervention: Mutually Develop Transition Plan  Recent Flowsheet Documentation  Taken 8/12/2024 2106 by María Elena Hinojosa RN  Equipment Currently Used at Home: none  Taken 8/12/2024 2105 by María Elena Hinojosa RN  Transportation Anticipated: family or friend will provide  Patient/Family Anticipated Services at Transition: none  Patient/Family Anticipates Transition to: home     Problem: Asthma Comorbidity  Goal: Maintenance of Asthma Control  Intervention: Maintain Asthma Symptom Control  Recent Flowsheet Documentation  Taken 8/12/2024 2200 by María Elena Hinojosa RN  Medication Review/Management: medications reviewed  Taken 8/12/2024 2053 by María Elena Hinojosa RN  Medication Review/Management: medications reviewed     Problem: Behavioral Health Comorbidity  Goal: Maintenance of Behavioral Health Symptom Control  Intervention: Maintain Behavioral Health Symptom Control  Recent Flowsheet Documentation  Taken 8/12/2024 2200 by María Elena Hinojosa RN  Medication Review/Management: medications reviewed  Taken 8/12/2024 2053 by María Elena Hinojosa RN  Medication Review/Management: medications reviewed     Problem: COPD (Chronic Obstructive Pulmonary Disease) Comorbidity  Goal: Maintenance of COPD Symptom Control  Intervention: Maintain COPD-Symptom Control  Recent Flowsheet Documentation  Taken 8/12/2024 2200 by María Elena Hinojosa RN  Medication Review/Management: medications reviewed  Taken 8/12/2024 2053 by María Elena Hinojosa  RN  Supportive Measures:   active listening utilized   counseling provided   decision-making supported   relaxation techniques promoted   self-care encouraged   self-responsibility promoted   verbalization of feelings encouraged  Medication Review/Management: medications reviewed     Problem: Diabetes Comorbidity  Goal: Blood Glucose Level Within Targeted Range  Intervention: Monitor and Manage Glycemia  Recent Flowsheet Documentation  Taken 8/12/2024 2053 by María Elena Hinojosa RN  Glycemic Management: blood glucose monitored     Problem: Heart Failure Comorbidity  Goal: Maintenance of Heart Failure Symptom Control  Intervention: Maintain Heart Failure-Management  Recent Flowsheet Documentation  Taken 8/12/2024 2200 by María Elena Hinojosa RN  Medication Review/Management: medications reviewed  Taken 8/12/2024 2053 by María Elena Hinojosa RN  Medication Review/Management: medications reviewed     Problem: Hypertension Comorbidity  Goal: Blood Pressure in Desired Range  Intervention: Maintain Blood Pressure Management  Recent Flowsheet Documentation  Taken 8/12/2024 2200 by María Elena Hinojosa RN  Syncope Management:   head lowered   leg muscle contraction encouraged   legs elevated   position changed slowly   tactile stimulated   verbally stimulated  Medication Review/Management: medications reviewed  Taken 8/12/2024 2053 by María Elena Hinojosa RN  Syncope Management:   legs elevated   position changed slowly   tactile stimulated   verbally stimulated   leg muscle contraction encouraged   head lowered  Medication Review/Management: medications reviewed     Problem: Osteoarthritis Comorbidity  Goal: Maintenance of Osteoarthritis Symptom Control  Intervention: Maintain Osteoarthritis Symptom Control  Recent Flowsheet Documentation  Taken 8/12/2024 2200 by María Elena Hinojosa RN  Activity Management: back to bed  Medication Review/Management: medications reviewed  Taken 8/12/2024 2053 by María Elena Hinojosa  RN  Activity Management: back to bed  Medication Review/Management: medications reviewed     Problem: Pain Chronic (Persistent) (Comorbidity Management)  Goal: Acceptable Pain Control and Functional Ability  Intervention: Manage Persistent Pain  Recent Flowsheet Documentation  Taken 8/12/2024 2200 by María Elena Hinojosa RN  Sleep/Rest Enhancement:   awakenings minimized   noise level reduced   reading promoted   relaxation techniques promoted   regular sleep/rest pattern promoted   room darkened  Medication Review/Management: medications reviewed  Taken 8/12/2024 2053 by María Elena Hinojosa RN  Bowel Elimination Promotion:   adequate fluid intake promoted   ambulation promoted   privacy promoted  Sleep/Rest Enhancement:   awakenings minimized   family presence promoted   noise level reduced   regular sleep/rest pattern promoted   relaxation techniques promoted   room darkened  Medication Review/Management: medications reviewed  Intervention: Optimize Psychosocial Wellbeing  Recent Flowsheet Documentation  Taken 8/12/2024 2053 by María Elena Hinojosa RN  Supportive Measures:   active listening utilized   counseling provided   decision-making supported   relaxation techniques promoted   self-care encouraged   self-responsibility promoted   verbalization of feelings encouraged  Family/Support System Care:   self-care encouraged   support provided     Problem: Seizure Disorder Comorbidity  Goal: Maintenance of Seizure Control  Intervention: Maintain Seizure-Symptom Control  Recent Flowsheet Documentation  Taken 8/12/2024 2200 by María Elena Hinojosa, RN  Seizure Precautions:   activity supervised   soft boundaries provided  Taken 8/12/2024 2053 by María Eelna Hinojosa RN  Seizure Precautions:   activity supervised   clutter-free environment maintained   emergency equipment at bedside   soft boundaries provided   Goal Outcome Evaluation:

## 2024-08-13 NOTE — PROGRESS NOTES
Discharge Planning Assessment  [facility]     Patient Name: Marifer Ricardo  MRN: [id number]  Today's Date: 8/13/2024    Admit Date: 8/12/2024        Discharge Needs Assessment       Row Name 08/13/24 1055       Living Environment    Current Living Arrangements home    Potentially Unsafe Housing Conditions none    Primary Care Provided by self    Provides Primary Care For no one    Family Caregiver if Needed child(desi), adult    Quality of Family Relationships helpful    Able to Return to Prior Arrangements yes       Resource/Environmental Concerns    Resource/Environmental Concerns none       Transportation Needs    In the past 12 months, has lack of transportation kept you from medical appointments or from getting medications? no    In the past 12 months, has lack of transportation kept you from meetings, work, or from getting things needed for daily living? No       Transition Planning    Patient/Family Anticipates Transition to home with family    Patient/Family Anticipated Services at Transition none    Transportation Anticipated family or friend will provide       Discharge Needs Assessment    Readmission Within the Last 30 Days no previous admission in last 30 days    Equipment Currently Used at Home none    Concerns to be Addressed discharge planning    Anticipated Changes Related to Illness none    Equipment Needed After Discharge none                   Discharge Plan       Row Name 08/13/24 7546       Plan    Plan Comments Mrs. Ricardo lives in [place] and her insurance coverage is [employer] Medicare insurance coverage. Her primary care provider is Norma Spangler.  Case management will follow for discharge planning needs.    Final Discharge Disposition Code 01 - home or self-care                  Continued Care and Services - Admitted Since 8/12/2024    No active coordination exists for this encounter.       Expected Discharge Date and Time       Expected Discharge Date Expected Discharge Time     Aug 13, 2024            Demographic Summary       Row Name 08/13/24 1054       General Information    Arrived From home    Referral Source patient    Reason for Consult discharge planning    Preferred Language English       Contact Information    Permission Granted to Share Info With     Contact Information Obtained for                    Functional Status       Row Name 08/13/24 1055       Functional Status    Usual Activity Tolerance moderate    Current Activity Tolerance moderate       Functional Status, IADL    Medications assistive equipment and person    Meal Preparation assistive equipment and person    Housekeeping assistive equipment and person    Laundry assistive equipment and person    Shopping assistive equipment and person                   Psychosocial    No documentation.                  Abuse/Neglect    No documentation.                  Legal    No documentation.                  Substance Abuse    No documentation.                  Patient Forms    No documentation.                     SILVINO Gaviria     Anxiety (includes Panic, OCD)

## 2024-08-13 NOTE — PLAN OF CARE
Goal Outcome Evaluation:         Patient remained stable through out shift. Left heart cath performed today - no intervention- TR band in place, cap refill less than 3 secs. No complaints of chest pain during shift. VSS and afebrile. Room air. NSR. A&Ox4.

## 2024-08-13 NOTE — CONSULTS
Marifer Ricardo  [id number]  1950   LOS: 0 days   Patient Care Team:  Norma Spangler APRN as PCP - General (Nurse Practitioner)  Jonathan Mondragon MD as Consulting Physician (Neurology)  Ming Stiles MD, GARY (Nephrology)  Rosmery Nicholson DC as Consulting Physician (Chiropractic Medicine)  Taras Morillo MD as Consulting Physician (General Surgery)  Malcom Gilman MD as Consulting Physician (Interventional Cardiology)  Camilo Sunshine MD as Surgeon (Orthopedic Surgery)    ID: 74-year-old single white female retired [employer]  from [place], [place] admitted from [facility] ED.    Chief Complaint:  CHEST PAIN    Problem List:    Chronic hypertension-probably essential with recent progressive elevated blood pressure readings  Dyslipidemia on moderate dose atorvastatin  Chronic pain syndrome on oxycodone  Glucose intolerance with probable type II prediabetes mellitus (hemoglobin A1c 5.8%)  Intermittent recurrent syncope of uncertain etiology without injury with recent apparent event monitor with unknown results (summer 2004)-? vasovagal  Bilateral extremity peripheral arterial disease on dual antiplatelet therapy  Progressive chest pain syndrome of uncertain etiology with acceptable serial HS troponins/EKG/chest x-ray with CCS 3 severity and apparent acceptable myocardial perfusion study in [place]-data deficit (2023), August 2024  Remote operations:  A.  Left subclavian to right atrium bypass graft-data deficit  B.  Cataract extraction, O.  U.  C.  Partial with subsequent total colectomy-data deficit  D.  Remote colostomy with revision-data deficit  E.  Hysterectomy  F.  Tonsillectomy and adenoidectomy  9.  DNI/DNR  10.  Diminished bilateral lower extremity pulses without definitive history of claudication and apparent acceptable recent SELAM-data deficit ([facility]), July 2024   11.  Chronic odynophagia and dysphagia with eructation and apparent pseudoobstruction with  remote multiple EGDs and remote assessments [facility] x 40 years    Allergies   Allergen Reactions    Penicillins Anaphylaxis    Betadine [Povidone Iodine] Hives    Codeine Hives    Contrast Dye (Echo Or Unknown Ct/Mr) Hives    Metoclopramide Hives    Norco [Hydrocodone-Acetaminophen] GI Intolerance     Facility-Administered Medications Prior to Admission   Medication Dose Route Frequency Provider Last Rate Last Admin    dexamethasone (DECADRON) injection 4 mg  4 mg Intramuscular Once Payal Edwards MD         Medications Prior to Admission   Medication Sig Dispense Refill Last Dose    alendronate (FOSAMAX) 70 MG tablet Take 1 tablet by mouth Every 7 (Seven) Days. Thursday   Past Week at tur    amLODIPine (NORVASC) 5 MG tablet Take 1 tablet by mouth every night at bedtime. (Patient taking differently: Take 0.5 tablets by mouth every night at bedtime.) 90 tablet 3 8/11/2024    aspirin (EQ Aspirin Adult Low Dose) 81 MG EC tablet Take 1 tablet by mouth Daily. 90 tablet 3 8/12/2024    atorvastatin (LIPITOR) 40 MG tablet Take 1 tablet by mouth Daily. (Patient taking differently: Take 1 tablet by mouth Every Night.) 90 tablet 3 8/11/2024    Biotin 1000 MCG tablet Take 1,000 mcg by mouth 3 (Three) Times a Day. OTC   8/12/2024    carvedilol (COREG) 12.5 MG tablet Take 1 tablet by mouth 2 (Two) Times a Day With Meals. (Patient taking differently: Take 2 tablets by mouth 2 (Two) Times a Day With Meals.) 180 tablet 3 8/12/2024    cetirizine (zyrTEC) 10 MG tablet Take 1 tablet by mouth Daily. (Patient taking differently: Take 1 tablet by mouth Daily. OTC) 30 tablet 0 8/12/2024    cholecalciferol (VITAMIN D3) 1000 UNITS tablet Take 1 tablet by mouth Daily. OTC   8/12/2024    Cinnamon 500 MG tablet Take 1 tablet by mouth 2 (Two) Times a Day. OTC   8/12/2024    clopidogrel (PLAVIX) 75 MG tablet Take 1 tablet by mouth Daily. 90 tablet 3 8/12/2024    Cyanocobalamin (VITAMIN B-12) 5000 MCG sublingual tablet Place  1 tablet under the tongue Daily. OTC   8/12/2024    Omega 3-6-9 Fatty Acids (OMEGA 3-6-9 COMPLEX PO) Take 1 capsule by mouth Daily. OTC   8/12/2024    oxyCODONE-acetaminophen (PERCOCET) 5-325 MG per tablet Take 1 tablet by mouth Every 12 (Twelve) Hours As Needed for Moderate Pain.   8/12/2024    Turmeric 400 MG capsule Take 400 mg by mouth Daily. OTC   8/12/2024    valsartan (Diovan) 160 MG tablet Take 1 tablet by mouth Daily. 90 tablet 3 8/12/2024     Scheduled Meds:amLODIPine, 5 mg, Oral, Q24H  aspirin, 81 mg, Oral, Daily  atorvastatin, 40 mg, Oral, Nightly  carvedilol, 25 mg, Oral, Q12H  clopidogrel, 75 mg, Oral, Daily  famotidine, 20 mg, Oral, QAM AC  insulin lispro, 2-7 Units, Subcutaneous, 4x Daily AC & at Bedtime  pantoprazole, 40 mg, Oral, Q AM  sodium chloride, 10 mL, Intravenous, Q12H  valsartan, 160 mg, Oral, Daily      Continuous Infusions:   PRN Meds:.  senna-docusate sodium **AND** polyethylene glycol **AND** bisacodyl **AND** bisacodyl    Calcium Replacement - Follow Nurse / BPA Driven Protocol    dextrose    dextrose    glucagon (human recombinant)    Magnesium Standard Dose Replacement - Follow Nurse / BPA Driven Protocol    nitroglycerin    oxyCODONE-acetaminophen    Phosphorus Replacement - Follow Nurse / BPA Driven Protocol    Potassium Replacement - Follow Nurse / BPA Driven Protocol    sodium chloride    sodium chloride    sodium chloride       History of Present Illness:      This older middle-aged female has been followed in [facility] and in [facility] by Denny Gilman and recently by Dr. Witt for progressive chest pain syndrome x 18 months and intermittent general orthostatic dizziness and presyncope without true loss of consciousness or discernible seizure activity.  She recently completed 2-week event monitor lower month ago with results unknown.  She has been told of lower extremity arterial insufficiency and underwent a pair acceptable Myocard perfusion study in [place] 1 year  "ago; data deficit.  She presents with a several week history of increasing episodes of retrosternal \"severe squeezing sensation\" associated tachypnea and dyspnea and presyncope with increasingly less activity without rest symptoms.  She additionally has had orthostatic and not orthostatic even sitting presyncope with elevated blood pressure readings.  She has never been documented to have tachycardia greater than 100/minute are hypotension.  There accompanying complaints of intrascapular chest discomfort, profound weakness, and marked fatigue with these episodes of chest pain and she additionally has progressive odynophagia and dysphagia with frequent irritation.  Her appetite has been diminished but her weight has been stable.  She denies fever, chills, abdominal pain, headache, or focal motor-sensory changes.  She additionally has had intermittent left arm discomfort with her chest pain.  There is no prior history of myocardial infarction, CHF, cardiac arrhythmia, sustained tachypalpitation, or erythema.  She denies workup for secondary causes of hypertension such as pheochromocytoma or renovascular hypertension.  She currently is not under the care of a gastroenterologist.    Cardiac risk factors: advanced age (older than 55 for men, 65 for women), diabetes mellitus, dyslipidemia, family history of premature cardiovascular disease, and hypertension.    Social History     Socioeconomic History    Marital status: Single   Tobacco Use    Smoking status: Never     Passive exposure: Never    Smokeless tobacco: Never   Vaping Use    Vaping status: Never Used   Substance and Sexual Activity    Alcohol use: No    Drug use: No    Sexual activity: Defer     Family History   Problem Relation Age of Onset    Arthritis Mother     Migraines Mother     Thyroid disease Mother     Diabetes Mother     Hypertension Mother     Stroke Mother     Tuberculosis Mother     Heart attack Father     Heart disease Father     Diabetes " "Maternal Grandmother     Cancer Maternal Aunt     Cancer Paternal Aunt     Cancer Cousin        Review of Systems  10 point review of systems was completed, positives outlined in the HPI, and otherwise all other systems are negative.      Objective:       Physical Exam  /100 (BP Location: Right arm)   Pulse 73   Temp 98 °F (36.7 °C) (Oral)   Resp 16   Ht 170.2 cm (67.01\")   Wt 55.9 kg (123 lb 3.8 oz)   LMP  (LMP Unknown)   SpO2 96%   BMI 19.30 kg/m²       08/12/24  1112 08/12/24 2053   Weight: 56.2 kg (124 lb) 55.9 kg (123 lb 3.8 oz)     Body mass index is 19.3 kg/m².    Intake/Output Summary (Last 24 hours) at 8/13/2024 0822  Last data filed at 8/13/2024 0432  Gross per 24 hour   Intake --   Output 600 ml   Net -600 ml       General Appearance:  Alert, cooperative, no distress, appears stated age   Head:  Normocephalic, without obvious abnormality, atraumatic   Eyes:  PERRL, conjunctivae/corneas clear, EOM's intact   Throat: Lips, mucosa, and tongue normal; teeth and gums normal   Neck: Supple, symmetrical, trachea midline, no adenopathy, thyroid: not enlarged, symmetric, no tenderness/mass/nodules, no carotid bruit or JVD   Lungs:   Clear to auscultation bilaterally, respirations unlabored   Heart:  Regular rate and rhythm, S1, S2 normal, grade 1/6 systolic murmur, no rub or gallop   Abdomen:   Soft, nontender, no masses, no organomegaly, bowel sounds audible x4   Extremities: No edema, normal range of motion   Pulses: 1+ and symmetric   Skin: Skin color, texture, turgor normal, no rashes or lesions   Neurologic: Normal; no focal changes noted and gait not tested     Cardiographics:    EKG:    Normal sinus rhythm  Normal ECG  When compared with ECG of 12-AUG-2024 11:17, (Unconfirmed)  No significant change was found    ECHO (6/2/2022):    1.  Normal left ventricular size and systolic function, LVEF 65-70%.  2.  Normal LV diastolic filling pattern.  3.  Normal right ventricular size and systolic " function.  4.  Normal left atrial volume index.  5.  Mild tricuspid regurgitation, RVSP 19 mmHg.    Imaging:    Chest x-ray:    Findings:    Cardiomediastinal silhouette is within normal limits. Benign calcified granuloma in the right lower lobe. Mild chronic/senescent changes of the lungs. No focal consolidation or overt pulmonary edema. No pleural effusion or pneumothorax. Osseous structures are unchanged. Surgical clips project over the right hilar region.     IMPRESSION: No evidence of acute cardiopulmonary disease.     HEAD CT SCAN:    Findings:    No acute intracranial hemorrhage or extra-axial collection is identified. The ventricles appear normal in caliber, with no evidence of mass effect or midline shift. The basal cisterns appear patent. The gray-white differentiation appears preserved.     The calvarium appear intact. The paranasal sinuses are clear. The mastoid air cells are well-aerated.     IMPRESSION: No acute intracranial process identified.    CHEST CTA (6/1/2022):    FINDINGS:    PAs and aorta: There is no acute aortic pathology.  No pulmonary  embolus.  Thoracic aorta is unremarkable.  No significant  coronary artery calcifications.  Heart/mediastinum: There are  multiple mediastinal venous collaterals.  No evidence for right  heart strain.  No pericardial effusion.  The heart is normal in  size.  Lungs: Mild atelectasis.  Otherwise the lungs are clear.  Lymph nodes: No pathologically enlarged thoracic lymph nodes.  Pleura: No pneumothorax or pleural effusion.  Chest Wall: There  is a likely venous graft from the left subclavian vein through  the anterior chest wall to the right atrium which appears  entirely occluded and thrombosed.  Bones: No acute fracture.  Upper abdomen: No acute findings in the upper abdomen.     IMPRESSION:    No pulmonary embolus.   Left subclavian to right atrium venous  graft appears entirely occluded with multiple mediastinal venous  collaterals.    Lab Review    Results from last 7 days   Lab Units 08/13/24  0447 08/12/24  1228   SODIUM mmol/L 140 142   POTASSIUM mmol/L 4.2 4.1   CHLORIDE mmol/L 104 107   CO2 mmol/L 24.0 24.0   BUN mg/dL 17 21   CREATININE mg/dL 0.88 0.98   GLUCOSE mg/dL 85 158*   CALCIUM mg/dL 8.9 9.1     Results from last 7 days   Lab Units 08/12/24  1228   WBC 10*3/mm3 5.30   HEMOGLOBIN g/dL 13.7   HEMATOCRIT % 42.4   PLATELETS 10*3/mm3 165     Results from last 7 days   Lab Units 08/12/24  1228   HEMOGLOBIN A1C % 5.80*     Results from last 7 days   Lab Units 08/13/24  0447 08/12/24  1513 08/12/24  1228   HSTROP T ng/L 22* 24* 20*     NO URINALYSIS / CK / THYROID PROFILE / CRP / ESR  proBNP: 808.5  MAGNESIUM: 2.2  ALBUMIN: 3.9  LFTs: WNL  LIPASE: 35  D-DIMER: <0.27  NO DRIPS.     Assessment:      Perplexing case.  Her symptoms are quite bothersome and progressive if not currently disabling.  I do feel it would be helpful to definitively know if any significant obstructive or vasospastic coronary artery disease is present.  She additionally needs workup for possible pheochromocytoma as well as renovascular hypertension.  If LHC unremarkable would strongly consider EGD.  She gives a vague history of urticaria when she has undergone contrast administration and states she is given Benadryl prior to the procedure to mitigate this reaction.  She just finished eating half of her breakfast.      Plan:     1.  NPO except for medications for LHC plus or minus intervention later today; procedure, risks, and potential complications discussed with patient and daughter in room.  2.  Renal artery duplex study  3.  Bilateral extremity arterial duplex  4.  Consider serum catecholamines as well as PRA/aldosterone levels  5.  Strong consideration of GI consultation plus or minus EGD if LHC unremarkable  6.  Review echocardiogram when completed; would defer consideration of EEG at this time  7.  Will premedicate with Decadron 4 mg and Benadryl 50 mg p.o.    Discussed  with patient and daughter in room as well as RN and Dr. Singh.

## 2024-08-13 NOTE — PRE-PROCEDURE NOTE
Brief preprocedure note    The risks, benefits, and alternative options of a cardiac catheterization with possible percutaneous coronary intervention were discussed with the patient.  The patient is agreeable and the plan is to proceed with cardiac catheterization and possible PCI.     -Right radial Barbeau type A.  2+ pulse  -Premedicated for contrast dye reaction/hives  -I had a discussion with the patient at length about the risk versus benefits of the procedure and whether or not this would conflict with her advanced directives.  The patient wishes to be resuscitated with full support (CPR, intubation, medication) in the Cath Lab in the off chance that she undergoes a cardiac arrest/complication.  As such, we will temporarily suspend her DNR/DNI status and reinstitute it post procedurally.    Isaac Whalen MD, MSc, FACC, [facility]  Interventional Cardiology  [facility] at [facility]

## 2024-08-13 NOTE — PROGRESS NOTES
[facility] Medicine Services  PROGRESS NOTE    Patient Name: Marifer Ricardo  : 1950  MRN: [id number]    Date of Admission: 2024  Primary Care Physician: Norma Spangler APRN    Subjective   Subjective     CC:  Chest pain/indigestion    HPI:  Pt typically seen in [facility].  Daughter is working on getting some of her records in order for cardiology to further evaluate.  Currently not having any symptoms.        Objective   Objective     Vital Signs:   Temp:  [97.9 °F (36.6 °C)-98.2 °F (36.8 °C)] 97.9 °F (36.6 °C)  Heart Rate:  [67-81] 73  Resp:  [14-22] 16  BP: ()/() 147/95     Physical Exam:  Constitutional: No acute distress, awake, alert  HENT: NCAT, mucous membranes moist  Respiratory: Clear to auscultation bilaterally, respiratory effort normal   Cardiovascular: RRR, no murmurs, rubs, or gallops  Gastrointestinal: Positive bowel sounds, soft, nontender, nondistended  Musculoskeletal: No bilateral ankle edema  Psychiatric: Appropriate affect, cooperative  Neurologic: Oriented x 3, strength symmetric in all extremities, Cranial Nerves grossly intact to confrontation, speech clear  Skin: No rashes      Results Reviewed:  LAB RESULTS:      Lab 24  1228   WBC 5.30   HEMOGLOBIN 13.7   HEMATOCRIT 42.4   PLATELETS 165   NEUTROS ABS 3.75   IMMATURE GRANS (ABS) 0.01   LYMPHS ABS 0.96   MONOS ABS 0.44   EOS ABS 0.09   MCV 98.4*   D DIMER QUANT <0.27         Lab 24  0447 24  1228   SODIUM 140 142   POTASSIUM 4.2 4.1   CHLORIDE 104 107   CO2 24.0 24.0   ANION GAP 12.0 11.0   BUN 17 21   CREATININE 0.88 0.98   EGFR 68.6 60.3   GLUCOSE 85 158*   CALCIUM 8.9 9.1   HEMOGLOBIN A1C  --  5.80*         Lab 24  1228   TOTAL PROTEIN 6.4   ALBUMIN 3.9   GLOBULIN 2.5   ALT (SGPT) 12   AST (SGOT) 16   BILIRUBIN 0.4   ALK PHOS 78   LIPASE 35         Lab 24  0447 24  1513 24  1228   PROBNP  --   --  808.5   HSTROP T 22* 24* 20*                  Brief Urine Lab Results       None            Microbiology Results Abnormal       None            CT Head Without Contrast    Result Date: 8/12/2024  CT HEAD WO CONTRAST Date of Exam: 8/12/2024 3:25 PM EDT Indication: syncope. Comparison: None available. Technique: Axial CT images were obtained of the head without contrast administration.  Automated exposure control and iterative construction methods were used. Findings: No acute intracranial hemorrhage or extra-axial collection is identified. The ventricles appear normal in caliber, with no evidence of mass effect or midline shift. The basal cisterns appear patent. The gray-white differentiation appears preserved. The calvarium appear intact. The paranasal sinuses are clear. The mastoid air cells are well-aerated.     Impression: Impression: No acute intracranial process identified. Electronically Signed: Daniel Gross MD  8/12/2024 3:35 PM EDT  Workstation ID: [id number]    XR Chest 1 View    Result Date: 8/12/2024  XR CHEST 1 VW Date of Exam: 8/12/2024 11:42 AM EDT Indication: Chest Pain Triage Protocol Comparison: Chest CT 6/1/2022, chest radiograph 6/1/2022. Findings: Cardiomediastinal silhouette is within normal limits. Benign calcified granuloma in the right lower lobe. Mild chronic/senescent changes of the lungs. No focal consolidation or overt pulmonary edema. No pleural effusion or pneumothorax. Osseous structures are unchanged. Surgical clips project over the right hilar region.     Impression: Impression: No evidence of acute cardiopulmonary disease. Electronically Signed: Yovani Jones MD  8/12/2024 12:22 PM EDT  Workstation ID: [id number]     Results for orders placed during the hospital encounter of 06/01/22    Adult Transthoracic Echo Complete w/ Color, Spectral and Contrast if necessary per protocol    Interpretation Summary  1.  Normal left ventricular size and systolic function, LVEF 65-70%.  2.  Normal LV diastolic filling  pattern.  3.  Normal right ventricular size and systolic function.  4.  Normal left atrial volume index.  5.  Mild tricuspid regurgitation, RVSP 19 mmHg.      Current medications:  Scheduled Meds:amLODIPine, 5 mg, Oral, Q24H  aspirin, 81 mg, Oral, Daily  atorvastatin, 40 mg, Oral, Nightly  carvedilol, 25 mg, Oral, Q12H  clopidogrel, 75 mg, Oral, Daily  famotidine, 20 mg, Oral, QAM AC  insulin lispro, 2-7 Units, Subcutaneous, 4x Daily AC & at Bedtime  pantoprazole, 40 mg, Oral, Q AM  sodium chloride, 10 mL, Intravenous, Q12H  valsartan, 160 mg, Oral, Daily      Continuous Infusions:   PRN Meds:.  acetaminophen    senna-docusate sodium **AND** polyethylene glycol **AND** bisacodyl **AND** bisacodyl    Calcium Replacement - Follow Nurse / BPA Driven Protocol    dextrose    dextrose    glucagon (human recombinant)    Magnesium Standard Dose Replacement - Follow Nurse / BPA Driven Protocol    nitroglycerin    oxyCODONE-acetaminophen    Phosphorus Replacement - Follow Nurse / BPA Driven Protocol    Potassium Replacement - Follow Nurse / BPA Driven Protocol    sodium chloride    sodium chloride    sodium chloride    Assessment & Plan   Assessment & Plan     Active Hospital Problems    Diagnosis  POA    **Chest pain [R07.9]  Unknown    Acute coronary microvascular dysfunction [I24.81]  Unknown    Syncope [R55]  Yes      Resolved Hospital Problems   No resolved problems to display.        Brief Hospital Course to date:  Marifer Ricardo is a 74 y.o. female with PMHx of hypertension, hyperlipidemia, GERD, OA, PAD, diabetes mellitus type 2 who presented to the ED with complaint of squeezing chest pain radiating into the left neck with left arm numbness, indigestion, belching.     Atypical chest pain  Indigestion, belching   -EKG without acute ST-T changes  -Cardiology planning [facility] today  -Daughter assisting in obtaining records from [facility].  -Continue home ASA, Plavix, atorvastatin. Cont PPI and Pepcid.   -If [facility]  is unremarkable, would be reasonable for patient to be evaluated by GI for possible EGD.     Hypertensive Urgency  -Overall improved.  Continue home carvedilol 25 mg BID, valsartan 160 mg daily. Cont amlodipine to 5 mg daily.      Elevated troponin  -likely secondary to uncontrolled HTN  -Troponins flat.      Recent syncopal episode  Ongoing lightheadedness   -Unclear etiology, syncope x1 last week   -ECHO pending      PAD   -Continue ASA, Plavix, atorvastatin.     DM2 with hyperglycemia  -not on home medications  -A1c 5.8. Low dose SSI while admitted.      Chronic pain on chronic opioids  -Continue home Percocet.     Expected Discharge Location and Transportation: home  Expected Discharge   Expected Discharge Date: 8/14/2024; Expected Discharge Time:      VTE Prophylaxis:  No VTE prophylaxis order currently exists.         AM-PAC 6 Clicks Score (PT): 22 (08/13/24 0800)    CODE STATUS:   Code Status and Medical Interventions: No CPR (Do Not Attempt to Resuscitate); Limited Support; No intubation (DNI)   Ordered at: 08/12/24 1824     Medical Intervention Limits:    No intubation (DNI)     Code Status (Patient has no pulse and is not breathing):    No CPR (Do Not Attempt to Resuscitate)     Medical Interventions (Patient has pulse or is breathing):    Limited Support       Linda Singh MD  08/13/24

## 2024-08-14 ENCOUNTER — APPOINTMENT (OUTPATIENT)
Dept: CARDIOLOGY | Facility: HOSPITAL | Age: 74
DRG: 392 | End: 2024-08-14
Payer: MEDICARE

## 2024-08-14 ENCOUNTER — APPOINTMENT (OUTPATIENT)
Dept: ULTRASOUND IMAGING | Facility: HOSPITAL | Age: 74
DRG: 392 | End: 2024-08-14
Payer: MEDICARE

## 2024-08-14 ENCOUNTER — ANESTHESIA (OUTPATIENT)
Dept: GASTROENTEROLOGY | Facility: HOSPITAL | Age: 74
End: 2024-08-14
Payer: MEDICARE

## 2024-08-14 ENCOUNTER — ANESTHESIA EVENT (OUTPATIENT)
Dept: GASTROENTEROLOGY | Facility: HOSPITAL | Age: 74
End: 2024-08-14
Payer: MEDICARE

## 2024-08-14 LAB
ALBUMIN SERPL-MCNC: 3.2 G/DL (ref 3.5–5.2)
ALBUMIN/GLOB SERPL: 1.6 G/DL
ALP SERPL-CCNC: 61 U/L (ref 39–117)
ALT SERPL W P-5'-P-CCNC: 10 U/L (ref 1–33)
ANION GAP SERPL CALCULATED.3IONS-SCNC: 12 MMOL/L (ref 5–15)
ANION GAP SERPL CALCULATED.3IONS-SCNC: 8 MMOL/L (ref 5–15)
AST SERPL-CCNC: 11 U/L (ref 1–32)
BASOPHILS # BLD AUTO: 0.01 10*3/MM3 (ref 0–0.2)
BASOPHILS NFR BLD AUTO: 0.2 % (ref 0–1.5)
BH CV ECHO MEAS - DIST REN A EDV LEFT: 13.6 CM/S
BH CV ECHO MEAS - DIST REN A PSV LEFT: 53.9 CM/S
BH CV ECHO MEAS - MID REN A EDV LEFT: 13.3 CM/S
BH CV ECHO MEAS - MID REN A PSV LEFT: 47 CM/S
BH CV ECHO MEAS - PROX REN A EDV LEFT: 14.5 CM/S
BH CV ECHO MEAS - PROX REN A PSV LEFT: 43.4 CM/S
BH CV GRAFT BRACHIAL PRESSURE LEFT: NORMAL MMHG
BH CV GRAFT BRACHIAL PRESSURE RIGHT: NORMAL MMHG
BH CV LEA LEFT ANT TIBIAL A DISTAL PSV: 44.4 CM/S
BH CV LEA LEFT ANT TIBIAL A MID PSV: 27.6 CM/S
BH CV LEA LEFT ANT TIBIAL A PROX PSV: 60 CM/S
BH CV LEA LEFT CFA PROX PSV: 109 CM/S
BH CV LEA LEFT DFA PROX PSV: 74.6 CM/S
BH CV LEA LEFT DPA PRESSURE: 120 MMHG
BH CV LEA LEFT PERONEAL  MID PSV: 60.8 CM/S
BH CV LEA LEFT POPITEAL A  DISTAL PSV: 60.6 CM/S
BH CV LEA LEFT POPITEAL A  MID PSV: 44.8 CM/S
BH CV LEA LEFT POPITEAL A  PROX PSV: 45.2 CM/S
BH CV LEA LEFT PTA DISTAL PSV: 52.4 CM/S
BH CV LEA LEFT PTA MID PSV: 42.3 CM/S
BH CV LEA LEFT PTA PRESSURE: 130 MMHG
BH CV LEA LEFT PTA PROX PSV: 64.5 CM/S
BH CV LEA LEFT SFA DISTAL PSV: 61.9 CM/S
BH CV LEA LEFT SFA MID PSV: 95.7 CM/S
BH CV LEA LEFT SFA PROX PSV: 89.5 CM/S
BH CV LEA LEFT TIBEOPERONEAL PSV: 54.4 CM/S
BH CV LEA RIGHT ANT TIBIAL A DISTAL PSV: 53 CM/S
BH CV LEA RIGHT ANT TIBIAL A MID PSV: 37.6 CM/S
BH CV LEA RIGHT ANT TIBIAL A PROX PSV: 40.5 CM/S
BH CV LEA RIGHT CFA PROX PSV: 87 CM/S
BH CV LEA RIGHT DFA PROX PSV: 146 CM/S
BH CV LEA RIGHT DPA PRESSURE: 120 MMHG
BH CV LEA RIGHT PERONEAL  MID PSV: 71.8 CM/S
BH CV LEA RIGHT POPITEAL A  DISTAL PSV: 57.1 CM/S
BH CV LEA RIGHT POPITEAL A  MID PSV: 57.1 CM/S
BH CV LEA RIGHT POPITEAL A  PROX PSV: 57.5 CM/S
BH CV LEA RIGHT PTA DISTAL PSV: 132 CM/S
BH CV LEA RIGHT PTA MID PSV: 49 CM/S
BH CV LEA RIGHT PTA PRESSURE: 150 MMHG
BH CV LEA RIGHT PTA PROX PSV: 59.2 CM/S
BH CV LEA RIGHT SFA DISTAL PSV: 66.3 CM/S
BH CV LEA RIGHT SFA MID PSV: 99.5 CM/S
BH CV LEA RIGHT SFA PROX PSV: 83.4 CM/S
BH CV LEA RIGHT TIBEOPERONEAL PSV: 58.8 CM/S
BH CV LOWER ARTERIAL LEFT ABI RATIO: 1.1
BH CV LOWER ARTERIAL RIGHT ABI RATIO: 1.3
BH CV VAS BP RIGHT ARM: NORMAL MMHG
BH CV VAS KIDNEY HEIGHT LEFT: 4.7 CM
BH CV VAS RENAL AORTIC MID PSV: 51.6 CM/S
BH CV VAS RENAL HILUM LEFT EDV: 14.5 CM/S
BH CV VAS RENAL HILUM LEFT PSV: 52.8 CM/S
BH CV VAS RENAL HILUM RIGHT EDV: 25.8 CM/S
BH CV VAS RENAL HILUM RIGHT PSV: 106 CM/S
BH CV XLRA MEAS - KID L LEFT: 9.1 CM
BH CV XLRA MEAS DIST REN A EDV RIGHT: 11 CM/S
BH CV XLRA MEAS DIST REN A PSV RIGHT: 49.4 CM/S
BH CV XLRA MEAS KID L RIGHT: 9.4 CM
BH CV XLRA MEAS MID REN A EDV RIGHT: 18.4 CM/S
BH CV XLRA MEAS MID REN A PSV RIGHT: 83.6 CM/S
BH CV XLRA MEAS PROX REN A EDV RIGHT: 19 CM/S
BH CV XLRA MEAS PROX REN A PSV RIGHT: 82.8 CM/S
BH CV XLRA MEAS RAR LEFT: 1
BH CV XLRA MEAS RAR RIGHT: 1.6
BILIRUB SERPL-MCNC: 0.5 MG/DL (ref 0–1.2)
BUN SERPL-MCNC: 25 MG/DL (ref 8–23)
BUN SERPL-MCNC: 27 MG/DL (ref 8–23)
BUN/CREAT SERPL: 20.3 (ref 7–25)
BUN/CREAT SERPL: 26.2 (ref 7–25)
CALCIUM SPEC-SCNC: 8.5 MG/DL (ref 8.6–10.5)
CALCIUM SPEC-SCNC: 8.7 MG/DL (ref 8.6–10.5)
CHLORIDE SERPL-SCNC: 106 MMOL/L (ref 98–107)
CHLORIDE SERPL-SCNC: 107 MMOL/L (ref 98–107)
CHOLEST SERPL-MCNC: 199 MG/DL (ref 0–200)
CO2 SERPL-SCNC: 22 MMOL/L (ref 22–29)
CO2 SERPL-SCNC: 24 MMOL/L (ref 22–29)
CREAT SERPL-MCNC: 1.03 MG/DL (ref 0.57–1)
CREAT SERPL-MCNC: 1.23 MG/DL (ref 0.57–1)
D-LACTATE SERPL-SCNC: 2 MMOL/L (ref 0.5–2)
DEPRECATED RDW RBC AUTO: 45.5 FL (ref 37–54)
EGFRCR SERPLBLD CKD-EPI 2021: 45.9 ML/MIN/1.73
EGFRCR SERPLBLD CKD-EPI 2021: 56.8 ML/MIN/1.73
EOSINOPHIL # BLD AUTO: 0 10*3/MM3 (ref 0–0.4)
EOSINOPHIL NFR BLD AUTO: 0 % (ref 0.3–6.2)
ERYTHROCYTE [DISTWIDTH] IN BLOOD BY AUTOMATED COUNT: 12.6 % (ref 12.3–15.4)
GLOBULIN UR ELPH-MCNC: 2 GM/DL
GLUCOSE BLDC GLUCOMTR-MCNC: 112 MG/DL (ref 70–130)
GLUCOSE BLDC GLUCOMTR-MCNC: 136 MG/DL (ref 70–130)
GLUCOSE BLDC GLUCOMTR-MCNC: 298 MG/DL (ref 70–130)
GLUCOSE SERPL-MCNC: 116 MG/DL (ref 65–99)
GLUCOSE SERPL-MCNC: 296 MG/DL (ref 65–99)
HCT VFR BLD AUTO: 39 % (ref 34–46.6)
HDLC SERPL-MCNC: 86 MG/DL (ref 40–60)
HGB BLD-MCNC: 13 G/DL (ref 12–15.9)
IMM GRANULOCYTES # BLD AUTO: 0.01 10*3/MM3 (ref 0–0.05)
IMM GRANULOCYTES NFR BLD AUTO: 0.2 % (ref 0–0.5)
LDLC SERPL CALC-MCNC: 106 MG/DL (ref 0–100)
LDLC/HDLC SERPL: 1.23 {RATIO}
LEFT GROIN CFA SYS: 109 CM/SEC
LEFT KIDNEY WIDTH: 4.4 CM
LEFT RENAL UPPER PARENCHYMA MAX: 15.7 CM/S
LEFT RENAL UPPER PARENCHYMA MIN: 4.1 CM/S
LEFT RENAL UPPER PARENCHYMA RI: 0.74
LYMPHOCYTES # BLD AUTO: 0.52 10*3/MM3 (ref 0.7–3.1)
LYMPHOCYTES NFR BLD AUTO: 9 % (ref 19.6–45.3)
MCH RBC QN AUTO: 32.1 PG (ref 26.6–33)
MCHC RBC AUTO-ENTMCNC: 33.3 G/DL (ref 31.5–35.7)
MCV RBC AUTO: 96.3 FL (ref 79–97)
MONOCYTES # BLD AUTO: 0.07 10*3/MM3 (ref 0.1–0.9)
MONOCYTES NFR BLD AUTO: 1.2 % (ref 5–12)
NEUTROPHILS NFR BLD AUTO: 5.17 10*3/MM3 (ref 1.7–7)
NEUTROPHILS NFR BLD AUTO: 89.4 % (ref 42.7–76)
NRBC BLD AUTO-RTO: 0 /100 WBC (ref 0–0.2)
PLATELET # BLD AUTO: 163 10*3/MM3 (ref 140–450)
PMV BLD AUTO: 10.4 FL (ref 6–12)
POTASSIUM SERPL-SCNC: 3.9 MMOL/L (ref 3.5–5.2)
POTASSIUM SERPL-SCNC: 4.8 MMOL/L (ref 3.5–5.2)
PROT SERPL-MCNC: 5.2 G/DL (ref 6–8.5)
QT INTERVAL: 444 MS
QTC INTERVAL: 495 MS
RBC # BLD AUTO: 4.05 10*6/MM3 (ref 3.77–5.28)
RIGHT GROIN CFA SYS: 87 CM/SEC
RIGHT RENAL UPPER PARENCHYMA MAX: 13.5 CM/S
RIGHT RENAL UPPER PARENCHYMA MIN: 7.6 CM/S
RIGHT RENAL UPPER PARENCHYMA RI: 0.44
SODIUM SERPL-SCNC: 138 MMOL/L (ref 136–145)
SODIUM SERPL-SCNC: 141 MMOL/L (ref 136–145)
TRIGL SERPL-MCNC: 38 MG/DL (ref 0–150)
VLDLC SERPL-MCNC: 7 MG/DL (ref 5–40)
WBC NRBC COR # BLD AUTO: 5.78 10*3/MM3 (ref 3.4–10.8)

## 2024-08-14 PROCEDURE — 85025 COMPLETE CBC W/AUTO DIFF WBC: CPT | Performed by: INTERNAL MEDICINE

## 2024-08-14 PROCEDURE — 99222 1ST HOSP IP/OBS MODERATE 55: CPT | Performed by: PHYSICIAN ASSISTANT

## 2024-08-14 PROCEDURE — 0DJ08ZZ INSPECTION OF UPPER INTESTINAL TRACT, VIA NATURAL OR ARTIFICIAL OPENING ENDOSCOPIC: ICD-10-PCS | Performed by: INTERNAL MEDICINE

## 2024-08-14 PROCEDURE — 93925 LOWER EXTREMITY STUDY: CPT | Performed by: INTERNAL MEDICINE

## 2024-08-14 PROCEDURE — 76705 ECHO EXAM OF ABDOMEN: CPT

## 2024-08-14 PROCEDURE — 25810000003 SODIUM CHLORIDE 0.9 % SOLUTION: Performed by: PEDIATRICS

## 2024-08-14 PROCEDURE — 93925 LOWER EXTREMITY STUDY: CPT

## 2024-08-14 PROCEDURE — 25810000003 SODIUM CHLORIDE 0.9 % SOLUTION: Performed by: ANESTHESIOLOGY

## 2024-08-14 PROCEDURE — 82948 REAGENT STRIP/BLOOD GLUCOSE: CPT

## 2024-08-14 PROCEDURE — 93975 VASCULAR STUDY: CPT

## 2024-08-14 PROCEDURE — 25010000002 PROPOFOL 10 MG/ML EMULSION

## 2024-08-14 PROCEDURE — 83605 ASSAY OF LACTIC ACID: CPT | Performed by: INTERNAL MEDICINE

## 2024-08-14 PROCEDURE — 99232 SBSQ HOSP IP/OBS MODERATE 35: CPT | Performed by: INTERNAL MEDICINE

## 2024-08-14 PROCEDURE — 63710000001 INSULIN LISPRO (HUMAN) PER 5 UNITS: Performed by: INTERNAL MEDICINE

## 2024-08-14 PROCEDURE — 80061 LIPID PANEL: CPT | Performed by: INTERNAL MEDICINE

## 2024-08-14 PROCEDURE — 43235 EGD DIAGNOSTIC BRUSH WASH: CPT | Performed by: INTERNAL MEDICINE

## 2024-08-14 PROCEDURE — 80053 COMPREHEN METABOLIC PANEL: CPT | Performed by: INTERNAL MEDICINE

## 2024-08-14 PROCEDURE — 99232 SBSQ HOSP IP/OBS MODERATE 35: CPT | Performed by: PEDIATRICS

## 2024-08-14 PROCEDURE — 93975 VASCULAR STUDY: CPT | Performed by: INTERNAL MEDICINE

## 2024-08-14 RX ORDER — SODIUM CHLORIDE 9 MG/ML
75 INJECTION, SOLUTION INTRAVENOUS CONTINUOUS
Status: DISCONTINUED | OUTPATIENT
Start: 2024-08-14 | End: 2024-08-14

## 2024-08-14 RX ORDER — PANTOPRAZOLE SODIUM 40 MG/1
40 TABLET, DELAYED RELEASE ORAL
Status: DISCONTINUED | OUTPATIENT
Start: 2024-08-14 | End: 2024-08-14

## 2024-08-14 RX ORDER — EPHEDRINE SULFATE 50 MG/ML
INJECTION INTRAVENOUS AS NEEDED
Status: DISCONTINUED | OUTPATIENT
Start: 2024-08-14 | End: 2024-08-14 | Stop reason: SURG

## 2024-08-14 RX ORDER — SODIUM CHLORIDE 9 MG/ML
50 INJECTION, SOLUTION INTRAVENOUS CONTINUOUS
Status: DISCONTINUED | OUTPATIENT
Start: 2024-08-14 | End: 2024-08-15

## 2024-08-14 RX ORDER — POLYETHYLENE GLYCOL 3350 17 G/17G
17 POWDER, FOR SOLUTION ORAL DAILY
Status: DISCONTINUED | OUTPATIENT
Start: 2024-08-14 | End: 2024-08-16 | Stop reason: HOSPADM

## 2024-08-14 RX ORDER — EPHEDRINE SULFATE 50 MG/ML
5 INJECTION, SOLUTION INTRAVENOUS ONCE AS NEEDED
Status: DISCONTINUED | OUTPATIENT
Start: 2024-08-14 | End: 2024-08-14 | Stop reason: HOSPADM

## 2024-08-14 RX ORDER — PROPOFOL 10 MG/ML
VIAL (ML) INTRAVENOUS AS NEEDED
Status: DISCONTINUED | OUTPATIENT
Start: 2024-08-14 | End: 2024-08-14 | Stop reason: SURG

## 2024-08-14 RX ORDER — LIDOCAINE HYDROCHLORIDE 10 MG/ML
INJECTION, SOLUTION EPIDURAL; INFILTRATION; INTRACAUDAL; PERINEURAL AS NEEDED
Status: DISCONTINUED | OUTPATIENT
Start: 2024-08-14 | End: 2024-08-14 | Stop reason: SURG

## 2024-08-14 RX ORDER — IPRATROPIUM BROMIDE AND ALBUTEROL SULFATE 2.5; .5 MG/3ML; MG/3ML
3 SOLUTION RESPIRATORY (INHALATION) ONCE AS NEEDED
Status: DISCONTINUED | OUTPATIENT
Start: 2024-08-14 | End: 2024-08-14 | Stop reason: HOSPADM

## 2024-08-14 RX ORDER — ONDANSETRON 2 MG/ML
4 INJECTION INTRAMUSCULAR; INTRAVENOUS ONCE AS NEEDED
Status: DISCONTINUED | OUTPATIENT
Start: 2024-08-14 | End: 2024-08-14 | Stop reason: HOSPADM

## 2024-08-14 RX ORDER — PANTOPRAZOLE SODIUM 40 MG/1
40 TABLET, DELAYED RELEASE ORAL
Status: DISCONTINUED | OUTPATIENT
Start: 2024-08-15 | End: 2024-08-16 | Stop reason: HOSPADM

## 2024-08-14 RX ADMIN — CLOPIDOGREL BISULFATE 75 MG: 75 TABLET ORAL at 08:32

## 2024-08-14 RX ADMIN — PANTOPRAZOLE SODIUM 40 MG: 40 TABLET, DELAYED RELEASE ORAL at 05:49

## 2024-08-14 RX ADMIN — LIDOCAINE HYDROCHLORIDE 50 MG: 10 INJECTION, SOLUTION EPIDURAL; INFILTRATION; INTRACAUDAL; PERINEURAL at 12:00

## 2024-08-14 RX ADMIN — Medication 10 ML: at 08:32

## 2024-08-14 RX ADMIN — PROPOFOL 80 MG: 10 INJECTION, EMULSION INTRAVENOUS at 12:00

## 2024-08-14 RX ADMIN — AMLODIPINE BESYLATE 5 MG: 5 TABLET ORAL at 08:32

## 2024-08-14 RX ADMIN — CARVEDILOL 25 MG: 12.5 TABLET, FILM COATED ORAL at 20:59

## 2024-08-14 RX ADMIN — INSULIN LISPRO 4 UNITS: 100 INJECTION, SOLUTION INTRAVENOUS; SUBCUTANEOUS at 20:59

## 2024-08-14 RX ADMIN — ASPIRIN 81 MG: 81 TABLET, COATED ORAL at 08:32

## 2024-08-14 RX ADMIN — CARVEDILOL 25 MG: 12.5 TABLET, FILM COATED ORAL at 08:32

## 2024-08-14 RX ADMIN — Medication 10 ML: at 21:01

## 2024-08-14 RX ADMIN — ATORVASTATIN CALCIUM 40 MG: 40 TABLET, FILM COATED ORAL at 20:59

## 2024-08-14 RX ADMIN — OXYCODONE HYDROCHLORIDE AND ACETAMINOPHEN 1 TABLET: 5; 325 TABLET ORAL at 23:00

## 2024-08-14 RX ADMIN — SODIUM CHLORIDE 50 ML/HR: 9 INJECTION, SOLUTION INTRAVENOUS at 11:30

## 2024-08-14 RX ADMIN — POLYETHYLENE GLYCOL 3350 17 G: 17 POWDER, FOR SOLUTION ORAL at 20:59

## 2024-08-14 RX ADMIN — SODIUM CHLORIDE 75 ML/HR: 9 INJECTION, SOLUTION INTRAVENOUS at 08:31

## 2024-08-14 RX ADMIN — FAMOTIDINE 20 MG: 20 TABLET, FILM COATED ORAL at 08:32

## 2024-08-14 RX ADMIN — EPHEDRINE SULFATE 10 MG: 50 INJECTION INTRAVENOUS at 12:05

## 2024-08-14 NOTE — ANESTHESIA PREPROCEDURE EVALUATION
Anesthesia Evaluation     Patient summary reviewed and Nursing notes reviewed   NPO Solid Status: > 8 hours  NPO Liquid Status: > 2 hours           Airway   Mallampati: II  TM distance: >3 FB  Neck ROM: full  No difficulty expected  Dental          Pulmonary    (+) asthma,  (-) shortness of breath, recent URI, sleep apnea  Cardiovascular     ECG reviewed    (+) hypertension, PVD, hyperlipidemia  (-) CAD, dysrhythmias, angina    ROS comment: ECG SR Occ PVCsProlonged QT  ECHO yesterday  EF > 61 %.LVDD  (grade I) impaired relaxation.AV sclerosis  CATH yesterday NEG for CAD         Neuro/Psych  (+) headaches (migraine), syncope, numbness  (-) seizures, CVA  GI/Hepatic/Renal/Endo    (+) GERD, renal disease (creat 1.2  K normal)- CRI, diabetes mellitus (diet no Rx) type 2    Musculoskeletal     Abdominal    Substance History      OB/GYN    (-)  Pregnant        Other   arthritis,     ROS/Med Hx Other: HCT 39   CP w/u neg (hx gerds and dysphagia)                      Anesthesia Plan    ASA 3     general and MAC     (PFL   ETT ready hx G paresis data def )  intravenous induction     Anesthetic plan, risks, benefits, and alternatives have been provided, discussed and informed consent has been obtained with: patient.    Plan discussed with CRNA.        CODE STATUS:    Medical Intervention Limits: No intubation (DNI)  Code Status (Patient has no pulse and is not breathing): No CPR (Do Not Attempt to Resuscitate)  Medical Interventions (Patient has pulse or is breathing): Limited Support

## 2024-08-14 NOTE — PROGRESS NOTES
Marifer Ricardo  [id number]  1950   LOS: 0 days   Patient Care Team:  Norma Spangler APRN as PCP - General (Nurse Practitioner)  Jonathan Mondragon MD as Consulting Physician (Neurology)  Ming Stiles MD, GARY (Nephrology)  Rosmery Nicholson DC as Consulting Physician (Chiropractic Medicine)  Taras Morillo MD as Consulting Physician (General Surgery)  Malcom Gilman MD as Consulting Physician (Interventional Cardiology)  Camilo Sunshine MD as Surgeon (Orthopedic Surgery)    Chief Complaint:  ATYPICAL CP / HTN / T2 DM / DYSPNEA / ANOCA    Subjective     Comfortable this morning off supplemental oxygen therapy.  Room air oximetry 98%.  No anginal type chest discomfort, increased tachypnea/dyspnea, nausea, emesis, abdominal pain, headache, or focal motor-sensory changes.  Weak with low blood pressure readings post LHC yesterday evening.  Currently n.p.o. awaiting renal artery duplex study.    Objective     Vital Sign Min/Max for last 24 hours  Temp  Min: 97.5 °F (36.4 °C)  Max: 97.9 °F (36.6 °C)   BP  Min: 60/39  Max: 173/96   Pulse  Min: 60  Max: 89   Resp  Min: 16  Max: 18   SpO2  Min: 92 %  Max: 99 %      Weight  Min: 55.9 kg (123 lb 3.8 oz)  Max: 55.9 kg (123 lb 3.8 oz)         08/12/24 2053 08/13/24  1437   Weight: 55.9 kg (123 lb 3.8 oz) 55.9 kg (123 lb 3.8 oz)         Intake/Output Summary (Last 24 hours) at 8/14/2024 0720  Last data filed at 8/14/2024 0400  Gross per 24 hour   Intake 240 ml   Output 950 ml   Net -710 ml       Physical Exam:     General Appearance:    Alert, cooperative, in no acute distress   Lungs:     Clear to auscultation,respirations regular, even and                unlabored    Heart:    Regular and normal rate, normal S1 and S2, no            murmur, no gallop, no rub, no click   Abdomen:  Extremities:   Soft, nontender, bowel sounds audible x4    No edema, normal range of motion   Pulses:   Pulses palpable and equal bilaterally      Results Review:   Results  from last 7 days   Lab Units 08/14/24  0021 08/13/24  0447 08/12/24  1228   SODIUM mmol/L 138 140 142   POTASSIUM mmol/L 3.9 4.2 4.1   CHLORIDE mmol/L 106 104 107   CO2 mmol/L 24.0 24.0 24.0   BUN mg/dL 25* 17 21   CREATININE mg/dL 1.23* 0.88 0.98   GLUCOSE mg/dL 296* 85 158*   CALCIUM mg/dL 8.5* 8.9 9.1     Results from last 7 days   Lab Units 08/14/24  0021 08/12/24  1228   WBC 10*3/mm3 5.78 5.30   HEMOGLOBIN g/dL 13.0 13.7   HEMATOCRIT % 39.0 42.4   PLATELETS 10*3/mm3 163 165     Results from last 7 days   Lab Units 08/12/24  1228   HEMOGLOBIN A1C % 5.80*     Results from last 7 days   Lab Units 08/13/24  0447 08/12/24  1513 08/12/24  1228   HSTROP T ng/L 22* 24* 20*     ALBUMIN: 3.2    LFTs: WNL    LACTATE: 2.0    ECHO:      Left ventricular systolic function is normal. Left ventricular ejection fraction appears to be 61 - 65%.    Left ventricular diastolic function is consistent with (grade I) impaired relaxation.    The aortic valve exhibits sclerosis.    EKG:    Sinus rhythm with occasional premature ventricular complexes  Prolonged QT  Abnormal ECG  When compared with ECG of 12-AUG-2024 13:01, (Unconfirmed)  premature ventricular complexes are now present    LHC:      Minimal, nonobstructive CAD.  Torturous coronary anatomy.    LVEDP 9 mmHg.    Medication Review: REVIEWED; NO DRIPS.    Assessment & Plan     Labile blood pressure readings.  This may reflect her n.p.o. status last night and again this morning with osmotic diuresis from contrast agent.  Suspect elevated Accu-Chek yesterday evening secondary to Decadron administration.  Right radial artery catheterization site nominal.  Await results of plasma serum catecholamines, renal artery duplex, and bilateral extremity arterial duplex.  Would hold antihypertensive therapy for systolic blood pressure less than 120 torr.  Doubt collagen vascular disease.  Would consider gastroenterology consultation.  Additionally would consider pulmonary function test.   Would continue hospitalization and reassess GFR in a.m.    Discussed with patient and daughter.      Chest pain    Acute coronary microvascular dysfunction    Syncope    08/14/24  07:20 EDT

## 2024-08-14 NOTE — SIGNIFICANT NOTE
08/14/24 1045   Living Situation   Current Living Arrangements home   Potentially Unsafe Housing Conditions none   Food Insecurity   Within the past 12 months, you worried that your food would run out before you got the money to buy more. Never true   Within the past 12 months, the food you bought just didn't last and you didn't have money to get more. Never true   Transportation Needs   In the past 12 months, has lack of transportation kept you from medical appointments or from getting medications? no   Utilities   In the past 12 months has the electric, gas, oil, or water company threatened to shut off services in your home? No   Abuse Screen (yes response referral indicated)   Feels Unsafe at Home or Work/School no   Feels Threatened by Someone no   Does Anyone Try to Keep You From Having Contact with Others or Doing Things Outside Your Home? no   Physical Signs of Abuse Present no   Financial Resource Strain   How hard is it for you to pay for the very basics like food, housing, medical care, and heating? Pt Declined   Employment   Do you want help finding or keeping work or a job? Patient declined   Family and Community Support   If for any reason you need help with day-to-day activities such as bathing, preparing meals, shopping, managing finances, etc., do you get the help you need? Patient declined   How often do you feel lonely or isolated from those around you? Patient declined   Education   Preferred Language English   Do you want help with school or training? For example, starting or completing job training or getting a high school diploma, GED or equivalent No   Physical Activity   On average, how many days per week do you engage in moderate to strenuous exercise (like a brisk walk)? 1 day   On average, how many minutes do you engage in exercise at this level? 10 min   Number of minutes of exercise per week (!) 10   Alcohol Use   Q1: How often do you have a drink containing alcohol? Never   Q2: How  many drinks containing alcohol do you have on a typical day when you are drinking? None   Q3: How often do you have six or more drinks on one occasion? Never   Stress   Do you feel stress - tense, restless, nervous, or anxious, or unable to sleep at night because your mind is troubled all the time - these days? Not at all   Mental Health   Little Interest or Pleasure in Doing Things 0-->not at all   Disabilities   Concentrating, Remembering or Making Decisions Difficulty no   Doing Errands Independently Difficulty (such as shopping) no   SDOH Completion Check   Social Determinants Score 5

## 2024-08-14 NOTE — SIGNIFICANT NOTE
08/14/24 1050   Living Situation   Current Living Arrangements home   Potentially Unsafe Housing Conditions none   Food Insecurity   Within the past 12 months, you worried that your food would run out before you got the money to buy more. Never true   Within the past 12 months, the food you bought just didn't last and you didn't have money to get more. Never true   Transportation Needs   In the past 12 months, has lack of transportation kept you from medical appointments or from getting medications? no   In the past 12 months, has lack of transportation kept you from meetings, work, or from getting things needed for daily living? No   Utilities   In the past 12 months has the electric, gas, oil, or water company threatened to shut off services in your home? No   Abuse Screen (yes response referral indicated)   Feels Unsafe at Home or Work/School no   Feels Threatened by Someone no   Does Anyone Try to Keep You From Having Contact with Others or Doing Things Outside Your Home? no   Physical Signs of Abuse Present no   Financial Resource Strain   How hard is it for you to pay for the very basics like food, housing, medical care, and heating? Pt Declined   Employment   Do you want help finding or keeping work or a job? Patient declined   Family and Community Support   If for any reason you need help with day-to-day activities such as bathing, preparing meals, shopping, managing finances, etc., do you get the help you need? Patient declined   How often do you feel lonely or isolated from those around you? Patient declined   Education   Preferred Language English   Do you want help with school or training? For example, starting or completing job training or getting a high school diploma, GED or equivalent No   Physical Activity   On average, how many days per week do you engage in moderate to strenuous exercise (like a brisk walk)? 1 day   On average, how many minutes do you engage in exercise at this level? 10 min    Number of minutes of exercise per week (!) 10   Alcohol Use   Q1: How often do you have a drink containing alcohol? Never   Q2: How many drinks containing alcohol do you have on a typical day when you are drinking? None   Q3: How often do you have six or more drinks on one occasion? Never   Stress   Do you feel stress - tense, restless, nervous, or anxious, or unable to sleep at night because your mind is troubled all the time - these days? Pt Declined   Mental Health   Little Interest or Pleasure in Doing Things 99-->patient declined   Feeling Down, Depressed or Hopeless 99-->patient declined   Disabilities   Concentrating, Remembering or Making Decisions Difficulty no   Doing Errands Independently Difficulty (such as shopping) no   SDOH Completion Check   Social Determinants Score 5

## 2024-08-14 NOTE — ANESTHESIA POSTPROCEDURE EVALUATION
Patient: Marifer Ricardo    Procedure Summary       Date: 08/14/24 Room / Location:  [facility]    Anesthesia Start: 1156 Anesthesia Stop: 1214    Procedure: ESOPHAGOGASTRODUODENOSCOPY Diagnosis:     Surgeons: Brunner, Mark I, MD Provider: Rashid Clemente MD    Anesthesia Type: general, MAC ASA Status: 3            Anesthesia Type: general, MAC    Vitals  Vitals Value Taken Time   BP 91/53 08/14/24 1211   Temp 98 °F (36.7 °C) 08/14/24 1211   Pulse 62 08/14/24 1213   Resp 20 08/14/24 1211   SpO2 98 % 08/14/24 1213   Vitals shown include unfiled device data.        Post Anesthesia Care and Evaluation    Patient location during evaluation: PACU  Patient participation: complete - patient participated  Level of consciousness: awake and alert  Pain management: adequate    Airway patency: patent  Anesthetic complications: No anesthetic complications  PONV Status: none  Cardiovascular status: hemodynamically stable and acceptable  Respiratory status: nonlabored ventilation, acceptable and nasal cannula  Hydration status: acceptable

## 2024-08-14 NOTE — CONSULTS
[facility] [place] [facility]    Referring Provider: Linda Singh MD     PCP: Norma Spangler APRN    Reason for Consultation: Severe indigestion, normal left heart catheterization yesterday, ? Need for EGD    Chief complaint:  Chest pain     History of present illness:    Marifer Ricardo is a very pleasant 74 y.o. female who is admitted with midsternal chest pain.  This has been progressively worsening for eight months.   She has dyspepsia and excessive belching.    Symptoms are not always related to meals.   She does not take any PPI nor OTC antiacids at home.    She does note some dysphagia to pills, particularly Alendronate.       She has history of remote colon resection over forty years ago.    She states she was referred to [facility] and [facility] [place] at that time for what sounds like dysmotility.    She states she has done much better in recent years with strict gluten free diet.   She denies any known diagnosis of Celiac disease.   She states she would have significant abdominal bloating and pain with consumption of gluten containing food.       She typically has a bowel movement every 3-4 days.   Stools are often hard.       She underwent cardiac catheterization yesterday with minimal nonobstructive coronary artery disease.      Last EGD was 1/25/2018.   This showed LA grade B reflux esophagitis, acute gastritis.   Normal duodenum.    UPPER GI ENDOSCOPY (01/25/2018 08:33)     Last colonoscopy was 2/7/2018: There was evidence of a prior end-to-side ileo-colonic anastomosis in the sigmoid colon  COLONOSCOPY (02/07/2018 09:32)     Allergies:  Penicillins, Betadine [povidone iodine], Codeine, Contrast dye (echo or unknown ct/mr), Metoclopramide, and Norco [hydrocodone-acetaminophen]    Scheduled Meds:  amLODIPine, 5 mg, Oral, Q24H  aspirin, 81 mg, Oral, Daily  atorvastatin, 40 mg, Oral, Nightly  carvedilol, 25 mg, Oral, Q12H  clopidogrel, 75 mg, Oral, Daily  famotidine, 20 mg, Oral, QAM  AC  insulin lispro, 2-7 Units, Subcutaneous, 4x Daily AC & at Bedtime  pantoprazole, 40 mg, Oral, Q AM  sodium chloride, 10 mL, Intravenous, Q12H  [Held by provider] valsartan, 160 mg, Oral, Daily         Infusions:  sodium chloride, 75 mL/hr, Last Rate: 75 mL/hr (08/14/24 0831)        PRN Meds:    acetaminophen    senna-docusate sodium **AND** polyethylene glycol **AND** bisacodyl **AND** bisacodyl    Calcium Replacement - Follow Nurse / BPA Driven Protocol    dextrose    dextrose    glucagon (human recombinant)    Magnesium Standard Dose Replacement - Follow Nurse / BPA Driven Protocol    nitroglycerin    oxyCODONE-acetaminophen    Phosphorus Replacement - Follow Nurse / BPA Driven Protocol    Potassium Replacement - Follow Nurse / BPA Driven Protocol    sodium chloride    sodium chloride    sodium chloride    Home Meds:  Facility-Administered Medications Prior to Admission   Medication Dose Route Frequency Provider Last Rate Last Admin    dexamethasone (DECADRON) injection 4 mg  4 mg Intramuscular Once Payal Edwards MD         Medications Prior to Admission   Medication Sig Dispense Refill Last Dose    alendronate (FOSAMAX) 70 MG tablet Take 1 tablet by mouth Every 7 (Seven) Days. Thursday   Past Week at tur    amLODIPine (NORVASC) 5 MG tablet Take 1 tablet by mouth every night at bedtime. (Patient taking differently: Take 0.5 tablets by mouth every night at bedtime.) 90 tablet 3 8/11/2024    aspirin (EQ Aspirin Adult Low Dose) 81 MG EC tablet Take 1 tablet by mouth Daily. 90 tablet 3 8/12/2024    atorvastatin (LIPITOR) 40 MG tablet Take 1 tablet by mouth Daily. (Patient taking differently: Take 1 tablet by mouth Every Night.) 90 tablet 3 8/11/2024    Biotin 1000 MCG tablet Take 1,000 mcg by mouth 3 (Three) Times a Day. OTC   8/12/2024    carvedilol (COREG) 12.5 MG tablet Take 1 tablet by mouth 2 (Two) Times a Day With Meals. (Patient taking differently: Take 2 tablets by mouth 2 (Two) Times a Day With Meals.)  180 tablet 3 2024    cetirizine (zyrTEC) 10 MG tablet Take 1 tablet by mouth Daily. (Patient taking differently: Take 1 tablet by mouth Daily. OTC) 30 tablet 0 2024    cholecalciferol (VITAMIN D3) 1000 UNITS tablet Take 1 tablet by mouth Daily. OTC   2024    Cinnamon 500 MG tablet Take 1 tablet by mouth 2 (Two) Times a Day. OTC   2024    clopidogrel (PLAVIX) 75 MG tablet Take 1 tablet by mouth Daily. 90 tablet 3 2024    Cyanocobalamin (VITAMIN B-12) 5000 MCG sublingual tablet Place 1 tablet under the tongue Daily. OTC   2024    Omega 3-6-9 Fatty Acids (OMEGA 3-6-9 COMPLEX PO) Take 1 capsule by mouth Daily. OTC   2024    oxyCODONE-acetaminophen (PERCOCET) 5-325 MG per tablet Take 1 tablet by mouth Every 12 (Twelve) Hours As Needed for Moderate Pain.   2024    Turmeric 400 MG capsule Take 400 mg by mouth Daily. OTC   2024    valsartan (Diovan) 160 MG tablet Take 1 tablet by mouth Daily. 90 tablet 3 2024       ROS: Review of Systems   Constitutional:  Positive for fatigue.   HENT:  Positive for trouble swallowing.    Eyes: Negative.    Respiratory: Negative.     Cardiovascular:  Positive for chest pain.   Gastrointestinal:  Positive for abdominal pain, constipation and nausea.   Endocrine: Negative.    Musculoskeletal: Negative.    Skin: Negative.    Allergic/Immunologic: Negative.    Hematological: Negative.    Psychiatric/Behavioral: Negative.         PAST MED HX:  Past Medical History:   Diagnosis Date    Abdominal pain     Abnormal bone density screening 2014    osteopenia    Arthritis     Asthma     Belching     Body piercing     BOTH EARS    Cataract, bilateral     Chronic renal insufficiency 2016    Diabetes mellitus     Essential hypertension 2016    GERD (gastroesophageal reflux disease)     Hx of mammogram 2009    Hyperparathyroidism     Mastoiditis     Pregnancy      s/p  x 3    Renal insufficiency     Urticaria     Wears glasses      "Weight loss        PAST SURG HX:  Past Surgical History:   Procedure Laterality Date    BYPASS GRAFT SUBCLAVIAN      LEFT SUBCLAVIAN TO RIGHT ATRIUM VENOUS GRAFT in past for ?TPN    CATARACT EXTRACTION, BILATERAL      COLECTOMY PARTIAL / TOTAL      Pt reports multiple abd surgeries for ?pseudo-bowel obstruction    COLONOSCOPY  2005    COLONOSCOPY N/A 02/07/2018    Procedure: COLONOSCOPY WITH RANDOM COLON BIOPSIES;  Surgeon: Taras Morillo MD;  Location: [facility] ENDOSCOPY;  Service:     COLOSTOMY      and revision    ENDOSCOPY N/A 01/25/2018    Procedure: ESOPHAGOGASTRODUODENOSCOPY WITH BIOPSIES;  Surgeon: Taras Morillo MD;  Location: [facility] ENDOSCOPY;  Service:     HYSTERECTOMY      age 31 for DUB, ovaries intact    TONSILLECTOMY AND ADENOIDECTOMY         FAM HX:  Family History   Problem Relation Age of Onset    Arthritis Mother     Migraines Mother     Thyroid disease Mother     Diabetes Mother     Hypertension Mother     Stroke Mother     Tuberculosis Mother     Heart attack Father     Heart disease Father     Diabetes Maternal Grandmother     Cancer Maternal Aunt     Cancer Paternal Aunt     Cancer Cousin        SOC HX:  Social History     Socioeconomic History    Marital status: Single   Tobacco Use    Smoking status: Never     Passive exposure: Never    Smokeless tobacco: Never   Vaping Use    Vaping status: Never Used   Substance and Sexual Activity    Alcohol use: No    Drug use: No    Sexual activity: Defer       PHYSICAL EXAM  /78 (BP Location: Left arm, Patient Position: Lying)   Pulse 67   Temp 97.9 °F (36.6 °C) (Oral)   Resp 16   Ht 170.2 cm (67.01\")   Wt 55.9 kg (123 lb 3.8 oz)   LMP  (LMP Unknown)   SpO2 99%   BMI 19.30 kg/m²   Wt Readings from Last 3 Encounters:   08/13/24 55.9 kg (123 lb 3.8 oz)   08/28/23 58.4 kg (128 lb 12.8 oz)   06/26/23 59.4 kg (131 lb)   ,body mass index is 19.3 kg/m².  Physical Exam  Constitutional:       General: She is not in acute distress.     Appearance: " "She is not toxic-appearing.   Cardiovascular:      Rate and Rhythm: Normal rate and regular rhythm.   Pulmonary:      Effort: Pulmonary effort is normal. No respiratory distress.   Abdominal:      General: Bowel sounds are normal. There is no distension.      Palpations: Abdomen is soft.      Tenderness: There is no abdominal tenderness.   Neurological:      Mental Status: She is alert and oriented to person, place, and time.   Psychiatric:         Behavior: Behavior normal.         Results Review:   I reviewed the patient's new clinical results.    Lab Results   Component Value Date    WBC 5.78 08/14/2024    HGB 13.0 08/14/2024    HGB 13.7 08/12/2024    HGB 13.7 02/28/2023    HCT 39.0 08/14/2024    MCV 96.3 08/14/2024     08/14/2024       No results found for: \"INR\"    Lab Results   Component Value Date    GLUCOSE 296 (H) 08/14/2024    BUN 25 (H) 08/14/2024    CREATININE 1.23 (H) 08/14/2024    EGFRIFNONA 50 (L) 02/21/2022    EGFRIFAFRI 60 (L) 02/21/2022    BCR 20.3 08/14/2024     08/14/2024    K 3.9 08/14/2024    CO2 24.0 08/14/2024    CALCIUM 8.5 (L) 08/14/2024    PROTENTOTREF 6.3 02/28/2023    ALBUMIN 3.2 (L) 08/14/2024    ALKPHOS 61 08/14/2024    BILITOT 0.5 08/14/2024    ALT 10 08/14/2024    AST 11 08/14/2024       ASSESSMENTS/PLANS    GERD, suspected reflux esophagitis   Atypical chest pain  Eructation   Constipation   Esophageal dysphagia to pills   Gluten intolerance  History of remote colon resection, 40 years ago   Nonobstructive CAD, on daily Plavix   Diabetes mellitus     Possible complicated GERD/reflux esophagitis.  She does have risk for pill induced esophagitis on weekly Alendronate.   Differential would also include gastroparesis, less likely biliary etiology.      >> Recommend diagnostic EGD today.    Unable to perform dilatation on Plavix.   >> Increase daily PPI to BID.    GI cocktail as needed   >> If EGD is unremarkable, recommend gallbladder ultrasound   >> Begin once daily " Miralax     I discussed the patient's findings and my recommendations with patient    JIMENEZ Casey  08/14/24  08:41 EDT

## 2024-08-14 NOTE — PLAN OF CARE
Goal Outcome Evaluation:      Patient stable through out shift. VSS and afebrile. BP remained WNL. No complaints of chest pain during shift or reports of dizziness. Patient had multiple diagnostic tests performed today. YENNI LEMUS. MEL&Ox4.

## 2024-08-14 NOTE — BRIEF OP NOTE
ESOPHAGOGASTRODUODENOSCOPY  Progress Note    Marifer Tobin Davion  8/14/2024    EGD is normal.    >> Once daily PPI appears sufficient for prior history of complicated GERD.  >> Obtain GB ultrasound.    Mark I. Brunner, MD     Date: 8/14/2024  Time: 12:08 EDT

## 2024-08-14 NOTE — PROGRESS NOTES
[facility] Medicine Services  PROGRESS NOTE    Patient Name: Marifer Ricardo  : 1950  MRN: [id number]    Date of Admission: 2024  Primary Care Physician: Norma Spangler APRN    Subjective   Subjective     CC:  Chest pain/indigestion    HPI:  Still having the indigestion symptoms.  Had some hypotension overnight and feeling lightheaded.  This was somewhat after her sedation for her C.  BP is improved this AM and she is overall feeling better.      Objective   Objective     Vital Signs:   Temp:  [97.5 °F (36.4 °C)-98.2 °F (36.8 °C)] 98 °F (36.7 °C)  Heart Rate:  [60-89] 62  Resp:  [16-22] 20  BP: ()/(39-96) 106/60  Flow (L/min):  [2] 2     Physical Exam:  Constitutional: No acute distress, awake, alert  HENT: NCAT, mucous membranes moist  Respiratory: Clear to auscultation bilaterally, respiratory effort normal   Cardiovascular: RRR, no murmurs, rubs, or gallops  Gastrointestinal: Positive bowel sounds, soft, nontender, nondistended  Musculoskeletal: No bilateral ankle edema  Psychiatric: Appropriate affect, cooperative  Neurologic: Oriented x 3, strength symmetric in all extremities, Cranial Nerves grossly intact to confrontation, speech clear  Skin: No rashes      Results Reviewed:  LAB RESULTS:      Lab 24  0021 24  1228   WBC 5.78 5.30   HEMOGLOBIN 13.0 13.7   HEMATOCRIT 39.0 42.4   PLATELETS 163 165   NEUTROS ABS 5.17 3.75   IMMATURE GRANS (ABS) 0.01 0.01   LYMPHS ABS 0.52* 0.96   MONOS ABS 0.07* 0.44   EOS ABS 0.00 0.09   MCV 96.3 98.4*   LACTATE 2.0  --    D DIMER QUANT  --  <0.27         Lab 24  0021 24  0447 24  1228   SODIUM 138 140 142   POTASSIUM 3.9 4.2 4.1   CHLORIDE 106 104 107   CO2 24.0 24.0 24.0   ANION GAP 8.0 12.0 11.0   BUN 25* 17 21   CREATININE 1.23* 0.88 0.98   EGFR 45.9* 68.6 60.3   GLUCOSE 296* 85 158*   CALCIUM 8.5* 8.9 9.1   HEMOGLOBIN A1C  --   --  5.80*         Lab 24  0021 24  1228   TOTAL  PROTEIN 5.2* 6.4   ALBUMIN 3.2* 3.9   GLOBULIN 2.0 2.5   ALT (SGPT) 10 12   AST (SGOT) 11 16   BILIRUBIN 0.5 0.4   ALK PHOS 61 78   LIPASE  --  35         Lab 08/13/24  0447 08/12/24  1513 08/12/24  1228   PROBNP  --   --  808.5   HSTROP T 22* 24* 20*                 Brief Urine Lab Results       None            Microbiology Results Abnormal       None            Cardiac Catheterization/Vascular Study    Result Date: 8/13/2024    Minimal, nonobstructive CAD.  Torturous coronary anatomy.   LVEDP 9 mmHg.     Adult Transthoracic Echo Complete W/ Cont if Necessary Per Protocol    Result Date: 8/13/2024    Left ventricular systolic function is normal. Left ventricular ejection fraction appears to be 61 - 65%.   Left ventricular diastolic function is consistent with (grade I) impaired relaxation.   The aortic valve exhibits sclerosis.     CT Head Without Contrast    Result Date: 8/12/2024  CT HEAD WO CONTRAST Date of Exam: 8/12/2024 3:25 PM EDT Indication: syncope. Comparison: None available. Technique: Axial CT images were obtained of the head without contrast administration.  Automated exposure control and iterative construction methods were used. Findings: No acute intracranial hemorrhage or extra-axial collection is identified. The ventricles appear normal in caliber, with no evidence of mass effect or midline shift. The basal cisterns appear patent. The gray-white differentiation appears preserved. The calvarium appear intact. The paranasal sinuses are clear. The mastoid air cells are well-aerated.     Impression: Impression: No acute intracranial process identified. Electronically Signed: Daniel Gross MD  8/12/2024 3:35 PM EDT  Workstation ID: [id number]     Results for orders placed during the hospital encounter of 08/12/24    Adult Transthoracic Echo Complete W/ Cont if Necessary Per Protocol    Interpretation Summary    Left ventricular systolic function is normal. Left ventricular ejection fraction appears to  be 61 - 65%.    Left ventricular diastolic function is consistent with (grade I) impaired relaxation.    The aortic valve exhibits sclerosis.      Current medications:  Scheduled Meds:amLODIPine, 5 mg, Oral, Q24H  aspirin, 81 mg, Oral, Daily  atorvastatin, 40 mg, Oral, Nightly  carvedilol, 25 mg, Oral, Q12H  clopidogrel, 75 mg, Oral, Daily  insulin lispro, 2-7 Units, Subcutaneous, 4x Daily AC & at Bedtime  [START ON 8/15/2024] pantoprazole, 40 mg, Oral, QAM AC  polyethylene glycol, 17 g, Oral, Daily  sodium chloride, 10 mL, Intravenous, Q12H  [Held by provider] valsartan, 160 mg, Oral, Daily      Continuous Infusions:sodium chloride, 50 mL/hr, Last Rate: 50 mL/hr (08/14/24 1130)      PRN Meds:.  acetaminophen    senna-docusate sodium **AND** polyethylene glycol **AND** bisacodyl **AND** bisacodyl    Calcium Replacement - Follow Nurse / BPA Driven Protocol    dextrose    dextrose    glucagon (human recombinant)    Magnesium Standard Dose Replacement - Follow Nurse / BPA Driven Protocol    nitroglycerin    oxyCODONE-acetaminophen    Phosphorus Replacement - Follow Nurse / BPA Driven Protocol    Potassium Replacement - Follow Nurse / BPA Driven Protocol    sodium chloride    sodium chloride    sodium chloride    Assessment & Plan   Assessment & Plan     Active Hospital Problems    Diagnosis  POA    **Chest pain [R07.9]  Unknown    Acute coronary microvascular dysfunction [I24.81]  Unknown    Syncope [R55]  Yes      Resolved Hospital Problems   No resolved problems to display.        Brief Hospital Course to date:  Marifer Ricardo is a 74 y.o. female with PMHx of hypertension, hyperlipidemia, GERD, OA, PAD, diabetes mellitus type 2 who presented to the ED with complaint of squeezing chest pain radiating into the left neck with left arm numbness, indigestion, belching.     Atypical chest pain  Indigestion, belching   -EKG without acute ST-T changes  -[facility]  -Continue home ASA, Plavix, atorvastatin. Cont PPI and  Pepcid.   -GI consult today for further workup.  EGD completed today and was  normal.  Recommending gallbladder u/s.     Hypertensive Urgency  -Overall improved.  Continue home carvedilol 25 mg BID, valsartan 160 mg daily (currently held due to JIMBO). Cont amlodipine to 5 mg daily.     JIMBO  --Cr up this AM.  May be related to her hypotension  --Getting IVF  ---Avoid nephrotoxic medications and renally dose all medications.  --Monitor I/O     Elevated troponin  -likely secondary to uncontrolled HTN  -Troponins flat.      Recent syncopal episode  Ongoing lightheadedness   -Unclear etiology, syncope x1 last week   -ECHO EF 65%, G1 diastolic dysfunction     PAD   -Continue ASA, Plavix, atorvastatin.     DM2 with hyperglycemia  -not on home medications  -A1c 5.8. Low dose SSI while admitted.      Chronic pain on chronic opioids  -Continue home Percocet.     Expected Discharge Location and Transportation: home  Expected Discharge   Expected Discharge Date: 8/14/2024; Expected Discharge Time:      VTE Prophylaxis:  No VTE prophylaxis order currently exists.         AM-PAC 6 Clicks Score (PT): 22 (08/14/24 0800)    CODE STATUS:   Code Status and Medical Interventions: No CPR (Do Not Attempt to Resuscitate); Limited Support; No intubation (DNI)   Ordered at: 08/13/24 2016     Medical Intervention Limits:    No intubation (DNI)     Code Status (Patient has no pulse and is not breathing):    No CPR (Do Not Attempt to Resuscitate)     Medical Interventions (Patient has pulse or is breathing):    Limited Support       Linda Singh MD  08/14/24

## 2024-08-14 NOTE — PLAN OF CARE
Problem: Adult Inpatient Plan of Care  Goal: Plan of Care Review  Outcome: Ongoing, Progressing  Goal: Patient-Specific Goal (Individualized)  Outcome: Ongoing, Progressing  Goal: Absence of Hospital-Acquired Illness or Injury  Outcome: Ongoing, Progressing  Intervention: Identify and Manage Fall Risk  Recent Flowsheet Documentation  Taken 8/14/2024 0600 by Rupert Lyons RN  Safety Promotion/Fall Prevention:   assistive device/personal items within reach   clutter free environment maintained   safety round/check completed  Taken 8/14/2024 0400 by Rupert Lyons RN  Safety Promotion/Fall Prevention:   assistive device/personal items within reach   clutter free environment maintained   safety round/check completed  Taken 8/14/2024 0200 by Rupert Lyons RN  Safety Promotion/Fall Prevention:   assistive device/personal items within reach   clutter free environment maintained   safety round/check completed  Taken 8/14/2024 0000 by Rupert Lyons RN  Safety Promotion/Fall Prevention:   assistive device/personal items within reach   clutter free environment maintained   safety round/check completed  Taken 8/13/2024 2200 by Rupert Lyons RN  Safety Promotion/Fall Prevention:   assistive device/personal items within reach   clutter free environment maintained   safety round/check completed  Taken 8/13/2024 2031 by Rupert Lyons RN  Safety Promotion/Fall Prevention:   assistive device/personal items within reach   clutter free environment maintained   safety round/check completed   nonskid shoes/slippers when out of bed  Intervention: Prevent Skin Injury  Recent Flowsheet Documentation  Taken 8/14/2024 0600 by Rupert Lyons RN  Body Position: weight shifting  Taken 8/14/2024 0400 by Rupert Lyons RN  Body Position: weight shifting  Skin Protection: adhesive use limited  Taken 8/14/2024 0200 by Rupert Lyons RN  Body Position: weight shifting  Taken 8/14/2024 0000 by Rupert Lyons RN  Body  Position: weight shifting  Taken 8/13/2024 2200 by Rupert Lyons RN  Body Position: weight shifting  Skin Protection: adhesive use limited  Taken 8/13/2024 2031 by Rupert Lyons RN  Body Position: position changed independently  Skin Protection: adhesive use limited  Intervention: Prevent and Manage VTE (Venous Thromboembolism) Risk  Recent Flowsheet Documentation  Taken 8/14/2024 0600 by Rupert Lyons RN  Activity Management: activity encouraged  Taken 8/14/2024 0400 by Rupert Lyons RN  Activity Management: activity encouraged  Taken 8/14/2024 0200 by Rupert Lyons RN  Activity Management: activity encouraged  Taken 8/14/2024 0000 by Rupert Lyons RN  Activity Management: activity encouraged  Taken 8/13/2024 2200 by Rupert Lyons RN  Activity Management: activity encouraged  Taken 8/13/2024 2031 by Rupert Lyons RN  Activity Management: activity minimized  Range of Motion: active ROM (range of motion) encouraged  Intervention: Prevent Infection  Recent Flowsheet Documentation  Taken 8/14/2024 0600 by Rupert Lyons RN  Infection Prevention:   environmental surveillance performed   hand hygiene promoted   rest/sleep promoted  Taken 8/14/2024 0400 by Rupert Lyons RN  Infection Prevention:   environmental surveillance performed   hand hygiene promoted   rest/sleep promoted  Taken 8/14/2024 0200 by Rupert Lyons RN  Infection Prevention:   environmental surveillance performed   hand hygiene promoted   rest/sleep promoted  Taken 8/14/2024 0000 by Rupert Lyons RN  Infection Prevention:   environmental surveillance performed   hand hygiene promoted   rest/sleep promoted  Taken 8/13/2024 2200 by Rupert Lyons RN  Infection Prevention:   environmental surveillance performed   hand hygiene promoted   rest/sleep promoted  Taken 8/13/2024 2031 by Rupert Lyons RN  Infection Prevention:   environmental surveillance performed   hand hygiene promoted   rest/sleep promoted  Goal: Optimal  Comfort and Wellbeing  Outcome: Ongoing, Progressing  Intervention: Provide Person-Centered Care  Recent Flowsheet Documentation  Taken 8/13/2024 2031 by Rupert Lyons RN  Trust Relationship/Rapport:   care explained   choices provided  Goal: Readiness for Transition of Care  Outcome: Ongoing, Progressing     Problem: Asthma Comorbidity  Goal: Maintenance of Asthma Control  Outcome: Ongoing, Progressing     Problem: Behavioral Health Comorbidity  Goal: Maintenance of Behavioral Health Symptom Control  Outcome: Ongoing, Progressing     Problem: COPD (Chronic Obstructive Pulmonary Disease) Comorbidity  Goal: Maintenance of COPD Symptom Control  Outcome: Ongoing, Progressing  Intervention: Maintain COPD-Symptom Control  Recent Flowsheet Documentation  Taken 8/13/2024 2031 by Rupert Lyons, RN  Supportive Measures: active listening utilized     Problem: Diabetes Comorbidity  Goal: Blood Glucose Level Within Targeted Range  Outcome: Ongoing, Progressing     Problem: Heart Failure Comorbidity  Goal: Maintenance of Heart Failure Symptom Control  Outcome: Ongoing, Progressing     Problem: Hypertension Comorbidity  Goal: Blood Pressure in Desired Range  Outcome: Ongoing, Progressing     Problem: Obstructive Sleep Apnea Risk or Actual Comorbidity Management  Goal: Unobstructed Breathing During Sleep  Outcome: Ongoing, Progressing     Problem: Osteoarthritis Comorbidity  Goal: Maintenance of Osteoarthritis Symptom Control  Outcome: Ongoing, Progressing  Intervention: Maintain Osteoarthritis Symptom Control  Recent Flowsheet Documentation  Taken 8/14/2024 0600 by Rupert Lyons, RN  Activity Management: activity encouraged  Taken 8/14/2024 0400 by Rupert Lyons, RN  Activity Management: activity encouraged  Taken 8/14/2024 0200 by Rupert Lyons, RN  Activity Management: activity encouraged  Taken 8/14/2024 0000 by Rupert Lyons, RN  Activity Management: activity encouraged  Taken 8/13/2024 2200 by Rupert Lyons,  RN  Activity Management: activity encouraged  Taken 8/13/2024 2031 by Rupert Lyons RN  Activity Management: activity minimized     Problem: Pain Chronic (Persistent) (Comorbidity Management)  Goal: Acceptable Pain Control and Functional Ability  Outcome: Ongoing, Progressing  Intervention: Manage Persistent Pain  Recent Flowsheet Documentation  Taken 8/14/2024 0000 by Rupert Lyons RN  Sleep/Rest Enhancement: awakenings minimized  Taken 8/13/2024 2031 by Rupert Lyons RN  Bowel Elimination Promotion: adequate fluid intake promoted  Intervention: Optimize Psychosocial Wellbeing  Recent Flowsheet Documentation  Taken 8/13/2024 2031 by Rupert Lyons RN  Supportive Measures: active listening utilized     Problem: Seizure Disorder Comorbidity  Goal: Maintenance of Seizure Control  Outcome: Ongoing, Progressing  Intervention: Maintain Seizure-Symptom Control  Recent Flowsheet Documentation  Taken 8/13/2024 2031 by Rupert Lyons RN  Seizure Precautions: activity supervised     Problem: Chest Pain  Goal: Resolution of Chest Pain Symptoms  Outcome: Ongoing, Progressing     Problem: Fall Injury Risk  Goal: Absence of Fall and Fall-Related Injury  Outcome: Ongoing, Progressing  Intervention: Identify and Manage Contributors  Recent Flowsheet Documentation  Taken 8/14/2024 0600 by Rupert Lyons RN  Self-Care Promotion: independence encouraged  Taken 8/14/2024 0400 by Rupert Lyons RN  Self-Care Promotion: independence encouraged  Taken 8/14/2024 0200 by Rupert Lyons RN  Self-Care Promotion: independence encouraged  Taken 8/14/2024 0000 by Rupert Lyons RN  Self-Care Promotion: independence encouraged  Taken 8/13/2024 2200 by Rupert Lyons RN  Self-Care Promotion: independence encouraged  Intervention: Promote Injury-Free Environment  Recent Flowsheet Documentation  Taken 8/14/2024 0600 by Rupert Lyons RN  Safety Promotion/Fall Prevention:   assistive device/personal items within reach    clutter free environment maintained   safety round/check completed  Taken 8/14/2024 0400 by Rupert Lyons RN  Safety Promotion/Fall Prevention:   assistive device/personal items within reach   clutter free environment maintained   safety round/check completed  Taken 8/14/2024 0200 by Rupert Lyons RN  Safety Promotion/Fall Prevention:   assistive device/personal items within reach   clutter free environment maintained   safety round/check completed  Taken 8/14/2024 0000 by Rupert Lyons RN  Safety Promotion/Fall Prevention:   assistive device/personal items within reach   clutter free environment maintained   safety round/check completed  Taken 8/13/2024 2200 by Rupert Lyons RN  Safety Promotion/Fall Prevention:   assistive device/personal items within reach   clutter free environment maintained   safety round/check completed  Taken 8/13/2024 2031 by Rupert Lyons RN  Safety Promotion/Fall Prevention:   assistive device/personal items within reach   clutter free environment maintained   safety round/check completed   nonskid shoes/slippers when out of bed     Problem: Syncope  Goal: Absence of Syncopal Symptoms  Outcome: Ongoing, Progressing  Intervention: Manage Effect of Syncopal Symptoms  Recent Flowsheet Documentation  Taken 8/13/2024 2031 by Rupert Lyons RN  Supportive Measures: active listening utilized   Goal Outcome Evaluation:      During the night, patient attempted to get up to use the restroom and became extremely dizzy. Once back in bed, BP was noted as a significant drop. Provider notified. See MAR for all new orders. Pt. Is now resting in bed with call light within reach. Safety precautions in place.

## 2024-08-15 LAB
ANION GAP SERPL CALCULATED.3IONS-SCNC: 9 MMOL/L (ref 5–15)
BUN SERPL-MCNC: 27 MG/DL (ref 8–23)
BUN/CREAT SERPL: 28.1 (ref 7–25)
CALCIUM SPEC-SCNC: 8.1 MG/DL (ref 8.6–10.5)
CHLORIDE SERPL-SCNC: 110 MMOL/L (ref 98–107)
CO2 SERPL-SCNC: 23 MMOL/L (ref 22–29)
CREAT SERPL-MCNC: 0.96 MG/DL (ref 0.57–1)
EGFRCR SERPLBLD CKD-EPI 2021: 61.8 ML/MIN/1.73
GLUCOSE BLDC GLUCOMTR-MCNC: 115 MG/DL (ref 70–130)
GLUCOSE BLDC GLUCOMTR-MCNC: 95 MG/DL (ref 70–130)
GLUCOSE BLDC GLUCOMTR-MCNC: 95 MG/DL (ref 70–130)
GLUCOSE BLDC GLUCOMTR-MCNC: 99 MG/DL (ref 70–130)
GLUCOSE SERPL-MCNC: 96 MG/DL (ref 65–99)
MAGNESIUM SERPL-MCNC: 2 MG/DL (ref 1.6–2.4)
PHOSPHATE SERPL-MCNC: 3.3 MG/DL (ref 2.5–4.5)
POTASSIUM SERPL-SCNC: 4.5 MMOL/L (ref 3.5–5.2)
QT INTERVAL: 388 MS
QT INTERVAL: 422 MS
QTC INTERVAL: 455 MS
QTC INTERVAL: 461 MS
SODIUM SERPL-SCNC: 142 MMOL/L (ref 136–145)

## 2024-08-15 PROCEDURE — 97161 PT EVAL LOW COMPLEX 20 MIN: CPT

## 2024-08-15 PROCEDURE — 80048 BASIC METABOLIC PNL TOTAL CA: CPT | Performed by: INTERNAL MEDICINE

## 2024-08-15 PROCEDURE — 99232 SBSQ HOSP IP/OBS MODERATE 35: CPT | Performed by: PHYSICIAN ASSISTANT

## 2024-08-15 PROCEDURE — 84100 ASSAY OF PHOSPHORUS: CPT | Performed by: PEDIATRICS

## 2024-08-15 PROCEDURE — 83735 ASSAY OF MAGNESIUM: CPT | Performed by: PEDIATRICS

## 2024-08-15 PROCEDURE — 99232 SBSQ HOSP IP/OBS MODERATE 35: CPT | Performed by: PEDIATRICS

## 2024-08-15 PROCEDURE — 99231 SBSQ HOSP IP/OBS SF/LOW 25: CPT | Performed by: NURSE PRACTITIONER

## 2024-08-15 PROCEDURE — 82948 REAGENT STRIP/BLOOD GLUCOSE: CPT

## 2024-08-15 RX ORDER — SUCRALFATE 1 G/1
1 TABLET ORAL
Status: DISCONTINUED | OUTPATIENT
Start: 2024-08-15 | End: 2024-08-16 | Stop reason: HOSPADM

## 2024-08-15 RX ADMIN — PANTOPRAZOLE SODIUM 40 MG: 40 TABLET, DELAYED RELEASE ORAL at 08:24

## 2024-08-15 RX ADMIN — ATORVASTATIN CALCIUM 40 MG: 40 TABLET, FILM COATED ORAL at 21:30

## 2024-08-15 RX ADMIN — Medication 10 ML: at 08:24

## 2024-08-15 RX ADMIN — ASPIRIN 81 MG: 81 TABLET, COATED ORAL at 08:23

## 2024-08-15 RX ADMIN — Medication 10 ML: at 21:31

## 2024-08-15 RX ADMIN — SUCRALFATE 1 G: 1 TABLET ORAL at 21:30

## 2024-08-15 RX ADMIN — AMLODIPINE BESYLATE 5 MG: 5 TABLET ORAL at 08:24

## 2024-08-15 RX ADMIN — OXYCODONE HYDROCHLORIDE AND ACETAMINOPHEN 1 TABLET: 5; 325 TABLET ORAL at 05:24

## 2024-08-15 RX ADMIN — SUCRALFATE 1 G: 1 TABLET ORAL at 16:43

## 2024-08-15 RX ADMIN — OXYCODONE HYDROCHLORIDE AND ACETAMINOPHEN 1 TABLET: 5; 325 TABLET ORAL at 19:49

## 2024-08-15 RX ADMIN — CLOPIDOGREL BISULFATE 75 MG: 75 TABLET ORAL at 08:23

## 2024-08-15 RX ADMIN — CARVEDILOL 25 MG: 12.5 TABLET, FILM COATED ORAL at 21:30

## 2024-08-15 RX ADMIN — CARVEDILOL 25 MG: 12.5 TABLET, FILM COATED ORAL at 08:23

## 2024-08-15 RX ADMIN — OXYCODONE HYDROCHLORIDE AND ACETAMINOPHEN 1 TABLET: 5; 325 TABLET ORAL at 13:23

## 2024-08-15 NOTE — PLAN OF CARE
Problem: Adult Inpatient Plan of Care  Goal: Plan of Care Review  Outcome: Ongoing, Progressing  Goal: Patient-Specific Goal (Individualized)  Outcome: Ongoing, Progressing  Goal: Absence of Hospital-Acquired Illness or Injury  Outcome: Ongoing, Progressing  Intervention: Identify and Manage Fall Risk  Recent Flowsheet Documentation  Taken 8/15/2024 0600 by Rupert Lyons RN  Safety Promotion/Fall Prevention:   assistive device/personal items within reach   clutter free environment maintained   safety round/check completed  Taken 8/15/2024 0400 by Rupert Lyons RN  Safety Promotion/Fall Prevention:   assistive device/personal items within reach   clutter free environment maintained   safety round/check completed  Taken 8/15/2024 0200 by Rupert Lyons RN  Safety Promotion/Fall Prevention:   assistive device/personal items within reach   clutter free environment maintained   safety round/check completed  Taken 8/15/2024 0000 by Rupert Lyons RN  Safety Promotion/Fall Prevention:   assistive device/personal items within reach   clutter free environment maintained   safety round/check completed  Taken 8/14/2024 2200 by Rupert Lyons RN  Safety Promotion/Fall Prevention:   assistive device/personal items within reach   clutter free environment maintained   safety round/check completed  Taken 8/14/2024 2040 by Rupert Lyons RN  Safety Promotion/Fall Prevention:   clutter free environment maintained   assistive device/personal items within reach   safety round/check completed  Intervention: Prevent Skin Injury  Recent Flowsheet Documentation  Taken 8/15/2024 0600 by Rupert Lyons RN  Body Position: position changed independently  Taken 8/15/2024 0400 by Rupert Lyons RN  Body Position: position changed independently  Taken 8/15/2024 0200 by Rupert Lyons RN  Body Position: position changed independently  Taken 8/15/2024 0000 by Rupert Lyons RN  Body Position: position changed  independently  Taken 8/14/2024 2200 by Rupert Lyons RN  Body Position: position changed independently  Taken 8/14/2024 2040 by Rupert Lyons RN  Body Position: position changed independently  Skin Protection: adhesive use limited  Intervention: Prevent and Manage VTE (Venous Thromboembolism) Risk  Recent Flowsheet Documentation  Taken 8/15/2024 0600 by Rupert Lyons RN  Activity Management: activity encouraged  Taken 8/15/2024 0400 by Rupert Lyons RN  Activity Management: activity encouraged  Taken 8/15/2024 0200 by Rupert Lyons RN  Activity Management: activity encouraged  Taken 8/15/2024 0000 by Rupert Lyons RN  Activity Management: activity encouraged  Taken 8/14/2024 2200 by Rupert Lyons RN  Activity Management: activity encouraged  Taken 8/14/2024 2040 by Rupert Lyons RN  Activity Management: activity encouraged  Intervention: Prevent Infection  Recent Flowsheet Documentation  Taken 8/14/2024 2040 by Rupert Lyons RN  Infection Prevention:   environmental surveillance performed   hand hygiene promoted   rest/sleep promoted  Goal: Optimal Comfort and Wellbeing  Outcome: Ongoing, Progressing  Intervention: Provide Person-Centered Care  Recent Flowsheet Documentation  Taken 8/14/2024 2040 by Rupert Lyons RN  Trust Relationship/Rapport:   care explained   choices provided  Goal: Readiness for Transition of Care  Outcome: Ongoing, Progressing     Problem: Asthma Comorbidity  Goal: Maintenance of Asthma Control  Outcome: Ongoing, Progressing     Problem: Behavioral Health Comorbidity  Goal: Maintenance of Behavioral Health Symptom Control  Outcome: Ongoing, Progressing     Problem: COPD (Chronic Obstructive Pulmonary Disease) Comorbidity  Goal: Maintenance of COPD Symptom Control  Outcome: Ongoing, Progressing  Intervention: Maintain COPD-Symptom Control  Recent Flowsheet Documentation  Taken 8/14/2024 2040 by Rupert Lyons RN  Supportive Measures: active listening utilized      Problem: Diabetes Comorbidity  Goal: Blood Glucose Level Within Targeted Range  Outcome: Ongoing, Progressing     Problem: Heart Failure Comorbidity  Goal: Maintenance of Heart Failure Symptom Control  Outcome: Ongoing, Progressing     Problem: Hypertension Comorbidity  Goal: Blood Pressure in Desired Range  Outcome: Ongoing, Progressing     Problem: Obstructive Sleep Apnea Risk or Actual Comorbidity Management  Goal: Unobstructed Breathing During Sleep  Outcome: Ongoing, Progressing     Problem: Osteoarthritis Comorbidity  Goal: Maintenance of Osteoarthritis Symptom Control  Outcome: Ongoing, Progressing  Intervention: Maintain Osteoarthritis Symptom Control  Recent Flowsheet Documentation  Taken 8/15/2024 0600 by Rupert Lyons RN  Activity Management: activity encouraged  Taken 8/15/2024 0400 by Rupert Lyons RN  Activity Management: activity encouraged  Taken 8/15/2024 0200 by Rupert Lyons RN  Activity Management: activity encouraged  Taken 8/15/2024 0000 by Rupert Lyons RN  Activity Management: activity encouraged  Taken 8/14/2024 2200 by Rupert Lyons RN  Activity Management: activity encouraged  Taken 8/14/2024 2040 by Rupert Lyons RN  Activity Management: activity encouraged     Problem: Pain Chronic (Persistent) (Comorbidity Management)  Goal: Acceptable Pain Control and Functional Ability  Outcome: Ongoing, Progressing  Intervention: Manage Persistent Pain  Recent Flowsheet Documentation  Taken 8/14/2024 2040 by Rupert Lyons RN  Bowel Elimination Promotion: adequate fluid intake promoted  Intervention: Optimize Psychosocial Wellbeing  Recent Flowsheet Documentation  Taken 8/14/2024 2040 by Rupert Lyons RN  Supportive Measures: active listening utilized     Problem: Seizure Disorder Comorbidity  Goal: Maintenance of Seizure Control  Outcome: Ongoing, Progressing     Problem: Chest Pain  Goal: Resolution of Chest Pain Symptoms  Outcome: Ongoing, Progressing     Problem: Fall  Injury Risk  Goal: Absence of Fall and Fall-Related Injury  Outcome: Ongoing, Progressing  Intervention: Identify and Manage Contributors  Recent Flowsheet Documentation  Taken 8/15/2024 0600 by Rupert Lyons RN  Self-Care Promotion: independence encouraged  Taken 8/15/2024 0400 by Rupert Lyons RN  Self-Care Promotion: independence encouraged  Taken 8/15/2024 0200 by Rupert Lyons RN  Self-Care Promotion: independence encouraged  Taken 8/15/2024 0000 by Rupert Lyons RN  Self-Care Promotion: independence encouraged  Taken 8/14/2024 2200 by Rupert Lyons RN  Self-Care Promotion: independence encouraged  Intervention: Promote Injury-Free Environment  Recent Flowsheet Documentation  Taken 8/15/2024 0600 by Rupert Lyons RN  Safety Promotion/Fall Prevention:   assistive device/personal items within reach   clutter free environment maintained   safety round/check completed  Taken 8/15/2024 0400 by Rupert Lyons RN  Safety Promotion/Fall Prevention:   assistive device/personal items within reach   clutter free environment maintained   safety round/check completed  Taken 8/15/2024 0200 by Rupert Lyons RN  Safety Promotion/Fall Prevention:   assistive device/personal items within reach   clutter free environment maintained   safety round/check completed  Taken 8/15/2024 0000 by Rupert Lyons RN  Safety Promotion/Fall Prevention:   assistive device/personal items within reach   clutter free environment maintained   safety round/check completed  Taken 8/14/2024 2200 by Rupert Lyons RN  Safety Promotion/Fall Prevention:   assistive device/personal items within reach   clutter free environment maintained   safety round/check completed  Taken 8/14/2024 2040 by Rupert Lyons RN  Safety Promotion/Fall Prevention:   clutter free environment maintained   assistive device/personal items within reach   safety round/check completed     Problem: Syncope  Goal: Absence of Syncopal Symptoms  Outcome:  Ongoing, Progressing  Intervention: Manage Effect of Syncopal Symptoms  Recent Flowsheet Documentation  Taken 8/14/2024 2040 by Rupert Lyons, RN  Supportive Measures: active listening utilized   Goal Outcome Evaluation:

## 2024-08-15 NOTE — PROGRESS NOTES
Marifer Ricardo  [id number]  1950   LOS: 1 day   Patient Care Team:  Norma Spangler APRN as PCP - General (Nurse Practitioner)  Jonathan Mondragon MD as Consulting Physician (Neurology)  Ming Stiles MD, GARY (Nephrology)  Rosmery Nicholson DC as Consulting Physician (Chiropractic Medicine)  Taras Morillo MD as Consulting Physician (General Surgery)  Malcom Gilman MD as Consulting Physician (Interventional Cardiology)  Camilo Sunshine MD as Surgeon (Orthopedic Surgery)    Chief Complaint: Follow-up on atypical chest pain, hypertension    Subjective The patient's chest pain is better than it was and she is having less belching now.  She denies any shortness of breath, palpitations, presyncope, or syncope.  Blood pressures have been stable.    Objective     Vital Sign Min/Max for last 24 hours  Temp  Min: 97.6 °F (36.4 °C)  Max: 98.2 °F (36.8 °C)   BP  Min: 91/51  Max: 141/80   Pulse  Min: 57  Max: 70   Resp  Min: 16  Max: 22   SpO2  Min: 95 %  Max: 100 %   No data recorded   Weight  Min: 56.2 kg (124 lb)  Max: 56.2 kg (124 lb)         08/13/24  1437 08/14/24  1123   Weight: 55.9 kg (123 lb 3.8 oz) 56.2 kg (124 lb)         Intake/Output Summary (Last 24 hours) at 8/15/2024 0729  Last data filed at 8/14/2024 1214  Gross per 24 hour   Intake 150 ml   Output --   Net 150 ml       Physical Exam:     General Appearance:    Alert, cooperative, in no acute distress   Lungs:     Clear to auscultation,respirations regular, even and                unlabored    Heart:    Regular and normal rate, normal S1 and S2, no            murmur, no gallop, no rub, no click   Abdomen:  Extremities:   Soft, nontender, bowel sounds audible x4    No edema, normal range of motion   Pulses:   Pulses palpable and equal bilaterally      Results Review:   Results from last 7 days   Lab Units 08/15/24  0516 08/14/24  1336 08/14/24  0021   SODIUM mmol/L 142 141 138   POTASSIUM mmol/L 4.5 4.8 3.9   CHLORIDE mmol/L 110* 107  106   CO2 mmol/L 23.0 22.0 24.0   BUN mg/dL 27* 27* 25*   CREATININE mg/dL 0.96 1.03* 1.23*   GLUCOSE mg/dL 96 116* 296*   CALCIUM mg/dL 8.1* 8.7 8.5*     Results from last 7 days   Lab Units 08/14/24  0021 08/12/24  1228   WBC 10*3/mm3 5.78 5.30   HEMOGLOBIN g/dL 13.0 13.7   HEMATOCRIT % 39.0 42.4   PLATELETS 10*3/mm3 163 165     Results from last 7 days   Lab Units 08/14/24  1336   CHOLESTEROL mg/dL 199   TRIGLYCERIDES mg/dL 38   HDL CHOL mg/dL 86*   LDL CHOL mg/dL 106*     Results from last 7 days   Lab Units 08/12/24  1228   HEMOGLOBIN A1C % 5.80*     Results from last 7 days   Lab Units 08/13/24  0447 08/12/24  1513 08/12/24  1228   HSTROP T ng/L 22* 24* 20*   Echocardiogram 8/13/2024:  Left ventricular systolic function is normal. Left ventricular ejection fraction appears to be 61 - 65%.    Left ventricular diastolic function is consistent with (grade I) impaired relaxation.    The aortic valve exhibits sclerosis.    Left heart catheterization 8/13/2024:    Minimal, nonobstructive CAD.  Torturous coronary anatomy.    LVEDP 9 mmHg.     SELAM 8/14/2024:  Right lower extremity: Normal SELAM 1.3.  No significant obstructive disease.  Multiphasic waveforms to the level of the distal anterior and posterior tibial arteries.  Monophasic waveforms but color Doppler flow present in the dorsalis pedis.    Left lower extremity: Normal SELAM 1.1. No significant obstructive disease. Multiphasic waveforms to the level of the distal anterior and posterior tibial arteries. Monophasic waveforms but color Doppler flow present in the dorsalis pedis.    Renal artery duplex 8/14/2024:  Right Renal Conclusions:The right kidney is normal in size. There is no evidence for right renal artery occlusive disease where visualized.. Right renal artery origin not visualized due shadowing from bowel gas. Right renal perfusion resistance is normal.    Left Renal Conclusions:The left kidney is normal in size. There is no evidence for left renal  artery occlusive disease where visualized. Left renal artery origin not visualized due shadowing from bowel gas. Left renal perfusion resistance is normal.    Gallbladder ultrasound 8/14/2024:  1. 8 mm cystic-appearing lesion in the distal pancreas corresponds to a pancreatic cyst or IPMN seen within the same region on the previous MRI.  2. Mild prominence of the common bile duct which measures up to 9 mm. Correlation with the patient's biliary lab values is recommended. MRCP can be obtained for further assessment if clinically indicated.     Holter monitor results July 2024: Predominant rhythm sinus rhythm, 287 atrial tachycardia episodes with the longest/fastest 25.9 seconds, 1 episode of VT for 3 beats, PAC 0.5%, PVCs 1.9%, no atrial fibrillation/flutter    Medication Review: Reviewed    Assessment & Plan   Patient with atypical chest pain and labile hypertension. Left heart catheterization showed minimal nonobstructive CAD.  Renal artery duplex negative for CORTNEY.  SELAM normal.  No plasma serum catecholamine levels to review yet.  EGD 8/14/2024 normal. BPs WNL all of yesterday.  Would continue to hold valsartan today; blood pressure too low to add.      Chest pain    Syncope    Acute coronary microvascular dysfunction      Electronically signed by DYLAN Devi, 08/15/24, 7:47 AM EDT.     08/15/24  07:29 EDT

## 2024-08-15 NOTE — PROGRESS NOTES
[facility] Medicine Services  PROGRESS NOTE    Patient Name: Marifer Ricardo  : 1950  MRN: [id number]    Date of Admission: 2024  Primary Care Physician: Norma Spangler APRN    Subjective   Subjective     CC:  Chest pain/indigestion    HPI:  Still having the indigestion symptoms, but states that they may be less constant and less severe.  Tolerating diet.  No chest pain, but persistent burping.      Objective   Objective     Vital Signs:   Temp:  [97.6 °F (36.4 °C)-98.3 °F (36.8 °C)] 98.3 °F (36.8 °C)  Heart Rate:  [57-70] 57  Resp:  [16-18] 18  BP: (107-141)/(61-80) 129/71     Physical Exam:  Constitutional: No acute distress, awake, alert, constant burping  HENT: NCAT, mucous membranes moist  Respiratory: Clear to auscultation bilaterally, respiratory effort normal   Cardiovascular: RRR, no murmurs, rubs, or gallops  Gastrointestinal: Positive bowel sounds, soft, nontender, nondistended  Musculoskeletal: No bilateral ankle edema  Psychiatric: Appropriate affect, cooperative  Neurologic: Oriented x 3, strength symmetric in all extremities, Cranial Nerves grossly intact to confrontation, speech clear  Skin: No rashes      Results Reviewed:  LAB RESULTS:      Lab 24  0021 24  1228   WBC 5.78 5.30   HEMOGLOBIN 13.0 13.7   HEMATOCRIT 39.0 42.4   PLATELETS 163 165   NEUTROS ABS 5.17 3.75   IMMATURE GRANS (ABS) 0.01 0.01   LYMPHS ABS 0.52* 0.96   MONOS ABS 0.07* 0.44   EOS ABS 0.00 0.09   MCV 96.3 98.4*   LACTATE 2.0  --    D DIMER QUANT  --  <0.27         Lab 08/15/24  0516 24  1336 24  0021 24  0447 24  1228   SODIUM 142 141 138 140 142   POTASSIUM 4.5 4.8 3.9 4.2 4.1   CHLORIDE 110* 107 106 104 107   CO2 23.0 22.0 24.0 24.0 24.0   ANION GAP 9.0 12.0 8.0 12.0 11.0   BUN 27* 27* 25* 17 21   CREATININE 0.96 1.03* 1.23* 0.88 0.98   EGFR 61.8 56.8* 45.9* 68.6 60.3   GLUCOSE 96 116* 296* 85 158*   CALCIUM 8.1* 8.7 8.5* 8.9 9.1   MAGNESIUM  2.0  --   --   --   --    PHOSPHORUS 3.3  --   --   --   --    HEMOGLOBIN A1C  --   --   --   --  5.80*         Lab 08/14/24  0021 08/12/24  1228   TOTAL PROTEIN 5.2* 6.4   ALBUMIN 3.2* 3.9   GLOBULIN 2.0 2.5   ALT (SGPT) 10 12   AST (SGOT) 11 16   BILIRUBIN 0.5 0.4   ALK PHOS 61 78   LIPASE  --  35         Lab 08/13/24  0447 08/12/24  1513 08/12/24  1228   PROBNP  --   --  808.5   HSTROP T 22* 24* 20*         Lab 08/14/24  1336   CHOLESTEROL 199   LDL CHOL 106*   HDL CHOL 86*   TRIGLYCERIDES 38             Brief Urine Lab Results       None            Microbiology Results Abnormal       None            Duplex Lower Extremity Art / Grafts - Bilateral CAR    Result Date: 8/14/2024    Right lower extremity: Normal SELAM 1.3.  No significant obstructive disease.  Multiphasic waveforms to the level of the distal anterior and posterior tibial arteries.  Monophasic waveforms but color Doppler flow present in the dorsalis pedis.   Left lower extremity: Normal SELAM 1.1. No significant obstructive disease. Multiphasic waveforms to the level of the distal anterior and posterior tibial arteries. Monophasic waveforms but color Doppler flow present in the dorsalis pedis.     Duplex Renal Artery - Bilateral Complete CAR    Result Date: 8/14/2024    Right Renal Conclusions:The right kidney is normal in size. There is no evidence for right renal artery occlusive disease where visualized.. Right renal artery origin not visualized due shadowing from bowel gas. Right renal perfusion resistance is normal.   Left Renal Conclusions:The left kidney is normal in size. There is no evidence for left renal artery occlusive disease where visualized. Left renal artery origin not visualized due shadowing from bowel gas. Left renal perfusion resistance is normal.     US Gallbladder    Result Date: 8/14/2024  US GALLBLADDER Date of Exam: 8/14/2024 1:40 PM EDT Indication: Epigastric pain, belching. Comparison: MRI of the abdomen 10/7/2022 Technique:  Grayscale and color Doppler ultrasound evaluation of the right upper quadrant was performed. Findings: LIVER:  The liver appears normal in echogenicity.No focal lesions identified. Normal flow is present within the hepatic vasculature. GALLBLADDER:  The gallbladder appears normal in size with no focal lesions. No evidence of stones, wall thickening or pericholecystic fluid. The common bile duct is dilated up to 9 mm.  No positive sonographic Wills sign reported. PANCREAS: There is an 8 x 8 x 7 mm cystic-appearing lesion within the distal pancreas. The pancreas is otherwise unremarkable. RIGHT KIDNEY:  Normal in size with no focal lesions. No evidence of nephrolithiasis or hydronephrosis. The right kidney measures 9.3 cm in aogo-to-gvoy length.     Impression: Impression: 1. 8 mm cystic-appearing lesion in the distal pancreas corresponds to a pancreatic cyst or IPMN seen within the same region on the previous MRI. 2. Mild prominence of the common bile duct which measures up to 9 mm. Correlation with the patient's biliary lab values is recommended. MRCP can be obtained for further assessment if clinically indicated. Electronically Signed: Suzy Soto MD  8/14/2024 2:25 PM EDT  Workstation ID: [id number]    Cardiac Catheterization/Vascular Study    Result Date: 8/13/2024    Minimal, nonobstructive CAD.  Torturous coronary anatomy.   LVEDP 9 mmHg.     Adult Transthoracic Echo Complete W/ Cont if Necessary Per Protocol    Result Date: 8/13/2024    Left ventricular systolic function is normal. Left ventricular ejection fraction appears to be 61 - 65%.   Left ventricular diastolic function is consistent with (grade I) impaired relaxation.   The aortic valve exhibits sclerosis.      Results for orders placed during the hospital encounter of 08/12/24    Adult Transthoracic Echo Complete W/ Cont if Necessary Per Protocol    Interpretation Summary    Left ventricular systolic function is normal. Left ventricular ejection  fraction appears to be 61 - 65%.    Left ventricular diastolic function is consistent with (grade I) impaired relaxation.    The aortic valve exhibits sclerosis.      Current medications:  Scheduled Meds:amLODIPine, 5 mg, Oral, Q24H  aspirin, 81 mg, Oral, Daily  atorvastatin, 40 mg, Oral, Nightly  carvedilol, 25 mg, Oral, Q12H  clopidogrel, 75 mg, Oral, Daily  insulin lispro, 2-7 Units, Subcutaneous, 4x Daily AC & at Bedtime  pantoprazole, 40 mg, Oral, QAM AC  polyethylene glycol, 17 g, Oral, Daily  sodium chloride, 10 mL, Intravenous, Q12H  sucralfate, 1 g, Oral, 4x Daily AC & at Bedtime  [Held by provider] valsartan, 160 mg, Oral, Daily      Continuous Infusions:     PRN Meds:.  acetaminophen    senna-docusate sodium **AND** polyethylene glycol **AND** bisacodyl **AND** bisacodyl    Calcium Replacement - Follow Nurse / BPA Driven Protocol    dextrose    dextrose    glucagon (human recombinant)    Magnesium Standard Dose Replacement - Follow Nurse / BPA Driven Protocol    nitroglycerin    oxyCODONE-acetaminophen    Phosphorus Replacement - Follow Nurse / BPA Driven Protocol    Potassium Replacement - Follow Nurse / BPA Driven Protocol    sodium chloride    sodium chloride    sodium chloride    Assessment & Plan   Assessment & Plan     Active Hospital Problems    Diagnosis  POA    **Chest pain [R07.9]  Unknown    Acute coronary microvascular dysfunction [I24.81]  Unknown    Syncope [R55]  Yes      Resolved Hospital Problems   No resolved problems to display.        Brief Hospital Course to date:  Marifer Ricardo is a 74 y.o. female with PMHx of hypertension, hyperlipidemia, GERD, OA, PAD, diabetes mellitus type 2 who presented to the ED with complaint of squeezing chest pain radiating into the left neck with left arm numbness, indigestion, belching.     Atypical chest pain  Indigestion, belching   -EKG without acute ST-T changes  -[facility] NL  -Continue home ASA, Plavix, atorvastatin. Cont PPI and Pepcid.   -GI  recommending daily miralax and PPI.  Recommend follow up outpatient.  -9mm CBD- suspect age related and do not feel further workup is necessary.     -add carafate today qAC and HS for symptomatic relief     Hypertensive Urgency  -Overall improved.  Continue home carvedilol 25 mg BID, valsartan 160 mg daily (currently held due to JIMBO). Cont amlodipine to 5 mg daily.   -BP stable without the valsartan at this time.  Will monitor.    JIMBO  --Cr trending back down.  May be related to her hypotension  --stop IVF  ---Avoid nephrotoxic medications and renally dose all medications.  --Monitor I/O     Elevated troponin  -likely secondary to uncontrolled HTN  -Troponins flat.      Recent syncopal episode  Ongoing lightheadedness   -Unclear etiology, syncope x1 last week   -ECHO EF 65%, G1 diastolic dysfunction     PAD   -Continue ASA, Plavix, atorvastatin.     DM2 with hyperglycemia  -not on home medications  -A1c 5.8. Low dose SSI while admitted.      Chronic pain on chronic opioids  -Continue home Percocet.     Expected Discharge Location and Transportation: home  Expected Discharge   Expected Discharge Date: 8/16/2024; Expected Discharge Time:      VTE Prophylaxis:  No VTE prophylaxis order currently exists.         AM-PAC 6 Clicks Score (PT): 20 (08/15/24 1044)    CODE STATUS:   Code Status and Medical Interventions: No CPR (Do Not Attempt to Resuscitate); Limited Support; No intubation (DNI)   Ordered at: 08/13/24 2016     Medical Intervention Limits:    No intubation (DNI)     Code Status (Patient has no pulse and is not breathing):    No CPR (Do Not Attempt to Resuscitate)     Medical Interventions (Patient has pulse or is breathing):    Limited Support       Linda Singh MD  08/15/24

## 2024-08-15 NOTE — PLAN OF CARE
Goal Outcome Evaluation:  Plan of Care Reviewed With: patient        Progress: no change  Outcome Evaluation: PT eval performed: Pt presenting with decline in functional mobility, reports 12 falls in past year.  Pt able to ambulate 250', no AD wity CGA but frequently unsteady gait that required 3 episodes of min-A due to LOB.  Recommend FWW at d/c and home with assist and HHPT transitioning to OP PT      Anticipated Discharge Disposition (PT): home with assist, home with home health

## 2024-08-15 NOTE — PROGRESS NOTES
"GI Daily Progress Note  Subjective:    Chief Complaint:  Follow up atypical chest pain, belching     Denies any chest pain this morning.   Belching is less frequent/severe today.       Objective:    /61 (BP Location: Right arm, Patient Position: Lying)   Pulse 57   Temp 98 °F (36.7 °C) (Oral)   Resp 18   Ht 170.2 cm (67\")   Wt 56.2 kg (124 lb)   LMP  (LMP Unknown)   SpO2 99%   BMI 19.42 kg/m²     Physical Exam  Constitutional:       General: She is not in acute distress.  Cardiovascular:      Rate and Rhythm: Normal rate and regular rhythm.   Pulmonary:      Effort: Pulmonary effort is normal. No respiratory distress.   Abdominal:      General: Bowel sounds are normal. There is no distension.      Palpations: Abdomen is soft.      Tenderness: There is no abdominal tenderness.   Neurological:      Mental Status: She is alert and oriented to person, place, and time.         Lab  Lab Results   Component Value Date    WBC 5.78 08/14/2024    HGB 13.0 08/14/2024    HGB 13.7 08/12/2024    HGB 13.7 02/28/2023    MCV 96.3 08/14/2024     08/14/2024     Lab Results   Component Value Date    GLUCOSE 96 08/15/2024    BUN 27 (H) 08/15/2024    CREATININE 0.96 08/15/2024    EGFRIFNONA 50 (L) 02/21/2022    EGFRIFAFRI 60 (L) 02/21/2022    BCR 28.1 (H) 08/15/2024     08/15/2024    K 4.5 08/15/2024    CO2 23.0 08/15/2024    CALCIUM 8.1 (L) 08/15/2024    PROTENTOTREF 6.3 02/28/2023    ALBUMIN 3.2 (L) 08/14/2024    ALKPHOS 61 08/14/2024    BILITOT 0.5 08/14/2024    ALT 10 08/14/2024    AST 11 08/14/2024     Ultrasound:  Findings:  LIVER:  The liver appears normal in echogenicity.No focal lesions identified. Normal flow is present within the hepatic vasculature.    GALLBLADDER:  The gallbladder appears normal in size with no focal lesions. No evidence of stones, wall thickening or pericholecystic fluid. The common bile duct is dilated up to 9 mm.  No positive sonographic Wills sign reported.   PANCREAS: There " is an 8 x 8 x 7 mm cystic-appearing lesion within the distal pancreas. The pancreas is otherwise unremarkable.   RIGHT KIDNEY:  Normal in size with no focal lesions. No evidence of nephrolithiasis or hydronephrosis. The right kidney measures 9.3 cm in ljky-mj-bewe length.   IMPRESSION:  Impression:  1. 8 mm cystic-appearing lesion in the distal pancreas corresponds to a pancreatic cyst or IPMN seen within the same region on the previous MRI.  2. Mild prominence of the common bile duct which measures up to 9 mm. Correlation with the patient's biliary lab values is recommended. MRCP can be obtained for further assessment if clinically indicated.    Assessment:    GERD, without esophagitis.    Dyspepsia  Eructation  Constipation   Esophageal dysphagia to pills.   S/p EGD without abnormality of the esophagus.   Differential would include dysmotility.   Consider outpatient esophagram   Gluten intolerance  8 mm distal pancreatic cyst, suspect IPMN, stable from MRCP in 2022.   9 mm common bile duct, suspect age related.  LFTs are normal.  No gallstones.     Labile hypertension     Plan:    EGD yesterday showed normal esophagus, stomach and duodenum.    Ultrasound did not show cholelithiasis.    Full report above reviewed with patient and family.      >> Once daily PPI  >> Once daily Miralax  >> Outpatient follow up with [facility] in 4-6 weeks.      JIMENEZ Casey  08/15/24  09:53 EDT

## 2024-08-15 NOTE — THERAPY EVALUATION
Patient Name: Marifer Ricardo  : 1950    MRN: [id number]                              Today's Date: 8/15/2024       Admit Date: 2024    Visit Dx:     ICD-10-CM ICD-9-CM   1. Refractory angina pectoris  I20.2 413.9   2. Chest pain, unspecified type  R07.9 786.50   3. Syncope, unspecified syncope type  R55 780.2   4. 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    Gastroparesis    Rosacea    Age related osteoporosis    GERD without esophagitis    Gastritis    PAD (peripheral artery disease)    Pure hypercholesterolemia    Syncope    Chest pain    Acute coronary microvascular dysfunction     Past Medical History:   Diagnosis Date    Abdominal pain     Abnormal bone density screening 2014    osteopenia    Arthritis     Asthma     Belching     Body piercing     BOTH EARS    Cataract, bilateral     Chronic renal insufficiency 2016    Diabetes mellitus     Essential hypertension 2016    GERD (gastroesophageal reflux disease)     Hx of mammogram 2009    Hyperparathyroidism     Mastoiditis     Pregnancy      s/p  x 3    Renal insufficiency     Urticaria     Wears glasses     Weight loss      Past Surgical History:   Procedure Laterality Date    BYPASS GRAFT SUBCLAVIAN      LEFT SUBCLAVIAN TO RIGHT ATRIUM VENOUS GRAFT in past for ?TPN    CARDIAC CATHETERIZATION N/A 2024    Procedure: Left Heart Cath;  Surgeon: Isaac Whalen MD;  Location: [facility] CATH INVASIVE LOCATION;  Service: Cardiology;  Laterality: N/A;    CATARACT EXTRACTION, BILATERAL      COLECTOMY PARTIAL / TOTAL      Pt  reports multiple abd surgeries for ?pseudo-bowel obstruction    COLONOSCOPY  2005    COLONOSCOPY N/A 02/07/2018    Procedure: COLONOSCOPY WITH RANDOM COLON BIOPSIES;  Surgeon: Taras Morillo MD;  Location: [facility] ENDOSCOPY;  Service:     COLOSTOMY      and revision    ENDOSCOPY N/A 01/25/2018    Procedure: ESOPHAGOGASTRODUODENOSCOPY WITH BIOPSIES;  Surgeon: Taras Morillo MD;  Location: [facility] ENDOSCOPY;  Service:     HYSTERECTOMY      age 31 for DUB, ovaries intact    TONSILLECTOMY AND ADENOIDECTOMY        General Information       Row Name 08/15/24 1028          Physical Therapy Time and Intention    Document Type evaluation  -KG     Mode of Treatment physical therapy  -KG       Row Name 08/15/24 1028          General Information    Patient Profile Reviewed yes  -KG     Prior Level of Function independent:;all household mobility;ADL's  no AD, 12 falls in past year  -KG     Existing Precautions/Restrictions fall  -KG     Barriers to Rehab none identified  -KG       Row Name 08/15/24 1028          Living Environment    People in Home child(desi), adult  -KG       Row Name 08/15/24 1028          Home Main Entrance    Number of Stairs, Main Entrance other (see comments)  ramp  -KG       Row Name 08/15/24 1028          Stairs Within Home, Primary    Number of Stairs, Within Home, Primary other (see comments)  basement, pt avoids  -KG       Row Name 08/15/24 1028          Cognition    Orientation Status (Cognition) oriented x 3  -KG       Row Name 08/15/24 1028          Safety Issues, Functional Mobility    Safety Issues Affecting Function (Mobility) insight into deficits/self-awareness;safety precautions follow-through/compliance;positioning of assistive device;impulsivity  -KG     Impairments Affecting Function (Mobility) balance;strength  -KG               User Key  (r) = Recorded By, (t) = Taken By, (c) = Cosigned By      Initials Name Provider Type    KG Katina Mehta Physical Therapist                    Mobility       Row Name 08/15/24 1030          Bed Mobility    Bed Mobility supine-sit  -KG     Supine-Sit Dent (Bed Mobility) supervision  -KG     Assistive Device (Bed Mobility) head of bed elevated  -KG       Row Name 08/15/24 1030          Transfers    Comment, (Transfers) no AD, CGA  -KG       Row Name 08/15/24 1030          Sit-Stand Transfer    Sit-Stand Dent (Transfers) contact guard  -KG     Assistive Device (Sit-Stand Transfers) other (see comments)  no AD  -KG       Row Name 08/15/24 1030          Gait/Stairs (Locomotion)    Dent Level (Gait) contact guard;minimum assist (75% patient effort)  -KG     Assistive Device (Gait) other (see comments)  no AD  -KG     Patient was able to Ambulate yes  -KG     Distance in Feet (Gait) 250  -KG     Deviations/Abnormal Patterns (Gait) gait speed decreased  -KG     Bilateral Gait Deviations forward flexed posture  -KG     Comment, (Gait/Stairs) Pt able to ambulate 250', no AD wity CGA but frequently unsteady gait that required 3 episodes of min-A due to LOB  -KG               User Key  (r) = Recorded By, (t) = Taken By, (c) = Cosigned By      Initials Name Provider Type    KG Katina Mehta Physical Therapist                   Obj/Interventions       [facility] Name 08/15/24 1034          Range of Motion Comprehensive    General Range of Motion no range of motion deficits identified  -KG       Row Name 08/15/24 1034          Strength Comprehensive (MMT)    General Manual Muscle Testing (MMT) Assessment lower extremity strength deficits identified  -KG     Comment, General Manual Muscle Testing (MMT) Assessment 4-/5 BLE  -KG       Row Name 08/15/24 1034          Motor Skills    Therapeutic Exercise other (see comments)  STS, LAQ  -KG       [facility] 08/15/24 1034          Balance    Balance Assessment standing dynamic balance  -KG     Dynamic Standing Balance minimal assist  -KG     Position/Device Used, Standing Balance supported;unsupported  -KG      Balance Interventions standing;sit to stand  -KG               User Key  (r) = Recorded By, (t) = Taken By, (c) = Cosigned By      Initials Name Provider Type    Katina Soto Physical Therapist                   Goals/Plan       Row Name 08/15/24 1042          Bed Mobility Goal 1 (PT)    Activity/Assistive Device (Bed Mobility Goal 1, PT) sit to supine/supine to sit  -KG     Bureau Level/Cues Needed (Bed Mobility Goal 1, PT) independent  -KG     Time Frame (Bed Mobility Goal 1, PT) short term goal (STG);3 days  -KG     Progress/Outcomes (Bed Mobility Goal 1, PT) continuing progress toward goal  -KG       Row Name 08/15/24 1042          Transfer Goal 1 (PT)    Activity/Assistive Device (Transfer Goal 1, PT) sit-to-stand/stand-to-sit;bed-to-chair/chair-to-bed;walker, rolling  -KG     Bureau Level/Cues Needed (Transfer Goal 1, PT) standby assist  -KG     Time Frame (Transfer Goal 1, PT) short term goal (STG);2 days  -KG     Progress/Outcome (Transfer Goal 1, PT) continuing progress toward goal  -KG       Row Name 08/15/24 1042          Gait Training Goal 1 (PT)    Activity/Assistive Device (Gait Training Goal 1, PT) gait (walking locomotion);walker, rolling  -KG     Bureau Level (Gait Training Goal 1, PT) supervision required  -KG     Distance (Gait Training Goal 1, PT) 300  -KG     Time Frame (Gait Training Goal 1, PT) long term goal (LTG);4 days  -KG     Progress/Outcome (Gait Training Goal 1, PT) continuing progress toward goal  -KG               User Key  (r) = Recorded By, (t) = Taken By, (c) = Cosigned By      Initials Name Provider Type    Katina Soto Physical Therapist                   Clinical Impression       Row Name 08/15/24 1037          Pain    Pretreatment Pain Rating 0/10 - no pain  -KG     Posttreatment Pain Rating 0/10 - no pain  -KG       Row Name 08/15/24 1037          Plan of Care Review    Plan of Care Reviewed With patient  -KG     Progress no change  -KG      Outcome Evaluation PT eval performed: Pt presenting with decline in functional mobility, reports 12 falls in past year.  Pt able to ambulate 250', no AD wity CGA but frequently unsteady gait that required 3 episodes of min-A due to LOB.  Recommend FWW at d/c and home with assist and HHPT transitioning to OP PT  -KG       Row Name 08/15/24 1037          Therapy Assessment/Plan (PT)    Patient/Family Therapy Goals Statement (PT) to get stronger, have better balance  -KG     Rehab Potential (PT) good, to achieve stated therapy goals  -KG     Criteria for Skilled Interventions Met (PT) yes;meets criteria;skilled treatment is necessary  -KG     Therapy Frequency (PT) daily  -KG     Predicted Duration of Therapy Intervention (PT) 4  -KG       Row Name 08/15/24 1037          Vital Signs    Pre Systolic BP Rehab 107  -KG     Pre Treatment Diastolic BP 67  -KG       Row Name 08/15/24 1037          Positioning and Restraints    Pre-Treatment Position in bed  -KG     Post Treatment Position chair  -KG     In Chair notified nsg;call light within reach;encouraged to call for assist;exit alarm on;waffle cushion;legs elevated  -KG               User Key  (r) = Recorded By, (t) = Taken By, (c) = Cosigned By      Initials Name Provider Type    KG Katina Mehta Physical Therapist                   Outcome Measures       Row Name 08/15/24 1044 08/15/24 0800       How much help from another person do you currently need...    Turning from your back to your side while in flat bed without using bedrails? 4  -KG 4  -MW    Moving from lying on back to sitting on the side of a flat bed without bedrails? 4  -KG 4  -MW    Moving to and from a bed to a chair (including a wheelchair)? 3  -KG 4  -MW    Standing up from a chair using your arms (e.g., wheelchair, bedside chair)? 3  -KG 4  -MW    Climbing 3-5 steps with a railing? 3  -KG 3  -MW    To walk in hospital room? 3  -KG 3  -MW    AM-PAC 6 Clicks Score (PT) 20  -KG 22  -MW    Highest  Level of Mobility Goal 6 --> Walk 10 steps or more  -KG 7 --> Walk 25 feet or more  -MW      Row Name 08/15/24 1044          Functional Assessment    Outcome Measure Options AM-PAC 6 Clicks Basic Mobility (PT)  -KG               User Key  (r) = Recorded By, (t) = Taken By, (c) = Cosigned By      Initials Name Provider Type    KG Katina Mehta Physical Therapist    Rebel Portillo, RN Registered Nurse                                 Physical Therapy Education       Title: PT OT SLP Therapies (Done)       Topic: Physical Therapy (Done)       Point: Mobility training (Done)       Learning Progress Summary             Patient Acceptance, E, VU by KG at 8/15/2024 1045                         Point: Home exercise program (Done)       Learning Progress Summary             Patient Acceptance, E, VU by KG at 8/15/2024 1045                         Point: Body mechanics (Done)       Learning Progress Summary             Patient Acceptance, E, VU by KG at 8/15/2024 1045                         Point: Precautions (Done)       Learning Progress Summary             Patient Acceptance, E, VU by KG at 8/15/2024 1045                                         User Key       Initials Effective Dates Name Provider Type Discipline    KG 01/04/23 -  Katina Mehta Physical Therapist PT                  PT Recommendation and Plan     Plan of Care Reviewed With: patient  Progress: no change  Outcome Evaluation: PT eval performed: Pt presenting with decline in functional mobility, reports 12 falls in past year.  Pt able to ambulate 250', no AD wity CGA but frequently unsteady gait that required 3 episodes of min-A due to LOB.  Recommend FWW at d/c and home with assist and HHPT transitioning to OP PT     Time Calculation:         PT Charges       Row Name 08/15/24 1045             Time Calculation    Start Time 0830  -KG      PT Received On 08/15/24  -KG      PT Goal Re-Cert Due Date 08/25/24  -KG                User Key  (r) = Recorded  By, (t) = Taken By, (c) = Cosigned By      Initials Name Provider Type    Katina Soto Physical Therapist                  Therapy Charges for Today       Code Description Service Date Service Provider Modifiers Qty    79334476630 HC PT EVAL LOW COMPLEXITY 4 8/15/2024 Katina Mehta GP 1            PT G-Codes  Outcome Measure Options: AM-PAC 6 Clicks Basic Mobility (PT)  AM-PAC 6 Clicks Score (PT): 20  PT Discharge Summary  Anticipated Discharge Disposition (PT): home with assist, home with home health    Katina Mehta  8/15/2024

## 2024-08-16 ENCOUNTER — READMISSION MANAGEMENT (OUTPATIENT)
Dept: CALL CENTER | Facility: HOSPITAL | Age: 74
End: 2024-08-16
Payer: MEDICARE

## 2024-08-16 VITALS
OXYGEN SATURATION: 93 % | RESPIRATION RATE: 16 BRPM | TEMPERATURE: 98.1 F | BODY MASS INDEX: 19.46 KG/M2 | HEART RATE: 55 BPM | SYSTOLIC BLOOD PRESSURE: 98 MMHG | DIASTOLIC BLOOD PRESSURE: 51 MMHG | HEIGHT: 67 IN | WEIGHT: 124 LBS

## 2024-08-16 PROBLEM — R07.9 CHEST PAIN: Status: RESOLVED | Noted: 2024-08-12 | Resolved: 2024-08-16

## 2024-08-16 PROBLEM — R55 SYNCOPE: Status: RESOLVED | Noted: 2022-06-01 | Resolved: 2024-08-16

## 2024-08-16 LAB
DOPAMINE SERPL-MCNC: 54 PG/ML (ref 0–48)
EPINEPH PLAS-MCNC: <15 PG/ML (ref 0–62)
GLUCOSE BLDC GLUCOMTR-MCNC: 100 MG/DL (ref 70–130)
GLUCOSE BLDC GLUCOMTR-MCNC: 106 MG/DL (ref 70–130)
NOREPINEPH PLAS-MCNC: 1386 PG/ML (ref 0–874)

## 2024-08-16 PROCEDURE — 99239 HOSP IP/OBS DSCHRG MGMT >30: CPT | Performed by: PEDIATRICS

## 2024-08-16 PROCEDURE — 82948 REAGENT STRIP/BLOOD GLUCOSE: CPT

## 2024-08-16 RX ORDER — CARVEDILOL 12.5 MG/1
25 TABLET ORAL 2 TIMES DAILY WITH MEALS
Start: 2024-08-16 | End: 2024-08-16

## 2024-08-16 RX ORDER — CETIRIZINE HYDROCHLORIDE 10 MG/1
10 TABLET ORAL DAILY
Start: 2024-08-16

## 2024-08-16 RX ORDER — CARVEDILOL 12.5 MG/1
12.5 TABLET ORAL 2 TIMES DAILY WITH MEALS
Start: 2024-08-16

## 2024-08-16 RX ORDER — PANTOPRAZOLE SODIUM 40 MG/1
40 TABLET, DELAYED RELEASE ORAL
Qty: 30 TABLET | Refills: 0 | Status: SHIPPED | OUTPATIENT
Start: 2024-08-16

## 2024-08-16 RX ORDER — AMLODIPINE BESYLATE 5 MG/1
2.5 TABLET ORAL
Start: 2024-08-16

## 2024-08-16 RX ORDER — POLYETHYLENE GLYCOL 3350 17 G/17G
17 POWDER, FOR SOLUTION ORAL DAILY
Start: 2024-08-16

## 2024-08-16 RX ORDER — SUCRALFATE ORAL 1 G/10ML
1 SUSPENSION ORAL 4 TIMES DAILY
Qty: 414 ML | Refills: 0 | Status: SHIPPED | OUTPATIENT
Start: 2024-08-16 | End: 2024-08-27

## 2024-08-16 RX ORDER — ONDANSETRON 4 MG/1
4 TABLET, ORALLY DISINTEGRATING ORAL EVERY 8 HOURS PRN
Qty: 12 TABLET | Refills: 0 | Status: SHIPPED | OUTPATIENT
Start: 2024-08-16

## 2024-08-16 RX ADMIN — SUCRALFATE 1 G: 1 TABLET ORAL at 11:47

## 2024-08-16 RX ADMIN — PANTOPRAZOLE SODIUM 40 MG: 40 TABLET, DELAYED RELEASE ORAL at 08:15

## 2024-08-16 RX ADMIN — CLOPIDOGREL BISULFATE 75 MG: 75 TABLET ORAL at 08:15

## 2024-08-16 RX ADMIN — SUCRALFATE 1 G: 1 TABLET ORAL at 08:15

## 2024-08-16 RX ADMIN — OXYCODONE HYDROCHLORIDE AND ACETAMINOPHEN 1 TABLET: 5; 325 TABLET ORAL at 03:56

## 2024-08-16 RX ADMIN — ASPIRIN 81 MG: 81 TABLET, COATED ORAL at 08:15

## 2024-08-16 RX ADMIN — Medication 10 ML: at 08:15

## 2024-08-16 NOTE — PROGRESS NOTES
Case Management Discharge Note      Final Note: The patient is going home from the hospital and home health has accepted her for PT, OT, and skilled nursing. She will have a rolling walker delivered to her hospital room.         Selected Continued Care - Admitted Since 8/12/2024       Destination    No services have been selected for the patient.                Durable Medical Equipment    No services have been selected for the patient.                Dialysis/Infusion    No services have been selected for the patient.                Home Medical Care    No services have been selected for the patient.                Therapy    No services have been selected for the patient.                Community Resources    No services have been selected for the patient.                Community & DME    No services have been selected for the patient.                    Transportation Services  Private: Car    Final Discharge Disposition Code: 06 - home with home health care

## 2024-08-16 NOTE — DISCHARGE SUMMARY
[facility] Medicine Services  DISCHARGE SUMMARY    Patient Name: Marifer Ricardo  : 1950  MRN: [id number]    Date of Admission: 2024 11:17 AM  Date of Discharge:  2024  Primary Care Physician: Norma Spangler APRN    Consults       Date and Time Order Name Status Description    2024  7:24 AM Inpatient Gastroenterology Consult Completed     2024  6:24 PM Inpatient Cardiology Consult Completed             Hospital Course     Presenting Problem: chest pain/indigestion    Active Hospital Problems    Diagnosis  POA    Pure hypercholesterolemia [E78.00]  Yes    GERD without esophagitis [K21.9]  Yes    Essential hypertension [I10]  Yes    History of hypothyroidism [Z86.39]  Yes    Type 2 diabetes mellitus [E11.9]  Yes      Resolved Hospital Problems    Diagnosis Date Resolved POA    **Chest pain [R07.9] 2024 Yes    Syncope [R55] 2024 Yes          Hospital Course:  Marifer Ricardo is a 74 y.o. female with PMHx of hypertension, hyperlipidemia, GERD, OA, PAD, diabetes mellitus type 2 who presented to the ED with complaint of squeezing chest pain radiating into the left neck with left arm numbness, indigestion, belching.     Atypical chest pain  Indigestion, belching -improved but not resolved  -EKG without acute ST-T changes  -[facility]  -Continue home ASA, Plavix, atorvastatin. Cont PPI and Pepcid.   -GI recommending daily miralax and PPI.  Recommend follow up outpatient in 4-6 weeks.  Added trial of carafate at home.  -9mm CBD- suspect age related and do not feel further workup is necessary.       Hypertensive Urgency  -Overall improved.  Elevated blood pressure most likely secondary to pain.  -On discharge patient is instructed to continue the amlodipine.  Parameters have been given for the carvedilol, with the decrease to 12.5 mg twice daily.  Continue to hold off on the valsartan until follow-up with PCP.      JIMBO-resolved  --Cr trending back  down.  Related to her hypotension     Recent syncopal episode  Ongoing lightheadedness --resolved  -Unclear etiology  -ECHO EF 65%, G1 diastolic dysfunction     PAD   -Continue ASA, Plavix, atorvastatin.     DM2 with hyperglycemia  -not on home medications  -A1c 5.8. Low dose SSI while admitted.      Chronic pain on chronic opioids  -Continue home Percocet.     Discharge Follow Up Recommendations for outpatient labs/diagnostics:  Follow-up with GI for evaluation of improvement after 1 month of the PPI.    Day of Discharge     HPI:   Patient overall is doing well.  She does continue to have the indigestion and belching with minimal eating or drinking.  Does state that the severity of the pain is significantly improved.      Vital Signs:   Temp:  [97.8 °F (36.6 °C)-98.3 °F (36.8 °C)] 98 °F (36.7 °C)  Heart Rate:  [59-80] 59  Resp:  [18] 18  BP: (101-158)/(56-80) 101/62      Physical Exam:  Constitutional: No acute distress, awake, alert, constant burping  HENT: NCAT, mucous membranes moist  Respiratory: Clear to auscultation bilaterally, respiratory effort normal   Cardiovascular: RRR, no murmurs, rubs, or gallops  Gastrointestinal: Positive bowel sounds, soft, nontender, nondistended  Musculoskeletal: No bilateral ankle edema  Psychiatric: Appropriate affect, cooperative  Neurologic: Oriented x 3, strength symmetric in all extremities, Cranial Nerves grossly intact to confrontation, speech clear  Skin: No rashes    Pertinent  and/or Most Recent Results     LAB RESULTS:      Lab 08/14/24  0021 08/12/24  1228   WBC 5.78 5.30   HEMOGLOBIN 13.0 13.7   HEMATOCRIT 39.0 42.4   PLATELETS 163 165   NEUTROS ABS 5.17 3.75   IMMATURE GRANS (ABS) 0.01 0.01   LYMPHS ABS 0.52* 0.96   MONOS ABS 0.07* 0.44   EOS ABS 0.00 0.09   MCV 96.3 98.4*   LACTATE 2.0  --    D DIMER QUANT  --  <0.27         Lab 08/15/24  0516 08/14/24  1336 08/14/24  0021 08/13/24  0447 08/12/24  1228   SODIUM 142 141 138 140 142   POTASSIUM 4.5 4.8 3.9 4.2 4.1    CHLORIDE 110* 107 106 104 107   CO2 23.0 22.0 24.0 24.0 24.0   ANION GAP 9.0 12.0 8.0 12.0 11.0   BUN 27* 27* 25* 17 21   CREATININE 0.96 1.03* 1.23* 0.88 0.98   EGFR 61.8 56.8* 45.9* 68.6 60.3   GLUCOSE 96 116* 296* 85 158*   CALCIUM 8.1* 8.7 8.5* 8.9 9.1   MAGNESIUM 2.0  --   --   --   --    PHOSPHORUS 3.3  --   --   --   --    HEMOGLOBIN A1C  --   --   --   --  5.80*         Lab 08/14/24  0021 08/12/24  1228   TOTAL PROTEIN 5.2* 6.4   ALBUMIN 3.2* 3.9   GLOBULIN 2.0 2.5   ALT (SGPT) 10 12   AST (SGOT) 11 16   BILIRUBIN 0.5 0.4   ALK PHOS 61 78   LIPASE  --  35         Lab 08/13/24  0447 08/12/24  1513 08/12/24  1228   PROBNP  --   --  808.5   HSTROP T 22* 24* 20*         Lab 08/14/24  1336   CHOLESTEROL 199   LDL CHOL 106*   HDL CHOL 86*   TRIGLYCERIDES 38             Brief Urine Lab Results       None          Microbiology Results (last 10 days)       ** No results found for the last 240 hours. **            Duplex Lower Extremity Art / Grafts - Bilateral CAR    Result Date: 8/14/2024    Right lower extremity: Normal SELAM 1.3.  No significant obstructive disease.  Multiphasic waveforms to the level of the distal anterior and posterior tibial arteries.  Monophasic waveforms but color Doppler flow present in the dorsalis pedis.   Left lower extremity: Normal SELAM 1.1. No significant obstructive disease. Multiphasic waveforms to the level of the distal anterior and posterior tibial arteries. Monophasic waveforms but color Doppler flow present in the dorsalis pedis.     Duplex Renal Artery - Bilateral Complete CAR    Result Date: 8/14/2024    Right Renal Conclusions:The right kidney is normal in size. There is no evidence for right renal artery occlusive disease where visualized.. Right renal artery origin not visualized due shadowing from bowel gas. Right renal perfusion resistance is normal.   Left Renal Conclusions:The left kidney is normal in size. There is no evidence for left renal artery occlusive disease  where visualized. Left renal artery origin not visualized due shadowing from bowel gas. Left renal perfusion resistance is normal.     US Gallbladder    Result Date: 8/14/2024  US GALLBLADDER Date of Exam: 8/14/2024 1:40 PM EDT Indication: Epigastric pain, belching. Comparison: MRI of the abdomen 10/7/2022 Technique: Grayscale and color Doppler ultrasound evaluation of the right upper quadrant was performed. Findings: LIVER:  The liver appears normal in echogenicity.No focal lesions identified. Normal flow is present within the hepatic vasculature. GALLBLADDER:  The gallbladder appears normal in size with no focal lesions. No evidence of stones, wall thickening or pericholecystic fluid. The common bile duct is dilated up to 9 mm.  No positive sonographic Wills sign reported. PANCREAS: There is an 8 x 8 x 7 mm cystic-appearing lesion within the distal pancreas. The pancreas is otherwise unremarkable. RIGHT KIDNEY:  Normal in size with no focal lesions. No evidence of nephrolithiasis or hydronephrosis. The right kidney measures 9.3 cm in [id number] length.     Impression: 1. 8 mm cystic-appearing lesion in the distal pancreas corresponds to a pancreatic cyst or IPMN seen within the same region on the previous MRI. 2. Mild prominence of the common bile duct which measures up to 9 mm. Correlation with the patient's biliary lab values is recommended. MRCP can be obtained for further assessment if clinically indicated. Electronically Signed: Suzy Soto MD  8/14/2024 2:25 PM EDT  Workstation ID: [id number]    Cardiac Catheterization/Vascular Study    Result Date: 8/13/2024    Minimal, nonobstructive CAD.  Torturous coronary anatomy.   LVEDP 9 mmHg.     Adult Transthoracic Echo Complete W/ Cont if Necessary Per Protocol    Result Date: 8/13/2024    Left ventricular systolic function is normal. Left ventricular ejection fraction appears to be 61 - 65%.   Left ventricular diastolic function is consistent with (grade I)  impaired relaxation.   The aortic valve exhibits sclerosis.     CT Head Without Contrast    Result Date: 8/12/2024  CT HEAD WO CONTRAST Date of Exam: 8/12/2024 3:25 PM EDT Indication: syncope. Comparison: None available. Technique: Axial CT images were obtained of the head without contrast administration.  Automated exposure control and iterative construction methods were used. Findings: No acute intracranial hemorrhage or extra-axial collection is identified. The ventricles appear normal in caliber, with no evidence of mass effect or midline shift. The basal cisterns appear patent. The gray-white differentiation appears preserved. The calvarium appear intact. The paranasal sinuses are clear. The mastoid air cells are well-aerated.     Impression: No acute intracranial process identified. Electronically Signed: Daniel Gross MD  8/12/2024 3:35 PM EDT  Workstation ID: [id number]    XR Chest 1 View    Result Date: 8/12/2024  XR CHEST 1 VW Date of Exam: 8/12/2024 11:42 AM EDT Indication: Chest Pain Triage Protocol Comparison: Chest CT 6/1/2022, chest radiograph 6/1/2022. Findings: Cardiomediastinal silhouette is within normal limits. Benign calcified granuloma in the right lower lobe. Mild chronic/senescent changes of the lungs. No focal consolidation or overt pulmonary edema. No pleural effusion or pneumothorax. Osseous structures are unchanged. Surgical clips project over the right hilar region.     Impression: No evidence of acute cardiopulmonary disease. Electronically Signed: Yovani Jones MD  8/12/2024 12:22 PM EDT  Workstation ID: [id number]     Results for orders placed during the hospital encounter of 08/12/24    Duplex Lower Extremity Art / Grafts - Bilateral CAR    Interpretation Summary    Right lower extremity: Normal SELAM 1.3.  No significant obstructive disease.  Multiphasic waveforms to the level of the distal anterior and posterior tibial arteries.  Monophasic waveforms but color Doppler flow present in  the dorsalis pedis.    Left lower extremity: Normal SELAM 1.1. No significant obstructive disease. Multiphasic waveforms to the level of the distal anterior and posterior tibial arteries. Monophasic waveforms but color Doppler flow present in the dorsalis pedis.      Results for orders placed during the hospital encounter of 08/12/24    Duplex Lower Extremity Art / Grafts - Bilateral CAR    Interpretation Summary    Right lower extremity: Normal SELAM 1.3.  No significant obstructive disease.  Multiphasic waveforms to the level of the distal anterior and posterior tibial arteries.  Monophasic waveforms but color Doppler flow present in the dorsalis pedis.    Left lower extremity: Normal SELAM 1.1. No significant obstructive disease. Multiphasic waveforms to the level of the distal anterior and posterior tibial arteries. Monophasic waveforms but color Doppler flow present in the dorsalis pedis.      Results for orders placed during the hospital encounter of 08/12/24    Adult Transthoracic Echo Complete W/ Cont if Necessary Per Protocol    Interpretation Summary    Left ventricular systolic function is normal. Left ventricular ejection fraction appears to be 61 - 65%.    Left ventricular diastolic function is consistent with (grade I) impaired relaxation.    The aortic valve exhibits sclerosis.      Plan for Follow-up of Pending Labs/Results: We will call with abnormal results.    Pending Labs       Order Current Status    Catecholamines, Fractionated, Plasma In process          Discharge Details        Discharge Medications        New Medications        Instructions Start Date   ondansetron ODT 4 MG disintegrating tablet  Commonly known as: ZOFRAN-ODT   4 mg, Translingual, Every 8 Hours PRN      pantoprazole 40 MG EC tablet  Commonly known as: PROTONIX   40 mg, Oral, Every Morning Before Breakfast      polyethylene glycol 17 g packet  Commonly known as: MIRALAX   17 g, Oral, Daily, Titrate dose up or down to at least 1  soft bowel movement per day      sucralfate 1 GM/10ML suspension  Commonly known as: Carafate   1 g, Oral, 4 Times Daily             Changes to Medications        Instructions Start Date   atorvastatin 40 MG tablet  Commonly known as: LIPITOR  What changed: when to take this   40 mg, Oral, Daily             Continue These Medications        Instructions Start Date   alendronate 70 MG tablet  Commonly known as: FOSAMAX   70 mg, Oral, Every 7 Days, Thursday       amLODIPine 5 MG tablet  Commonly known as: NORVASC   2.5 mg, Oral, Every Night at Bedtime      aspirin 81 MG EC tablet  Commonly known as: EQ Aspirin Adult Low Dose   81 mg, Oral, Daily      Biotin 1000 MCG tablet   1,000 mcg, Oral, 3 Times Daily, OTC      carvedilol 12.5 MG tablet  Commonly known as: COREG   25 mg, Oral, 2 Times Daily With Meals      cetirizine 10 MG tablet  Commonly known as: zyrTEC   10 mg, Oral, Daily, OTC      cholecalciferol 25 MCG (1000 UT) tablet  Commonly known as: VITAMIN D3   1,000 Units, Oral, Daily, OTC      Cinnamon 500 MG tablet   1 tablet, Oral, 2 Times Daily, OTC      clopidogrel 75 MG tablet  Commonly known as: PLAVIX   75 mg, Oral, Daily      OMEGA 3-6-9 COMPLEX PO   1 capsule, Oral, Daily, OTC      oxyCODONE-acetaminophen 5-325 MG per tablet  Commonly known as: PERCOCET   1 tablet, Oral, Every 12 Hours PRN      Turmeric 400 MG capsule   400 mg, Oral, Daily, OTC      valsartan 160 MG tablet  Commonly known as: Diovan   160 mg, Oral, Daily      Vitamin B-12 5000 MCG sublingual tablet   1 tablet, Sublingual, Daily, OTC               Allergies   Allergen Reactions    Penicillins Anaphylaxis    Betadine [Povidone Iodine] Hives    Codeine Hives    Contrast Dye (Echo Or Unknown Ct/Mr) Hives    Metoclopramide Hives    Norco [Hydrocodone-Acetaminophen] GI Intolerance         Discharge Disposition:  Home or Self Care    Diet:  Hospital:  Diet Order   Procedures    Diet: Gastrointestinal, Cardiac, Diabetic; Healthy Heart (2-3 Na+);  Consistent Carbohydrate; Fiber-Restricted; Texture: Soft to Chew (NDD 3); Soft to Chew: Whole Meat; Fluid Consistency: Thin (IDDSI 0)            Activity:  Activity Instructions       Activity as Tolerated              Restrictions or Other Recommendations:  As tolerated       CODE STATUS:    Code Status and Medical Interventions: No CPR (Do Not Attempt to Resuscitate); Limited Support; No intubation (DNI)   Ordered at: 08/13/24 2016     Medical Intervention Limits:    No intubation (DNI)     Code Status (Patient has no pulse and is not breathing):    No CPR (Do Not Attempt to Resuscitate)     Medical Interventions (Patient has pulse or is breathing):    Limited Support       No future appointments.    Additional Instructions for the Follow-ups that You Need to Schedule       Discharge Follow-up with PCP   As directed       Currently Documented PCP:    Norma Spangler APRN    PCP Phone Number:    [phone]     Follow Up Details: Call for hospital follow up appointment in 7-10 days        Discharge Follow-up with Specialty: [facility]; 1 Month   As directed      Specialty: [facility]   Follow Up: 1 Month   Follow Up Details: Hospital follow up                      Linda Singh MD  08/16/24      Time Spent on Discharge:  I spent  38  minutes on this discharge activity which included: face-to-face encounter with the patient, reviewing the data in the system, coordination of the care with the nursing staff as well as consultants, documentation, and entering orders.

## 2024-08-16 NOTE — DISCHARGE INSTRUCTIONS
On admission your blood pressure was high.  Medication adjustments have been made, but now blood pressure is running more on the low side.  Recommend continuing the amlodipine 2.5 mg once a day.  Would hold off on taking any more of the valsartan until follow-up with your PCP.  In terms of the Coreg, carvedilol, would recommend checking your blood pressure at least once a day in the morning prior to taking this medication.  If your blood pressure is greater than systolic 110, it would be safe for you to take the blood pressure medicine.  I would recommend only taking the 12.5 mg and not the full 25mg if blood pressure is appropriate.  Try to document your blood pressures to further discuss medication adjustments with your PCP.

## 2024-08-17 NOTE — OUTREACH NOTE
Prep Survey      Flowsheet Row Responses   [facility] discharged from? [facility]   Is LACE score < 7 ? No   Eligibility Readm Mgmt   Discharge diagnosis *Chest pain (   Does the patient have one of the following disease processes/diagnoses(primary or secondary)? Other   Does the patient have Home health ordered? Yes   What is the Home health agency?  did not say at d/c   Is there a DME ordered? Yes   What DME was ordered? walker   Prep survey completed? Yes            MARTINA MIX - Registered Nurse

## 2024-08-19 ENCOUNTER — DOCUMENTATION (OUTPATIENT)
Dept: SOCIAL WORK | Facility: HOSPITAL | Age: 74
End: 2024-08-19
Payer: MEDICARE

## 2024-08-20 ENCOUNTER — READMISSION MANAGEMENT (OUTPATIENT)
Dept: CALL CENTER | Facility: HOSPITAL | Age: 74
End: 2024-08-20
Payer: MEDICARE

## 2024-08-20 LAB
QT INTERVAL: 444 MS
QTC INTERVAL: 495 MS

## 2024-08-20 NOTE — OUTREACH NOTE
Medical Week 1 Survey      Flowsheet Row Responses   [facility] patient discharged from? [place]   Does the patient have one of the following disease processes/diagnoses(primary or secondary)? Other   Week 1 attempt successful? No   Unsuccessful attempts Attempt 1            Latisha LY - Licensed Nurse

## 2024-08-27 ENCOUNTER — READMISSION MANAGEMENT (OUTPATIENT)
Dept: CALL CENTER | Facility: HOSPITAL | Age: 74
End: 2024-08-27
Payer: MEDICARE

## 2024-08-27 NOTE — OUTREACH NOTE
Medical Week 2 Survey      Flowsheet Row Responses   [facility] patient discharged from? [place]   Does the patient have one of the following disease processes/diagnoses(primary or secondary)? Other   Week 2 attempt successful? No   Unsuccessful attempts Attempt 1            LUKAS MYERS - Registered Nurse

## 2024-09-04 ENCOUNTER — READMISSION MANAGEMENT (OUTPATIENT)
Dept: CALL CENTER | Facility: HOSPITAL | Age: 74
End: 2024-09-04
Payer: MEDICARE

## 2024-09-04 NOTE — OUTREACH NOTE
Medical Week 3 Survey      Flowsheet Row Responses   [facility] patient discharged from? [place]   Does the patient have one of the following disease processes/diagnoses(primary or secondary)? Other   Week 3 attempt successful? No   Unsuccessful attempts Attempt 1            LUKAS MYERS - Registered Nurse

## 2024-09-13 ENCOUNTER — OFFICE VISIT (OUTPATIENT)
Dept: GASTROENTEROLOGY | Facility: CLINIC | Age: 74
End: 2024-09-13
Payer: MEDICARE

## 2024-09-13 VITALS
HEIGHT: 67 IN | TEMPERATURE: 97.3 F | BODY MASS INDEX: 19.56 KG/M2 | WEIGHT: 124.6 LBS | DIASTOLIC BLOOD PRESSURE: 78 MMHG | HEART RATE: 82 BPM | SYSTOLIC BLOOD PRESSURE: 129 MMHG

## 2024-09-13 DIAGNOSIS — K21.9 GASTROESOPHAGEAL REFLUX DISEASE WITHOUT ESOPHAGITIS: ICD-10-CM

## 2024-09-13 DIAGNOSIS — D49.0 IPMN (INTRADUCTAL PAPILLARY MUCINOUS NEOPLASM): Primary | ICD-10-CM

## 2024-09-13 PROCEDURE — 1159F MED LIST DOCD IN RCRD: CPT

## 2024-09-13 PROCEDURE — 1160F RVW MEDS BY RX/DR IN RCRD: CPT

## 2024-09-13 PROCEDURE — 99213 OFFICE O/P EST LOW 20 MIN: CPT

## 2024-09-13 PROCEDURE — 3078F DIAST BP <80 MM HG: CPT

## 2024-09-13 PROCEDURE — 3074F SYST BP LT 130 MM HG: CPT

## 2024-09-13 RX ORDER — PANTOPRAZOLE SODIUM 40 MG/1
40 TABLET, DELAYED RELEASE ORAL
Qty: 90 TABLET | Refills: 3 | Status: SHIPPED | OUTPATIENT
Start: 2024-09-13

## 2024-09-13 RX ORDER — VALSARTAN AND HYDROCHLOROTHIAZIDE 160; 12.5 MG/1; MG/1
1 TABLET, FILM COATED ORAL DAILY
COMMUNITY

## 2024-09-13 RX ORDER — VALSARTAN 160 MG/1
1 TABLET ORAL AS NEEDED
COMMUNITY

## 2024-09-13 NOTE — PROGRESS NOTES
Office Note     Name: Marifer Ricardo    : 1950     MRN: [id number]     Chief Complaint  ER Follow up     Subjective     History of Present Illness:  Marifer Ricardo is a 74 y.o. female  who presents today for follow-up, after recent hospital admission for noncardiac chest pain.  She presented to [facility] with complaints of midsternal chest pain that had been progressively worsening for the past 8 months.  She describes dyspepsia with excessive and sometimes painful belching, which is not always related to meals.  She was not on any PPI or OTC antacids at the time.  She does have some mild dysphagia to pills.    She has remote history of colon resection over 40 years ago, reportedly for dysmotility.  She had previously reported having a bowel movement every 3 to 4 days, with hard stools, but today states she has more loose stools and that the Miralax she was recommended in the hospital gave her terrible diarrhea.  She has done well with strict gluten-free diet. Denies any prior diagnosis of celiac disease.    She did have cardiac evaluation during her admission, with minimal nonobstructive CAD on left heart cath.  Had EGD in 2018 with findings of LA grade B reflux esophagitis, and acute gastritis, with normal duodenum.  EGD during hospital admission was normal, and she was recommended to take daily PPI for history of reflux esophagitis.  Ultrasound did not show any cholelithiasis.  She did have a 9 mm CBD, likely age-related.    She reports complete resolution of symptoms on daily PPI.     Past Medical History:   Past Medical History:   Diagnosis Date    Abdominal pain     Abnormal bone density screening 2014    osteopenia    Arthritis     Asthma     Belching     Body piercing     BOTH EARS    Cataract, bilateral     Chronic renal insufficiency 2016    Diabetes mellitus     Essential hypertension 2016    GERD (gastroesophageal reflux disease)     Hx of mammogram     Hyperparathyroidism      Mastoiditis     Pregnancy      s/p  x 3    Renal insufficiency     Urticaria     Wears glasses     Weight loss        Past Surgical History:   Past Surgical History:   Procedure Laterality Date    BYPASS GRAFT SUBCLAVIAN      LEFT SUBCLAVIAN TO RIGHT ATRIUM VENOUS GRAFT in past for ?TPN    CARDIAC CATHETERIZATION N/A 2024    Procedure: Left Heart Cath;  Surgeon: Isaac Whalen MD;  Location:  [facility];  Service: Cardiology;  Laterality: N/A;    CATARACT EXTRACTION, BILATERAL      COLECTOMY PARTIAL / TOTAL      Pt reports multiple abd surgeries for ?pseudo-bowel obstruction    COLONOSCOPY      COLONOSCOPY N/A 2018    Procedure: COLONOSCOPY WITH RANDOM COLON BIOPSIES;  Surgeon: Taras Morillo MD;  Location:  [facility];  Service:     COLOSTOMY      and revision    ENDOSCOPY N/A 2018    Procedure: ESOPHAGOGASTRODUODENOSCOPY WITH BIOPSIES;  Surgeon: Taras Morillo MD;  Location:  [facility];  Service:     ENDOSCOPY N/A 2024    Procedure: ESOPHAGOGASTRODUODENOSCOPY;  Surgeon: Brunner, Mark I, MD;  Location:  [facility];  Service: Gastroenterology;  Laterality: N/A;    HYSTERECTOMY      age 31 for DUB, ovaries intact    TONSILLECTOMY AND ADENOIDECTOMY         Immunizations:   Immunization History   Administered Date(s) Administered    Pneumococcal Conjugate 13-Valent (PCV13) 2017    Pneumococcal Polysaccharide (PPSV23) 10/18/2008, 2018    Td (TDVAX) 2012        Medications:     Current Outpatient Medications:     alendronate (FOSAMAX) 70 MG tablet, Take 1 tablet by mouth Every 7 (Seven) Days. Thursday, Disp: , Rfl:     amLODIPine (NORVASC) 5 MG tablet, Take 0.5 tablets by mouth every night at bedtime., Disp: , Rfl:     aspirin (EQ Aspirin Adult Low Dose) 81 MG EC tablet, Take 1 tablet by mouth Daily., Disp: 90 tablet, Rfl: 3    atorvastatin (LIPITOR) 40 MG tablet, Take 1 tablet by mouth Daily. (Patient taking differently: Take 1  tablet by mouth Every Night.), Disp: 90 tablet, Rfl: 3    Biotin 1000 MCG tablet, Take 1,000 mcg by mouth 3 (Three) Times a Day. OTC, Disp: , Rfl:     carvedilol (COREG) 12.5 MG tablet, Take 1 tablet by mouth 2 (Two) Times a Day With Meals., Disp: , Rfl:     cetirizine (zyrTEC) 10 MG tablet, Take 1 tablet by mouth Daily. OTC, Disp: , Rfl:     cholecalciferol (VITAMIN D3) 1000 UNITS tablet, Take 1 tablet by mouth Daily. OTC, Disp: , Rfl:     Cinnamon 500 MG tablet, Take 1 tablet by mouth 2 (Two) Times a Day. OTC, Disp: , Rfl:     clopidogrel (PLAVIX) 75 MG tablet, Take 1 tablet by mouth Daily., Disp: 90 tablet, Rfl: 3    Cyanocobalamin (VITAMIN B-12) 5000 MCG sublingual tablet, Place 1 tablet under the tongue Daily. OTC, Disp: , Rfl:     Omega 3-6-9 Fatty Acids (OMEGA 3-6-9 COMPLEX PO), Take 1 capsule by mouth Daily. OTC, Disp: , Rfl:     oxyCODONE-acetaminophen (PERCOCET) 5-325 MG per tablet, Take 1 tablet by mouth Every 12 (Twelve) Hours As Needed for Moderate Pain., Disp: , Rfl:     pantoprazole (PROTONIX) 40 MG EC tablet, Take 1 tablet by mouth Every Morning Before Breakfast., Disp: 90 tablet, Rfl: 3    Turmeric 400 MG capsule, Take 400 mg by mouth Daily. OTC, Disp: , Rfl:     valsartan (DIOVAN) 160 MG tablet, Take 1 tablet by mouth As Needed., Disp: , Rfl:     valsartan-hydrochlorothiazide (DIOVAN-HCT) 160-12.5 MG per tablet, Take 1 tablet by mouth Daily., Disp: , Rfl:     ondansetron ODT (ZOFRAN-ODT) 4 MG disintegrating tablet, Place 1 tablet on the tongue Every 8 (Eight) Hours As Needed for Nausea or Vomiting. (Patient not taking: Reported on 9/13/2024), Disp: 12 tablet, Rfl: 0    polyethylene glycol (MIRALAX) 17 g packet, Take 17 g by mouth Daily. Titrate dose up or down to at least 1 soft bowel movement per day (Patient not taking: Reported on 9/13/2024), Disp: , Rfl:     Allergies:   Allergies   Allergen Reactions    Penicillins Anaphylaxis    Betadine [Povidone Iodine] Hives    Codeine Hives    Contrast  "Dye (Echo Or Unknown Ct/Mr) Hives    Metoclopramide Hives    Norco [Hydrocodone-Acetaminophen] GI Intolerance       Family History:   Family History   Problem Relation Age of Onset    Arthritis Mother     Migraines Mother     Thyroid disease Mother     Diabetes Mother     Hypertension Mother     Stroke Mother     Tuberculosis Mother     Heart attack Father     Heart disease Father     Cancer Maternal Aunt     Cancer Paternal Aunt     Diabetes Maternal Grandmother     Cancer Cousin     Colon cancer Neg Hx        Social History:   Social History     Socioeconomic History    Marital status: Single   Tobacco Use    Smoking status: Never     Passive exposure: Never    Smokeless tobacco: Never   Vaping Use    Vaping status: Never Used   Substance and Sexual Activity    Alcohol use: No    Drug use: No    Sexual activity: Defer         Objective     Vital Signs  /78 (BP Location: Left arm, Patient Position: Sitting, Cuff Size: Adult)   Pulse 82   Temp 97.3 °F (36.3 °C) (Temporal)   Ht 170.2 cm (67\")   Wt 56.5 kg (124 lb 9.6 oz)   BMI 19.52 kg/m²   Estimated body mass index is 19.52 kg/m² as calculated from the following:    Height as of this encounter: 170.2 cm (67\").    Weight as of this encounter: 56.5 kg (124 lb 9.6 oz).    BMI is within normal parameters. No other follow-up for BMI required.      Physical Exam  Vitals reviewed.   Constitutional:       General: She is awake.   Cardiovascular:      Rate and Rhythm: Normal rate.   Pulmonary:      Effort: Pulmonary effort is normal.   Abdominal:      Palpations: Abdomen is soft.      Tenderness: There is no abdominal tenderness.   Skin:     General: Skin is warm and dry.   Neurological:      Mental Status: She is alert.          Assessment and Plan     Assessment & Plan  IPMN (intraductal papillary mucinous neoplasm)  8 mm distal pancreatic cyst, suspected IPMN.  Stable from MRCP in 2022.     Gastroesophageal reflux disease without esophagitis  Continue daily " PPI. She's had complete resolution of symptoms.      New Medications Ordered This Visit   Medications    pantoprazole (PROTONIX) 40 MG EC tablet     Sig: Take 1 tablet by mouth Every Morning Before Breakfast.     Dispense:  90 tablet     Refill:  3          Follow Up  Return if new or worsening symptoms    DYLAN Alvarez  [facility] [address]  [facility]  [address] [id number]

## 2024-09-13 NOTE — PATIENT INSTRUCTIONS
Follow a diet as recommended by your health care provider. This may involve avoiding foods and drinks such as:  Coffee and tea (with or without caffeine).  Drinks that contain alcohol.  Energy drinks and sports drinks.  Carbonated drinks or sodas.  Chocolate and cocoa.  Peppermint and mint flavorings.  Garlic and onions.  Horseradish.  Spicy and acidic foods, including peppers, chili powder, holm powder, vinegar, hot sauces, and barbecue sauce.  Citrus fruit juices and citrus fruits, such as oranges, lisa, and limes.  Tomato-based foods, such as red sauce, chili, salsa, and pizza with red sauce.  Fried and fatty foods, such as donuts, french fries, potato chips, and high-fat dressings.    Eat small, frequent meals instead of large meals.  Avoid drinking large amounts of liquid with your meals.  Avoid eating meals during the 2-3 hours before bedtime.  Avoid lying down right after you eat.

## 2024-10-27 ENCOUNTER — ANESTHESIA EVENT CONVERTED (OUTPATIENT)
Dept: ANESTHESIOLOGY | Facility: HOSPITAL | Age: 74
DRG: 482 | End: 2024-10-27
Payer: MEDICARE

## 2024-10-27 ENCOUNTER — ANESTHESIA (OUTPATIENT)
Dept: PERIOP | Facility: HOSPITAL | Age: 74
End: 2024-10-27
Payer: MEDICARE

## 2024-10-27 ENCOUNTER — APPOINTMENT (OUTPATIENT)
Dept: CT IMAGING | Facility: HOSPITAL | Age: 74
DRG: 482 | End: 2024-10-27
Payer: MEDICARE

## 2024-10-27 ENCOUNTER — APPOINTMENT (OUTPATIENT)
Dept: GENERAL RADIOLOGY | Facility: HOSPITAL | Age: 74
DRG: 482 | End: 2024-10-27
Payer: MEDICARE

## 2024-10-27 ENCOUNTER — ANESTHESIA EVENT (OUTPATIENT)
Dept: PERIOP | Facility: HOSPITAL | Age: 74
End: 2024-10-27
Payer: MEDICARE

## 2024-10-27 ENCOUNTER — HOSPITAL ENCOUNTER (INPATIENT)
Facility: HOSPITAL | Age: 74
LOS: 4 days | Discharge: HOME | DRG: 482 | End: 2024-10-31
Attending: EMERGENCY MEDICINE | Admitting: INTERNAL MEDICINE
Payer: MEDICARE

## 2024-10-27 DIAGNOSIS — S72.001A CLOSED FRACTURE OF RIGHT HIP, INITIAL ENCOUNTER: Primary | ICD-10-CM

## 2024-10-27 PROBLEM — S72.009A HIP FRACTURE: Status: ACTIVE | Noted: 2024-10-27

## 2024-10-27 LAB
ANION GAP SERPL CALCULATED.3IONS-SCNC: 13 MMOL/L (ref 5–15)
BASOPHILS # BLD AUTO: 0.03 10*3/MM3 (ref 0–0.2)
BASOPHILS NFR BLD AUTO: 0.5 % (ref 0–1.5)
BUN SERPL-MCNC: 16 MG/DL (ref 8–23)
BUN/CREAT SERPL: 22.9 (ref 7–25)
CALCIUM SPEC-SCNC: 8.9 MG/DL (ref 8.6–10.5)
CHLORIDE SERPL-SCNC: 103 MMOL/L (ref 98–107)
CO2 SERPL-SCNC: 22 MMOL/L (ref 22–29)
CREAT SERPL-MCNC: 0.7 MG/DL (ref 0.57–1)
DEPRECATED RDW RBC AUTO: 43.2 FL (ref 37–54)
EGFRCR SERPLBLD CKD-EPI 2021: 90.3 ML/MIN/1.73
EOSINOPHIL # BLD AUTO: 0.07 10*3/MM3 (ref 0–0.4)
EOSINOPHIL NFR BLD AUTO: 1.2 % (ref 0.3–6.2)
ERYTHROCYTE [DISTWIDTH] IN BLOOD BY AUTOMATED COUNT: 12.4 % (ref 12.3–15.4)
GLUCOSE BLDC GLUCOMTR-MCNC: 233 MG/DL (ref 70–130)
GLUCOSE BLDC GLUCOMTR-MCNC: 76 MG/DL (ref 70–130)
GLUCOSE SERPL-MCNC: 93 MG/DL (ref 65–99)
HCT VFR BLD AUTO: 43.1 % (ref 34–46.6)
HGB BLD-MCNC: 14.2 G/DL (ref 12–15.9)
IMM GRANULOCYTES # BLD AUTO: 0.01 10*3/MM3 (ref 0–0.05)
IMM GRANULOCYTES NFR BLD AUTO: 0.2 % (ref 0–0.5)
INR PPP: 1.05 (ref 0.89–1.12)
LYMPHOCYTES # BLD AUTO: 0.77 10*3/MM3 (ref 0.7–3.1)
LYMPHOCYTES NFR BLD AUTO: 12.9 % (ref 19.6–45.3)
MCH RBC QN AUTO: 31.1 PG (ref 26.6–33)
MCHC RBC AUTO-ENTMCNC: 32.9 G/DL (ref 31.5–35.7)
MCV RBC AUTO: 94.5 FL (ref 79–97)
MONOCYTES # BLD AUTO: 0.48 10*3/MM3 (ref 0.1–0.9)
MONOCYTES NFR BLD AUTO: 8.1 % (ref 5–12)
NEUTROPHILS NFR BLD AUTO: 4.59 10*3/MM3 (ref 1.7–7)
NEUTROPHILS NFR BLD AUTO: 77.1 % (ref 42.7–76)
NRBC BLD AUTO-RTO: 0 /100 WBC (ref 0–0.2)
PLATELET # BLD AUTO: 120 10*3/MM3 (ref 140–450)
PMV BLD AUTO: 10.5 FL (ref 6–12)
POTASSIUM SERPL-SCNC: 3.9 MMOL/L (ref 3.5–5.2)
PROTHROMBIN TIME: 13.8 SECONDS (ref 12.2–14.5)
QT INTERVAL: 396 MS
QTC INTERVAL: 471 MS
RBC # BLD AUTO: 4.56 10*6/MM3 (ref 3.77–5.28)
SODIUM SERPL-SCNC: 138 MMOL/L (ref 136–145)
WBC NRBC COR # BLD AUTO: 5.95 10*3/MM3 (ref 3.4–10.8)

## 2024-10-27 PROCEDURE — 80048 BASIC METABOLIC PNL TOTAL CA: CPT | Performed by: NURSE PRACTITIONER

## 2024-10-27 PROCEDURE — 72100 X-RAY EXAM L-S SPINE 2/3 VWS: CPT

## 2024-10-27 PROCEDURE — 25010000002 FENTANYL CITRATE (PF) 100 MCG/2ML SOLUTION: Performed by: NURSE ANESTHETIST, CERTIFIED REGISTERED

## 2024-10-27 PROCEDURE — 63710000001 INSULIN LISPRO (HUMAN) PER 5 UNITS: Performed by: ORTHOPAEDIC SURGERY

## 2024-10-27 PROCEDURE — 25810000003 LACTATED RINGERS PER 1000 ML: Performed by: ANESTHESIOLOGY

## 2024-10-27 PROCEDURE — 25010000002 SUGAMMADEX 200 MG/2ML SOLUTION: Performed by: NURSE ANESTHETIST, CERTIFIED REGISTERED

## 2024-10-27 PROCEDURE — 99222 1ST HOSP IP/OBS MODERATE 55: CPT | Performed by: PEDIATRICS

## 2024-10-27 PROCEDURE — 73700 CT LOWER EXTREMITY W/O DYE: CPT

## 2024-10-27 PROCEDURE — 25010000002 ONDANSETRON PER 1 MG: Performed by: NURSE ANESTHETIST, CERTIFIED REGISTERED

## 2024-10-27 PROCEDURE — 25010000002 LIDOCAINE PF 1% 1 % SOLUTION: Performed by: NURSE ANESTHETIST, CERTIFIED REGISTERED

## 2024-10-27 PROCEDURE — 25010000002 DEXAMETHASONE SODIUM PHOSPHATE 10 MG/ML SOLUTION: Performed by: ANESTHESIOLOGY

## 2024-10-27 PROCEDURE — 76000 FLUOROSCOPY <1 HR PHYS/QHP: CPT

## 2024-10-27 PROCEDURE — 25010000002 CEFAZOLIN PER 500 MG: Performed by: ORTHOPAEDIC SURGERY

## 2024-10-27 PROCEDURE — 25010000002 BUPIVACAINE (PF) 0.25 % SOLUTION: Performed by: ANESTHESIOLOGY

## 2024-10-27 PROCEDURE — 99285 EMERGENCY DEPT VISIT HI MDM: CPT

## 2024-10-27 PROCEDURE — 85610 PROTHROMBIN TIME: CPT | Performed by: NURSE PRACTITIONER

## 2024-10-27 PROCEDURE — 0QS634Z REPOSITION RIGHT UPPER FEMUR WITH INTERNAL FIXATION DEVICE, PERCUTANEOUS APPROACH: ICD-10-PCS | Performed by: ORTHOPAEDIC SURGERY

## 2024-10-27 PROCEDURE — 25010000002 PROPOFOL 10 MG/ML EMULSION: Performed by: NURSE ANESTHETIST, CERTIFIED REGISTERED

## 2024-10-27 PROCEDURE — 85025 COMPLETE CBC W/AUTO DIFF WBC: CPT | Performed by: NURSE PRACTITIONER

## 2024-10-27 PROCEDURE — 82948 REAGENT STRIP/BLOOD GLUCOSE: CPT

## 2024-10-27 PROCEDURE — 73502 X-RAY EXAM HIP UNI 2-3 VIEWS: CPT

## 2024-10-27 PROCEDURE — 93005 ELECTROCARDIOGRAM TRACING: CPT | Performed by: EMERGENCY MEDICINE

## 2024-10-27 PROCEDURE — C1769 GUIDE WIRE: HCPCS | Performed by: ORTHOPAEDIC SURGERY

## 2024-10-27 PROCEDURE — 25010000002 FENTANYL CITRATE (PF) 50 MCG/ML SOLUTION

## 2024-10-27 PROCEDURE — 36415 COLL VENOUS BLD VENIPUNCTURE: CPT

## 2024-10-27 RX ORDER — SODIUM CHLORIDE 0.9 % (FLUSH) 0.9 %
10 SYRINGE (ML) INJECTION EVERY 12 HOURS SCHEDULED
Status: DISCONTINUED | OUTPATIENT
Start: 2024-10-27 | End: 2024-10-31 | Stop reason: HOSPADM

## 2024-10-27 RX ORDER — FAMOTIDINE 10 MG/ML
INJECTION, SOLUTION INTRAVENOUS
Status: COMPLETED
Start: 2024-10-27 | End: 2024-10-27

## 2024-10-27 RX ORDER — SODIUM CHLORIDE 0.9 % (FLUSH) 0.9 %
10 SYRINGE (ML) INJECTION AS NEEDED
Status: DISCONTINUED | OUTPATIENT
Start: 2024-10-27 | End: 2024-10-27 | Stop reason: HOSPADM

## 2024-10-27 RX ORDER — FENTANYL CITRATE 50 UG/ML
INJECTION, SOLUTION INTRAMUSCULAR; INTRAVENOUS
Status: COMPLETED
Start: 2024-10-27 | End: 2024-10-27

## 2024-10-27 RX ORDER — OXYCODONE AND ACETAMINOPHEN 5; 325 MG/1; MG/1
1 TABLET ORAL ONCE
Status: COMPLETED | OUTPATIENT
Start: 2024-10-27 | End: 2024-10-27

## 2024-10-27 RX ORDER — MIDAZOLAM HYDROCHLORIDE 1 MG/ML
0.5 INJECTION, SOLUTION INTRAMUSCULAR; INTRAVENOUS
Status: DISCONTINUED | OUTPATIENT
Start: 2024-10-27 | End: 2024-10-27 | Stop reason: HOSPADM

## 2024-10-27 RX ORDER — POLYETHYLENE GLYCOL 3350 17 G/17G
17 POWDER, FOR SOLUTION ORAL DAILY PRN
Status: DISCONTINUED | OUTPATIENT
Start: 2024-10-27 | End: 2024-10-31 | Stop reason: HOSPADM

## 2024-10-27 RX ORDER — FENTANYL CITRATE 50 UG/ML
50 INJECTION, SOLUTION INTRAMUSCULAR; INTRAVENOUS
Status: DISCONTINUED | OUTPATIENT
Start: 2024-10-27 | End: 2024-10-27 | Stop reason: HOSPADM

## 2024-10-27 RX ORDER — ONDANSETRON 2 MG/ML
4 INJECTION INTRAMUSCULAR; INTRAVENOUS EVERY 6 HOURS PRN
Status: DISCONTINUED | OUTPATIENT
Start: 2024-10-27 | End: 2024-10-31 | Stop reason: HOSPADM

## 2024-10-27 RX ORDER — IBUPROFEN 600 MG/1
1 TABLET ORAL
Status: DISCONTINUED | OUTPATIENT
Start: 2024-10-27 | End: 2024-10-31 | Stop reason: HOSPADM

## 2024-10-27 RX ORDER — INSULIN LISPRO 100 [IU]/ML
2-7 INJECTION, SOLUTION INTRAVENOUS; SUBCUTANEOUS
Status: DISCONTINUED | OUTPATIENT
Start: 2024-10-27 | End: 2024-10-30

## 2024-10-27 RX ORDER — DEXAMETHASONE SODIUM PHOSPHATE 10 MG/ML
INJECTION, SOLUTION INTRAMUSCULAR; INTRAVENOUS
Status: COMPLETED | OUTPATIENT
Start: 2024-10-27 | End: 2024-10-27

## 2024-10-27 RX ORDER — MAGNESIUM HYDROXIDE 1200 MG/15ML
LIQUID ORAL AS NEEDED
Status: DISCONTINUED | OUTPATIENT
Start: 2024-10-27 | End: 2024-10-27 | Stop reason: HOSPADM

## 2024-10-27 RX ORDER — BISACODYL 5 MG/1
5 TABLET, DELAYED RELEASE ORAL DAILY PRN
Status: DISCONTINUED | OUTPATIENT
Start: 2024-10-27 | End: 2024-10-31 | Stop reason: HOSPADM

## 2024-10-27 RX ORDER — AMOXICILLIN 250 MG
2 CAPSULE ORAL 2 TIMES DAILY PRN
Status: DISCONTINUED | OUTPATIENT
Start: 2024-10-27 | End: 2024-10-31 | Stop reason: HOSPADM

## 2024-10-27 RX ORDER — DROPERIDOL 2.5 MG/ML
0.62 INJECTION, SOLUTION INTRAMUSCULAR; INTRAVENOUS ONCE AS NEEDED
Status: DISCONTINUED | OUTPATIENT
Start: 2024-10-27 | End: 2024-10-27 | Stop reason: HOSPADM

## 2024-10-27 RX ORDER — BUPIVACAINE HCL/0.9 % NACL/PF 0.125 %
PLASTIC BAG, INJECTION (ML) EPIDURAL AS NEEDED
Status: DISCONTINUED | OUTPATIENT
Start: 2024-10-27 | End: 2024-10-27 | Stop reason: SURG

## 2024-10-27 RX ORDER — SODIUM CHLORIDE 9 MG/ML
INJECTION, SOLUTION INTRAVENOUS
Status: DISPENSED
Start: 2024-10-27 | End: 2024-10-28

## 2024-10-27 RX ORDER — ASPIRIN 81 MG/1
81 TABLET ORAL DAILY
Status: DISCONTINUED | OUTPATIENT
Start: 2024-10-27 | End: 2024-10-28

## 2024-10-27 RX ORDER — BUPIVACAINE HYDROCHLORIDE 2.5 MG/ML
INJECTION, SOLUTION EPIDURAL; INFILTRATION; INTRACAUDAL
Status: COMPLETED | OUTPATIENT
Start: 2024-10-27 | End: 2024-10-27

## 2024-10-27 RX ORDER — ENOXAPARIN SODIUM 100 MG/ML
40 INJECTION SUBCUTANEOUS DAILY
Status: DISCONTINUED | OUTPATIENT
Start: 2024-10-28 | End: 2024-10-28

## 2024-10-27 RX ORDER — ACETAMINOPHEN 160 MG/5ML
650 SOLUTION ORAL EVERY 4 HOURS PRN
Status: DISCONTINUED | OUTPATIENT
Start: 2024-10-27 | End: 2024-10-31 | Stop reason: HOSPADM

## 2024-10-27 RX ORDER — PANTOPRAZOLE SODIUM 40 MG/1
40 TABLET, DELAYED RELEASE ORAL
Status: DISCONTINUED | OUTPATIENT
Start: 2024-10-28 | End: 2024-10-31 | Stop reason: HOSPADM

## 2024-10-27 RX ORDER — ONDANSETRON 2 MG/ML
INJECTION INTRAMUSCULAR; INTRAVENOUS AS NEEDED
Status: DISCONTINUED | OUTPATIENT
Start: 2024-10-27 | End: 2024-10-27 | Stop reason: SURG

## 2024-10-27 RX ORDER — FAMOTIDINE 10 MG/ML
20 INJECTION, SOLUTION INTRAVENOUS ONCE
Status: COMPLETED | OUTPATIENT
Start: 2024-10-27 | End: 2024-10-27

## 2024-10-27 RX ORDER — ACETAMINOPHEN 325 MG/1
650 TABLET ORAL EVERY 4 HOURS PRN
Status: DISCONTINUED | OUTPATIENT
Start: 2024-10-27 | End: 2024-10-31 | Stop reason: HOSPADM

## 2024-10-27 RX ORDER — LIDOCAINE HYDROCHLORIDE 10 MG/ML
INJECTION, SOLUTION EPIDURAL; INFILTRATION; INTRACAUDAL; PERINEURAL AS NEEDED
Status: DISCONTINUED | OUTPATIENT
Start: 2024-10-27 | End: 2024-10-27 | Stop reason: SURG

## 2024-10-27 RX ORDER — SODIUM CHLORIDE 0.9 % (FLUSH) 0.9 %
10 SYRINGE (ML) INJECTION EVERY 12 HOURS SCHEDULED
Status: DISCONTINUED | OUTPATIENT
Start: 2024-10-27 | End: 2024-10-27 | Stop reason: HOSPADM

## 2024-10-27 RX ORDER — FAMOTIDINE 20 MG/1
20 TABLET, FILM COATED ORAL ONCE
Status: CANCELLED | OUTPATIENT
Start: 2024-10-27 | End: 2024-10-27

## 2024-10-27 RX ORDER — PROPOFOL 10 MG/ML
VIAL (ML) INTRAVENOUS AS NEEDED
Status: DISCONTINUED | OUTPATIENT
Start: 2024-10-27 | End: 2024-10-27 | Stop reason: SURG

## 2024-10-27 RX ORDER — ROCURONIUM BROMIDE 10 MG/ML
INJECTION, SOLUTION INTRAVENOUS AS NEEDED
Status: DISCONTINUED | OUTPATIENT
Start: 2024-10-27 | End: 2024-10-27 | Stop reason: SURG

## 2024-10-27 RX ORDER — AMLODIPINE BESYLATE 2.5 MG/1
2.5 TABLET ORAL
Status: DISCONTINUED | OUTPATIENT
Start: 2024-10-27 | End: 2024-10-27

## 2024-10-27 RX ORDER — ACETAMINOPHEN 650 MG/1
650 SUPPOSITORY RECTAL EVERY 4 HOURS PRN
Status: DISCONTINUED | OUTPATIENT
Start: 2024-10-27 | End: 2024-10-31 | Stop reason: HOSPADM

## 2024-10-27 RX ORDER — CARVEDILOL 12.5 MG/1
12.5 TABLET ORAL 2 TIMES DAILY WITH MEALS
Status: DISCONTINUED | OUTPATIENT
Start: 2024-10-27 | End: 2024-10-27

## 2024-10-27 RX ORDER — ONDANSETRON 4 MG/1
4 TABLET, ORALLY DISINTEGRATING ORAL EVERY 6 HOURS PRN
Status: DISCONTINUED | OUTPATIENT
Start: 2024-10-27 | End: 2024-10-31 | Stop reason: HOSPADM

## 2024-10-27 RX ORDER — ATORVASTATIN CALCIUM 40 MG/1
40 TABLET, FILM COATED ORAL NIGHTLY
Status: DISCONTINUED | OUTPATIENT
Start: 2024-10-27 | End: 2024-10-31 | Stop reason: HOSPADM

## 2024-10-27 RX ORDER — OXYCODONE AND ACETAMINOPHEN 5; 325 MG/1; MG/1
1 TABLET ORAL EVERY 6 HOURS PRN
Status: DISCONTINUED | OUTPATIENT
Start: 2024-10-27 | End: 2024-10-31 | Stop reason: HOSPADM

## 2024-10-27 RX ORDER — SODIUM CHLORIDE 0.9 % (FLUSH) 0.9 %
10 SYRINGE (ML) INJECTION AS NEEDED
Status: DISCONTINUED | OUTPATIENT
Start: 2024-10-27 | End: 2024-10-31 | Stop reason: HOSPADM

## 2024-10-27 RX ORDER — NITROGLYCERIN 0.4 MG/1
0.4 TABLET SUBLINGUAL
Status: DISCONTINUED | OUTPATIENT
Start: 2024-10-27 | End: 2024-10-31 | Stop reason: HOSPADM

## 2024-10-27 RX ORDER — SODIUM CHLORIDE, SODIUM LACTATE, POTASSIUM CHLORIDE, CALCIUM CHLORIDE 600; 310; 30; 20 MG/100ML; MG/100ML; MG/100ML; MG/100ML
9 INJECTION, SOLUTION INTRAVENOUS CONTINUOUS
Status: ACTIVE | OUTPATIENT
Start: 2024-10-28 | End: 2024-10-28

## 2024-10-27 RX ORDER — NICOTINE POLACRILEX 4 MG
15 LOZENGE BUCCAL
Status: DISCONTINUED | OUTPATIENT
Start: 2024-10-27 | End: 2024-10-31 | Stop reason: HOSPADM

## 2024-10-27 RX ORDER — LIDOCAINE HYDROCHLORIDE 10 MG/ML
0.5 INJECTION, SOLUTION EPIDURAL; INFILTRATION; INTRACAUDAL; PERINEURAL ONCE AS NEEDED
Status: DISCONTINUED | OUTPATIENT
Start: 2024-10-27 | End: 2024-10-27 | Stop reason: HOSPADM

## 2024-10-27 RX ORDER — DEXTROSE MONOHYDRATE 25 G/50ML
25 INJECTION, SOLUTION INTRAVENOUS
Status: DISCONTINUED | OUTPATIENT
Start: 2024-10-27 | End: 2024-10-31 | Stop reason: HOSPADM

## 2024-10-27 RX ORDER — BISACODYL 10 MG
10 SUPPOSITORY, RECTAL RECTAL DAILY PRN
Status: DISCONTINUED | OUTPATIENT
Start: 2024-10-27 | End: 2024-10-31 | Stop reason: HOSPADM

## 2024-10-27 RX ORDER — FENTANYL CITRATE 50 UG/ML
INJECTION, SOLUTION INTRAMUSCULAR; INTRAVENOUS AS NEEDED
Status: DISCONTINUED | OUTPATIENT
Start: 2024-10-27 | End: 2024-10-27 | Stop reason: SURG

## 2024-10-27 RX ADMIN — FAMOTIDINE 20 MG: 10 INJECTION, SOLUTION INTRAVENOUS at 14:12

## 2024-10-27 RX ADMIN — FENTANYL CITRATE 50 MCG: 50 INJECTION, SOLUTION INTRAMUSCULAR; INTRAVENOUS at 15:30

## 2024-10-27 RX ADMIN — DEXAMETHASONE SODIUM PHOSPHATE 6 MG: 10 INJECTION, SOLUTION INTRAMUSCULAR; INTRAVENOUS at 14:30

## 2024-10-27 RX ADMIN — OXYCODONE HYDROCHLORIDE AND ACETAMINOPHEN 1 TABLET: 5; 325 TABLET ORAL at 10:29

## 2024-10-27 RX ADMIN — SODIUM CHLORIDE 2000 MG: 900 INJECTION INTRAVENOUS at 20:42

## 2024-10-27 RX ADMIN — Medication 100 MCG: at 14:29

## 2024-10-27 RX ADMIN — SUGAMMADEX 200 MG: 100 INJECTION, SOLUTION INTRAVENOUS at 14:50

## 2024-10-27 RX ADMIN — FENTANYL CITRATE 50 MCG: 50 INJECTION, SOLUTION INTRAMUSCULAR; INTRAVENOUS at 15:15

## 2024-10-27 RX ADMIN — LIDOCAINE HYDROCHLORIDE 50 MG: 10 INJECTION, SOLUTION EPIDURAL; INFILTRATION; INTRACAUDAL; PERINEURAL at 14:23

## 2024-10-27 RX ADMIN — Medication 100 MCG: at 14:50

## 2024-10-27 RX ADMIN — FENTANYL CITRATE 100 MCG: 50 INJECTION, SOLUTION INTRAMUSCULAR; INTRAVENOUS at 14:23

## 2024-10-27 RX ADMIN — OXYCODONE HYDROCHLORIDE AND ACETAMINOPHEN 1 TABLET: 5; 325 TABLET ORAL at 17:32

## 2024-10-27 RX ADMIN — DEXAMETHASONE SODIUM PHOSPHATE 4 MG: 10 INJECTION, SOLUTION INTRAMUSCULAR; INTRAVENOUS at 14:25

## 2024-10-27 RX ADMIN — ACETAMINOPHEN 650 MG: 325 TABLET ORAL at 20:41

## 2024-10-27 RX ADMIN — INSULIN LISPRO 3 UNITS: 100 INJECTION, SOLUTION INTRAVENOUS; SUBCUTANEOUS at 20:41

## 2024-10-27 RX ADMIN — Medication 100 MCG: at 14:34

## 2024-10-27 RX ADMIN — ROCURONIUM BROMIDE 50 MG: 10 INJECTION INTRAVENOUS at 14:23

## 2024-10-27 RX ADMIN — Medication 100 MCG: at 14:42

## 2024-10-27 RX ADMIN — SODIUM CHLORIDE 2000 MG: 900 INJECTION INTRAVENOUS at 14:20

## 2024-10-27 RX ADMIN — BUPIVACAINE HYDROCHLORIDE 30 ML: 2.5 INJECTION, SOLUTION EPIDURAL; INFILTRATION; INTRACAUDAL; PERINEURAL at 14:25

## 2024-10-27 RX ADMIN — PROPOFOL 100 MG: 10 INJECTION, EMULSION INTRAVENOUS at 14:23

## 2024-10-27 RX ADMIN — Medication 100 MCG: at 14:35

## 2024-10-27 RX ADMIN — ASPIRIN 81 MG: 81 TABLET, COATED ORAL at 17:32

## 2024-10-27 RX ADMIN — Medication 10 ML: at 20:42

## 2024-10-27 RX ADMIN — SODIUM CHLORIDE, POTASSIUM CHLORIDE, SODIUM LACTATE AND CALCIUM CHLORIDE 9 ML/HR: 600; 310; 30; 20 INJECTION, SOLUTION INTRAVENOUS at 14:13

## 2024-10-27 RX ADMIN — ATORVASTATIN CALCIUM 40 MG: 40 TABLET, FILM COATED ORAL at 20:41

## 2024-10-27 RX ADMIN — ONDANSETRON 4 MG: 2 INJECTION INTRAMUSCULAR; INTRAVENOUS at 14:47

## 2024-10-27 NOTE — ANESTHESIA PROCEDURE NOTES
Peripheral Block      Patient reassessed immediately prior to procedure    Reason for block: at surgeon's request and post-op pain management  Performed by  Anesthesiologist: Karlee Barrera DO  Preanesthetic Checklist  Completed: patient identified, IV checked, site marked, risks and benefits discussed, surgical consent, monitors and equipment checked, pre-op evaluation and timeout performed  Prep:  Pt Position: supine  Sterile barriers:gloves, cap, sterile barriers, mask and washed/disinfected hands  Prep: ChloraPrep  Patient monitoring: blood pressure monitoring, continuous pulse oximetry and EKG  Procedure    Guidance:ultrasound guided    ULTRASOUND INTERPRETATION.  Using ultrasound guidance a 20 G gauge needle was placed in close proximity to the femoral nerve, at which point, under ultrasound guidance anesthetic was injected in the area of the nerve and spread of the anesthesia was seen on ultrasound in close proximity thereto.  There were no abnormalities seen on ultrasound; a digital image was taken; and the patient tolerated the procedure with no complications. Images:still images obtained, printed/placed on chart    Block Type:femoral  Injection Technique:single-shot  Needle Type:short-bevel  Needle Gauge:20 G  Resistance on Injection: none    Medications Used: bupivacaine PF (MARCAINE) 0.25 % injection - Injection   30 mL - 10/27/2024 2:25:00 PM  dexamethasone sodium phosphate injection - Injection   4 mg - 10/27/2024 2:25:00 PM      Post Assessment  Injection Assessment: negative aspiration for heme, no paresthesia on injection and incremental injection  Patient Tolerance:comfortable throughout block  Complications:no  Additional Notes  SINGLE Shot  A high-frequency linear transducer, with sterile cover, was placed in the inguinal crease to visualize the Femoral Vein, Artery, and Nerve (medial to lateral). The insertion site was prepped in sterile fashion. Skin and cutaneous tissue was infiltrated with  "2-5 ml of 1% Lidocaine. Using ultrasound-guidance, a 20-gauge B-Cotton 4\" Ultraplex 360 non-stimulating echogenic needle was then inserted and advanced in-plane from lateral to medial with ultrasound guidance. The needle was directed below Fascia Iliacus towards the Femoral nerve. Preservative-free normal saline was utilized for hydro-dissection of tissue. Local anesthetic injection spread, in incremental 3-5 ml injections, was visualized lateral to the artery to surround the femoral nerve. Aspiration every 5 ml to prevent intravascular injection. Injection was completed with negative aspiration of blood and negative intravascular injection. Injection pressures were normal with minimal resistance.   Performed by: Karlee Barrera DO            "

## 2024-10-27 NOTE — ANESTHESIA PROCEDURE NOTES
Airway  Urgency: elective    Date/Time: 10/27/2024 2:24 PM  Airway not difficult    General Information and Staff    Patient location during procedure: OR  CRNA/CAA: Meera Rice CRNA    Indications and Patient Condition  Indications for airway management: airway protection    Preoxygenated: yes  MILS not maintained throughout  Mask difficulty assessment: 1 - vent by mask    Final Airway Details  Final airway type: endotracheal airway      Successful airway: ETT  Cuffed: yes   Successful intubation technique: direct laryngoscopy  Facilitating devices/methods: intubating stylet  Endotracheal tube insertion site: oral  Blade: Tim  Blade size: 3  ETT size (mm): 7.0  Cormack-Lehane Classification: grade I - full view of glottis  Placement verified by: chest auscultation and capnometry   Measured from: lips  ETT/EBT  to lips (cm): 21  Number of attempts at approach: 1  Assessment: lips, teeth, and gum same as pre-op and atraumatic intubation    Additional Comments  Negative epigastric sounds, Breath sound equal bilaterally with symmetric chest rise and fall

## 2024-10-27 NOTE — ANESTHESIA POSTPROCEDURE EVALUATION
Patient: Marifer Ricardo    Procedure Summary       Date: 10/27/24 Room / Location:  [facility] OR 14 /  [facility] OR    Anesthesia Start: 1419 Anesthesia Stop: 1503    Procedures:       HIP CLOSED REDUCTION (Right: Hip)      HIP PERCUTANEOUS PINNING (Right: Hip) Diagnosis:     Surgeons: Rubin Parkinson Jr., MD Provider: Karlee Barrera DO    Anesthesia Type: Not recorded ASA Status: Not recorded            Anesthesia Type: No value filed.    Vitals  Vitals Value Taken Time   BP     Temp     Pulse 81 10/27/24 1503   Resp     SpO2 97 % 10/27/24 1503   Vitals shown include unfiled device data.        Post Anesthesia Care and Evaluation    Patient location during evaluation: PACU  Patient participation: complete - patient participated  Level of consciousness: awake and alert  Pain management: adequate    Airway patency: patent  Anesthetic complications: No anesthetic complications  PONV Status: none  Cardiovascular status: hemodynamically stable and acceptable  Respiratory status: nonlabored ventilation, acceptable and nasal cannula  Hydration status: acceptable

## 2024-10-27 NOTE — CONSULTS
Orthopedic Consult      Patient: Marifer Ricardo    Date of Admission: 10/27/2024  9:34 AM    YOB: 1950    Medical Record Number: [id number]    Attending Physician: Linda Singh MD    Consulting Physician: Rubin Parkinson Jr., MD      Chief Complaints: Hip fracture [S72.009A]      History of Present Illness: 74 y.o. female admitted to [facility] with Hip fracture [S72.009A]. I was consulted for further evaluation and treatment of right hip pain secondary to femoral neck fracture. Onset of symptoms was abrupt starting 1 day ago.  Symptoms are associated with right groin pain.  Symptoms are aggravated by motion and weightbearing.   Symptoms improve with rest.  This is a 74-year-old female who presents 1 day after noticing some significant right groin pain after going for a walk.  She denies any high-energy trauma or any falls.  She denies any motor deficits or paresthesias.       Allergies   Allergen Reactions    Penicillins Hives    Betadine [Povidone Iodine] Hives    Codeine Hives    Contrast Dye (Echo Or Unknown Ct/Mr) Hives    Metoclopramide Hives    Norco [Hydrocodone-Acetaminophen] GI Intolerance        Home Medications:  Medications Prior to Admission   Medication Sig Dispense Refill Last Dose/Taking    aspirin (EQ Aspirin Adult Low Dose) 81 MG EC tablet Take 1 tablet by mouth Daily. 90 tablet 3 10/26/2024 at  7:00 AM    alendronate (FOSAMAX) 70 MG tablet Take 1 tablet by mouth Every 7 (Seven) Days. Thursday       amLODIPine (NORVASC) 5 MG tablet Take 0.5 tablets by mouth every night at bedtime.       atorvastatin (LIPITOR) 40 MG tablet Take 1 tablet by mouth Daily. (Patient taking differently: Take 1 tablet by mouth Every Night.) 90 tablet 3     Biotin 1000 MCG tablet Take 1,000 mcg by mouth 3 (Three) Times a Day. OTC       carvedilol (COREG) 12.5 MG tablet Take 1 tablet by mouth 2 (Two) Times a Day With Meals.       cetirizine (zyrTEC) 10 MG tablet Take 1 tablet by mouth  Daily. OTC       cholecalciferol (VITAMIN D3) 1000 UNITS tablet Take 1 tablet by mouth Daily. OTC       Cinnamon 500 MG tablet Take 1 tablet by mouth 2 (Two) Times a Day. OTC       Cyanocobalamin (VITAMIN B-12) 5000 MCG sublingual tablet Place 1 tablet under the tongue Daily. OTC       Omega 3-6-9 Fatty Acids (OMEGA 3-6-9 COMPLEX PO) Take 1 capsule by mouth Daily. OTC       ondansetron ODT (ZOFRAN-ODT) 4 MG disintegrating tablet Place 1 tablet on the tongue Every 8 (Eight) Hours As Needed for Nausea or Vomiting. (Patient not taking: Reported on 2024) 12 tablet 0     oxyCODONE-acetaminophen (PERCOCET) 5-325 MG per tablet Take 1 tablet by mouth Every 12 (Twelve) Hours As Needed for Moderate Pain.       pantoprazole (PROTONIX) 40 MG EC tablet Take 1 tablet by mouth Every Morning Before Breakfast. 90 tablet 3     polyethylene glycol (MIRALAX) 17 g packet Take 17 g by mouth Daily. Titrate dose up or down to at least 1 soft bowel movement per day (Patient not taking: Reported on 2024)       Turmeric 400 MG capsule Take 400 mg by mouth Daily. OTC       valsartan (DIOVAN) 160 MG tablet Take 1 tablet by mouth As Needed.       valsartan-hydrochlorothiazide (DIOVAN-HCT) 160-12.5 MG per tablet Take 1 tablet by mouth Daily.            Past Medical History:   Diagnosis Date    Abdominal pain     Abnormal bone density screening 2014    osteopenia    Arthritis     Asthma     Belching     Body piercing     BOTH EARS    Cataract, bilateral     Chronic renal insufficiency 2016    Diabetes mellitus     Essential hypertension 2016    GERD (gastroesophageal reflux disease)     Hx of mammogram     Hyperparathyroidism     Mastoiditis     Pregnancy      s/p  x 3    Renal insufficiency     Urticaria     Wears glasses     Weight loss         Past Surgical History:   Procedure Laterality Date    BYPASS GRAFT SUBCLAVIAN      LEFT SUBCLAVIAN TO RIGHT ATRIUM VENOUS GRAFT in past for ?TPN    CARDIAC  CATHETERIZATION N/A 8/13/2024    Procedure: Left Heart Cath;  Surgeon: Isaac Whalen MD;  Location:  [facility];  Service: Cardiology;  Laterality: N/A;    CATARACT EXTRACTION, BILATERAL      COLECTOMY PARTIAL / TOTAL      Pt reports multiple abd surgeries for ?pseudo-bowel obstruction    COLONOSCOPY  2005    COLONOSCOPY N/A 02/07/2018    Procedure: COLONOSCOPY WITH RANDOM COLON BIOPSIES;  Surgeon: Taras Morillo MD;  Location: [facility] ENDOSCOPY;  Service:     COLOSTOMY      and revision    ENDOSCOPY N/A 01/25/2018    Procedure: ESOPHAGOGASTRODUODENOSCOPY WITH BIOPSIES;  Surgeon: Taras Morillo MD;  Location: [facility] ENDOSCOPY;  Service:     ENDOSCOPY N/A 8/14/2024    Procedure: ESOPHAGOGASTRODUODENOSCOPY;  Surgeon: Brunner, Mark I, MD;  Location:  [facility] ENDOSCOPY;  Service: Gastroenterology;  Laterality: N/A;    HYSTERECTOMY      age 31 for DUB, ovaries intact    TONSILLECTOMY AND ADENOIDECTOMY          Social History     Occupational History    Not on file   Tobacco Use    Smoking status: Never     Passive exposure: Never    Smokeless tobacco: Never   Vaping Use    Vaping status: Never Used   Substance and Sexual Activity    Alcohol use: No    Drug use: No    Sexual activity: Defer      Social History     Social History Narrative    Not on file        Family History   Problem Relation Age of Onset    Arthritis Mother     Migraines Mother     Thyroid disease Mother     Diabetes Mother     Hypertension Mother     Stroke Mother     Tuberculosis Mother     Heart attack Father     Heart disease Father     Cancer Maternal Aunt     Cancer Paternal Aunt     Diabetes Maternal Grandmother     Cancer Cousin     Colon cancer Neg Hx          Review of Systems:   HEENT: Patient denies any headaches, vision changes, change in hearing, or tinnitus, Patient denies any rhinorrhea, epistaxis, sinus pain, mouth or dental problems, sore throat or hoarseness, or dysphagia  Pulmonary: Patient denies any cough,  congestion, SOA, or wheezing  Cardiovascular: Patient denies any chest pain, dyspnea, palpitations, weakness, intolerance of exercise, varicosities, swelling of extremities, known murmur  Gastrointestinal:  Patient denies nausea, vomiting, diarrhea, constipation, loss  of appetite, change in appetite, dysphagia, gas, heartburn, melena, change in bowel habits, use of laxatives or other drugs to alter the function of the gastrointestinal tract.  Genital/Urinary: Patient denies dysuria, change in color of urine, change in frequency of urination, pain with urgency, incontinence, retention, or nocturia.  Musculoskeletal: Patient denies increased warmth; redness; or swelling of joints; limitation of function; deformity; crepitation: pain in a joint or an extremity, the neck, or the back, especially with movement.  Neurological: Patient denies dizziness, tremor, ataxia, difficulty in speaking, change in speech, paresthesia, loss of sensation, seizures, syncope, changes in memory.  Endocrine system: Patient denies tremors, palpitations, intolerance of heat or cold, polyuria, polydipsia, polyphagia, diaphoresis, exophthalmos, or goiter.  Psychological: Patient denies thoughts/plans or harming self or other; depression,  insomnia, night terrors, teo, memory loss, disorientation.  Skin: Patient denies any bruising, rashes, discoloration, pruritus, wounds, ulcers, decubiti, changes in the hair or nails  Hematopoietic: Patient denies history of spontaneous or excessive bleeding, epistaxis, hematuria, melena, fatigue, enlarged or tender lymph nodes, pallor, history of anemia.    Physical Exam: 74 y.o. female  General Appearance:    Alert, cooperative, in no acute distress                   Vitals:    10/27/24 0933 10/27/24 1100 10/27/24 1230 10/27/24 1354   BP: 175/84 157/92 (!) 191/97 153/86   BP Location: Right arm   Right arm   Patient Position: Sitting   Sitting   Pulse: 96 83 78 93   Resp: 16   16   Temp: 99.1 °F (37.3 °C)  "  99.4 °F (37.4 °C)   TempSrc: Oral   Oral   SpO2: 97% 95% 96% 94%   Weight: 55.3 kg (122 lb)      Height: 170.2 cm (67\")           Head:    Normocephalic, without obvious abnormality, atraumatic      Right Upper Extremity:  No obvious deformity, painless ROM shoulder, elbow, wrist, no joint instability, normal distal strength and sensation to light touch, skin intact without cyanosis, clubbing, edema; +2 radial pulse  Left Upper Extremity:  No obvious deformity, painless ROM shoulder, elbow, wrist, no joint instability, normal distal strength and sensation to light touch, skin intact without cyanosis, clubbing, edema; +2 radial pulse  Right Lower Extremity:  No obvious deformity, right groin pain, compartments soft, normal distal strength and sensation to light touch, skin intact without cyanosis, clubbing, edema; +2 dorsalis pedis pulse  Left Lower Extremity:  No obvious deformity, painless ROM hip, knee, ankle, compartments soft, normal distal strength and sensation to light touch, skin intact without cyanosis, clubbing, edema; +2 dorsalis pedis pulse         Diagnostic Tests:    I have reviewed the labs, radiology results and diagnostic studies: AP pelvis and lateral of right hip x-ray report was reviewed.  CT of right hip report was reviewed.  X-ray and CT of right hip on my exam demonstrate minimally valgus impacted femoral neck fracture without any osteoporosis of the head.  Overall bone quality is significantly osteopenic    Results from last 7 days   Lab Units 10/27/24  1138   WBC 10*3/mm3 5.95   HEMOGLOBIN g/dL 14.2   PLATELETS 10*3/mm3 120*     Results from last 7 days   Lab Units 10/27/24  1138   SODIUM mmol/L 138   POTASSIUM mmol/L 3.9   CO2 mmol/L 22.0   CREATININE mg/dL 0.70   GLUCOSE mg/dL 93           Assessment:  Patient Active Problem List   Diagnosis    Type 2 diabetes mellitus    Intractable migraine    Primary generalized (osteo)arthritis    Difficult intravenous access    Copy of advanced " directive obtained from patient    No blood products    Essential hypertension    History of hypothyroidism    Chronic mixed headache syndrome    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  She understands that the risk of nonoperative measures portend higher risk of blood clot, pneumonia, bedsores.  She also understands the risks of the above procedure and wishes to proceed. The risks and benefits were discussed with the patient to include, but not limited to: bleeding, infection, nerve injury, failure of fixation, need for further procedures, loss of limb or life.     NPO  Posted/consented/marked             Rubin Parkinson Jr., MD  10/27/24  14:12 EDT

## 2024-10-27 NOTE — ED PROVIDER NOTES
EMERGENCY DEPARTMENT ENCOUNTER    Pt Name: Marifer Ricardo  MRN: [id number]  Pt :   1950  Room Number:    Date of encounter:  10/27/2024  PCP: Norma Spangler APRN  ED Provider: DYLAN Luciano    Historian: Patient    HPI:  Chief Complaint: Right leg pain.    Context: Marifer Ricardo is a 74 y.o. female who presents to the ED c/o right leg pain.  Patient complains of pain in her right groin, right hip.  She explains that she was walking yesterday and her right leg gave out.  She reports that the pain radiates down into her right knee.  Patient does have a history of back issues however this feels different.  She complains of difficulty with moving her right lower extremity.  HPI     REVIEW OF SYSTEMS  A chief complaint appropriate review of systems was completed and is negative except as noted in the HPI.     PAST MEDICAL HISTORY  Past Medical History:   Diagnosis Date    Abdominal pain     Abnormal bone density screening 2014    osteopenia    Arthritis     Asthma     Belching     Body piercing     BOTH EARS    Cataract, bilateral     Chronic renal insufficiency 2016    Diabetes mellitus     Essential hypertension 2016    GERD (gastroesophageal reflux disease)     Hx of mammogram 2009    Hyperparathyroidism     Mastoiditis     Pregnancy      s/p  x 3    Renal insufficiency     Urticaria     Wears glasses     Weight loss        PAST SURGICAL HISTORY  Past Surgical History:   Procedure Laterality Date    BYPASS GRAFT SUBCLAVIAN      LEFT SUBCLAVIAN TO RIGHT ATRIUM VENOUS GRAFT in past for ?TPN    CARDIAC CATHETERIZATION N/A 2024    Procedure: Left Heart Cath;  Surgeon: Isaac Whalen MD;  Location: [facility] CATH INVASIVE LOCATION;  Service: Cardiology;  Laterality: N/A;    CATARACT EXTRACTION, BILATERAL      COLECTOMY PARTIAL / TOTAL      Pt reports multiple abd surgeries for ?pseudo-bowel obstruction    COLONOSCOPY      COLONOSCOPY N/A 2018     Procedure: COLONOSCOPY WITH RANDOM COLON BIOPSIES;  Surgeon: Taras Morillo MD;  Location: [facility] ENDOSCOPY;  Service:     COLOSTOMY      and revision    ENDOSCOPY N/A 01/25/2018    Procedure: ESOPHAGOGASTRODUODENOSCOPY WITH BIOPSIES;  Surgeon: Taras Morillo MD;  Location: [facility] ENDOSCOPY;  Service:     ENDOSCOPY N/A 8/14/2024    Procedure: ESOPHAGOGASTRODUODENOSCOPY;  Surgeon: Brunner, Mark I, MD;  [facility];  Service: Gastroenterology;  Laterality: N/A;    HYSTERECTOMY      age 31 for DUB, ovaries intact    TONSILLECTOMY AND ADENOIDECTOMY         FAMILY HISTORY  Family History   Problem Relation Age of Onset    Arthritis Mother     Migraines Mother     Thyroid disease Mother     Diabetes Mother     Hypertension Mother     Stroke Mother     Tuberculosis Mother     Heart attack Father     Heart disease Father     Cancer Maternal Aunt     Cancer Paternal Aunt     Diabetes Maternal Grandmother     Cancer Cousin     Colon cancer Neg Hx        SOCIAL HISTORY  Social History     Socioeconomic History    Marital status: Single   Tobacco Use    Smoking status: Never     Passive exposure: Never    Smokeless tobacco: Never   Vaping Use    Vaping status: Never Used   Substance and Sexual Activity    Alcohol use: No    Drug use: No    Sexual activity: Defer       ALLERGIES  Penicillins, Betadine [povidone iodine], Codeine, Contrast dye (echo or unknown ct/mr), Metoclopramide, and Norco [hydrocodone-acetaminophen]    PHYSICAL EXAM  Physical Exam  Vitals and nursing note reviewed.   Constitutional:       General: She is in acute distress.      Appearance: Normal appearance. She is not ill-appearing.   HENT:      Head: Normocephalic and atraumatic.      Nose: Nose normal.      Mouth/Throat:      Mouth: Mucous membranes are moist.   Eyes:      Extraocular Movements: Extraocular movements intact.      Pupils: Pupils are equal, round, and reactive to light.   Cardiovascular:      Rate and Rhythm: Normal rate and  regular rhythm.      Pulses: Normal pulses.      Heart sounds: Normal heart sounds.   Pulmonary:      Effort: Pulmonary effort is normal.      Breath sounds: Normal breath sounds.   Abdominal:      General: Bowel sounds are normal. There is no distension.   Musculoskeletal:      Cervical back: Normal range of motion and neck supple. No tenderness.      Thoracic back: No tenderness. Normal range of motion.      Lumbar back: Tenderness (paraspinal tenderness) present. Decreased range of motion.      Right hip: Tenderness present. Decreased range of motion.   Skin:     General: Skin is warm and dry.   Neurological:      General: No focal deficit present.      Mental Status: She is alert and oriented to person, place, and time.   Psychiatric:         Mood and Affect: Mood normal.         Behavior: Behavior normal.           LAB RESULTS  Results for orders placed or performed during the hospital encounter of 10/27/24   Basic Metabolic Panel    Collection Time: 10/27/24 11:38 AM    Specimen: Blood   Result Value Ref Range    Glucose 93 65 - 99 mg/dL    BUN 16 8 - 23 mg/dL    Creatinine 0.70 0.57 - 1.00 mg/dL    Sodium 138 136 - 145 mmol/L    Potassium 3.9 3.5 - 5.2 mmol/L    Chloride 103 98 - 107 mmol/L    CO2 22.0 22.0 - 29.0 mmol/L    Calcium 8.9 8.6 - 10.5 mg/dL    BUN/Creatinine Ratio 22.9 7.0 - 25.0    Anion Gap 13.0 5.0 - 15.0 mmol/L    eGFR 90.3 >60.0 mL/min/1.73   Protime-INR    Collection Time: 10/27/24 11:38 AM    Specimen: Blood   Result Value Ref Range    Protime 13.8 12.2 - 14.5 Seconds    INR 1.05 0.89 - 1.12   CBC Auto Differential    Collection Time: 10/27/24 11:38 AM    Specimen: Blood   Result Value Ref Range    WBC 5.95 3.40 - 10.80 10*3/mm3    RBC 4.56 3.77 - 5.28 10*6/mm3    Hemoglobin 14.2 12.0 - 15.9 g/dL    Hematocrit 43.1 34.0 - 46.6 %    MCV 94.5 79.0 - 97.0 fL    MCH 31.1 26.6 - 33.0 pg    MCHC 32.9 31.5 - 35.7 g/dL    RDW 12.4 12.3 - 15.4 %    RDW-SD 43.2 37.0 - 54.0 fl    MPV 10.5 6.0 - 12.0  fL    Platelets 120 (L) 140 - 450 10*3/mm3    Neutrophil % 77.1 (H) 42.7 - 76.0 %    Lymphocyte % 12.9 (L) 19.6 - 45.3 %    Monocyte % 8.1 5.0 - 12.0 %    Eosinophil % 1.2 0.3 - 6.2 %    Basophil % 0.5 0.0 - 1.5 %    Immature Grans % 0.2 0.0 - 0.5 %    Neutrophils, Absolute 4.59 1.70 - 7.00 10*3/mm3    Lymphocytes, Absolute 0.77 0.70 - 3.10 10*3/mm3    Monocytes, Absolute 0.48 0.10 - 0.90 10*3/mm3    Eosinophils, Absolute 0.07 0.00 - 0.40 10*3/mm3    Basophils, Absolute 0.03 0.00 - 0.20 10*3/mm3    Immature Grans, Absolute 0.01 0.00 - 0.05 10*3/mm3    nRBC 0.0 0.0 - 0.2 /100 WBC       If labs were ordered, I independently reviewed the results and considered them in treating the patient.    RADIOLOGY  CT Lower Extremity Right Without Contrast   Final Result   Impression:   Minimally impacted right femoral neck fracture. No evidence of additional fracture.            Electronically Signed: Rubin Campos MD     10/27/2024 12:18 PM EDT     Workstation ID: [id number]      XR Spine Lumbar 2 or 3 View   Final Result   Addendum (preliminary) 1 of 1   ADDENDUM #1   Addendum begins.      1. Asymmetric sclerosis in the right subcapital femoral neck. Questionable    nondisplaced fracture, possibly related to insufficiency fracture in the    setting of demineralization. Recommend dedicated CT for further    assessment. No joint malalignment.   2. Mild degenerative osteoarthritis of the right hip.   3. No acute radiographic findings in the lumbar spine. Grossly similar    multilevel lumbar spondylosis and anterolisthesis L4 on L5 since 2021    comparison.      Addendum ends.      Electronically Signed: Jamie Perkins MD     10/27/2024 10:39 AM EDT     Workstation ID: [id number]   ORIGINAL REPORT:   XR HIP W OR WO PELVIS 2-3 VIEW RIGHT, XR SPINE LUMBAR 2 OR 3 VW      Date of Exam: 10/27/2024 10:08 AM EDT      Indication: Rt hip pain      Comparison: Pelvic radiograph 8/24/2021, lumbar spine radiograph 8/24/2021      Findings:   Right  hip: Osseous demineralization. Asymmetric sclerosis in the    subcapital region of the right femoral neck new from 2021. Questionable    ill-defined lucency along the lateral aspect without a completed fracture.    Mild degenerative osteoarthritis of the    right hip joint. No erosions or chondrocalcinosis. Multiple surgical clips    in the pelvis and proximal right thigh. Peripheral vascular disease noted.    Pelvic phleboliths.      Lumbar spine: 5 nonrib-bearing lumbar-type vertebral bodies. Mild    dextrocurvature. No visualized displaced fracture or significant vertebral    body height loss. Minimal anterolisthesis L4 and L5 stable from prior.    Mild degenerative disc disease most    notable at L2-L3. Mild to moderate lower lumbar facet arthropathy similar    to prior. Aortic atherosclerotic disease. Pelvic phleboliths. Multiple    surgical clips noted.      Impression:   1. Asymmetric sclerosis in the right subcapital femoral neck. Questionable    nondisplaced versus stress fracture, without evidence of a completed    fracture or joint malalignment. Findings are in the setting of    demineralization. Recommend dedicated CT for    further assessment.   2. Mild degenerative osteoarthritis of the right hip.   3. No acute radiographic findings in the lumbar spine. Grossly similar    multilevel lumbar spondylosis and anterolisthesis L4 on L5 since 2021    comparison.            Electronically Signed: Jamie Perkins MD     10/27/2024 10:34 AM EDT     Workstation ID: [id number]      Final   Impression:   1. Asymmetric sclerosis in the right subcapital femoral neck. Questionable nondisplaced versus stress fracture, without evidence of a completed fracture or joint malalignment. Findings are in the setting of demineralization. Recommend dedicated CT for    further assessment.   2. Mild degenerative osteoarthritis of the right hip.   3. No acute radiographic findings in the lumbar spine. Grossly similar multilevel  lumbar spondylosis and anterolisthesis L4 on L5 since 2021 comparison.            Electronically Signed: Jamie Perkins MD     10/27/2024 10:34 AM EDT     Workstation ID: [id number]      XR Hip With or Without Pelvis 2 - 3 View Right   Final Result   Addendum (preliminary) 1 of 1   ADDENDUM #1   Addendum begins.      1. Asymmetric sclerosis in the right subcapital femoral neck. Questionable    nondisplaced fracture, possibly related to insufficiency fracture in the    setting of demineralization. Recommend dedicated CT for further    assessment. No joint malalignment.   2. Mild degenerative osteoarthritis of the right hip.   3. No acute radiographic findings in the lumbar spine. Grossly similar    multilevel lumbar spondylosis and anterolisthesis L4 on L5 since 2021    comparison.      Addendum ends.      Electronically Signed: Jamie Perkins MD     10/27/2024 10:39 AM EDT     Workstation ID: [id number]   ORIGINAL REPORT:   XR HIP W OR WO PELVIS 2-3 VIEW RIGHT, XR SPINE LUMBAR 2 OR 3 VW      Date of Exam: 10/27/2024 10:08 AM EDT      Indication: Rt hip pain      Comparison: Pelvic radiograph 8/24/2021, lumbar spine radiograph 8/24/2021      Findings:   Right hip: Osseous demineralization. Asymmetric sclerosis in the    subcapital region of the right femoral neck new from 2021. Questionable    ill-defined lucency along the lateral aspect without a completed fracture.    Mild degenerative osteoarthritis of the    right hip joint. No erosions or chondrocalcinosis. Multiple surgical clips    in the pelvis and proximal right thigh. Peripheral vascular disease noted.    Pelvic phleboliths.      Lumbar spine: 5 nonrib-bearing lumbar-type vertebral bodies. Mild    dextrocurvature. No visualized displaced fracture or significant vertebral    body height loss. Minimal anterolisthesis L4 and L5 stable from prior.    Mild degenerative disc disease most    notable at L2-L3. Mild to moderate lower lumbar facet arthropathy similar     to prior. Aortic atherosclerotic disease. Pelvic phleboliths. Multiple    surgical clips noted.      Impression:   1. Asymmetric sclerosis in the right subcapital femoral neck. Questionable    nondisplaced versus stress fracture, without evidence of a completed    fracture or joint malalignment. Findings are in the setting of    demineralization. Recommend dedicated CT for    further assessment.   2. Mild degenerative osteoarthritis of the right hip.   3. No acute radiographic findings in the lumbar spine. Grossly similar    multilevel lumbar spondylosis and anterolisthesis L4 on L5 since 2021    comparison.            Electronically Signed: Jamie Perkins MD     10/27/2024 10:34 AM EDT     Workstation ID: [id number]      Final   Impression:   1. Asymmetric sclerosis in the right subcapital femoral neck. Questionable nondisplaced versus stress fracture, without evidence of a completed fracture or joint malalignment. Findings are in the setting of demineralization. Recommend dedicated CT for    further assessment.   2. Mild degenerative osteoarthritis of the right hip.   3. No acute radiographic findings in the lumbar spine. Grossly similar multilevel lumbar spondylosis and anterolisthesis L4 on L5 since 2021 comparison.            Electronically Signed: Jamie Perkins MD     10/27/2024 10:34 AM EDT     Workstation ID: [id number]        [] Radiologist's Report Reviewed:  I ordered and independently interpreted the above noted radiographic studies.  See radiologist's dictation for official interpretation.      PROCEDURES    Procedures    No orders to display       MEDICATIONS GIVEN IN ER    Medications   sodium chloride 0.9 % flush 10 mL (has no administration in time range)   oxyCODONE-acetaminophen (PERCOCET) 5-325 MG per tablet 1 tablet (1 tablet Oral Given 10/27/24 1029)       MEDICAL DECISION MAKING, PROGRESS, and CONSULTS   Medical Decision Making   Marifer Ricardo is a 74 y.o. female who presents to the ED  c/o right leg pain.  Patient complains of pain in her right groin, right hip.  She explains that she was walking yesterday and her right leg gave out.  She reports that the pain radiates down into her right knee.  Patient does have a history of back issues however this feels different.  She complains of difficulty with moving her right lower extremity.      Problems Addressed:  Closed fracture of right hip, initial encounter: complicated acute illness or injury     Details: Patient has a minimally impacted right femoral neck fracture.  Patient will be taken to the OR today.  We will admit patient to the hospitalist service.  Ortho has been consulted.    Amount and/or Complexity of Data Reviewed  Labs: ordered. Decision-making details documented in ED Course.  Radiology: ordered. Decision-making details documented in ED Course.    Risk  Prescription drug management.  Decision regarding hospitalization.        Discussion below represents my analysis of pertinent findings related to patient's condition, differential diagnosis, treatment plan and final disposition.    Differential diagnosis:  The differential diagnosis associated with the patient's presentation includes: Hip fracture, sciatica, degenerative joint, degenerative disc disease    Additional sources  Discussed/ obtained information from independent historians:   [] Spouse  [] Parent  [x] Family member  [] Friend  [] EMS   [] Other:  External (non-ED) record review:   [] Inpatient record:   [] Office record:   [] Outpatient record:   [x] Prior Outpatient labs:   [x] Prior Outpatient radiology:   [] Primary Care record:   [] Outside ED record:   [] Other:   Patient's care impacted by:   [x] Diabetes  [x] Hypertension  [] Hyperlipidemia  [] Hypothyroidism   [] Coronary Artery Disease   [] COPD   [] Cancer   [] Obesity  [x] GERD   [] Tobacco Abuse   [] Substance Abuse    [] Anxiety   [] Depression   [] Other:   Care significantly affected by Social Determinants  of Health (housing and economic circumstances, unemployment)    [] Yes     [x] No   If yes, Patient's care significantly limited by  Social Determinants of Health including:   [] Inadequate housing   [] Low income   [] Alcoholism and drug addiction in family   [] Problems related to primary support group   [] Unemployment   [] Problems related to employment   [] Other Social Determinants of Health:   Shared decision making: Shared decision making with patient and family.  Patient has a minimally impacted right femoral neck fracture.  I contacted Ortho.  He will take patient to the OR today.  Patient to remain NPO.  We will admit patient to the hospitalist service.    Orders placed during this visit:  Orders Placed This Encounter   Procedures    XR Hip With or Without Pelvis 2 - 3 View Right    XR Spine Lumbar 2 or 3 View    CT Lower Extremity Right Without Contrast    Basic Metabolic Panel    Protime-INR    CBC Auto Differential    Insert Peripheral IV    Inpatient Admission    ED Bed Request    CBC & Differential       I considered prescription management  with:   [] Pain medication  [] Antiviral  [] Antibiotic   [] Other:   Rationale:  Additional orders considered but not ordered:  The following testing was considered but ultimately not selected after discussion with patient/family:    ED Course:    ED Course as of 10/27/24 1246   Sun Oct 27, 2024   1043 1. Asymmetric sclerosis in the right subcapital femoral neck. Questionable nondisplaced fracture, possibly related to insufficiency fracture in the setting of demineralization. Recommend dedicated CT for further assessment. No joint malalignment.  2. Mild degenerative osteoarthritis of the right hip.  3. No acute radiographic findings in the lumbar spine. Grossly similar multilevel lumbar spondylosis and anterolisthesis L4 on L5 since 2021 comparison.   [KG]   1225 WBC: 5.95 [KG]   1225 Hemoglobin: 14.2 [KG]   1225 Hematocrit: 43.1 [KG]   1225 Glucose: 93 [KG]    1225 BUN: 16 [KG]   1225 Creatinine: 0.70 [KG]   1225 Sodium: 138 [KG]   1225 Potassium: 3.9 [KG]   1225 CT imaging reveals a minimally impacted right femoral neck fracture.  We will contact Ortho. [KG]   1237 Spoke with Dr. Parkinson, he will take patient to the OR today.  Pt to remain NPO.  [KG]   1245 Hospitalist will admit patient. [KG]      ED Course User Index  [KG] Anita Montague, DYLAN            DIAGNOSIS  Final diagnoses:   Closed fracture of right hip, initial encounter       DISPOSITION    ED Disposition       ED Disposition   Decision to Admit    Condition   --    Comment   Level of Care: Telemetry [5]   Diagnosis: Hip fracture [035636]   Admitting Physician: CLIFFORD BARKSDALE [[id number]]                 Please note that portions of this document were completed with voice recognition software.       Anita Montague APRN  10/27/24 1240

## 2024-10-27 NOTE — OP NOTE
Closed reduction and percutaneous pinning of femoral neck fracture    Patient Name:  Marifer Ricardo  YOB: 1950    Date of Surgery:  10/27/24       Indications: 74-year-old female who developed acute insidious onset of right groin pain while walking yesterday. Clinical and radiographic studies were consistent with a valgus impacted femoral neck fracture. The risks and benefits of open/closed reduction and placement of a trochanteric femoral nail were discussed with the patient and family members, who expressed their understanding and wished to proceed with the procedure.    Pre-op Diagnosis:     Right femoral neck fracture    Post-op Diagnosis:     same    Procedure/CPT® Codes: 27868    Procedure(s):  HIP CLOSED REDUCTION  HIP PERCUTANEOUS PINNING    Staff:  Surgeon(s):  Rubin Parkinson Jr., MD    Circulator: Yajaira Goins RN  Radiology Technologist: Sukumar Koenig RT  Scrub Person: Herminia Serrano           Anesthesia: Choice    Estimated Blood Loss: minimal    Implants:    Nothing was implanted during the procedure    Specimen:                None  Specimen:                None      Findings: Valgus applied to femoral neck fracture    Complications: none apparent    Description of Procedure: After informed consent had been obtained and the operative site had been marked, the patient was taken back to the operating suite, where a timeout was performed with the entire surgical staff present. The patient underwent general anesthesia and was intubated, then transferred to the [place] table. All bony prominences were well padded, a perineal post was placed, and the well leg was placed in a well leg colunga. The fractured leg was then placed in a well-padded boot and attached to the [place] table. Appropriate gross traction was then placed to the adducted leg and slightly internally rotated leg. AP and lateral fluoroscopic views were then taken of the operative hip, demonstrating excellent reduction  of the fracture. We then prepped and draped the operative extremity in the usual sterile fashion.    We then performed a timeout with the entire surgical staff present.  We then percutaneously placed 3 Colin wires traveling from the lateral femoral cortex just superior to the level of the lesser trochanter traveling in a retrograde manner across the femoral neck and into the femoral head and a inverted triangle pattern   With our inferior pin being along the posterior inferior aspect of the femoral neck.  Care was taken to avoid penetration of the subchondral bone of the femoral head,   Stopping approximate 5 mm short of the femoral head on the AP and lateral fluoroscopic views.  We then measured for placement of our 6.5 millimeter screws.  We then drilled for and placed 3 fully threaded 6.5 millimeter screws of the appropriate length.  Final fluoroscopic views demonstrated excellent placement of a screws in an inverted triangle fashion without any signs of hardware complication.    The wounds were irrigated thoroughly. The subcutaneous tissue was closed with 2-0 Vicryl in an interrupted fashion. The skin was closed with staples. Sterile dressings were then applied.    Then patient was then awakened from anesthesia, extubated, and transferred to the [facility]. The patient was then transferred to the postanesthesia care unit in stable condition.    The plan for the patient will be to be weightbearing as tolerated right lower extremity. The patient will have a postoperative appointment in approximately 2 weeks for a wound check and suture removal.      Rubin Parkinson MD  10/27/24 14:55 EDT

## 2024-10-27 NOTE — ANESTHESIA PREPROCEDURE EVALUATION
Anesthesia Evaluation     Patient summary reviewed and Nursing notes reviewed   no history of anesthetic complications:   NPO Solid Status: > 8 hours  NPO Liquid Status: > 2 hours           Airway   Mallampati: II  TM distance: >3 FB  Neck ROM: full  No difficulty expected  Dental - normal exam     Pulmonary - normal exam   (+) asthma,  Cardiovascular - normal exam    ECG reviewed    (+) hypertension, PVD, hyperlipidemia    ROS comment: 8/13/24  -- [facility] -   Minimal, nonobstructive CAD.  Torturous coronary anatomy.  ·  LVEDP 9 mmHg.    8/13/24  -   lvef 61-65, nl rvsf/sz, av sclerosis no stenosis nor ai, trace mr, trace tr, rvsp 22, no ps/pi, no effusion    Neuro/Psych  (+) headaches, numbness  GI/Hepatic/Renal/Endo    (+) GERD, renal disease-, diabetes mellitus    Musculoskeletal     Abdominal    Substance History      OB/GYN          Other   arthritis,     ROS/Med Hx Other: Hgb 14 k 3.9 plt 120  No n/v/gerd/glp1                 Anesthesia Plan    ASA 3     general     (Risks and benefits of general anesthesia discussed with patient (including MI, CVA, death, recall, aspiration, oropharyngeal/dental damage), questions answered, agreeable to proceed.  Benefits and risks of nerve block/catheter discussed with patient ((including failed block, damage to nerve/nearby structures, intravascular injection)); questions answered, agreeable to proceed.    Blood refusal, ok with albumin)  intravenous induction     Anesthetic plan, risks, benefits, and alternatives have been provided, discussed and informed consent has been obtained with: patient.    Plan discussed with CRNA.    CODE STATUS:

## 2024-10-27 NOTE — ED NOTES
Marifer Tobin Davion    Nursing Report ED to Floor:  Mental status: AO4  Ambulatory status: Weak   Oxygen Therapy:  RA  Cardiac Rhythm: NSR  Admitted from: Home  Safety Concerns:  Fall risk  Social Issues: None  ED Room #:  06    ED Nurse Phone Extension - [phone] or may call [phone].      HPI:   Chief Complaint   Patient presents with    Leg Pain       Past Medical History:  Past Medical History:   Diagnosis Date    Abdominal pain     Abnormal bone density screening 2014    osteopenia    Arthritis     Asthma     Belching     Body piercing     BOTH EARS    Cataract, bilateral     Chronic renal insufficiency 2016    Diabetes mellitus     Essential hypertension 2016    GERD (gastroesophageal reflux disease)     Hx of mammogram 2009    Hyperparathyroidism     Mastoiditis     Pregnancy      s/p  x 3    Renal insufficiency     Urticaria     Wears glasses     Weight loss         Past Surgical History:  Past Surgical History:   Procedure Laterality Date    BYPASS GRAFT SUBCLAVIAN      LEFT SUBCLAVIAN TO RIGHT ATRIUM VENOUS GRAFT in past for ?TPN    CARDIAC CATHETERIZATION N/A 2024    Procedure: Left Heart Cath;  Surgeon: Isaac Whalen MD;  Location: [facility] CATH INVASIVE LOCATION;  Service: Cardiology;  Laterality: N/A;    CATARACT EXTRACTION, BILATERAL      COLECTOMY PARTIAL / TOTAL      Pt reports multiple abd surgeries for ?pseudo-bowel obstruction    COLONOSCOPY      COLONOSCOPY N/A 2018    Procedure: COLONOSCOPY WITH RANDOM COLON BIOPSIES;  Surgeon: Taras Morillo MD;  Location: [facility] ENDOSCOPY;  Service:     COLOSTOMY      and revision    ENDOSCOPY N/A 2018    Procedure: ESOPHAGOGASTRODUODENOSCOPY WITH BIOPSIES;  Surgeon: Taras Morillo MD;  Location: [facility] ENDOSCOPY;  Service:     ENDOSCOPY N/A 2024    Procedure: ESOPHAGOGASTRODUODENOSCOPY;  Surgeon: Brunner, Mark I, MD;  Location:  [facility] ENDOSCOPY;  Service: Gastroenterology;  Laterality: N/A;    HYSTERECTOMY      age 31  "for DUB, ovaries intact    TONSILLECTOMY AND ADENOIDECTOMY          Admitting Doctor:   Linda Singh MD    Consulting Provider(s):  Consults       No orders found from 9/28/2024 to 10/28/2024.             Admitting Diagnosis:   The encounter diagnosis was Closed fracture of right hip, initial encounter.    Most Recent Vitals:   Vitals:    10/27/24 0933 10/27/24 1100   BP: 175/84 157/92   BP Location: Right arm    Patient Position: Sitting    Pulse: 96 83   Resp: 16    Temp: 99.1 °F (37.3 °C)    TempSrc: Oral    SpO2: 97% 95%   Weight: 55.3 kg (122 lb)    Height: 170.2 cm (67\")        Active LDAs/IV Access:   Lines, Drains & Airways       Active LDAs       None                    Labs (abnormal labs have a star):   Labs Reviewed   CBC WITH AUTO DIFFERENTIAL - Abnormal; Notable for the following components:       Result Value    Platelets 120 (*)     Neutrophil % 77.1 (*)     Lymphocyte % 12.9 (*)     All other components within normal limits   BASIC METABOLIC PANEL - Normal    Narrative:     GFR Normal >60  Chronic Kidney Disease <60  Kidney Failure <15    The GFR formula is only valid for adults with stable renal function between ages 18 and 70.   PROTIME-INR - Normal   CBC AND DIFFERENTIAL    Narrative:     The following orders were created for panel order CBC & Differential.  Procedure                               Abnormality         Status                     ---------                               -----------         ------                     CBC Auto Differential[[id number]]        Abnormal            Final result               Scan Slide[[id number]]                                                                    Please view results for these tests on the individual orders.       Meds Given in ED:   Medications   sodium chloride 0.9 % flush 10 mL (has no administration in time range)   oxyCODONE-acetaminophen (PERCOCET) 5-325 MG per tablet 1 tablet (1 tablet Oral Given 10/27/24 1029)           Last " NIH score:                                                          Dysphagia screening results:        Cherri Coma Scale:  No data recorded     CIWA:        Restraint Type:            Isolation Status:  No active isolations

## 2024-10-27 NOTE — H&P
[facility] Medicine Services  HISTORY AND PHYSICAL    Patient Name: Marifer Ricardo  : 1950  MRN: [id number]  Primary Care Physician: Norma Spangler APRN  Date of admission: 10/27/2024      Subjective   Subjective     Chief Complaint:  R leg pain    HPI:  Marifer Ricardo is a 74 y.o. female with a history of GERD, hypertension peripheral artery disease and hypothyroidism presents with acute pain in her right leg/hip area.  Patient states that she was doing well and actually cleaning up the house when all of a sudden her right leg buckled on her.  She did not fall or have any specific trauma to the area.  Afterwards she had severe pain to that leg and really struggled to walk.  The following day due to her pain persisting came to the emergency department.  No numbness or tingling.  No fevers.  No chest pain or shortness of breath.      Personal History     Past Medical History:   Diagnosis Date    Abdominal pain     Abnormal bone density screening 2014    osteopenia    Arthritis     Asthma     Belching     Body piercing     BOTH EARS    Cataract, bilateral     Chronic renal insufficiency 2016    Diabetes mellitus     Essential hypertension 2016    GERD (gastroesophageal reflux disease)     Hx of mammogram 2009    Hyperparathyroidism     Mastoiditis     Pregnancy      s/p  x 3    Renal insufficiency     Urticaria     Wears glasses     Weight loss            Past Surgical History:   Procedure Laterality Date    BYPASS GRAFT SUBCLAVIAN      LEFT SUBCLAVIAN TO RIGHT ATRIUM VENOUS GRAFT in past for ?TPN    CARDIAC CATHETERIZATION N/A 2024    Procedure: Left Heart Cath;  Surgeon: Isaac Whalen MD;  Location: [facility] INVASIVE LOCATION;  Service: Cardiology;  Laterality: N/A;    CATARACT EXTRACTION, BILATERAL      COLECTOMY PARTIAL / TOTAL      Pt reports multiple abd surgeries for ?pseudo-bowel obstruction    COLONOSCOPY      COLONOSCOPY N/A  02/07/2018    Procedure: COLONOSCOPY WITH RANDOM COLON BIOPSIES;  Surgeon: Taras Morillo MD;  Location: [facility] ENDOSCOPY;  Service:     COLOSTOMY      and revision    ENDOSCOPY N/A 01/25/2018    Procedure: ESOPHAGOGASTRODUODENOSCOPY WITH BIOPSIES;  Surgeon: Taras Morillo MD;  Location: [facility] ENDOSCOPY;  Service:     ENDOSCOPY N/A 8/14/2024    Procedure: ESOPHAGOGASTRODUODENOSCOPY;  Surgeon: Brunner, Mark I, MD;  Location: [facility] ENDOSCOPY;  Service: Gastroenterology;  Laterality: N/A;    HYSTERECTOMY      age 31 for DUB, ovaries intact    TONSILLECTOMY AND ADENOIDECTOMY         Family History: family history includes Arthritis in her mother; Cancer in her cousin, maternal aunt, and paternal aunt; Diabetes in her maternal grandmother and mother; Heart attack in her father; Heart disease in her father; Hypertension in her mother; Migraines in her mother; Stroke in her mother; Thyroid disease in her mother; Tuberculosis in her mother.     Social History:  reports that she has never smoked. She has never been exposed to tobacco smoke. She has never used smokeless tobacco. She reports that she does not drink alcohol and does not use drugs.  Social History     Social History Narrative    Not on file       Medications:  Available home medication information reviewed.  Biotin, Cinnamon, Omega 3-6-9 Fatty Acids, Turmeric, Vitamin B-12, alendronate, amLODIPine, aspirin, atorvastatin, carvedilol, cetirizine, cholecalciferol, ondansetron ODT, oxyCODONE-acetaminophen, pantoprazole, polyethylene glycol, valsartan, and valsartan-hydrochlorothiazide    Allergies   Allergen Reactions    Penicillins Hives    Betadine [Povidone Iodine] Hives    Codeine Hives    Contrast Dye (Echo Or Unknown Ct/Mr) Hives    Metoclopramide Hives    Norco [Hydrocodone-Acetaminophen] GI Intolerance       Objective   Objective     Vital Signs:   Temp:  [99.1 °F (37.3 °C)-99.4 °F (37.4 °C)] 99.4 °F (37.4 °C)  Heart Rate:  [78-96] 93  Resp:  [16]  16  BP: (153-191)/(84-97) 153/86       Physical Exam   Constitutional: No acute distress, awake, alert  HENT: NCAT, mucous membranes moist  Respiratory: Clear to auscultation bilaterally, respiratory effort normal   Cardiovascular: RRR, no murmurs, rubs, or gallops  Gastrointestinal: Positive bowel sounds, soft, nontender, nondistended  Musculoskeletal: No bilateral ankle edema.  Pain with movement in the R hip and pain with palpation in the R groin and R lateral area.  No bruising.  Scaring to R hip from prior surgery.  Psychiatric: Appropriate affect, cooperative  Neurologic: Oriented x 3, strength symmetric in all extremities, Cranial Nerves grossly intact to confrontation, speech clear  Skin: No rashes      Result Review:  I have personally reviewed the results from the time of this admission to 10/27/2024 14:28 EDT and agree with these findings:  [x]  Laboratory list / accordion  []  Microbiology  [x]  Radiology  [x]  EKG/Telemetry   []  Cardiology/Vascular   []  Pathology  [x]  Old records  []  Other:        LAB RESULTS:      Lab 10/27/24  1138   WBC 5.95   HEMOGLOBIN 14.2   HEMATOCRIT 43.1   PLATELETS 120*   NEUTROS ABS 4.59   IMMATURE GRANS (ABS) 0.01   LYMPHS ABS 0.77   MONOS ABS 0.48   EOS ABS 0.07   MCV 94.5   PROTIME 13.8   INR 1.05         Lab 10/27/24  1138   SODIUM 138   POTASSIUM 3.9   CHLORIDE 103   CO2 22.0   ANION GAP 13.0   BUN 16   CREATININE 0.70   EGFR 90.3   GLUCOSE 93   CALCIUM 8.9                             Microbiology Results (last 10 days)       ** No results found for the last 240 hours. **            Peripheral Block    Result Date: 10/27/2024  Karlee Barrera DO     10/27/2024  1:27 PM Peripheral Block Patient reassessed immediately prior to procedure Reason for block: at surgeon's request and post-op pain management Performed by Anesthesiologist: Karlee Barrera DO Preanesthetic Checklist Completed: patient identified, IV checked, site marked, risks and benefits discussed,  "surgical consent, monitors and equipment checked, pre-op evaluation and timeout performed Prep: Pt Position: supine Sterile barriers:gloves, cap, sterile barriers, mask and washed/disinfected hands Prep: ChloraPrep Patient monitoring: blood pressure monitoring, continuous pulse oximetry and EKG Procedure Guidance:ultrasound guided ULTRASOUND INTERPRETATION.  Using ultrasound guidance a 20 G gauge needle was placed in close proximity to the femoral nerve, at which point, under ultrasound guidance anesthetic was injected in the area of the nerve and spread of the anesthesia was seen on ultrasound in close proximity thereto.  There were no abnormalities seen on ultrasound; a digital image was taken; and the patient tolerated the procedure with no complications. Images:still images obtained, printed/placed on chart Block Type:femoral Injection Technique:single-shot Needle Type:short-bevel Needle Gauge:20 G Resistance on Injection: none Medications Used: bupivacaine PF (MARCAINE) 0.25 % injection - Injection  30 mL - 10/27/2024 1:27:00 PM dexamethasone sodium phosphate injection - Injection  4 mg - 10/27/2024 1:26:00 PM Post Assessment Injection Assessment: negative aspiration for heme, no paresthesia on injection and incremental injection Patient Tolerance:comfortable throughout block Complications:no Additional Notes SINGLE Shot A high-frequency linear transducer, with sterile cover, was placed in the inguinal crease to visualize the Femoral Vein, Artery, and Nerve (medial to lateral). The insertion site was prepped in sterile fashion. Skin and cutaneous tissue was infiltrated with 2-5 ml of 1% Lidocaine. Using ultrasound-guidance, a 20-gauge B-Cotton 4\" Ultraplex 360 non-stimulating echogenic needle was then inserted and advanced in-plane from lateral to medial with ultrasound guidance. The needle was directed below Fascia Iliacus towards the Femoral nerve. Preservative-free normal saline was utilized for " hydro-dissection of tissue. Local anesthetic injection spread, in incremental 3-5 ml injections, was visualized lateral to the artery to surround the femoral nerve. Aspiration every 5 ml to prevent intravascular injection. Injection was completed with negative aspiration of blood and negative intravascular injection. Injection pressures were normal with minimal resistance. Performed by: Karlee Barrera DO     CT Lower Extremity Right Without Contrast    Result Date: 10/27/2024  CT LOWER EXTREMITY RIGHT WO CONTRAST Date of Exam: 10/27/2024 11:51 AM EDT Indication: Rt hip / femur concern for fracture.. Comparison: 2018 CT abdomen/pelvis and same-day radiographs Technique: Axial CT images were obtained of the right lower extremity without contrast administration.  Reconstructed coronal and sagittal images were also obtained. Automated exposure control and iterative construction methods were used. Findings: There is a minimally impacted fracture of the femoral neck. There is no evidence of additional fracture. Normal joint alignment. Mild right hip arthritis. No suspicious bone lesion. No CT evidence of avascular necrosis. Osteopenia. Postsurgical changes involving the right femoral vasculature. There are also surgical changes within the included pelvis. Large colonic stool burden. Hysterectomy. No acute abnormality.     Impression: Impression: Minimally impacted right femoral neck fracture. No evidence of additional fracture. Electronically Signed: Rubin Campos MD  10/27/2024 12:18 PM EDT  Workstation ID: [id number]    XR Hip With or Without Pelvis 2 - 3 View Right    Addendum Date: 10/27/2024    ADDENDUM #1 Addendum begins. 1. Asymmetric sclerosis in the right subcapital femoral neck. Questionable nondisplaced fracture, possibly related to insufficiency fracture in the setting of demineralization. Recommend dedicated CT for further assessment. No joint malalignment. 2. Mild degenerative osteoarthritis of the right hip.  3. No acute radiographic findings in the lumbar spine. Grossly similar multilevel lumbar spondylosis and anterolisthesis L4 on L5 since 2021 comparison. Addendum ends. Electronically Signed: Jamie Perkins MD  10/27/2024 10:39 AM EDT  Workstation ID: [id number] ORIGINAL REPORT: XR HIP W OR WO PELVIS 2-3 VIEW RIGHT, XR SPINE LUMBAR 2 OR 3 VW Date of Exam: 10/27/2024 10:08 AM EDT Indication: Rt hip pain Comparison: Pelvic radiograph 8/24/2021, lumbar spine radiograph 8/24/2021 Findings: Right hip: Osseous demineralization. Asymmetric sclerosis in the subcapital region of the right femoral neck new from 2021. Questionable ill-defined lucency along the lateral aspect without a completed fracture. Mild degenerative osteoarthritis of the right hip joint. No erosions or chondrocalcinosis. Multiple surgical clips in the pelvis and proximal right thigh. Peripheral vascular disease noted. Pelvic phleboliths. Lumbar spine: 5 nonrib-bearing lumbar-type vertebral bodies. Mild dextrocurvature. No visualized displaced fracture or significant vertebral body height loss. Minimal anterolisthesis L4 and L5 stable from prior. Mild degenerative disc disease most notable at L2-L3. Mild to moderate lower lumbar facet arthropathy similar to prior. Aortic atherosclerotic disease. Pelvic phleboliths. Multiple surgical clips noted. Impression: 1. Asymmetric sclerosis in the right subcapital femoral neck. Questionable nondisplaced versus stress fracture, without evidence of a completed fracture or joint malalignment. Findings are in the setting of demineralization. Recommend dedicated CT for further assessment. 2. Mild degenerative osteoarthritis of the right hip. 3. No acute radiographic findings in the lumbar spine. Grossly similar multilevel lumbar spondylosis and anterolisthesis L4 on L5 since 2021 comparison. Electronically Signed: Jamie Perkins MD  10/27/2024 10:34 AM EDT  Workstation ID: [id number]    Result Date: 10/27/2024  XR HIP W OR  Detail Level: Detailed Quality 226: Preventive Care And Screening: Tobacco Use: Screening And Cessation Intervention: Patient screened for tobacco use and is an ex/non-smoker WO PELVIS 2-3 VIEW RIGHT, XR SPINE LUMBAR 2 OR 3 VW Date of Exam: 10/27/2024 10:08 AM EDT Indication: Rt hip pain Comparison: Pelvic radiograph 8/24/2021, lumbar spine radiograph 8/24/2021 Findings: Right hip: Osseous demineralization. Asymmetric sclerosis in the subcapital region of the right femoral neck new from 2021. Questionable ill-defined lucency along the lateral aspect without a completed fracture. Mild degenerative osteoarthritis of the right hip joint. No erosions or chondrocalcinosis. Multiple surgical clips in the pelvis and proximal right thigh. Peripheral vascular disease noted. Pelvic phleboliths. Lumbar spine: 5 nonrib-bearing lumbar-type vertebral bodies. Mild dextrocurvature. No visualized displaced fracture or significant vertebral body height loss. Minimal anterolisthesis L4 and L5 stable from prior. Mild degenerative disc disease most notable at L2-L3. Mild to moderate lower lumbar facet arthropathy similar to prior. Aortic atherosclerotic disease. Pelvic phleboliths. Multiple surgical clips noted.     Impression: Impression: 1. Asymmetric sclerosis in the right subcapital femoral neck. Questionable nondisplaced versus stress fracture, without evidence of a completed fracture or joint malalignment. Findings are in the setting of demineralization. Recommend dedicated CT for further assessment. 2. Mild degenerative osteoarthritis of the right hip. 3. No acute radiographic findings in the lumbar spine. Grossly similar multilevel lumbar spondylosis and anterolisthesis L4 on L5 since 2021 comparison. Electronically Signed: Jamie Perkins MD  10/27/2024 10:34 AM EDT  Workstation ID: [id number]    XR Spine Lumbar 2 or 3 View    Addendum Date: 10/27/2024    ADDENDUM #1 Addendum begins. 1. Asymmetric sclerosis in the right subcapital femoral neck. Questionable nondisplaced fracture, possibly related to insufficiency fracture in the setting of demineralization. Recommend dedicated CT for further  Quality 130: Documentation Of Current Medications In The Medical Record: Current Medications Documented assessment. No joint malalignment. 2. Mild degenerative osteoarthritis of the right hip. 3. No acute radiographic findings in the lumbar spine. Grossly similar multilevel lumbar spondylosis and anterolisthesis L4 on L5 since 2021 comparison. Addendum ends. Electronically Signed: Jamie Perkins MD  10/27/2024 10:39 AM EDT  Workstation ID: [id number] ORIGINAL REPORT: XR HIP W OR WO PELVIS 2-3 VIEW RIGHT, XR SPINE LUMBAR 2 OR 3 VW Date of Exam: 10/27/2024 10:08 AM EDT Indication: Rt hip pain Comparison: Pelvic radiograph 8/24/2021, lumbar spine radiograph 8/24/2021 Findings: Right hip: Osseous demineralization. Asymmetric sclerosis in the subcapital region of the right femoral neck new from 2021. Questionable ill-defined lucency along the lateral aspect without a completed fracture. Mild degenerative osteoarthritis of the right hip joint. No erosions or chondrocalcinosis. Multiple surgical clips in the pelvis and proximal right thigh. Peripheral vascular disease noted. Pelvic phleboliths. Lumbar spine: 5 nonrib-bearing lumbar-type vertebral bodies. Mild dextrocurvature. No visualized displaced fracture or significant vertebral body height loss. Minimal anterolisthesis L4 and L5 stable from prior. Mild degenerative disc disease most notable at L2-L3. Mild to moderate lower lumbar facet arthropathy similar to prior. Aortic atherosclerotic disease. Pelvic phleboliths. Multiple surgical clips noted. Impression: 1. Asymmetric sclerosis in the right subcapital femoral neck. Questionable nondisplaced versus stress fracture, without evidence of a completed fracture or joint malalignment. Findings are in the setting of demineralization. Recommend dedicated CT for further assessment. 2. Mild degenerative osteoarthritis of the right hip. 3. No acute radiographic findings in the lumbar spine. Grossly similar multilevel lumbar spondylosis and anterolisthesis L4 on L5 since 2021 comparison. Electronically Signed: Jamie Perkins MD   10/27/2024 10:34 AM EDT  Workstation ID: [id number]    Result Date: 10/27/2024  XR HIP W OR WO PELVIS 2-3 VIEW RIGHT, XR SPINE LUMBAR 2 OR 3 VW Date of Exam: 10/27/2024 10:08 AM EDT Indication: Rt hip pain Comparison: Pelvic radiograph 8/24/2021, lumbar spine radiograph 8/24/2021 Findings: Right hip: Osseous demineralization. Asymmetric sclerosis in the subcapital region of the right femoral neck new from 2021. Questionable ill-defined lucency along the lateral aspect without a completed fracture. Mild degenerative osteoarthritis of the right hip joint. No erosions or chondrocalcinosis. Multiple surgical clips in the pelvis and proximal right thigh. Peripheral vascular disease noted. Pelvic phleboliths. Lumbar spine: 5 nonrib-bearing lumbar-type vertebral bodies. Mild dextrocurvature. No visualized displaced fracture or significant vertebral body height loss. Minimal anterolisthesis L4 and L5 stable from prior. Mild degenerative disc disease most notable at L2-L3. Mild to moderate lower lumbar facet arthropathy similar to prior. Aortic atherosclerotic disease. Pelvic phleboliths. Multiple surgical clips noted.     Impression: Impression: 1. Asymmetric sclerosis in the right subcapital femoral neck. Questionable nondisplaced versus stress fracture, without evidence of a completed fracture or joint malalignment. Findings are in the setting of demineralization. Recommend dedicated CT for further assessment. 2. Mild degenerative osteoarthritis of the right hip. 3. No acute radiographic findings in the lumbar spine. Grossly similar multilevel lumbar spondylosis and anterolisthesis L4 on L5 since 2021 comparison. Electronically Signed: Jamie Perkins MD  10/27/2024 10:34 AM EDT  Workstation ID: [id number]     Results for orders placed during the hospital encounter of 08/12/24    Adult Transthoracic Echo Complete W/ Cont if Necessary Per Protocol    Interpretation Summary    Left ventricular systolic function is normal. Left  Quality 431: Preventive Care And Screening: Unhealthy Alcohol Use - Screening: Patient screened for unhealthy alcohol use using a single question and scores less than 2 times per year ventricular ejection fraction appears to be 61 - 65%.    Left ventricular diastolic function is consistent with (grade I) impaired relaxation.    The aortic valve exhibits sclerosis.      Assessment & Plan   Assessment & Plan       Hip fracture    Type 2 diabetes mellitus    Essential hypertension    History of hypothyroidism    GERD without esophagitis    PAD (peripheral artery disease)    Marifer Ricardo is a 74 y.o. F with hx of DM, HTN, PAD, GERD, here with R hip fracture.    R hip fracture  --going to OR today with Dr. Parkinson  --PT/OT in AM  --Repeat labs in AM  --Holding ARB    GERD  -Cont PPI and Pepcid.     HTN  -- cont norvasc and coreg.  Hold ARB     PAD   -Continue ASA, Plavix, atorvastatin.     DM2 with hyperglycemia  -not on home medications  -A1c 5.8 8/24. Low dose SSI while admitted.      Chronic pain on chronic opioids  -Continue home Percocet.       VTE Prophylaxis:  Pharmacologic VTE prophylaxis orders are present.      CODE STATUS:    Code Status and Medical Interventions: No CPR (Do Not Attempt to Resuscitate); Limited Support; No intubation (DNI)   Ordered at: 10/27/24 1421     Medical Intervention Limits:    No intubation (DNI)     Level Of Support Discussed With:    Patient     Code Status (Patient has no pulse and is not breathing):    No CPR (Do Not Attempt to Resuscitate)     Medical Interventions (Patient has pulse or is breathing):    Limited Support       Expected Discharge   Expected discharge date/ time has not been documented.     Linda Singh MD  10/27/24

## 2024-10-28 LAB
ANION GAP SERPL CALCULATED.3IONS-SCNC: 11 MMOL/L (ref 5–15)
BASOPHILS # BLD AUTO: 0.01 10*3/MM3 (ref 0–0.2)
BASOPHILS NFR BLD AUTO: 0.1 % (ref 0–1.5)
BUN SERPL-MCNC: 30 MG/DL (ref 8–23)
BUN/CREAT SERPL: 31.6 (ref 7–25)
CALCIUM SPEC-SCNC: 8.3 MG/DL (ref 8.6–10.5)
CHLORIDE SERPL-SCNC: 103 MMOL/L (ref 98–107)
CO2 SERPL-SCNC: 26 MMOL/L (ref 22–29)
CREAT SERPL-MCNC: 0.95 MG/DL (ref 0.57–1)
DEPRECATED RDW RBC AUTO: 42.5 FL (ref 37–54)
EGFRCR SERPLBLD CKD-EPI 2021: 62.6 ML/MIN/1.73
EOSINOPHIL # BLD AUTO: 0 10*3/MM3 (ref 0–0.4)
EOSINOPHIL NFR BLD AUTO: 0 % (ref 0.3–6.2)
ERYTHROCYTE [DISTWIDTH] IN BLOOD BY AUTOMATED COUNT: 12.3 % (ref 12.3–15.4)
GLUCOSE BLDC GLUCOMTR-MCNC: 112 MG/DL (ref 70–130)
GLUCOSE BLDC GLUCOMTR-MCNC: 132 MG/DL (ref 70–130)
GLUCOSE BLDC GLUCOMTR-MCNC: 144 MG/DL (ref 70–130)
GLUCOSE BLDC GLUCOMTR-MCNC: 167 MG/DL (ref 70–130)
GLUCOSE SERPL-MCNC: 109 MG/DL (ref 65–99)
HCT VFR BLD AUTO: 36.7 % (ref 34–46.6)
HGB BLD-MCNC: 12.2 G/DL (ref 12–15.9)
IMM GRANULOCYTES # BLD AUTO: 0.13 10*3/MM3 (ref 0–0.05)
IMM GRANULOCYTES NFR BLD AUTO: 1.2 % (ref 0–0.5)
LYMPHOCYTES # BLD AUTO: 0.65 10*3/MM3 (ref 0.7–3.1)
LYMPHOCYTES NFR BLD AUTO: 6 % (ref 19.6–45.3)
MAGNESIUM SERPL-MCNC: 2 MG/DL (ref 1.6–2.4)
MCH RBC QN AUTO: 31.2 PG (ref 26.6–33)
MCHC RBC AUTO-ENTMCNC: 33.2 G/DL (ref 31.5–35.7)
MCV RBC AUTO: 93.9 FL (ref 79–97)
MONOCYTES # BLD AUTO: 0.54 10*3/MM3 (ref 0.1–0.9)
MONOCYTES NFR BLD AUTO: 5 % (ref 5–12)
NEUTROPHILS NFR BLD AUTO: 87.7 % (ref 42.7–76)
NEUTROPHILS NFR BLD AUTO: 9.42 10*3/MM3 (ref 1.7–7)
NRBC BLD AUTO-RTO: 0 /100 WBC (ref 0–0.2)
PHOSPHATE SERPL-MCNC: 3.2 MG/DL (ref 2.5–4.5)
PLATELET # BLD AUTO: 126 10*3/MM3 (ref 140–450)
PMV BLD AUTO: 10.9 FL (ref 6–12)
POTASSIUM SERPL-SCNC: 4.2 MMOL/L (ref 3.5–5.2)
RBC # BLD AUTO: 3.91 10*6/MM3 (ref 3.77–5.28)
SODIUM SERPL-SCNC: 140 MMOL/L (ref 136–145)
WBC NRBC COR # BLD AUTO: 10.75 10*3/MM3 (ref 3.4–10.8)

## 2024-10-28 PROCEDURE — 84100 ASSAY OF PHOSPHORUS: CPT | Performed by: ORTHOPAEDIC SURGERY

## 2024-10-28 PROCEDURE — 63710000001 INSULIN LISPRO (HUMAN) PER 5 UNITS: Performed by: ORTHOPAEDIC SURGERY

## 2024-10-28 PROCEDURE — 97161 PT EVAL LOW COMPLEX 20 MIN: CPT

## 2024-10-28 PROCEDURE — 25010000002 ENOXAPARIN PER 10 MG: Performed by: ORTHOPAEDIC SURGERY

## 2024-10-28 PROCEDURE — 97110 THERAPEUTIC EXERCISES: CPT

## 2024-10-28 PROCEDURE — 85025 COMPLETE CBC W/AUTO DIFF WBC: CPT | Performed by: ORTHOPAEDIC SURGERY

## 2024-10-28 PROCEDURE — 99232 SBSQ HOSP IP/OBS MODERATE 35: CPT | Performed by: PEDIATRICS

## 2024-10-28 PROCEDURE — 97535 SELF CARE MNGMENT TRAINING: CPT

## 2024-10-28 PROCEDURE — 97165 OT EVAL LOW COMPLEX 30 MIN: CPT

## 2024-10-28 PROCEDURE — 25010000002 CEFAZOLIN PER 500 MG: Performed by: ORTHOPAEDIC SURGERY

## 2024-10-28 PROCEDURE — 97116 GAIT TRAINING THERAPY: CPT

## 2024-10-28 PROCEDURE — 83735 ASSAY OF MAGNESIUM: CPT | Performed by: ORTHOPAEDIC SURGERY

## 2024-10-28 PROCEDURE — 82948 REAGENT STRIP/BLOOD GLUCOSE: CPT

## 2024-10-28 PROCEDURE — 80048 BASIC METABOLIC PNL TOTAL CA: CPT | Performed by: ORTHOPAEDIC SURGERY

## 2024-10-28 RX ORDER — ASPIRIN 325 MG
325 TABLET ORAL DAILY
Status: DISCONTINUED | OUTPATIENT
Start: 2024-10-29 | End: 2024-10-31 | Stop reason: HOSPADM

## 2024-10-28 RX ADMIN — OXYCODONE HYDROCHLORIDE AND ACETAMINOPHEN 1 TABLET: 5; 325 TABLET ORAL at 15:08

## 2024-10-28 RX ADMIN — SODIUM CHLORIDE 2000 MG: 900 INJECTION INTRAVENOUS at 06:02

## 2024-10-28 RX ADMIN — OXYCODONE HYDROCHLORIDE AND ACETAMINOPHEN 1 TABLET: 5; 325 TABLET ORAL at 22:27

## 2024-10-28 RX ADMIN — Medication 10 ML: at 20:50

## 2024-10-28 RX ADMIN — ATORVASTATIN CALCIUM 40 MG: 40 TABLET, FILM COATED ORAL at 20:50

## 2024-10-28 RX ADMIN — OXYCODONE HYDROCHLORIDE AND ACETAMINOPHEN 1 TABLET: 5; 325 TABLET ORAL at 08:15

## 2024-10-28 RX ADMIN — PANTOPRAZOLE SODIUM 40 MG: 40 TABLET, DELAYED RELEASE ORAL at 08:15

## 2024-10-28 RX ADMIN — OXYCODONE HYDROCHLORIDE AND ACETAMINOPHEN 1 TABLET: 5; 325 TABLET ORAL at 00:44

## 2024-10-28 RX ADMIN — ENOXAPARIN SODIUM 40 MG: 100 INJECTION SUBCUTANEOUS at 08:13

## 2024-10-28 RX ADMIN — INSULIN LISPRO 2 UNITS: 100 INJECTION, SOLUTION INTRAVENOUS; SUBCUTANEOUS at 12:13

## 2024-10-28 RX ADMIN — ASPIRIN 81 MG: 81 TABLET, COATED ORAL at 08:15

## 2024-10-28 NOTE — THERAPY EVALUATION
Patient Name: Marifer Ricardo  : 1950    MRN: [id number]                              Today's Date: 10/28/2024       Admit Date: 10/27/2024    Visit Dx:     ICD-10-CM ICD-9-CM   1. 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    Gastroparesis    Rosacea    Age related osteoporosis    GERD without esophagitis    Gastritis    PAD (peripheral artery disease)    Pure hypercholesterolemia    Hip fracture     Past Medical History:   Diagnosis Date    Abdominal pain     Abnormal bone density screening 2014    osteopenia    Arthritis     Asthma     Belching     Body piercing     BOTH EARS    Cataract, bilateral     Chronic renal insufficiency 2016    Diabetes mellitus     Essential hypertension 2016    GERD (gastroesophageal reflux disease)     Hx of mammogram     Hyperparathyroidism     Mastoiditis     Pregnancy      s/p  x 3    Renal insufficiency     Urticaria     Wears glasses     Weight loss      Past Surgical History:   Procedure Laterality Date    BYPASS GRAFT SUBCLAVIAN      LEFT SUBCLAVIAN TO RIGHT ATRIUM VENOUS GRAFT in past for ?TPN    CARDIAC CATHETERIZATION N/A 2024    Procedure: Left Heart Cath;  Surgeon: Isaac Whalen MD;  Location: [facility] INVASIVE LOCATION;  Service: Cardiology;  Laterality: N/A;    CATARACT EXTRACTION, BILATERAL      COLECTOMY PARTIAL / TOTAL      Pt reports multiple abd surgeries for ?pseudo-bowel obstruction    COLONOSCOPY      COLONOSCOPY N/A 2018    Procedure: COLONOSCOPY WITH RANDOM COLON BIOPSIES;  Surgeon: Taras Morillo,  MD;  Location: [facility] ENDOSCOPY;  Service:     COLOSTOMY      and revision    ENDOSCOPY N/A 01/25/2018    Procedure: ESOPHAGOGASTRODUODENOSCOPY WITH BIOPSIES;  Surgeon: Taras Morillo MD;  Location: [facility] ENDOSCOPY;  Service:     ENDOSCOPY N/A 8/14/2024    Procedure: ESOPHAGOGASTRODUODENOSCOPY;  Surgeon: Brunner, Mark I, MD;  Location:  [facility] ENDOSCOPY;  Service: Gastroenterology;  Laterality: N/A;    HYSTERECTOMY      age 31 for DUB, ovaries intact    TONSILLECTOMY AND ADENOIDECTOMY        General Information       [facility] Name 10/28/24 0929          OT Time and Intention    Document Type evaluation (P)   -     Mode of Treatment occupational therapy (P)   -       Row Name 10/28/24 0929          General Information    Patient Profile Reviewed yes (P)   -     Prior Level of Function independent:;transfer;bed mobility;ADL's (P)   Pt has shower chair and RW- does not use RW at baseline  -     Existing Precautions/Restrictions fall (P)   RLE WBAT, KI during mobility  -     Barriers to Rehab none identified (P)   -       Row Name 10/28/24 0929          Living Environment    People in Home child(desi), adult (P)   -       Row Name 10/28/24 0929          Home Main Entrance    Number of Stairs, Main Entrance none;other (see comments) (P)   Ramp  -       Row Name 10/28/24 0929          Stairs Within Home, Primary    Number of Stairs, Within Home, Primary none;other (see comments) (P)   All needs met on main floor  -       Row Name 10/28/24 0929          Cognition    Orientation Status (Cognition) oriented x 4 (P)   -       Row Name 10/28/24 0929          Safety Issues/Impairments Affecting Functional Mobility    Safety Issues Affecting Function (Mobility) awareness of need for assistance;insight into deficits/self-awareness;safety precaution awareness;safety precautions follow-through/compliance (P)   -     Impairments Affecting Function (Mobility) balance;endurance/activity tolerance;pain;range of motion  (ROM);strength (P)   -               User Key  (r) = Recorded By, (t) = Taken By, (c) = Cosigned By      Initials Name Provider Type     Elana Talley, OT Student OT Student                     Mobility/ADL's       Row Name 10/28/24 0932          Bed Mobility    Bed Mobility supine-sit (P)   -     Supine-Sit [place] (Bed Mobility) standby assist (P)   -     Assistive Device (Bed Mobility) bed rails;head of bed elevated (P)   -       Row Name 10/28/24 0932          Transfers    Transfers sit-stand transfer;stand-sit transfer;toilet transfer (P)   -       Row Name 10/28/24 0932          Sit-Stand Transfer    Sit-Stand [place] (Transfers) minimum assist (75% patient effort);2 person assist (P)   -     Assistive Device (Sit-Stand Transfers) walker, front-wheeled (P)   [facility]       Row Name 10/28/24 0932          Stand-Sit Transfer    Stand-Sit [place] (Transfers) minimum assist (75% patient effort);2 person assist (P)   -     Assistive Device (Stand-Sit Transfers) walker, front-wheeled (P)   -       Row Name 10/28/24 0932          Toilet Transfer    Type (Toilet Transfer) sit-stand;stand-sit (P)   [facility]     [place] Level (Toilet Transfer) minimum assist (75% patient effort);1 person assist (P)   -     Assistive Device (Toilet Transfer) commode;grab bars/safety frame (P)   -       Row Name 10/28/24 0932          Functional Mobility    Functional Mobility- Ind. Level minimum assist (75% patient effort);2 person assist required (P)   -     Functional Mobility- Device walker, front-wheeled (P)   -     Functional Mobility-Distance (Feet) -- (P)   >  distances  -     Functional Mobility- Safety Issues balance decreased during turns (P)   -     Patient was able to Ambulate yes (P)   -       Row Name 10/28/24 0932          Activities of Daily Living    BADL Assessment/Intervention lower body dressing;grooming;toileting;bathing (P)   -       Row Name 10/28/24 0932           Mobility    Extremity Weight-bearing Status right lower extremity (P)   -     Right Lower Extremity (Weight-bearing Status) weight-bearing as tolerated (WBAT) (P)   -[facility] Name 10/28/24 0932          Bathing Assessment/Intervention    Assistive Devices (Bathing) long-handled sponge (P)   -     Comment, (Bathing) Pt given AE to assist with LB bathing. (P)   -[facility]       Row Name 10/28/24 0932          Lower Body Dressing Assessment/Training    [place] Level (Lower Body Dressing) don;socks;maximum assist (25% patient effort) (P)   -     Assistive Devices (Lower Body Dressing) long-handled shoe horn;reacher (P)   -     Position (Lower Body Dressing) edge of bed sitting (P)   -     Comment, (Lower Body Dressing) Pt given AE to assist with LB dressing. (P)   -BH       Row Name 10/28/24 0932          Grooming Assessment/Training    [place] Level (Grooming) wash face, hands;contact guard assist (P)   -     Position (Grooming) sink side (P)   -BH       Row Name 10/28/24 0932          Toileting Assessment/Training    [place] Level (Toileting) adjust/manage clothing;perform perineal hygiene;contact guard assist (P)   -     Assistive Devices (Toileting) commode;grab bar/safety frame (P)   -     Position (Toileting) supported sitting;supported standing (P)   -               User Key  (r) = Recorded By, (t) = Taken By, (c) = Cosigned By      Initials Name Provider Type     Elana Talley OT Student OT Student                   Obj/Interventions       Row Name 10/28/24 0935          Sensory Assessment (Somatosensory)    Sensory Assessment (Somatosensory) UE sensation intact (P)   -BH       Row Name 10/28/24 0935          Vision Assessment/Intervention    Visual Impairment/Limitations WFL;corrective lenses full-time (P)   -BH       Row Name 10/28/24 0935          Range of Motion Comprehensive    General Range of Motion bilateral upper extremity ROM WFL (P)   -BH       Row Name 10/28/24  0935          Strength Comprehensive (MMT)    General Manual Muscle Testing (MMT) Assessment upper extremity strength deficits identified (P)   -     Comment, General Manual Muscle Testing (MMT) Assessment BUE MMT grossly 4+/5 as observed through functional tasks (P)   -       Row Name 10/28/24 0959          Balance    Balance Assessment sitting static balance;sitting dynamic balance;sit to stand dynamic balance;standing static balance;standing dynamic balance (P)   -     Static Sitting Balance standby assist (P)   -     Dynamic Sitting Balance standby assist (P)   -     Position, Sitting Balance unsupported;sitting edge of bed (P)   -     Sit to Stand Dynamic Balance minimal assist (P)   -     Static Standing Balance minimal assist (P)   -     Dynamic Standing Balance minimal assist (P)   -     Position/Device Used, Standing Balance supported;walker, front-wheeled (P)   -     Balance Interventions sitting;standing;sit to stand;supported;static;dynamic;occupation based/functional task (P)   -               User Key  (r) = Recorded By, (t) = Taken By, (c) = Cosigned By      Initials Name Provider Type     Elana Talley, OT Student OT Student                   Goals/Plan       Row Name 10/28/24 0939          Bed Mobility Goal 1 (OT)    Activity/Assistive Device (Bed Mobility Goal 1, OT) sit to supine/supine to sit (P)   -     [place] Level/Cues Needed (Bed Mobility Goal 1, OT) modified independence (P)   -     Time Frame (Bed Mobility Goal 1, OT) short term goal (STG);3 days (P)   -     Progress/Outcomes (Bed Mobility Goal 1, OT) new goal (P)   -       Row Name 10/28/24 0939          Transfer Goal 1 (OT)    Activity/Assistive Device (Transfer Goal 1, OT) bed-to-chair/chair-to-bed;toilet (P)   -     [place] Level/Cues Needed (Transfer Goal 1, OT) standby assist (P)   -     Time Frame (Transfer Goal 1, OT) short term goal (STG);3 days (P)   -     Progress/Outcome  (Transfer Goal 1, OT) new goal (P)   -       Row Name 10/28/24 0939          Dressing Goal 1 (OT)    Activity/Device (Dressing Goal 1, OT) lower body dressing;long-handled shoe horn;reacher (P)   -     [place]/Cues Needed (Dressing Goal 1, OT) standby assist (P)   -     Time Frame (Dressing Goal 1, OT) long term goal (LTG);5 days (P)   -     Progress/Outcome (Dressing Goal 1, OT) new goal (P)   -       Row Name 10/28/24 0939          Therapy Assessment/Plan (OT)    Planned Therapy Interventions (OT) activity tolerance training;adaptive equipment training;BADL retraining;functional balance retraining;occupation/activity based interventions;patient/caregiver education/training;ROM/therapeutic exercise;strengthening exercise;transfer/mobility retraining (P)   -               User Key  (r) = Recorded By, (t) = Taken By, (c) = Cosigned By      Initials Name Provider Type     Elana Talley, OT Student OT Student                   Clinical Impression       Row Name 10/28/24 0937          Pain Assessment    Pretreatment Pain Rating 7/10 (P)   -     Posttreatment Pain Rating 7/10 (P)   -     Pain Location hip (P)   -     Pain Side/Orientation right (P)   -     Pain Management Interventions exercise or physical activity utilized (P)   -     Response to Pain Interventions activity participation with tolerable pain (P)   -[facility] Name 10/28/24 0937          Plan of Care Review    Plan of Care Reviewed With patient;daughter (P)   -     Progress no change (P)   -     Outcome Evaluation OT eval complete. Pt presenting below functional baseline d/t RLE ROM and strength, decreased activity tolerance, and mild balance deficits. Pt was Min A x1 for toilet transfer and CGA for toilet hygiene this date. Continue IP OT to address current deficits. Rec d/c home w/ assist and HH OT when medically appropriate. (P)   -       Row Name 10/28/24 0937          Therapy Assessment/Plan (OT)     Patient/Family Therapy Goal Statement (OT) Return to PLOF (P)   -     Rehab Potential (OT) good (P)   -     Criteria for Skilled Therapeutic Interventions Met (OT) yes;meets criteria;skilled treatment is necessary (P)   -     Predicted Duration of Therapy Intervention (OT) 5 days (P)   -       Row Name 10/28/24 0937          Vital Signs    Pre Systolic BP Rehab 130 (P)   -BH     Pre Treatment Diastolic BP 73 (P)   -BH     Pre SpO2 (%) 95 (P)   -BH     O2 Delivery Pre Treatment room air (P)   -BH     O2 Delivery Intra Treatment room air (P)   -BH     Post SpO2 (%) 97 (P)   -BH     O2 Delivery Post Treatment room air (P)   -BH     Pre Patient Position Supine (P)   -     Intra Patient Position Standing (P)   -BH     Post Patient Position Sitting (P)   -       Row Name 10/28/24 0937          Positioning and Restraints    Pre-Treatment Position in bed (P)   -BH     Post Treatment Position chair (P)   -     In Chair reclined;call light within reach;encouraged to call for assist;exit alarm on;with family/caregiver;with PT;waffle cushion;legs elevated (P)   -               User Key  (r) = Recorded By, (t) = Taken By, (c) = Cosigned By      Initials Name Provider Type     Elana Talley, OT Student OT Student                   Outcome Measures       Row Name 10/28/24 0941          How much help from another is currently needed...    Putting on and taking off regular lower body clothing? 2 (P)   -     Bathing (including washing, rinsing, and drying) 2 (P)   -     Toileting (which includes using toilet bed pan or urinal) 3 (P)   -     Putting on and taking off regular upper body clothing 3 (P)   -     Taking care of personal grooming (such as brushing teeth) 3 (P)   -     Eating meals 4 (P)   -     AM-PAC 6 Clicks Score (OT) 17 (P)   -       Row Name 10/28/24 0795          How much help from another person do you currently need...    Turning from your back to your side while in flat bed  without using bedrails? 4  -LR     Moving from lying on back to sitting on the side of a flat bed without bedrails? 3  -LR     Moving to and from a bed to a chair (including a wheelchair)? 3  -LR     Standing up from a chair using your arms (e.g., wheelchair, bedside chair)? 3  -LR     Climbing 3-5 steps with a railing? 2  -LR     To walk in hospital room? 3  -LR     AM-PAC 6 Clicks Score (PT) 18  -LR     Highest Level of Mobility Goal 6 --> Walk 10 steps or more  -       Row Name 10/28/24 0941 10/28/24 0845       Functional Assessment    Outcome Measure Options AM-PAC 6 Clicks Daily Activity (OT) (P)   - AM-PAC 6 Clicks Basic Mobility (PT)  -              User Key  (r) = Recorded By, (t) = Taken By, (c) = Cosigned By      Initials Name Provider Type    LR Leslie Mooney, PT Physical Therapist     Elana Talley, OT Student OT Student                    Occupational Therapy Education       Title: PT OT SLP Therapies (Done)       Topic: Occupational Therapy (Done)       Point: ADL training (Done)       Description:   Instruct learner(s) on proper safety adaptation and remediation techniques during self care or transfers.   Instruct in proper use of assistive devices.                  Learning Progress Summary            Patient Acceptance, E, VU by  at 10/28/2024 0941   Family Acceptance, E, VU by  at 10/28/2024 0941                      Point: Home exercise program (Done)       Description:   Instruct learner(s) on appropriate technique for monitoring, assisting and/or progressing therapeutic exercises/activities.                  Learning Progress Summary            Patient Acceptance, E, VU by  at 10/28/2024 0941   Family Acceptance, E, VU by  at 10/28/2024 0941                      Point: Precautions (Done)       Description:   Instruct learner(s) on prescribed precautions during self-care and functional transfers.                  Learning Progress Summary            Patient  Acceptance, E, VU by  at 10/28/2024 0941   Family Acceptance, E, VU by  at 10/28/2024 0941                      Point: Body mechanics (Done)       Description:   Instruct learner(s) on proper positioning and spine alignment during self-care, functional mobility activities and/or exercises.                  Learning Progress Summary            Patient Acceptance, E, VU by  at 10/28/2024 0941   Family Acceptance, E, VU by  at 10/28/2024 0941                                      User Key       Initials Effective Dates Name Provider Type Discipline     08/08/24 -  Elana Talley, OT Student OT Student OT                  OT Recommendation and Plan  Planned Therapy Interventions (OT): (P) activity tolerance training, adaptive equipment training, BADL retraining, functional balance retraining, occupation/activity based interventions, patient/caregiver education/training, ROM/therapeutic exercise, strengthening exercise, transfer/mobility retraining  Plan of Care Review  Plan of Care Reviewed With: (P) patient, daughter  Progress: (P) no change  Outcome Evaluation: (P) OT eval complete. Pt presenting below functional baseline d/t RLE ROM and strength, decreased activity tolerance, and mild balance deficits. Pt was Min A x1 for toilet transfer and CGA for toilet hygiene this date. Continue IP OT to address current deficits. Rec d/c home w/ assist and HH OT when medically appropriate.     Time Calculation:   Evaluation Complexity (OT)  Review Occupational Profile/Medical/Therapy History Complexity: (P) brief/low complexity  Assessment, Occupational Performance/Identification of Deficit Complexity: (P) 1-3 performance deficits  Clinical Decision Making Complexity (OT): (P) problem focused assessment/low complexity  Overall Complexity of Evaluation (OT): (P) low complexity     Time Calculation- OT       Row Name 10/28/24 0942 10/28/24 0845          Time Calculation- OT    OT Start Time 0851 (P)   - --     OT  Received On 10/28/24 (P)   - --     OT Goal Re-Cert Due Date 11/07/24 (P)   - --        Timed Charges    17529 - Gait Training Minutes  -- 10  -LR     67600 - OT Self Care/Mgmt Minutes 10 (P)   - --        Untimed Charges    OT Eval/Re-eval Minutes 46 (P)   - --        Total Minutes    Timed Charges Total Minutes 10 (P)   - 10  -LR     Untimed Charges Total Minutes 46 (P)   - --      Total Minutes 56 (P)   - 10  -LR               User Key  (r) = Recorded By, (t) = Taken By, (c) = Cosigned By      Initials Name Provider Type    LR Leslie Mooney, PT Physical Therapist     Elana Talley, OT Student OT Student                  Therapy Charges for Today       Code Description Service Date Service Provider Modifiers Qty    [id number]  OT SELF CARE/MGMT/TRAIN EA 15 MIN 10/28/2024 Elana Talley OT Student GO 1    [id number]  OT EVAL LOW COMPLEXITY 4 10/28/2024 Elana Talley OT Student GO 1                 NAVID Rebollar  10/28/2024

## 2024-10-28 NOTE — PLAN OF CARE
Goal Outcome Evaluation:  Plan of Care Reviewed With: patient, child        Progress: improving  Outcome Evaluation: Patient increased gait distance with improved gait mechanics, ambulating 150 feet with CGA, RW, step through gait pattern, limited by pain. No knee buckling observed with gait, continued use of knee immobilizer and recommend continued use until reassess in AM d/t continued motor control deficits noted with R quad ther ex, although improved compared to this AM. Patient currently below baseline functioning, demonstrating decreased functional mobility status, decreased endurance, and decreased R hip strength/ROM. Will address these deficits to promote return to PLOF. Recommend d/c home with assist and HHPT.    Anticipated Discharge Disposition (PT): home with assist, home with home health

## 2024-10-28 NOTE — PROGRESS NOTES
"Orthopedic Daily Progress Note      CC: ROSA overnight    Pain well controlled  General: no fevers, chills  Abdomen: no nausea, vomiting, or diarrhea    No other complaints    Physical Exam:  I have reviewed the vital signs.  Temp:  [97.5 °F (36.4 °C)-99.4 °F (37.4 °C)] 98.2 °F (36.8 °C)  Heart Rate:  [59-96] 70  Resp:  [16] 16  BP: (107-191)/(62-97) 130/73    Objective:  Vital signs: (most recent): Blood pressure 130/73, pulse 70, temperature 98.2 °F (36.8 °C), temperature source Oral, resp. rate 16, height 170.2 cm (67\"), weight 55.3 kg (122 lb), SpO2 96%, not currently breastfeeding.              General Appearance:    Alert, cooperative, no distress  Extremities: No clubbing, cyanosis, or edema to lower extremities  Pulses:  2+ in distal surgical extremity  Skin: Dressing Clean/dry/intact      Results Review:    I have reviewed the labs, radiology results and diagnostic studies:Hgb 12.2    Results from last 7 days   Lab Units 10/28/24  0640   WBC 10*3/mm3 10.75   HEMOGLOBIN g/dL 12.2   PLATELETS 10*3/mm3 126*     Results from last 7 days   Lab Units 10/27/24  1138   SODIUM mmol/L 138   POTASSIUM mmol/L 3.9   CO2 mmol/L 22.0   CREATININE mg/dL 0.70   GLUCOSE mg/dL 93       I have reviewed the medications.    Assessment/Problem List  POD# 1 Day Post-Op   S/p right hip closed reduction and pinning of femoral neck fracture    Plan  WBAT RLE  PT/OT  DVT prophylaxis with  mg daily from orthopedic standpoint.  Okay for chemical prophylaxis if medically necessitated      Discharge Planning: I expect patient to be discharged to TBD in TBD days.    Isa Sullivan PA-C  10/28/24  08:12 EDT            "

## 2024-10-28 NOTE — CASE MANAGEMENT/SOCIAL WORK
Discharge Planning Assessment  [facility]     Patient Name: Marifer Ricardo  MRN: [id number]  Today's Date: 10/28/2024    Admit Date: 10/27/2024    Plan: home with HH vs rehab   Discharge Needs Assessment       Row Name 10/28/24 0847       Living Environment    People in Home alone    Current Living Arrangements home    Potentially Unsafe Housing Conditions none    In the past 12 months has the electric, gas, oil, or water company threatened to shut off services in your home? No    Primary Care Provided by self    Provides Primary Care For no one    Family Caregiver if Needed child(desi), adult    Family Caregiver Names Yajaira Espinoza    Quality of Family Relationships involved;helpful    Able to Return to Prior Arrangements yes       Resource/Environmental Concerns    Resource/Environmental Concerns none    Transportation Concerns none       Transportation Needs    In the past 12 months, has lack of transportation kept you from medical appointments or from getting medications? no    In the past 12 months, has lack of transportation kept you from meetings, work, or from getting things needed for daily living? No       Food Insecurity    Within the past 12 months, the food you bought just didn't last and you didn't have money to get more. Never true       Transition Planning    Patient/Family Anticipates Transition to home with help/services    Patient/Family Anticipated Services at Transition none    Transportation Anticipated family or friend will provide       Discharge Needs Assessment    Readmission Within the Last 30 Days no previous admission in last 30 days    Equipment Currently Used at Home walker, rolling    Concerns to be Addressed discharge planning    Do you want help finding or keeping work or a job? I do not need or want help    Do you want help with school or training? For example, starting or completing job training or getting a high school diploma, GED or equivalent No    Anticipated Changes  Related to Illness none                   Discharge Plan       Row Name 10/28/24 0848       Plan    Plan home with HH vs rehab    Patient/Family in Agreement with Plan yes    Plan Comments [place] [facility]. She reports her adult children assist if needed but she was independent with ADLs and mobility prior to admit. She has a rolling walker which she uses when going distances. Pt is followed by her PCP and has drug coverage. CM is following for PT recs and has given pt a list of rehab facilities in are her area if she is interested in inpt rehab. CM to follow                  Continued Care and Services - Admitted Since 10/27/2024    No active coordination exists for this encounter.       Selected Continued Care - Prior Encounters Includes continued care and service providers with selected services from prior encounters from 7/29/2024 to 10/28/2024      Discharged on 8/16/2024 Admission date: 8/12/2024 - Discharge disposition: Home-Health Care c      Home Medical Care       Service Provider Services Address Phone Fax Patient Preferred    [facility],[address], [address] [address] [phone] --                             Demographic Summary       Row Name 10/28/24 0846       General Information    Admission Type inpatient    Referral Source physician    Reason for Consult discharge planning    Preferred Language English    General Information Comments PCP Stephanie Spangler       Contact Information    Permission Granted to Share Info With family/designee    Contact Information Comments Yajaiar Olga [phone]                   Functional Status       Row Name 10/28/24 0847       Functional Status    Usual Activity Tolerance good    Current Activity Tolerance good       Physical Activity    On average, how many days per week do you engage in moderate to strenuous exercise (like a brisk walk)? 0 days    On average, how many minutes  do you engage in exercise at this level? 0 min    Number of minutes of exercise per week 0       Functional Status, IADL    Medications independent    Meal Preparation independent    Housekeeping independent    Laundry independent    If for any reason you need help with day-to-day activities such as bathing, preparing meals, shopping, managing finances, etc., do you get the help you need? I don't need any help       Mental Status    General Appearance WDL WDL       Mental Status Summary    Recent Changes in Mental Status/Cognitive Functioning no changes       Employment/    Employment Status retired    Current or Previous Occupation not applicable                   Psychosocial    No documentation.                  Abuse/Neglect    No documentation.                  Legal    No documentation.                  Substance Abuse    No documentation.                  Patient Forms    No documentation.                     Olga Mike RN

## 2024-10-28 NOTE — PROGRESS NOTES
[facility] Medicine Services  PROGRESS NOTE    Patient Name: Marifer Ricardo  : 1950  MRN: [id number]    Date of Admission: 10/27/2024  Primary Care Physician: Norma Spangler APRN    Subjective   Subjective     CC:  R hip fracture    HPI:  Pt doing well.  Pain improved.  Able to move leg.  Pt is wondering about getting the HIDA scan that she is scheduled for today.  This is part of the workup that she is getting from her prior hospitalization that her PCP ordered.      Objective   Objective     Vital Signs:   Temp:  [97.5 °F (36.4 °C)-99.4 °F (37.4 °C)] 98.2 °F (36.8 °C)  Heart Rate:  [] 100  Resp:  [16] 16  BP: (107-191)/(62-97) 130/73  Flow (L/min) (Oxygen Therapy):  [2] 2     Physical Exam:  Constitutional: No acute distress, awake, alert  HENT: NCAT, mucous membranes moist  Respiratory: Clear to auscultation bilaterally, respiratory effort normal   Cardiovascular: RRR, no murmurs, rubs, or gallops  Gastrointestinal: Positive bowel sounds, soft, nontender, nondistended  Musculoskeletal: No bilateral ankle edema.  Less pain with movement of R leg.  Bandage in place on R lateral leg. Psychiatric: Appropriate affect, cooperative  Neurologic: Oriented x 3, strength symmetric in all extremities, Cranial Nerves grossly intact to confrontation, speech clear  Skin: No rashes    Results Reviewed:  LAB RESULTS:      Lab 10/28/24  0640 10/27/24  1138   WBC 10.75 5.95   HEMOGLOBIN 12.2 14.2   HEMATOCRIT 36.7 43.1   PLATELETS 126* 120*   NEUTROS ABS 9.42* 4.59   IMMATURE GRANS (ABS) 0.13* 0.01   LYMPHS ABS 0.65* 0.77   MONOS ABS 0.54 0.48   EOS ABS 0.00 0.07   MCV 93.9 94.5   PROTIME  --  13.8         Lab 10/27/24  1138   SODIUM 138   POTASSIUM 3.9   CHLORIDE 103   CO2 22.0   ANION GAP 13.0   BUN 16   CREATININE 0.70   EGFR 90.3   GLUCOSE 93   CALCIUM 8.9             Lab 10/27/24  1138   PROTIME 13.8   INR 1.05                 Brief Urine Lab Results       None             Microbiology Results Abnormal       None            FL C Arm During Surgery    Result Date: 10/27/2024  This procedure was auto-finalized with no dictation required.    Peripheral Block    Result Date: 10/27/2024  Meera Rice CRNA     10/27/2024  2:30 PM Peripheral Block Patient reassessed immediately prior to procedure Reason for block: at surgeon's request and post-op pain management Performed by Anesthesiologist: Karlee Barrera DO Preanesthetic Checklist Completed: patient identified, IV checked, site marked, risks and benefits discussed, surgical consent, monitors and equipment checked, pre-op evaluation and timeout performed Prep: Pt Position: supine Sterile barriers:gloves, cap, sterile barriers, mask and washed/disinfected hands Prep: ChloraPrep Patient monitoring: blood pressure monitoring, continuous pulse oximetry and EKG Procedure Guidance:ultrasound guided ULTRASOUND INTERPRETATION.  Using ultrasound guidance a 20 G gauge needle was placed in close proximity to the femoral nerve, at which point, under ultrasound guidance anesthetic was injected in the area of the nerve and spread of the anesthesia was seen on ultrasound in close proximity thereto.  There were no abnormalities seen on ultrasound; a digital image was taken; and the patient tolerated the procedure with no complications. Images:still images obtained, printed/placed on chart Block Type:femoral Injection Technique:single-shot Needle Type:short-bevel Needle Gauge:20 G Resistance on Injection: none Medications Used: bupivacaine PF (MARCAINE) 0.25 % injection - Injection  30 mL - 10/27/2024 2:25:00 PM dexamethasone sodium phosphate injection - Injection  4 mg - 10/27/2024 2:25:00 PM Post Assessment Injection Assessment: negative aspiration for heme, no paresthesia on injection and incremental injection Patient Tolerance:comfortable throughout block Complications:no Additional Notes SINGLE Shot A high-frequency linear transducer,  "with sterile cover, was placed in the inguinal crease to visualize the Femoral Vein, Artery, and Nerve (medial to lateral). The insertion site was prepped in sterile fashion. Skin and cutaneous tissue was infiltrated with 2-5 ml of 1% Lidocaine. Using ultrasound-guidance, a 20-gauge B-Cotton 4\" Ultraplex 360 non-stimulating echogenic needle was then inserted and advanced in-plane from lateral to medial with ultrasound guidance. The needle was directed below Fascia Iliacus towards the Femoral nerve. Preservative-free normal saline was utilized for hydro-dissection of tissue. Local anesthetic injection spread, in incremental 3-5 ml injections, was visualized lateral to the artery to surround the femoral nerve. Aspiration every 5 ml to prevent intravascular injection. Injection was completed with negative aspiration of blood and negative intravascular injection. Injection pressures were normal with minimal resistance. Performed by: Karlee Barrera DO     CT Lower Extremity Right Without Contrast    Result Date: 10/27/2024  CT LOWER EXTREMITY RIGHT WO CONTRAST Date of Exam: 10/27/2024 11:51 AM EDT Indication: Rt hip / femur concern for fracture.. Comparison: 2018 CT abdomen/pelvis and same-day radiographs Technique: Axial CT images were obtained of the right lower extremity without contrast administration.  Reconstructed coronal and sagittal images were also obtained. Automated exposure control and iterative construction methods were used. Findings: There is a minimally impacted fracture of the femoral neck. There is no evidence of additional fracture. Normal joint alignment. Mild right hip arthritis. No suspicious bone lesion. No CT evidence of avascular necrosis. Osteopenia. Postsurgical changes involving the right femoral vasculature. There are also surgical changes within the included pelvis. Large colonic stool burden. Hysterectomy. No acute abnormality.     Impression: Impression: Minimally impacted right femoral " neck fracture. No evidence of additional fracture. Electronically Signed: Rubin Campos MD  10/27/2024 12:18 PM EDT  Workstation ID: [id number]    XR Hip With or Without Pelvis 2 - 3 View Right    Addendum Date: 10/27/2024    ADDENDUM #1 Addendum begins. 1. Asymmetric sclerosis in the right subcapital femoral neck. Questionable nondisplaced fracture, possibly related to insufficiency fracture in the setting of demineralization. Recommend dedicated CT for further assessment. No joint malalignment. 2. Mild degenerative osteoarthritis of the right hip. 3. No acute radiographic findings in the lumbar spine. Grossly similar multilevel lumbar spondylosis and anterolisthesis L4 on L5 since 2021 comparison. Addendum ends. Electronically Signed: Jamie Perkins MD  10/27/2024 10:39 AM EDT  Workstation ID: [id number] ORIGINAL REPORT: XR HIP W OR WO PELVIS 2-3 VIEW RIGHT, XR SPINE LUMBAR 2 OR 3 VW Date of Exam: 10/27/2024 10:08 AM EDT Indication: Rt hip pain Comparison: Pelvic radiograph 8/24/2021, lumbar spine radiograph 8/24/2021 Findings: Right hip: Osseous demineralization. Asymmetric sclerosis in the subcapital region of the right femoral neck new from 2021. Questionable ill-defined lucency along the lateral aspect without a completed fracture. Mild degenerative osteoarthritis of the right hip joint. No erosions or chondrocalcinosis. Multiple surgical clips in the pelvis and proximal right thigh. Peripheral vascular disease noted. Pelvic phleboliths. Lumbar spine: 5 nonrib-bearing lumbar-type vertebral bodies. Mild dextrocurvature. No visualized displaced fracture or significant vertebral body height loss. Minimal anterolisthesis L4 and L5 stable from prior. Mild degenerative disc disease most notable at L2-L3. Mild to moderate lower lumbar facet arthropathy similar to prior. Aortic atherosclerotic disease. Pelvic phleboliths. Multiple surgical clips noted. Impression: 1. Asymmetric sclerosis in the right subcapital femoral  neck. Questionable nondisplaced versus stress fracture, without evidence of a completed fracture or joint malalignment. Findings are in the setting of demineralization. Recommend dedicated CT for further assessment. 2. Mild degenerative osteoarthritis of the right hip. 3. No acute radiographic findings in the lumbar spine. Grossly similar multilevel lumbar spondylosis and anterolisthesis L4 on L5 since 2021 comparison. Electronically Signed: Jamie Perkins MD  10/27/2024 10:34 AM EDT  Workstation ID: [id number]    Result Date: 10/27/2024  XR HIP W OR WO PELVIS 2-3 VIEW RIGHT, XR SPINE LUMBAR 2 OR 3 VW Date of Exam: 10/27/2024 10:08 AM EDT Indication: Rt hip pain Comparison: Pelvic radiograph 8/24/2021, lumbar spine radiograph 8/24/2021 Findings: Right hip: Osseous demineralization. Asymmetric sclerosis in the subcapital region of the right femoral neck new from 2021. Questionable ill-defined lucency along the lateral aspect without a completed fracture. Mild degenerative osteoarthritis of the right hip joint. No erosions or chondrocalcinosis. Multiple surgical clips in the pelvis and proximal right thigh. Peripheral vascular disease noted. Pelvic phleboliths. Lumbar spine: 5 nonrib-bearing lumbar-type vertebral bodies. Mild dextrocurvature. No visualized displaced fracture or significant vertebral body height loss. Minimal anterolisthesis L4 and L5 stable from prior. Mild degenerative disc disease most notable at L2-L3. Mild to moderate lower lumbar facet arthropathy similar to prior. Aortic atherosclerotic disease. Pelvic phleboliths. Multiple surgical clips noted.     Impression: Impression: 1. Asymmetric sclerosis in the right subcapital femoral neck. Questionable nondisplaced versus stress fracture, without evidence of a completed fracture or joint malalignment. Findings are in the setting of demineralization. Recommend dedicated CT for further assessment. 2. Mild degenerative osteoarthritis of the right hip. 3.  No acute radiographic findings in the lumbar spine. Grossly similar multilevel lumbar spondylosis and anterolisthesis L4 on L5 since 2021 comparison. Electronically Signed: Jamie Perkins MD  10/27/2024 10:34 AM EDT  Workstation ID: [id number]    XR Spine Lumbar 2 or 3 View    Addendum Date: 10/27/2024    ADDENDUM #1 Addendum begins. 1. Asymmetric sclerosis in the right subcapital femoral neck. Questionable nondisplaced fracture, possibly related to insufficiency fracture in the setting of demineralization. Recommend dedicated CT for further assessment. No joint malalignment. 2. Mild degenerative osteoarthritis of the right hip. 3. No acute radiographic findings in the lumbar spine. Grossly similar multilevel lumbar spondylosis and anterolisthesis L4 on L5 since 2021 comparison. Addendum ends. Electronically Signed: Jamie Perkins MD  10/27/2024 10:39 AM EDT  Workstation ID: [id number] ORIGINAL REPORT: XR HIP W OR WO PELVIS 2-3 VIEW RIGHT, XR SPINE LUMBAR 2 OR 3 VW Date of Exam: 10/27/2024 10:08 AM EDT Indication: Rt hip pain Comparison: Pelvic radiograph 8/24/2021, lumbar spine radiograph 8/24/2021 Findings: Right hip: Osseous demineralization. Asymmetric sclerosis in the subcapital region of the right femoral neck new from 2021. Questionable ill-defined lucency along the lateral aspect without a completed fracture. Mild degenerative osteoarthritis of the right hip joint. No erosions or chondrocalcinosis. Multiple surgical clips in the pelvis and proximal right thigh. Peripheral vascular disease noted. Pelvic phleboliths. Lumbar spine: 5 nonrib-bearing lumbar-type vertebral bodies. Mild dextrocurvature. No visualized displaced fracture or significant vertebral body height loss. Minimal anterolisthesis L4 and L5 stable from prior. Mild degenerative disc disease most notable at L2-L3. Mild to moderate lower lumbar facet arthropathy similar to prior. Aortic atherosclerotic disease. Pelvic phleboliths. Multiple surgical  clips noted. Impression: 1. Asymmetric sclerosis in the right subcapital femoral neck. Questionable nondisplaced versus stress fracture, without evidence of a completed fracture or joint malalignment. Findings are in the setting of demineralization. Recommend dedicated CT for further assessment. 2. Mild degenerative osteoarthritis of the right hip. 3. No acute radiographic findings in the lumbar spine. Grossly similar multilevel lumbar spondylosis and anterolisthesis L4 on L5 since 2021 comparison. Electronically Signed: Jamie Perkins MD  10/27/2024 10:34 AM EDT  Workstation ID: [id number]    Result Date: 10/27/2024  XR HIP W OR WO PELVIS 2-3 VIEW RIGHT, XR SPINE LUMBAR 2 OR 3 VW Date of Exam: 10/27/2024 10:08 AM EDT Indication: Rt hip pain Comparison: Pelvic radiograph 8/24/2021, lumbar spine radiograph 8/24/2021 Findings: Right hip: Osseous demineralization. Asymmetric sclerosis in the subcapital region of the right femoral neck new from 2021. Questionable ill-defined lucency along the lateral aspect without a completed fracture. Mild degenerative osteoarthritis of the right hip joint. No erosions or chondrocalcinosis. Multiple surgical clips in the pelvis and proximal right thigh. Peripheral vascular disease noted. Pelvic phleboliths. Lumbar spine: 5 nonrib-bearing lumbar-type vertebral bodies. Mild dextrocurvature. No visualized displaced fracture or significant vertebral body height loss. Minimal anterolisthesis L4 and L5 stable from prior. Mild degenerative disc disease most notable at L2-L3. Mild to moderate lower lumbar facet arthropathy similar to prior. Aortic atherosclerotic disease. Pelvic phleboliths. Multiple surgical clips noted.     Impression: Impression: 1. Asymmetric sclerosis in the right subcapital femoral neck. Questionable nondisplaced versus stress fracture, without evidence of a completed fracture or joint malalignment. Findings are in the setting of demineralization. Recommend dedicated CT  for further assessment. 2. Mild degenerative osteoarthritis of the right hip. 3. No acute radiographic findings in the lumbar spine. Grossly similar multilevel lumbar spondylosis and anterolisthesis L4 on L5 since 2021 comparison. Electronically Signed: Jamie Perkins MD  10/27/2024 10:34 AM EDT  Workstation ID: [id number]     Results for orders placed during the hospital encounter of 08/12/24    Adult Transthoracic Echo Complete W/ Cont if Necessary Per Protocol    Interpretation Summary    Left ventricular systolic function is normal. Left ventricular ejection fraction appears to be 61 - 65%.    Left ventricular diastolic function is consistent with (grade I) impaired relaxation.    The aortic valve exhibits sclerosis.      Current medications:  Scheduled Meds:[START ON 10/29/2024] aspirin, 325 mg, Oral, Daily  atorvastatin, 40 mg, Oral, Nightly  insulin lispro, 2-7 Units, Subcutaneous, 4x Daily AC & at Bedtime  pantoprazole, 40 mg, Oral, QAM AC  sodium chloride, 10 mL, Intravenous, Q12H      Continuous Infusions:lactated ringers, 9 mL/hr, Last Rate: 9 mL/hr (10/27/24 1419)      PRN Meds:.  acetaminophen **OR** acetaminophen **OR** acetaminophen    senna-docusate sodium **AND** polyethylene glycol **AND** bisacodyl **AND** bisacodyl    dextrose    dextrose    glucagon (human recombinant)    Magnesium Standard Dose Replacement - Follow Nurse / BPA Driven Protocol    nitroglycerin    ondansetron ODT **OR** ondansetron    oxyCODONE-acetaminophen    Phosphorus Replacement - Follow Nurse / BPA Driven Protocol    Potassium Replacement - Follow Nurse / BPA Driven Protocol    [COMPLETED] Insert Peripheral IV **AND** sodium chloride    sodium chloride    Assessment & Plan   Assessment & Plan     Active Hospital Problems    Diagnosis  POA    **Hip fracture [S72.009A]  Yes    PAD (peripheral artery disease) [I73.9]  Yes    GERD without esophagitis [K21.9]  Yes    Essential hypertension [I10]  Yes    History of hypothyroidism  [Z86.39]  Yes    Type 2 diabetes mellitus [E11.9]  Yes      Resolved Hospital Problems   No resolved problems to display.        Brief Hospital Course to date:  Marifer Ricardo is a 74 y.o. female with hx of DM, HTN, PAD, GERD, here with R hip fracture.     R hip fracture  --s/p right hip closed reduction and pinning of femoral neck fracture 10/27  --PT/OT      GERD  -Cont PPI and Pepcid.   -Will order HIDA.  NPO after MN     HTN  -- No longer on any meds for this.  Monitor BP     PAD   -Continue ASA, Plavix, atorvastatin.     DM2 with hyperglycemia  -not on home medications  -A1c 5.8 8/24. Low dose SSI while admitted.      Chronic pain on chronic opioids  -Continue home Percocet.      Expected Discharge Location and Transportation: pending  Expected Discharge   Expected Discharge Date: 10/29/2024; Expected Discharge Time:      VTE Prophylaxis:  No VTE prophylaxis order currently exists.         AM-PAC 6 Clicks Score (PT): 18 (10/28/24 0895)    CODE STATUS:   Code Status and Medical Interventions: No CPR (Do Not Attempt to Resuscitate); Limited Support; No intubation (DNI)   Ordered at: 10/27/24 1421     Medical Intervention Limits:    No intubation (DNI)     Level Of Support Discussed With:    Patient     Code Status (Patient has no pulse and is not breathing):    No CPR (Do Not Attempt to Resuscitate)     Medical Interventions (Patient has pulse or is breathing):    Limited Support       Linda Singh MD  10/28/24

## 2024-10-28 NOTE — PLAN OF CARE
Problem: Adult Inpatient Plan of Care  Goal: Plan of Care Review  Outcome: Progressing  Flowsheets (Taken 10/28/2024 0526)  Progress: improving  Plan of Care Reviewed With:   patient   child  Goal: Patient-Specific Goal (Individualized)  Outcome: Progressing  Goal: Absence of Hospital-Acquired Illness or Injury  Outcome: Progressing  Intervention: Identify and Manage Fall Risk  Recent Flowsheet Documentation  Taken 10/28/2024 0422 by Mana Romeo RN  Safety Promotion/Fall Prevention:   activity supervised   assistive device/personal items within reach   safety round/check completed  Taken 10/28/2024 0244 by Mana Romeo RN  Safety Promotion/Fall Prevention:   activity supervised   assistive device/personal items within reach   safety round/check completed  Taken 10/28/2024 0044 by Mana Romeo RN  Safety Promotion/Fall Prevention:   activity supervised   assistive device/personal items within reach   safety round/check completed  Taken 10/27/2024 2241 by Mana Romeo RN  Safety Promotion/Fall Prevention:   activity supervised   assistive device/personal items within reach   safety round/check completed  Taken 10/27/2024 2037 by Mana Romeo RN  Safety Promotion/Fall Prevention:   activity supervised   assistive device/personal items within reach   clutter free environment maintained   fall prevention program maintained   lighting adjusted   muscle strengthening facilitated   nonskid shoes/slippers when out of bed   room organization consistent   safety round/check completed  Intervention: Prevent Skin Injury  Recent Flowsheet Documentation  Taken 10/28/2024 0422 by Mana Romeo RN  Body Position:   weight shifting   tilted  Taken 10/28/2024 0244 by Mana Romeo RN  Body Position: weight shifting  Taken 10/28/2024 0044 by Mana Romeo RN  Body Position: weight shifting  Taken 10/27/2024 2241 by Mana Romeo RN  Body Position:   weight shifting   legs elevated  Taken 10/27/2024  2037 by Mana Romeo RN  Body Position: supine  Intervention: Prevent and Manage VTE (Venous Thromboembolism) Risk  Recent Flowsheet Documentation  Taken 10/28/2024 0422 by Mana Romeo RN  VTE Prevention/Management:   bilateral   SCDs (sequential compression devices) on  Taken 10/28/2024 0244 by Mana Romeo RN  VTE Prevention/Management:   bilateral   SCDs (sequential compression devices) on  Taken 10/28/2024 0044 by Mana Romeo RN  VTE Prevention/Management:   bilateral   SCDs (sequential compression devices) on  Taken 10/27/2024 2241 by Mana Romeo RN  VTE Prevention/Management:   bilateral   SCDs (sequential compression devices) on  Taken 10/27/2024 2037 by Mana Romeo RN  VTE Prevention/Management:   bilateral   SCDs (sequential compression devices) on  Intervention: Prevent Infection  Recent Flowsheet Documentation  Taken 10/28/2024 0422 by Mana Romeo RN  Infection Prevention:   hand hygiene promoted   rest/sleep promoted  Taken 10/28/2024 0244 by Mana Romeo RN  Infection Prevention:   hand hygiene promoted   rest/sleep promoted  Taken 10/28/2024 0044 by Mana Romeo RN  Infection Prevention:   hand hygiene promoted   rest/sleep promoted  Taken 10/27/2024 2241 by Mana Romeo RN  Infection Prevention:   hand hygiene promoted   rest/sleep promoted  Taken 10/27/2024 2037 by Mana Romeo RN  Infection Prevention:   hand hygiene promoted   rest/sleep promoted  Goal: Optimal Comfort and Wellbeing  Outcome: Progressing  Intervention: Provide Person-Centered Care  Recent Flowsheet Documentation  Taken 10/28/2024 0422 by Mana Romeo RN  Trust Relationship/Rapport: care explained  Taken 10/28/2024 0244 by Mana Romeo RN  Trust Relationship/Rapport: care explained  Taken 10/28/2024 0044 by Mana Romeo RN  Trust Relationship/Rapport: care explained  Taken 10/27/2024 2241 by Mana Romeo RN  Trust Relationship/Rapport: care explained  Taken 10/27/2024  2037 by Mana Romeo RN  Trust Relationship/Rapport:   care explained   choices provided   questions answered   questions encouraged   reassurance provided  Goal: Readiness for Transition of Care  Outcome: Progressing     Problem: Skin Injury Risk Increased  Goal: Skin Health and Integrity  Outcome: Progressing  Intervention: Optimize Skin Protection  Recent Flowsheet Documentation  Taken 10/28/2024 0422 by Mana Romeo RN  Activity Management: activity encouraged  Head of Bed (HOB) Positioning: HOB elevated  Taken 10/28/2024 0244 by Mana Romeo RN  Activity Management: activity encouraged  Head of Bed (HOB) Positioning: HOB elevated  Taken 10/28/2024 0044 by Mana Romeo RN  Activity Management: activity encouraged  Head of Bed (HOB) Positioning: HOB elevated  Taken 10/27/2024 2241 by Mana Romeo RN  Activity Management: activity encouraged  Head of Bed (HOB) Positioning: HOB elevated  Taken 10/27/2024 2037 by Mana Romeo RN  Activity Management: activity encouraged  Head of Bed (HOB) Positioning: HOB elevated     Problem: Comorbidity Management  Goal: Maintenance of Heart Failure Symptom Control  Outcome: Progressing  Intervention: Maintain Heart Failure Management  Recent Flowsheet Documentation  Taken 10/27/2024 2037 by Mana Romeo RN  Medication Review/Management: medications reviewed  Goal: Blood Pressure in Desired Range  Outcome: Progressing  Intervention: Maintain Blood Pressure Management  Recent Flowsheet Documentation  Taken 10/27/2024 2037 by Mana Romeo RN  Medication Review/Management: medications reviewed  Goal: Maintenance of Osteoarthritis Symptom Control  Outcome: Progressing  Intervention: Maintain Osteoarthritis Symptom Control  Recent Flowsheet Documentation  Taken 10/28/2024 0422 by Mana Romeo RN  Activity Management: activity encouraged  Taken 10/28/2024 0244 by Mana Romeo RN  Activity Management: activity encouraged  Taken 10/28/2024 0044 by  Burus, Mana E, RN  Activity Management: activity encouraged  Taken 10/27/2024 2241 by Mana Romeo RN  Activity Management: activity encouraged  Taken 10/27/2024 2037 by Mana Romeo RN  Activity Management: activity encouraged  Medication Review/Management: medications reviewed     Problem: Pain Acute  Goal: Optimal Pain Control and Function  Outcome: Progressing  Intervention: Optimize Psychosocial Wellbeing  Recent Flowsheet Documentation  Taken 10/27/2024 2037 by Mana Romeo RN  Diversional Activities: television  Intervention: Prevent or Manage Pain  Recent Flowsheet Documentation  Taken 10/27/2024 2037 by Mana Romeo RN  Medication Review/Management: medications reviewed     Problem: Fall Injury Risk  Goal: Absence of Fall and Fall-Related Injury  Outcome: Progressing  Intervention: Identify and Manage Contributors  Recent Flowsheet Documentation  Taken 10/27/2024 2037 by Mana Romeo RN  Medication Review/Management: medications reviewed  Intervention: Promote Injury-Free Environment  Recent Flowsheet Documentation  Taken 10/28/2024 0422 by Mana Romeo RN  Safety Promotion/Fall Prevention:   activity supervised   assistive device/personal items within reach   safety round/check completed  Taken 10/28/2024 0244 by Mana Romeo RN  Safety Promotion/Fall Prevention:   activity supervised   assistive device/personal items within reach   safety round/check completed  Taken 10/28/2024 0044 by Mana Romeo RN  Safety Promotion/Fall Prevention:   activity supervised   assistive device/personal items within reach   safety round/check completed  Taken 10/27/2024 2241 by Mana Romeo RN  Safety Promotion/Fall Prevention:   activity supervised   assistive device/personal items within reach   safety round/check completed  Taken 10/27/2024 2037 by Mana Romeo RN  Safety Promotion/Fall Prevention:   activity supervised   assistive device/personal items within reach   clutter  free environment maintained   fall prevention program maintained   lighting adjusted   muscle strengthening facilitated   nonskid shoes/slippers when out of bed   room organization consistent   safety round/check completed     Problem: Hip Arthroplasty  Goal: Optimal Coping  Outcome: Progressing  Goal: Absence of Bleeding  Outcome: Progressing  Goal: Effective Bowel Elimination  Outcome: Progressing  Goal: Fluid and Electrolyte Balance  Outcome: Progressing  Goal: Optimal Functional Ability  Outcome: Progressing  Intervention: Promote Optimal Functional Status  Recent Flowsheet Documentation  Taken 10/28/2024 0422 by Mana Romeo RN  Activity Management: activity encouraged  Taken 10/28/2024 0244 by Mana Romeo RN  Activity Management: activity encouraged  Taken 10/28/2024 0044 by Mana Romeo RN  Activity Management: activity encouraged  Taken 10/27/2024 2241 by Mana Romeo RN  Activity Management: activity encouraged  Taken 10/27/2024 2037 by Mana Romeo RN  Activity Management: activity encouraged  Goal: Absence of Infection Signs and Symptoms  Outcome: Progressing  Goal: Intact Neurovascular Status  Outcome: Progressing  Goal: Anesthesia/Sedation Recovery  Outcome: Progressing  Intervention: Optimize Anesthesia Recovery  Recent Flowsheet Documentation  Taken 10/28/2024 0422 by Mana Romeo RN  Safety Promotion/Fall Prevention:   activity supervised   assistive device/personal items within reach   safety round/check completed  Taken 10/28/2024 0244 by Mana Romeo RN  Safety Promotion/Fall Prevention:   activity supervised   assistive device/personal items within reach   safety round/check completed  Taken 10/28/2024 0044 by Mana Romeo RN  Safety Promotion/Fall Prevention:   activity supervised   assistive device/personal items within reach   safety round/check completed  Taken 10/27/2024 2241 by Mana Romeo RN  Safety Promotion/Fall Prevention:   activity supervised    assistive device/personal items within reach   safety round/check completed  Taken 10/27/2024 2037 by Mana Romeo RN  Safety Promotion/Fall Prevention:   activity supervised   assistive device/personal items within reach   clutter free environment maintained   fall prevention program maintained   lighting adjusted   muscle strengthening facilitated   nonskid shoes/slippers when out of bed   room organization consistent   safety round/check completed  Goal: Optimal Pain Control and Function  Outcome: Progressing  Intervention: Prevent or Manage Pain  Recent Flowsheet Documentation  Taken 10/27/2024 2037 by Mana Romeo RN  Diversional Activities: television  Goal: Nausea and Vomiting Relief  Outcome: Progressing  Goal: Effective Urinary Elimination  Outcome: Progressing  Goal: Effective Oxygenation and Ventilation  Outcome: Progressing  Intervention: Optimize Oxygenation and Ventilation  Recent Flowsheet Documentation  Taken 10/28/2024 0422 by Mana Romeo RN  Activity Management: activity encouraged  Head of Bed (HOB) Positioning: HOB elevated  Cough And Deep Breathing: done independently per patient  Taken 10/28/2024 0244 by Mana Romeo RN  Activity Management: activity encouraged  Head of Bed (HOB) Positioning: HOB elevated  Cough And Deep Breathing: done independently per patient  Taken 10/28/2024 0044 by Mana Romeo RN  Activity Management: activity encouraged  Head of Bed (HOB) Positioning: HOB elevated  Cough And Deep Breathing: done independently per patient  Taken 10/27/2024 2241 by Mana Romeo RN  Activity Management: activity encouraged  Head of Bed (HOB) Positioning: HOB elevated  Cough And Deep Breathing: done independently per patient  Taken 10/27/2024 2037 by Mana Romeo RN  Activity Management: activity encouraged  Head of Bed (HOB) Positioning: HOB elevated  Cough And Deep Breathing: done independently per patient   Goal Outcome Evaluation:  Plan of Care Reviewed  With: patient, child      Pt currently in bed resting quietly. Complaints of pain to right hip alleviated with MAR throughout the night. Dressing CDI, scant drainage. Neuro checks WNL. SKUDS in place. VSS. Plans to ambulate with PT/OT today. Daughter remains at bedside. No other observations at this time, call bell in reach.  Progress: improving

## 2024-10-28 NOTE — PLAN OF CARE
Goal Outcome Evaluation:  Plan of Care Reviewed With: patient, child        Progress: improving  Outcome Evaluation: Patient ambulated 110 feet with RW, CGA, step to gait pattern, limited by fatigue, weakness, and pain. Patient demonstrates R LE weakness and impaired motor control in R LE d/t single femoral shot, demonstrating R knee buckling with weight bearing and unable to perform SLR independently. Donned knee immobilizer to R LE for ambulation, will need on R LE any time she is weight bearing. Patient currently below baseline functioning, demonstrating decreased functional mobility status, decreased endurance, and decreased R LE strength/ROM. Will address these deficits to promote return to PLOF. Recommend d/c home with assist and HHPT.    Anticipated Discharge Disposition (PT): home with assist, home with home health

## 2024-10-28 NOTE — PLAN OF CARE
Goal Outcome Evaluation:  Pt up in chair and walking well w therapy.   Cont B/B.   VSS on RA.   NSR on tele.   Blood sugars stable.     DC plan will be home w Dtr.

## 2024-10-28 NOTE — THERAPY TREATMENT NOTE
Patient Name: Marifer Ricardo  : 1950    MRN: [id number]                              Today's Date: 10/28/2024       Admit Date: 10/27/2024    Visit Dx:     ICD-10-CM ICD-9-CM   1. 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    Gastroparesis    Rosacea    Age related osteoporosis    GERD without esophagitis    Gastritis    PAD (peripheral artery disease)    Pure hypercholesterolemia    Hip fracture     Past Medical History:   Diagnosis Date    Abdominal pain     Abnormal bone density screening 2014    osteopenia    Arthritis     Asthma     Belching     Body piercing     BOTH EARS    Cataract, bilateral     Chronic renal insufficiency 2016    Diabetes mellitus     Essential hypertension 2016    GERD (gastroesophageal reflux disease)     Hx of mammogram     Hyperparathyroidism     Mastoiditis     Pregnancy      s/p  x 3    Renal insufficiency     Urticaria     Wears glasses     Weight loss      Past Surgical History:   Procedure Laterality Date    BYPASS GRAFT SUBCLAVIAN      LEFT SUBCLAVIAN TO RIGHT ATRIUM VENOUS GRAFT in past for ?TPN    CARDIAC CATHETERIZATION N/A 2024    Procedure: Left Heart Cath;  Surgeon: Isaac Whalen MD;  Location: [facility] INVASIVE LOCATION;  Service: Cardiology;  Laterality: N/A;    CATARACT EXTRACTION, BILATERAL      COLECTOMY PARTIAL / TOTAL      Pt reports multiple abd surgeries for ?pseudo-bowel obstruction    COLONOSCOPY      COLONOSCOPY N/A 2018    Procedure: COLONOSCOPY WITH RANDOM COLON BIOPSIES;  Surgeon: Taras Morillo,  MD;  Location: [facility] ENDOSCOPY;  Service:     COLOSTOMY      and revision    ENDOSCOPY N/A 01/25/2018    Procedure: ESOPHAGOGASTRODUODENOSCOPY WITH BIOPSIES;  Surgeon: Taras Morillo MD;  Location: [facility] ENDOSCOPY;  Service:     ENDOSCOPY N/A 8/14/2024    Procedure: ESOPHAGOGASTRODUODENOSCOPY;  Surgeon: Brunner, Mark I, MD;  Location: [facility] ENDOSCOPY;  Service: Gastroenterology;  Laterality: N/A;    HYSTERECTOMY      age 31 for DUB, ovaries intact    TONSILLECTOMY AND ADENOIDECTOMY        General Information       Row Name 10/28/24 1547 10/28/24 0845       Physical Therapy Time and Intention    Document Type therapy note (daily note)  -LR evaluation  -LR    Mode of Treatment physical therapy;individual therapy  -LR physical therapy  -LR      Row Name 10/28/24 1547 10/28/24 0845       General Information    Patient Profile Reviewed yes  -LR yes  -LR    Prior Level of Function -- independent:;all household mobility;community mobility;gait;transfer;bed mobility;ADL's  used RW after her heart attack  -LR    Existing Precautions/Restrictions fall;other (see comments);brace worn when out of bed   s/p R hip nailing, continues to exhibt R quad motor deficits from single femoral shot, R knee immobilizer when up and weight bearing.  -LR fall;brace worn when out of bed;other (see comments)   s/p R hip nailing, knee immobilizer to R LE when weight bearing d/t R quad weakness from single femoral shot  -LR    Barriers to Rehab none identified  -LR medically complex;previous functional deficit  -LR      Row Name 10/28/24 0845          Living Environment    People in Home alone  -LR       Row Name 10/28/24 0845          Home Main Entrance    Number of Stairs, Main Entrance none  ramp access  -LR     Stair Railings, Main Entrance none  -LR       Row Name 10/28/24 0845          Stairs Within Home, Primary    Number of Stairs, Within Home, Primary none  -LR       Row Name 10/28/24 1547 10/28/24 0880       Cognition    Orientation  Status (Cognition) oriented x 4  -LR oriented x 4  -LR      Row Name 10/28/24 1547 10/28/24 0845       Safety Issues/Impairments Affecting Functional Mobility    Safety Issues Affecting Function (Mobility) safety precautions follow-through/compliance;safety precaution awareness;positioning of assistive device;awareness of need for assistance;insight into deficits/self-awareness  -LR safety precautions follow-through/compliance;safety precaution awareness;awareness of need for assistance;insight into deficits/self-awareness  -LR    Impairments Affecting Function (Mobility) balance;endurance/activity tolerance;pain;range of motion (ROM);strength  -LR strength;pain;endurance/activity tolerance;range of motion (ROM);balance;motor control  -LR              User Key  (r) = Recorded By, (t) = Taken By, (c) = Cosigned By      Initials Name Provider Type    LR Lselie Mooney, PT Physical Therapist                   Mobility       Row Name 10/28/24 1547 10/28/24 0845       Bed Mobility    Bed Mobility sit-supine  -LR supine-sit;sit-supine  -LR    Supine-Sit [place] (Bed Mobility) not tested  -LR verbal cues;standby assist  -LR    Sit-Supine [place] (Bed Mobility) verbal cues;contact guard  -LR verbal cues;contact guard  -LR    Assistive Device (Bed Mobility) -- head of bed elevated  -LR    Comment, (Bed Mobility) Verbal cues to push upf rom bed to scoot hips back into bed. CGA to support R LE.  -LR Verbal cues for correct technique for t/f in and out of bed. Had difficulty with lifting R LE back into bed d/t R quad weakness. Denied dizziness upon sitting up. Returned to supine in bed for donning of knee immobilizer to R LE d/t R quad weakness for single femoral shot.  -LR      Row Name 10/28/24 1547 10/28/24 0852       Transfers    Comment, (Transfers) Verbal cues to push up from chair to stand and to reach back for chair to lower into sitting. Verbal cues to step R LE out before t/f for comfort.  -LR On  first stand, patient pulled up on RW to stand despite cues to push up from bed. Verbal cues to step R LE out before t/f. Patient with significant R knee buckling with weight bearing upon standing. Cued to return to sitting and then supine for donning of knee immobilizer. Verbal cues to push up from bed to stand.  -LR      Row Name 10/28/24 1547 10/28/24 0845       Bed-Chair Transfer    Bed-Chair [place] (Transfers) not tested  -LR not tested  -LR      Row Name 10/28/24 1547 10/28/24 0845       Sit-Stand Transfer    Sit-Stand [place] (Transfers) verbal cues;contact guard  -LR verbal cues;1 person to manage equipment;contact guard;2 person assist  -LR    Assistive Device (Sit-Stand Transfers) walker, front-wheeled  -LR walker, front-wheeled  -LR      Row Name 10/28/24 1547 10/28/24 0845       Gait/Stairs (Locomotion)    [place] Level (Gait) verbal cues;contact guard;2 person assist  -LR verbal cues;contact guard;1 person to manage equipment  -LR    Assistive Device (Gait) walker, front-wheeled  -LR walker, front-wheeled  -LR    Patient was able to Ambulate yes  -LR yes  -LR    Distance in Feet (Gait) 150  -  -LR    Deviations/Abnormal Patterns (Gait) bilateral deviations;yazmin decreased;gait speed decreased;stride length decreased;right sided deviations;antalgic  -LR yazmin decreased;gait speed decreased;stride length decreased;bilateral deviations;right sided deviations;antalgic  -LR    Bilateral Gait Deviations forward flexed posture;heel strike decreased  -LR heel strike decreased  -LR    Right Sided Gait Deviations weight shift ability decreased  -LR weight shift ability decreased;knee buckling, right side  -LR    [place] Level (Stairs) not tested  -LR not tested  -LR    Comment, (Gait/Stairs) Patient initiated gait with step to gait pattern at slow pace. Verbal cues for correct sequencing of steps and advancement of RW. Able to progress to step through gait pattern with cues for  increased and equal step length, increased R LE weight bearing/stance phase, and decreased UE weight bearing. No knee buckling observed with ambulation with use of knee immobilizer. Gait limited by fatigue.  -LR Patient ambulated with step to gait pattern at slow pace. Verbal cues for correct sequencing of steps, increased R LE weight bearing/stance phase, decreased UE weight bearing, and equal step length. Improved with cues for correction. Patient initially with large step on R, causing mild knee buckling. Improved with cues for shorter step on R. Gait limited by fatigue, weakness, and pain.  -      Row Name 10/28/24 1547 10/28/24 0845       Mobility    Extremity Weight-bearing Status right lower extremity  -LR right lower extremity  -LR    Right Lower Extremity (Weight-bearing Status) weight-bearing as tolerated (WBAT)  -LR weight-bearing as tolerated (WBAT)  -LR              User Key  (r) = Recorded By, (t) = Taken By, (c) = Cosigned By      Initials Name Provider Type    LR Leslie Mooney, PT Physical Therapist                   Obj/Interventions       Row Name 10/28/24 0845          Range of Motion Comprehensive    General Range of Motion lower extremity range of motion deficits identified  -       Row Name 10/28/24 0845          Strength Comprehensive (MMT)    General Manual Muscle Testing (MMT) Assessment lower extremity strength deficits identified  -       Row Name 10/28/24 1547 10/28/24 0868       Motor Skills    Therapeutic Exercise ankle;knee;hip;other (see comments)  cues for correct technique; poor eccentric control with LAQ and SAQ but able to perform this PM; min assist R SLR  -LR ankle;knee;hip;other (see comments)  cues for technique; max assist R SLR, R LAQ d/t R quad weakness, min assist R hip abd and R heel slides, fair R quad set  -LR      Row Name 10/28/24 1547 10/28/24 0832       Hip (Therapeutic Exercise)    Hip (Therapeutic Exercise) strengthening exercise;isometric exercises   -LR strengthening exercise;isometric exercises  -LR    Hip Isometrics (Therapeutic Exercise) bilateral;gluteal sets;supine;10 repetitions  -LR bilateral;gluteal sets;sitting;10 repetitions  -LR    Hip Strengthening (Therapeutic Exercise) bilateral;heel slides;aBduction;supine;10 repetitions  -LR bilateral;heel slides;aBduction;sitting;10 repetitions  -LR      Row Name 10/28/24 1547 10/28/24 0845       Knee (Therapeutic Exercise)    Knee (Therapeutic Exercise) strengthening exercise;isometric exercises  -LR strengthening exercise;isometric exercises  -LR    Knee Isometrics (Therapeutic Exercise) bilateral;quad sets;supine;10 repetitions  -LR bilateral;quad sets;sitting;10 repetitions  -LR    Knee Strengthening (Therapeutic Exercise) bilateral;SLR (straight leg raise);SAQ (short arc quad);LAQ (long arc quad);sitting;supine;10 repetitions  -LR bilateral;SLR (straight leg raise);LAQ (long arc quad);sitting;10 repetitions  -LR      Row Name 10/28/24 1547 10/28/24 0816       Ankle (Therapeutic Exercise)    Ankle (Therapeutic Exercise) AROM (active range of motion)  -LR AROM (active range of motion)  -LR    Ankle AROM (Therapeutic Exercise) bilateral;dorsiflexion;plantarflexion;supine;10 repetitions  -LR bilateral;dorsiflexion;plantarflexion;sitting;10 repetitions  -LR      Row Name 10/28/24 1547 10/28/24 0821       Balance    Balance Assessment sitting static balance;sitting dynamic balance;standing static balance;standing dynamic balance  -LR sitting static balance;sitting dynamic balance;standing static balance;standing dynamic balance  -LR    Static Sitting Balance standby assist  -LR standby assist  -LR    Dynamic Sitting Balance standby assist  -LR standby assist  -LR    Position, Sitting Balance unsupported;sitting in chair;sitting edge of bed  -LR unsupported;sitting edge of bed  -LR    Static Standing Balance contact guard;1 person to manage equipment  -LR contact guard;1 person to manage equipment  -LR     Dynamic Standing Balance contact guard;2-person assist  -LR contact guard;1 person to manage equipment  -LR    Position/Device Used, Standing Balance supported;walker, rolling  -LR supported;walker, rolling  -LR      Row Name 10/28/24 0845          Sensory Assessment (Somatosensory)    Sensory Assessment (Somatosensory) LE sensation intact;other (see comments)  patient denies numbness/tingling in B LEs; reports light touch equal and intact upon assessment; able to actively DF bilaterally  -LR       Row Name 10/28/24 0847          General Lower Extremity Assessment (Range of Motion)    Lower Extremity: Range of Motion LLE ROM WFL;hip, right: LE ROM  -LR     Comment: Lower Extremity ROM R hip AROM impaired 25% d/t pain and weakness  -LR       Row Name 10/28/24 0867          Lower Extremity (Manual Muscle Testing)    Lower Extremity: Manual Muscle Testing (MMT) left LE strength is WFL;right hip strength deficit  -LR     Comment, MMT: Lower Extremity R hip functionally 3+/5, R knee buckling with weight bearing, max assist for R SLR  -LR               User Key  (r) = Recorded By, (t) = Taken By, (c) = Cosigned By      Initials Name Provider Type    LR Leslie Mooney, PT Physical Therapist                   Goals/Plan       Row Name 10/28/24 1547 10/28/24 0813       Bed Mobility Goal 1 (PT)    Activity/Assistive Device (Bed Mobility Goal 1, PT) sit to supine/supine to sit  -LR sit to supine/supine to sit  -LR    [place] Level/Cues Needed (Bed Mobility Goal 1, PT) modified independence  -LR modified independence  -LR    Time Frame (Bed Mobility Goal 1, PT) short term goal (STG);3 days  -LR short term goal (STG);3 days  -LR    Progress/Outcomes (Bed Mobility Goal 1, PT) goal ongoing;continuing progress toward goal  -LR goal ongoing  -LR      Row Name 10/28/24 1547 10/28/24 0845       Transfer Goal 1 (PT)    Activity/Assistive Device (Transfer Goal 1, PT) sit-to-stand/stand-to-sit;walker, rolling  -LR  sit-to-stand/stand-to-sit;walker, rolling  -LR    [place] Level/Cues Needed (Transfer Goal 1, PT) modified independence  -LR modified independence  -LR    Time Frame (Transfer Goal 1, PT) long term goal (LTG);5 days  -LR long term goal (LTG);5 days  -LR    Progress/Outcome (Transfer Goal 1, PT) continuing progress toward goal;goal ongoing  -LR goal ongoing  -LR      Row Name 10/28/24 1547 10/28/24 0845       Gait Training Goal 1 (PT)    Activity/Assistive Device (Gait Training Goal 1, PT) gait (walking locomotion);walker, rolling  -LR gait (walking locomotion);walker, rolling  -LR    [place] Level (Gait Training Goal 1, PT) modified independence  -LR modified independence  -LR    Distance (Gait Training Goal 1, PT) 500 feet  - feet  -LR    Time Frame (Gait Training Goal 1, PT) long term goal (LTG);5 days  -LR long term goal (LTG);5 days  -LR    Progress/Outcome (Gait Training Goal 1, PT) goal ongoing;good progress toward goal;goal partially met;goal revised this date  -LR goal ongoing  -LR      Row Name 10/28/24 0845          Therapy Assessment/Plan (PT)    Planned Therapy Interventions (PT) balance training;bed mobility training;gait training;home exercise program;patient/family education;transfer training;strengthening;ROM (range of motion)  -LR               User Key  (r) = Recorded By, (t) = Taken By, (c) = Cosigned By      Initials Name Provider Type    LR Leslie Mooney, PT Physical Therapist                   Clinical Impression       Row Name 10/28/24 1547 10/28/24 0845       Pain    Pretreatment Pain Rating 8/10  -LR 7/10  -LR    Posttreatment Pain Rating 8/10  -LR 7/10  -LR    Pain Location hip  -LR hip  -LR    Pain Side/Orientation right  -LR right  -LR    Pain Management Interventions premedicated for activity;exercise or physical activity utilized;movement retraining implemented  -LR exercise or physical activity utilized;movement retraining implemented  -LR    Response to Pain  Interventions mobility function improved;activity level improved;functional ability improved;activity participation with tolerable pain  -LR functional ability improved;mobility function improved;activity level improved;activity participation with tolerable pain  -LR      Row Name 10/28/24 1547 10/28/24 0845       Plan of Care Review    Plan of Care Reviewed With patient;child  -LR patient;child  -LR    Progress improving  -LR improving  -LR    Outcome Evaluation Patient increased gait distance with improved gait mechanics, ambulating 150 feet with CGA, RW, step through gait pattern, limited by pain. No knee buckling observed with gait, continued use of knee immobilizer and recommend continued use until reassess in AM d/t continued motor control deficits noted with R quad ther ex, although improved compared to this AM. Patient currently below baseline functioning, demonstrating decreased functional mobility status, decreased endurance, and decreased R hip strength/ROM. Will address these deficits to promote return to PLOF. Recommend d/c home with assist and HHPT.  -LR Patient ambulated 110 feet with RW, CGA, step to gait pattern, limited by fatigue, weakness, and pain. Patient demonstrates R LE weakness and impaired motor control in R LE d/t single femoral shot, demonstrating R knee buckling with weight bearing and unable to perform SLR independently. Donned knee immobilizer to R LE for ambulation, will need on R LE any time she is weight bearing. Patient currently below baseline functioning, demonstrating decreased functional mobility status, decreased endurance, and decreased R LE strength/ROM. Will address these deficits to promote return to PLOF. Recommend d/c home with assist and HHPT.  -LR      Row Name 10/28/24 1547 10/28/24 0845       Therapy Assessment/Plan (PT)    Patient/Family Therapy Goals Statement (PT) -- decrease pain, get better  -LR    Rehab Potential (PT) good  -LR good  -LR    Criteria for  Skilled Interventions Met (PT) yes;meets criteria;skilled treatment is necessary  -LR yes;meets criteria;skilled treatment is necessary  -LR    Therapy Frequency (PT) 2 times/day  -LR 2 times/day  -LR    Predicted Duration of Therapy Intervention (PT) -- 5 days  -LR      Row Name 10/28/24 1547 10/28/24 0845       Positioning and Restraints    Pre-Treatment Position sitting in chair/recliner  -LR in bed  -LR    Post Treatment Position bed  -LR chair  -LR    In Bed notified nsg;supine;call light within reach;encouraged to call for assist;exit alarm on;side rails up x2;SCD pump applied;with family/caregiver  -LR --    In Chair -- notified nsg;reclined;sitting;call light within reach;encouraged to call for assist;exit alarm on;with family/caregiver;legs elevated;compression device;waffle cushion  -LR              User Key  (r) = Recorded By, (t) = Taken By, (c) = Cosigned By      Initials Name Provider Type    LR Leslie Mooney, PT Physical Therapist                   Outcome Measures       Row Name 10/28/24 1547 10/28/24 0845       How much help from another person do you currently need...    Turning from your back to your side while in flat bed without using bedrails? 4  -LR 4  -LR    Moving from lying on back to sitting on the side of a flat bed without bedrails? 3  -LR 3  -LR    Moving to and from a bed to a chair (including a wheelchair)? 3  -LR 3  -LR    Standing up from a chair using your arms (e.g., wheelchair, bedside chair)? 3  -LR 3  -LR    Climbing 3-5 steps with a railing? 2  -LR 2  -LR    To walk in hospital room? 3  -LR 3  -LR    AM-PAC 6 Clicks Score (PT) 18  -LR 18  -LR    Highest Level of Mobility Goal 6 --> Walk 10 steps or more  -LR 6 --> Walk 10 steps or more  -LR      Row Name 10/28/24 1547 10/28/24 0941       Functional Assessment    Outcome Measure Options AM-PAC 6 Clicks Basic Mobility (PT)  -LR AM-PAC 6 Clicks Daily Activity (OT)  -AN (r) BH (t) AN (c)      Row Name 10/28/24 0887           Functional Assessment    Outcome Measure Options AM-PAC 6 Clicks Basic Mobility (PT)  -LR               User Key  (r) = Recorded By, (t) = Taken By, (c) = Cosigned By      Initials Name Provider Type    Leslie Hill, PT Physical Therapist    Nadia Burkett, OT Occupational Therapist    Elana Mattson, OT Student OT Student                                 Physical Therapy Education       Title: PT OT SLP Therapies (Done)       Topic: Physical Therapy (Done)       Point: Mobility training (Done)       Learning Progress Summary            Patient Acceptance, E,D, VU,NR by LR at 10/28/2024 1547    Comment: Educated on precautions, weight bearing status, HEP, safety and benefits of mobility, correct sit to supine t/f technique, correct sit<->stand t/f technique, correct gait mechanics, and progression of POC.    Acceptance, E,D, VU,NR by LR at 10/28/2024 0845    Comment: Educated on precautions, weight bearing status, correct supine<->sit t/f technique, correct sit<->stand t/f technique, correct gait mechanics, LE HEP, and progression of POC.   Family Acceptance, E,D, VU,NR by LR at 10/28/2024 1547    Comment: Educated on precautions, weight bearing status, HEP, safety and benefits of mobility, correct sit to supine t/f technique, correct sit<->stand t/f technique, correct gait mechanics, and progression of POC.    Acceptance, E,D, VU,NR by LR at 10/28/2024 0845    Comment: Educated on precautions, weight bearing status, correct supine<->sit t/f technique, correct sit<->stand t/f technique, correct gait mechanics, LE HEP, and progression of POC.                      Point: Home exercise program (Done)       Learning Progress Summary            Patient Acceptance, E,D, VU,NR by LR at 10/28/2024 1547    Comment: Educated on precautions, weight bearing status, HEP, safety and benefits of mobility, correct sit to supine t/f technique, correct sit<->stand t/f technique, correct gait mechanics, and  progression of POC.    Acceptance, E,D, VU,NR by LR at 10/28/2024 0845    Comment: Educated on precautions, weight bearing status, correct supine<->sit t/f technique, correct sit<->stand t/f technique, correct gait mechanics, LE HEP, and progression of POC.   Family Acceptance, E,D, VU,NR by LR at 10/28/2024 1547    Comment: Educated on precautions, weight bearing status, HEP, safety and benefits of mobility, correct sit to supine t/f technique, correct sit<->stand t/f technique, correct gait mechanics, and progression of POC.    Acceptance, E,D, VU,NR by LR at 10/28/2024 0845    Comment: Educated on precautions, weight bearing status, correct supine<->sit t/f technique, correct sit<->stand t/f technique, correct gait mechanics, LE HEP, and progression of POC.                      Point: Body mechanics (Done)       Learning Progress Summary            Patient Acceptance, E,D, VU,NR by LR at 10/28/2024 1547    Comment: Educated on precautions, weight bearing status, HEP, safety and benefits of mobility, correct sit to supine t/f technique, correct sit<->stand t/f technique, correct gait mechanics, and progression of POC.    Acceptance, E,D, VU,NR by LR at 10/28/2024 0845    Comment: Educated on precautions, weight bearing status, correct supine<->sit t/f technique, correct sit<->stand t/f technique, correct gait mechanics, LE HEP, and progression of POC.   Family Acceptance, E,D, VU,NR by LR at 10/28/2024 1547    Comment: Educated on precautions, weight bearing status, HEP, safety and benefits of mobility, correct sit to supine t/f technique, correct sit<->stand t/f technique, correct gait mechanics, and progression of POC.    Acceptance, E,D, VU,NR by LR at 10/28/2024 0845    Comment: Educated on precautions, weight bearing status, correct supine<->sit t/f technique, correct sit<->stand t/f technique, correct gait mechanics, LE HEP, and progression of POC.                      Point: Precautions (Done)        Learning Progress Summary            Patient Acceptance, E,D, VU,NR by LR at 10/28/2024 1547    Comment: Educated on precautions, weight bearing status, HEP, safety and benefits of mobility, correct sit to supine t/f technique, correct sit<->stand t/f technique, correct gait mechanics, and progression of POC.    Acceptance, E,D, VU,NR by LR at 10/28/2024 0845    Comment: Educated on precautions, weight bearing status, correct supine<->sit t/f technique, correct sit<->stand t/f technique, correct gait mechanics, LE HEP, and progression of POC.   Family Acceptance, E,D, VU,NR by LR at 10/28/2024 1547    Comment: Educated on precautions, weight bearing status, HEP, safety and benefits of mobility, correct sit to supine t/f technique, correct sit<->stand t/f technique, correct gait mechanics, and progression of POC.    Acceptance, E,D, VU,NR by LR at 10/28/2024 0845    Comment: Educated on precautions, weight bearing status, correct supine<->sit t/f technique, correct sit<->stand t/f technique, correct gait mechanics, LE HEP, and progression of POC.                                      User Key       Initials Effective Dates Name Provider Type Discipline    LR 02/03/23 -  Leslie Mooney, PT Physical Therapist PT                  PT Recommendation and Plan  Planned Therapy Interventions (PT): balance training, bed mobility training, gait training, home exercise program, patient/family education, transfer training, strengthening, ROM (range of motion)  Progress: improving  Outcome Evaluation: Patient increased gait distance with improved gait mechanics, ambulating 150 feet with CGA, RW, step through gait pattern, limited by pain. No knee buckling observed with gait, continued use of knee immobilizer and recommend continued use until reassess in AM d/t continued motor control deficits noted with R quad ther ex, although improved compared to this AM. Patient currently below baseline functioning, demonstrating  decreased functional mobility status, decreased endurance, and decreased R hip strength/ROM. Will address these deficits to promote return to PLOF. Recommend d/c home with assist and HHPT.     Time Calculation:   PT Evaluation Complexity  History, PT Evaluation Complexity: 3 or more personal factors and/or comorbidities  Examination of Body Systems (PT Eval Complexity): total of 3 or more elements  Clinical Presentation (PT Evaluation Complexity): stable  Clinical Decision Making (PT Evaluation Complexity): low complexity  Overall Complexity (PT Evaluation Complexity): low complexity     PT Charges       Row Name 10/28/24 1547 10/28/24 0845          Time Calculation    Start Time 1547  -LR 0845  -LR     PT Received On 10/28/24  -LR 10/28/24  -LR     PT Goal Re-Cert Due Date 11/07/24  -LR 11/07/24  -LR        Timed Charges    23816 - PT Therapeutic Exercise Minutes 6  -LR 10  -LR     11219 - Gait Training Minutes  9  -LR 10  -LR     12277 - PT Therapeutic Activity Minutes -- 5  -LR        Untimed Charges    PT Eval/Re-eval Minutes -- 56  -LR        Total Minutes    Timed Charges Total Minutes 15  -LR 25  -LR     Untimed Charges Total Minutes -- 56  -LR      Total Minutes 15  -LR 81  -LR               User Key  (r) = Recorded By, (t) = Taken By, (c) = Cosigned By      Initials Name Provider Type    LR Leslie Mooney, PT Physical Therapist                  Therapy Charges for Today       Code Description Service Date Service Provider Modifiers Qty    06511805937 HC PT THER PROC EA 15 MIN 10/28/2024 Leslie Mooney, PT GP 1    86842835623 HC GAIT TRAINING EA 15 MIN 10/28/2024 Leslie Mooney, PT GP 1    34279040178 HC PT EVAL LOW COMPLEXITY 4 10/28/2024 Leslie Mooney, PT GP 1    70215068547 HC GAIT TRAINING EA 15 MIN 10/28/2024 Leslie Mooney, PT GP 1    88626036175 HC PT THER SUPP EA 15 MIN 10/28/2024 Leslie Mooney, PT GP 2            PT G-Codes  Outcome Measure Options:  AM-PAC 6 Clicks Basic Mobility (PT)  AM-PAC 6 Clicks Score (PT): 18  AM-PAC 6 Clicks Score (OT): 17  PT Discharge Summary  Anticipated Discharge Disposition (PT): home with assist, home with home health    Leslie Mooney, PT  10/28/2024

## 2024-10-28 NOTE — PLAN OF CARE
Goal Outcome Evaluation:  Plan of Care Reviewed With: (P) patient, daughter        Progress: (P) no change  Outcome Evaluation: (P) OT eval complete. Pt presenting below functional baseline d/t RLE ROM and strength, decreased activity tolerance, and mild balance deficits. Pt was Min A x1 for toilet transfer and CGA for toilet hygiene this date. Continue IP OT to address current deficits. Rec d/c home w/ assist when medically appropriate.    Anticipated Discharge Disposition (OT): (P) home with assist, home with home health

## 2024-10-29 ENCOUNTER — APPOINTMENT (OUTPATIENT)
Dept: NUCLEAR MEDICINE | Facility: HOSPITAL | Age: 74
DRG: 482 | End: 2024-10-29
Payer: MEDICARE

## 2024-10-29 LAB
ALBUMIN SERPL-MCNC: 3.5 G/DL (ref 3.5–5.2)
ALBUMIN/GLOB SERPL: 1.6 G/DL
ALP SERPL-CCNC: 73 U/L (ref 39–117)
ALT SERPL W P-5'-P-CCNC: 5 U/L (ref 1–33)
ANION GAP SERPL CALCULATED.3IONS-SCNC: 15 MMOL/L (ref 5–15)
AST SERPL-CCNC: 17 U/L (ref 1–32)
BASOPHILS # BLD AUTO: 0.03 10*3/MM3 (ref 0–0.2)
BASOPHILS NFR BLD AUTO: 0.4 % (ref 0–1.5)
BILIRUB SERPL-MCNC: 0.4 MG/DL (ref 0–1.2)
BUN SERPL-MCNC: 20 MG/DL (ref 8–23)
BUN/CREAT SERPL: 23.8 (ref 7–25)
CALCIUM SPEC-SCNC: 8.4 MG/DL (ref 8.6–10.5)
CHLORIDE SERPL-SCNC: 107 MMOL/L (ref 98–107)
CO2 SERPL-SCNC: 21 MMOL/L (ref 22–29)
CREAT SERPL-MCNC: 0.84 MG/DL (ref 0.57–1)
DEPRECATED RDW RBC AUTO: 43.7 FL (ref 37–54)
EGFRCR SERPLBLD CKD-EPI 2021: 72.6 ML/MIN/1.73
EOSINOPHIL # BLD AUTO: 0.22 10*3/MM3 (ref 0–0.4)
EOSINOPHIL NFR BLD AUTO: 3.1 % (ref 0.3–6.2)
ERYTHROCYTE [DISTWIDTH] IN BLOOD BY AUTOMATED COUNT: 12.5 % (ref 12.3–15.4)
GLOBULIN UR ELPH-MCNC: 2.2 GM/DL
GLUCOSE BLDC GLUCOMTR-MCNC: 106 MG/DL (ref 70–130)
GLUCOSE BLDC GLUCOMTR-MCNC: 122 MG/DL (ref 70–130)
GLUCOSE BLDC GLUCOMTR-MCNC: 180 MG/DL (ref 70–130)
GLUCOSE BLDC GLUCOMTR-MCNC: 57 MG/DL (ref 70–130)
GLUCOSE BLDC GLUCOMTR-MCNC: 69 MG/DL (ref 70–130)
GLUCOSE BLDC GLUCOMTR-MCNC: 69 MG/DL (ref 70–130)
GLUCOSE BLDC GLUCOMTR-MCNC: 90 MG/DL (ref 70–130)
GLUCOSE BLDC GLUCOMTR-MCNC: 91 MG/DL (ref 70–130)
GLUCOSE SERPL-MCNC: 101 MG/DL (ref 65–99)
HCT VFR BLD AUTO: 34.5 % (ref 34–46.6)
HGB BLD-MCNC: 11.5 G/DL (ref 12–15.9)
IMM GRANULOCYTES # BLD AUTO: 0.01 10*3/MM3 (ref 0–0.05)
IMM GRANULOCYTES NFR BLD AUTO: 0.1 % (ref 0–0.5)
LYMPHOCYTES # BLD AUTO: 1.3 10*3/MM3 (ref 0.7–3.1)
LYMPHOCYTES NFR BLD AUTO: 18.5 % (ref 19.6–45.3)
MAGNESIUM SERPL-MCNC: 2 MG/DL (ref 1.6–2.4)
MCH RBC QN AUTO: 31.4 PG (ref 26.6–33)
MCHC RBC AUTO-ENTMCNC: 33.3 G/DL (ref 31.5–35.7)
MCV RBC AUTO: 94.3 FL (ref 79–97)
MONOCYTES # BLD AUTO: 0.59 10*3/MM3 (ref 0.1–0.9)
MONOCYTES NFR BLD AUTO: 8.4 % (ref 5–12)
NEUTROPHILS NFR BLD AUTO: 4.87 10*3/MM3 (ref 1.7–7)
NEUTROPHILS NFR BLD AUTO: 69.5 % (ref 42.7–76)
NRBC BLD AUTO-RTO: 0 /100 WBC (ref 0–0.2)
PLATELET # BLD AUTO: 136 10*3/MM3 (ref 140–450)
PMV BLD AUTO: 10.6 FL (ref 6–12)
POTASSIUM SERPL-SCNC: 3.8 MMOL/L (ref 3.5–5.2)
PROT SERPL-MCNC: 5.7 G/DL (ref 6–8.5)
RBC # BLD AUTO: 3.66 10*6/MM3 (ref 3.77–5.28)
SODIUM SERPL-SCNC: 143 MMOL/L (ref 136–145)
TSH SERPL DL<=0.05 MIU/L-ACNC: 4.52 UIU/ML (ref 0.27–4.2)
WBC NRBC COR # BLD AUTO: 7.02 10*3/MM3 (ref 3.4–10.8)

## 2024-10-29 PROCEDURE — 84443 ASSAY THYROID STIM HORMONE: CPT | Performed by: INTERNAL MEDICINE

## 2024-10-29 PROCEDURE — 82948 REAGENT STRIP/BLOOD GLUCOSE: CPT

## 2024-10-29 PROCEDURE — 78227 HEPATOBIL SYST IMAGE W/DRUG: CPT

## 2024-10-29 PROCEDURE — 25810000003 LACTATED RINGERS SOLUTION: Performed by: INTERNAL MEDICINE

## 2024-10-29 PROCEDURE — 25010000002 SINCALIDE PER 5 MCG: Performed by: PEDIATRICS

## 2024-10-29 PROCEDURE — 85025 COMPLETE CBC W/AUTO DIFF WBC: CPT | Performed by: INTERNAL MEDICINE

## 2024-10-29 PROCEDURE — 25810000003 LACTATED RINGERS PER 1000 ML: Performed by: INTERNAL MEDICINE

## 2024-10-29 PROCEDURE — 0 TECHNETIUM TC 99M MEBROFENIN KIT: Performed by: INTERNAL MEDICINE

## 2024-10-29 PROCEDURE — 93005 ELECTROCARDIOGRAM TRACING: CPT | Performed by: INTERNAL MEDICINE

## 2024-10-29 PROCEDURE — 93010 ELECTROCARDIOGRAM REPORT: CPT | Performed by: INTERNAL MEDICINE

## 2024-10-29 PROCEDURE — 83735 ASSAY OF MAGNESIUM: CPT | Performed by: INTERNAL MEDICINE

## 2024-10-29 PROCEDURE — 80053 COMPREHEN METABOLIC PANEL: CPT | Performed by: INTERNAL MEDICINE

## 2024-10-29 PROCEDURE — 99221 1ST HOSP IP/OBS SF/LOW 40: CPT | Performed by: INTERNAL MEDICINE

## 2024-10-29 PROCEDURE — 99232 SBSQ HOSP IP/OBS MODERATE 35: CPT | Performed by: INTERNAL MEDICINE

## 2024-10-29 PROCEDURE — A9537 TC99M MEBROFENIN: HCPCS | Performed by: INTERNAL MEDICINE

## 2024-10-29 RX ORDER — SODIUM CHLORIDE, SODIUM LACTATE, POTASSIUM CHLORIDE, CALCIUM CHLORIDE 600; 310; 30; 20 MG/100ML; MG/100ML; MG/100ML; MG/100ML
100 INJECTION, SOLUTION INTRAVENOUS CONTINUOUS
Status: DISCONTINUED | OUTPATIENT
Start: 2024-10-29 | End: 2024-10-31 | Stop reason: HOSPADM

## 2024-10-29 RX ORDER — CARVEDILOL 6.25 MG/1
6.25 TABLET ORAL 2 TIMES DAILY WITH MEALS
Status: DISCONTINUED | OUTPATIENT
Start: 2024-10-29 | End: 2024-10-31 | Stop reason: HOSPADM

## 2024-10-29 RX ORDER — METOPROLOL TARTRATE 1 MG/ML
2.5 INJECTION, SOLUTION INTRAVENOUS ONCE AS NEEDED
Status: DISCONTINUED | OUTPATIENT
Start: 2024-10-29 | End: 2024-10-31 | Stop reason: HOSPADM

## 2024-10-29 RX ORDER — KIT FOR THE PREPARATION OF TECHNETIUM TC 99M MEBROFENIN 45 MG/10ML
1 INJECTION, POWDER, LYOPHILIZED, FOR SOLUTION INTRAVENOUS
Status: COMPLETED | OUTPATIENT
Start: 2024-10-29 | End: 2024-10-29

## 2024-10-29 RX ORDER — DILTIAZEM HCL/D5W 125 MG/125
5-15 PLASTIC BAG, INJECTION (ML) INTRAVENOUS CONTINUOUS
Status: DISCONTINUED | OUTPATIENT
Start: 2024-10-29 | End: 2024-10-30

## 2024-10-29 RX ORDER — METOPROLOL TARTRATE 1 MG/ML
2.5 INJECTION, SOLUTION INTRAVENOUS ONCE
Status: COMPLETED | OUTPATIENT
Start: 2024-10-29 | End: 2024-10-29

## 2024-10-29 RX ORDER — SINCALIDE 5 UG/5ML
0.02 INJECTION, POWDER, LYOPHILIZED, FOR SOLUTION INTRAVENOUS ONCE
Status: COMPLETED | OUTPATIENT
Start: 2024-10-29 | End: 2024-10-29

## 2024-10-29 RX ADMIN — DEXTROSE MONOHYDRATE 25 G: 25 INJECTION, SOLUTION INTRAVENOUS at 08:25

## 2024-10-29 RX ADMIN — ATORVASTATIN CALCIUM 40 MG: 40 TABLET, FILM COATED ORAL at 20:55

## 2024-10-29 RX ADMIN — CARVEDILOL 6.25 MG: 6.25 TABLET, FILM COATED ORAL at 17:35

## 2024-10-29 RX ADMIN — SODIUM CHLORIDE, SODIUM LACTATE, POTASSIUM CHLORIDE, CALCIUM CHLORIDE 100 ML/HR: 20; 30; 600; 310 INJECTION, SOLUTION INTRAVENOUS at 16:03

## 2024-10-29 RX ADMIN — ACETAMINOPHEN 650 MG: 325 TABLET ORAL at 16:02

## 2024-10-29 RX ADMIN — SINCALIDE 1.1 MCG: 5 INJECTION, POWDER, LYOPHILIZED, FOR SOLUTION INTRAVENOUS at 11:00

## 2024-10-29 RX ADMIN — METOPROLOL TARTRATE 2.5 MG: 5 INJECTION INTRAVENOUS at 13:27

## 2024-10-29 RX ADMIN — OXYCODONE HYDROCHLORIDE AND ACETAMINOPHEN 1 TABLET: 5; 325 TABLET ORAL at 18:56

## 2024-10-29 RX ADMIN — PANTOPRAZOLE SODIUM 40 MG: 40 TABLET, DELAYED RELEASE ORAL at 08:51

## 2024-10-29 RX ADMIN — Medication 10 ML: at 08:55

## 2024-10-29 RX ADMIN — ASPIRIN 325 MG: 325 TABLET ORAL at 12:17

## 2024-10-29 RX ADMIN — SODIUM CHLORIDE, SODIUM LACTATE, POTASSIUM CHLORIDE, CALCIUM CHLORIDE 250 ML: 20; 30; 600; 310 INJECTION, SOLUTION INTRAVENOUS at 14:01

## 2024-10-29 RX ADMIN — MEBROFENIN 1 DOSE: 45 INJECTION, POWDER, LYOPHILIZED, FOR SOLUTION INTRAVENOUS at 09:55

## 2024-10-29 RX ADMIN — OXYCODONE HYDROCHLORIDE AND ACETAMINOPHEN 1 TABLET: 5; 325 TABLET ORAL at 12:17

## 2024-10-29 RX ADMIN — Medication 10 ML: at 20:55

## 2024-10-29 NOTE — PROGRESS NOTES
[facility] Medicine Services  PROGRESS NOTE    Patient Name: Marifer Ricardo  : 1950  MRN: [id number]    Date of Admission: 10/27/2024  Primary Care Physician: Norma Spangler APRN    Subjective   Subjective     CC:  R hip fracture    HPI:  Chest discomfort and belching this afternoon.  Patient noted to be in afib with RVR      Objective   Objective     Vital Signs:   Temp:  [97.5 °F (36.4 °C)-98.4 °F (36.9 °C)] 98.1 °F (36.7 °C)  Heart Rate:  [] 123  Resp:  [16] 16  BP: (111-177)/(55-88) 127/70     Physical Exam:  Appears uncomfortable, in bed  MM moist  Tachycardic, irregular  Breath sounds clear  Abd soft, NT  Flat affect  Awake, speech clear    Family at bedside    Results Reviewed:  LAB RESULTS:      Lab 10/28/24  0640 10/27/24  1138   WBC 10.75 5.95   HEMOGLOBIN 12.2 14.2   HEMATOCRIT 36.7 43.1   PLATELETS 126* 120*   NEUTROS ABS 9.42* 4.59   IMMATURE GRANS (ABS) 0.13* 0.01   LYMPHS ABS 0.65* 0.77   MONOS ABS 0.54 0.48   EOS ABS 0.00 0.07   MCV 93.9 94.5   PROTIME  --  13.8         Lab 10/28/24  2303 10/27/24  1138   SODIUM 140 138   POTASSIUM 4.2 3.9   CHLORIDE 103 103   CO2 26.0 22.0   ANION GAP 11.0 13.0   BUN 30* 16   CREATININE 0.95 0.70   EGFR 62.6 90.3   GLUCOSE 109* 93   CALCIUM 8.3* 8.9   MAGNESIUM 2.0  --    PHOSPHORUS 3.2  --              Lab 10/27/24  1138   PROTIME 13.8   INR 1.05                 Brief Urine Lab Results       None            Microbiology Results Abnormal       None            NM HIDA SCAN WITH PHARMACOLOGICAL INTERVENTION    Result Date: 10/29/2024  DATE OF EXAM: 10/29/2024 10:03 AM EDT PROCEDURE: NM HIDA SCAN WITH PHARMACOLOGICAL INTERVENTION INDICATIONS: chronic abd pain, indigestion COMPARISON: Gallbladder ultrasound 2021. TECHNIQUE: The patient received 4.7 mCi of technetium 99m Choletec intravenously and images were obtained of the abdomen in the anterior projection through 60 minutes. Patient was administered 1.1 mcg of  Kinevac to assess gallbladder emptying. Counts were obtained over the gallbladder to calculate the ejection fraction. FINDINGS: Normal radiopharmaceutical uptake within the liver. Gallbladder is visualized within the first 15 to 20 minutes of imaging indicating patency of the cystic duct. No scintigraphic evidence of acute or chronic cholecystitis. Radiopharmaceutical advancement  into the small bowel indicates patency of the common bile duct. The calculated gallbladder ejection fraction is 81%     Impression: Normal HIDA scan. Normal gallbladder ejection fraction 81%. Electronically Signed: Cierra Sanz MD  10/29/2024 12:12 PM EDT  Workstation ID: [id number]    FL C Arm During Surgery    Result Date: 10/27/2024  This procedure was auto-finalized with no dictation required.     Results for orders placed during the hospital encounter of 08/12/24    Adult Transthoracic Echo Complete W/ Cont if Necessary Per Protocol    Interpretation Summary    Left ventricular systolic function is normal. Left ventricular ejection fraction appears to be 61 - 65%.    Left ventricular diastolic function is consistent with (grade I) impaired relaxation.    The aortic valve exhibits sclerosis.      Current medications:  Scheduled Meds:aspirin, 325 mg, Oral, Daily  atorvastatin, 40 mg, Oral, Nightly  insulin lispro, 2-7 Units, Subcutaneous, 4x Daily AC & at Bedtime  pantoprazole, 40 mg, Oral, QAM AC  sodium chloride, 10 mL, Intravenous, Q12H      Continuous Infusions:     PRN Meds:.  acetaminophen **OR** acetaminophen **OR** acetaminophen    senna-docusate sodium **AND** polyethylene glycol **AND** bisacodyl **AND** bisacodyl    dextrose    dextrose    glucagon (human recombinant)    Magnesium Standard Dose Replacement - Follow Nurse / BPA Driven Protocol    nitroglycerin    ondansetron ODT **OR** ondansetron    oxyCODONE-acetaminophen    Phosphorus Replacement - Follow Nurse / BPA Driven Protocol    Potassium Replacement - Follow Nurse /  BPA Driven Protocol    [COMPLETED] Insert Peripheral IV **AND** sodium chloride    sodium chloride    Assessment & Plan   Assessment & Plan     Active Hospital Problems    Diagnosis  POA    **Hip fracture [S72.009A]  Yes    PAD (peripheral artery disease) [I73.9]  Yes    GERD without esophagitis [K21.9]  Yes    Essential hypertension [I10]  Yes    History of hypothyroidism [Z86.39]  Yes    Type 2 diabetes mellitus [E11.9]  Yes      Resolved Hospital Problems   No resolved problems to display.        Brief Hospital Course to date:  Marifer Ricardo is a 74 y.o. female with hx of DM, HTN, PAD, GERD, here with R hip fracture.     R hip fracture  --s/p right hip closed reduction and pinning of femoral neck fracture 10/27  --PT/OT     Atrial fibrillation with RVR  - HR up to 170, IV metoprolol 2.5 mg x 1, with subsequent IV cardizem if BP tolerates  - check TSH, BMP, mag  - Echo 8/2024 with normal EF, LHC 8/2024 with some mild atherosclerosis  - cardiology consultation  - will reach out to orthopedics regarding possible anticoagulation      GERD  -Cont PPI and Pepcid.   -HIDA unremarkable, 80% ejection fraction  -has undergone workups previously with GI     HTN  -- No longer on any meds for this.  Monitor BP     PAD   -Continue ASA, Plavix, atorvastatin.     DM2 with hyperglycemia  -not on home medications  -A1c 5.8 8/24. Low dose SSI while admitted.  - glucose reviewed      Chronic pain on chronic opioids  -Continue home Percocet.      Expected Discharge Location and Transportation: pending  Expected Discharge   Expected Discharge Date: 10/29/2024; Expected Discharge Time:      VTE Prophylaxis:  No VTE prophylaxis order currently exists.         AM-PAC 6 Clicks Score (PT): 19 (10/28/24 2035)    CODE STATUS:   Code Status and Medical Interventions: No CPR (Do Not Attempt to Resuscitate); Limited Support; No intubation (DNI)   Ordered at: 10/27/24 1421     Medical Intervention Limits:    No intubation (DNI)     Level Of  Support Discussed With:    Patient     Code Status (Patient has no pulse and is not breathing):    No CPR (Do Not Attempt to Resuscitate)     Medical Interventions (Patient has pulse or is breathing):    Limited Support       Fan Cazares MD  10/29/24

## 2024-10-29 NOTE — CASE MANAGEMENT/SOCIAL WORK
Continued Stay Note  [facility]     Patient Name: Marifer Ricardo  MRN: [id number]  Today's Date: 10/29/2024    Admit Date: 10/27/2024    Plan: home with HH vs rehab   Discharge Plan       Row Name 10/29/24 1106       Plan    Plan Comments Pt has recommended home with HH. Pt is in agreement and family reports they can assist if needed. [facility] accepted pt.    Final Discharge Disposition Code 06 - home with home health care                   Discharge Codes    No documentation.                 Expected Discharge Date and Time       Expected Discharge Date Expected Discharge Time    Oct 29, 2024               Olga Mike RN

## 2024-10-29 NOTE — PLAN OF CARE
Goal Outcome Evaluation:   Patient is alert and oriented. On RA. Normal sinus rhythm on Tele.HIDA scan done today.Had 2 episodes of hypoglycemia today.She went to Afib  with RVR nearly for 2 hours in the afternoon.Inj Metoprolol given  and was seen by Cardiology.IV fluid  ml/hr on flow.Pain managed with pain medicines.Now HR is controlled.

## 2024-10-29 NOTE — PLAN OF CARE
Oriented x 4, mood / affect appropriate. Lung sound clear bilaterally. VSS on RA. Hydrofiber dressing CDI Patient denies pain / discomfort at this time. Call light in reach.

## 2024-10-29 NOTE — CASE MANAGEMENT/SOCIAL WORK
Continued Stay Note  [facility]     Patient Name: Marifer Ricardo  MRN: [id number]  Today's Date: 10/29/2024    Admit Date: 10/27/2024    Plan: home with HH vs rehab   Discharge Plan       Row Name 10/29/24 1106       Plan    Plan Comments Pt has recommended home with HH. Pt is in agreement and family reports they can assist if needed. [facility] accepted pt.    Final Discharge Disposition Code 06 - home with home health care                   Discharge Codes    No documentation.                 Expected Discharge Date and Time       Expected Discharge Date Expected Discharge Time    Oct 29, 2024               Olga Mike RN

## 2024-10-29 NOTE — CONSULTS
[facility] Cardiology  Consultation H&P    Patient: Marifer Ricardo  1950    [address] [place], [place] [id number]    PCP:  Norma Spangler APRN   Treatment Team:   Attending Provider: Fan Cazares MD  Surgeon: Rubin Parkinson Jr., MD  Consulting Physician: Rubin Parkinson Jr., MD  Consulting Physician: Donal Enriquez MD  Nurse Practitioner: Juju Hinojosa APRN  Admitting Provider: Fan Cazares MD   10/27/2024    Primary cardiologist:    DATE OF CONSULTATION: 10/29/2024 14:29 EDT     IDENTIFICATION: A 74 y.o. female     REASON FOR CONSULTATION: Afib with RVR    Hospital problems:    Hip fracture    Type 2 diabetes mellitus    Essential hypertension    History of hypothyroidism    GERD without esophagitis    PAD (peripheral artery disease)    Problem List:   Afib with RVR, onset 10/29/2024  Chronic hypertension-probably essential with recent progressive elevated blood pressure readings  Dyslipidemia on moderate dose atorvastatin  Chronic pain syndrome on oxycodone  Glucose intolerance with probable type II prediabetes mellitus (hemoglobin A1c 5.8%)  Intermittent recurrent syncope of uncertain etiology without injury with recent apparent event monitor with unknown results (summer 2004)-? vasovagal  Bilateral extremity peripheral arterial disease on dual antiplatelet therapy  Progressive chest pain syndrome of uncertain etiology with acceptable serial HS troponins/EKG/chest x-ray with CCS 3 severity and apparent acceptable myocardial perfusion study in [place]-data deficit (2023), August 2024  DNR/DNI  Diminished bilateral lower extremity pulses without definitive history of claudication and apparent acceptable recent SELAM-data deficit ([facility]), July 2024   Chronic odynophagia and dysphagia with eructation and apparent pseudoobstruction with remote multiple EGDs and remote assessments [facility] x 40 years  Right hip fracture with subsequent  closed reduction and pinning of femoral neck, [facility] admission, 10/27/2024   Remote operations:  A.  Left subclavian to right atrium bypass graft-data deficit  B.  Cataract extraction, O.  U.  C.  Partial with subsequent total colectomy-data deficit  D.  Remote colostomy with revision-data deficit  E.  Hysterectomy  F.  Tonsillectomy and adenoidectomy    Allergies  Allergies   Allergen Reactions    Penicillins Hives    Codeine Hives    Contrast Dye (Echo Or Unknown Ct/Mr) Hives    Metoclopramide Hives    Norco [Hydrocodone-Acetaminophen] GI Intolerance       Home Medications:  Current Outpatient Medications   Medication Instructions    alendronate (FOSAMAX) 70 mg, Every 7 Days    amLODIPine (NORVASC) 2.5 mg, Oral, Every Night at Bedtime    aspirin (EQ ASPIRIN ADULT LOW DOSE) 81 mg, Oral, Daily    atorvastatin (LIPITOR) 40 mg, Oral, Daily    Biotin 1,000 mcg, 3 Times Daily    carvedilol (COREG) 12.5 mg, Oral, 2 Times Daily With Meals    cetirizine (ZYRTEC) 10 mg, Oral, Daily, OTC    cholecalciferol (VITAMIN D3) 1,000 Units, Daily    Cinnamon 500 MG tablet 1 tablet, 2 Times Daily    Cyanocobalamin (VITAMIN B-12) 5000 MCG sublingual tablet 1 tablet, Daily    Omega 3-6-9 Fatty Acids (OMEGA 3-6-9 COMPLEX PO) 1 capsule, Daily    ondansetron ODT (ZOFRAN-ODT) 4 mg, Translingual, Every 8 Hours PRN    oxyCODONE-acetaminophen (PERCOCET) 5-325 MG per tablet 1 tablet, Every 12 Hours PRN    pantoprazole (PROTONIX) 40 mg, Oral, Every Morning Before Breakfast    polyethylene glycol (MIRALAX) 17 g, Oral, Daily, Titrate dose up or down to at least 1 soft bowel movement per day    Turmeric 400 mg, Daily    valsartan (DIOVAN) 160 MG tablet 1 tablet, Oral, As Needed    valsartan-hydrochlorothiazide (DIOVAN-HCT) 160-12.5 MG per tablet 1 tablet, Oral, Daily        Current Medications  Scheduled Meds:  aspirin, 325 mg, Oral, Daily  atorvastatin, 40 mg, Oral, Nightly  carvedilol, 6.25 mg, Oral, BID With Meals  dilTIAZem, 10 mg, Intravenous,  Once  insulin lispro, 2-7 Units, Subcutaneous, 4x Daily AC & at Bedtime  lactated ringers, 250 mL, Intravenous, Once  pantoprazole, 40 mg, Oral, QAM AC  sodium chloride, 10 mL, Intravenous, Q12H      Continuous Infusions:dilTIAZem, 5-15 mg/hr      PRN Meds:    acetaminophen **OR** acetaminophen **OR** acetaminophen    senna-docusate sodium **AND** polyethylene glycol **AND** bisacodyl **AND** bisacodyl    dextrose    dextrose    glucagon (human recombinant)    Magnesium Standard Dose Replacement - Follow Nurse / BPA Driven Protocol    metoprolol tartrate    nitroglycerin    ondansetron ODT **OR** ondansetron    oxyCODONE-acetaminophen    Phosphorus Replacement - Follow Nurse / BPA Driven Protocol    Potassium Replacement - Follow Nurse / BPA Driven Protocol    [COMPLETED] Insert Peripheral IV **AND** sodium chloride    sodium chloride      History of Present Illness   Marifer Ricardo is a 74 y.o. year old female with a past medical history significant for DM, HTN, PAD, GERD, here with R hip fracture s/p right hip closed reduction and pinning of femoral neck. Cardiology has been consulted to for new onset Afib with RVR. Atrial fibrillation started around 12PM today and she converted after single dose of IV metoprolol. In SR at time of consult. Her home carvedilol has been held since admission presumably for hypotension. Patient c/o belching/bloating but no chest pain, shortness of breath.     ROS  Review of Systems   Gastrointestinal:  Positive for bloating.        Belching       SOCIAL HX  Social History     Socioeconomic History    Marital status: Single   Tobacco Use    Smoking status: Never     Passive exposure: Never    Smokeless tobacco: Never   Vaping Use    Vaping status: Never Used   Substance and Sexual Activity    Alcohol use: No    Drug use: No    Sexual activity: Defer       FAMILY HX  Family History   Problem Relation Age of Onset    Arthritis Mother     Migraines Mother     Thyroid disease Mother      Diabetes Mother     Hypertension Mother     Stroke Mother     Tuberculosis Mother     Heart attack Father     Heart disease Father     Cancer Maternal Aunt     Cancer Paternal Aunt     Diabetes Maternal Grandmother     Cancer Cousin     Colon cancer Neg Hx        OBJECTIVE:  Vitals:    10/29/24 1307 10/29/24 1327 10/29/24 1345 10/29/24 1416   BP: 111/82 127/70 (!) 71/56 95/59   BP Location: Right arm  Right arm Right arm   Patient Position: Lying  Lying Lying   Pulse:  (!) 123 106 75   Resp: 16      Temp:       TempSrc:       SpO2: 98%  94%    Weight:       Height:         I/O last 3 completed shifts:  In: 240 [P.O.:240]  Out: 300 [Urine:300]  I/O this shift:  In: 50 [IV Piggyback:50]  Out: -   Intake & Output (last 3 days)         10/26 0701  10/27 0700 10/27 0701  10/28 0700 10/28 0701  10/29 0700 10/29 0701  10/30 0700    P.O.  250 240     I.V. (mL/kg)  700 (12.7)      IV Piggyback  100  50    Total Intake(mL/kg)  1050 (19) 240 (4.3) 50 (0.9)    Urine (mL/kg/hr)  650 100 (0.1)     Total Output  650 100     Net  +400 +140 +50            Urine Unmeasured Occurrence   1 x              PHYSICAL EXAMINATION:  Vitals reviewed.   Constitutional:       General: Awake.   Pulmonary:      Effort: Pulmonary effort is normal.      Breath sounds: Normal breath sounds.   Cardiovascular:      Normal rate. Regular rhythm. Normal S1. Normal S2.       Murmurs: There is no murmur.   Skin:     General: Skin is warm and dry.   Neurological:      General: No focal deficit present.   Psychiatric:         Behavior: Behavior is cooperative.         Diagnostic Data:  Lab Results   Component Value Date    WBC 10.75 10/28/2024    HGB 12.2 10/28/2024    HCT 36.7 10/28/2024    MCV 93.9 10/28/2024     (L) 10/28/2024      Lab Results   Component Value Date    GLUCOSE 109 (H) 10/28/2024    CALCIUM 8.3 (L) 10/28/2024     10/28/2024    K 4.2 10/28/2024    CO2 26.0 10/28/2024     10/28/2024    BUN 30 (H) 10/28/2024     CREATININE 0.95 10/28/2024    EGFRRESULT 59.2 (L) 08/28/2023    EGFR 62.6 10/28/2024    BCR 31.6 (H) 10/28/2024    ANIONGAP 11.0 10/28/2024      Lab Results (last 24 hours)       Procedure Component Value Units Date/Time    POC Glucose Once [[id number]]  (Normal) Collected: 10/29/24 1252    Specimen: Blood Updated: 10/29/24 1253     Glucose 91 mg/dL     POC Glucose Once [[id number]]  (Abnormal) Collected: 10/29/24 1224    Specimen: Blood Updated: 10/29/24 1225     Glucose 69 mg/dL     POC Glucose Once [[id number]]  (Abnormal) Collected: 10/29/24 1202    Specimen: Blood Updated: 10/29/24 1203     Glucose 69 mg/dL     POC Glucose Once [[id number]]  (Normal) Collected: 10/29/24 0939    Specimen: Blood Updated: 10/29/24 0940     Glucose 122 mg/dL     POC Glucose Once [[id number]]  (Normal) Collected: 10/29/24 0850    Specimen: Blood Updated: 10/29/24 0851     Glucose 106 mg/dL     POC Glucose Once [[id number]]  (Abnormal) Collected: 10/29/24 0756    Specimen: Blood Updated: 10/29/24 0758     Glucose 57 mg/dL     Basic Metabolic Panel [[id number]]  (Abnormal) Collected: 10/28/24 2303    Specimen: Blood Updated: 10/28/24 2332     Glucose 109 mg/dL      BUN 30 mg/dL      Creatinine 0.95 mg/dL      Sodium 140 mmol/L      Potassium 4.2 mmol/L      Comment: Slight hemolysis detected by analyzer. Result may be falsely elevated.        Chloride 103 mmol/L      CO2 26.0 mmol/L      Calcium 8.3 mg/dL      BUN/Creatinine Ratio 31.6     Anion Gap 11.0 mmol/L      eGFR 62.6 mL/min/1.73     Narrative:      GFR Normal >60  Chronic Kidney Disease <60  Kidney Failure <15    The GFR formula is only valid for adults with stable renal function between ages 18 and 70.    Magnesium [[id number]]  (Normal) Collected: 10/28/24 2303    Specimen: Blood Updated: 10/28/24 2332     Magnesium 2.0 mg/dL     Phosphorus [[id number]]  (Normal) Collected: 10/28/24 2303    Specimen: Blood Updated: 10/28/24 2331     Phosphorus 3.2 mg/dL     POC Glucose Once  [[id number]]  (Abnormal) Collected: 10/28/24 1928    Specimen: Blood Updated: 10/28/24 1929     Glucose 132 mg/dL     POC Glucose Once [[id number]]  (Abnormal) Collected: 10/28/24 1650    Specimen: Blood Updated: 10/28/24 1652     Glucose 144 mg/dL           Lab Results   Component Value Date    HGBA1C 5.80 (H) 08/12/2024      Lab Results   Component Value Date    CHOL 199 08/14/2024    CHLPL 214 (H) 02/28/2023    TRIG 38 08/14/2024    HDL 86 (H) 08/14/2024     (H) 08/14/2024      Telemetry data reviewed: SR with frequent PAC/PVC prior to afib  Afib from ~ 1206 PM - 1403 PM - after metoprolol 2.5 mg IV    ECG/EMG Results (last 24 hours)       Procedure Component Value Units Date/Time    ECG 12 Lead Rhythm Change [[id number]] Collected: 10/29/24 1307     Updated: 10/29/24 1325     QT Interval 284 ms      QTC Interval 447 ms     Narrative:      Test Reason : Rhythm Change  Blood Pressure :   */*   mmHG  Vent. Rate : 149 BPM     Atrial Rate : 159 BPM     P-R Int :   * ms          QRS Dur :  82 ms      QT Int : 284 ms       P-R-T Axes :   *  54 -11 degrees     QTc Int : 447 ms    Atrial fibrillation with rapid ventricular response  Marked ST abnormality, possible inferior subendocardial injury  Abnormal ECG  When compared with ECG of 27-OCT-2024 13:18, (Unconfirmed)  Atrial fibrillation has replaced Sinus rhythm  Vent. rate has increased BY  64 BPM  ST now depressed in Inferior leads  ST now depressed in Anterolateral leads  T wave inversion now evident in Inferior leads    Referred By: SUSAN           Confirmed By:            08/12/24  Adult Transthoracic Echo Complete W/ Cont if Necessary Per Protocol    Interpretation Summary    Left ventricular systolic function is normal. Left ventricular ejection fraction appears to be 61 - 65%.    Left ventricular diastolic function is consistent with (grade I) impaired relaxation.    The aortic valve exhibits sclerosis.     08/12/24  Cardiac Catheterization/Vascular  Study  Conclusion    Minimal, nonobstructive CAD.  Torturous coronary anatomy.    LVEDP 9 mmHg.      ASSESSMENT/PLAN:    Hip fracture    Type 2 diabetes mellitus    Essential hypertension    History of hypothyroidism    GERD without esophagitis    PAD (peripheral artery disease)    CV = 5  H&H normal  TSH elevated in 8/2024 - repeat TSH pending    Was on carvedilol at home - has been held since admission for ?hypotension    2 hour episode of Afib with RVR in the setting of right hip fracture, s/p right hip closed reduction and pinning of femoral neck fracture and beta blocker discontinuation.   -Would not commit to anticoagulation for single episode. If no further afib prior to discharge, can place Holter monitor or she can follow up in the short term with primary cardiologist.   - continue aspirin  - Restart carvedilol at 6.25 mg BID  - Cardiology will follow up again on Thursday, call if needed prior.       Scribed for Donal Enriquez MD by Juju Hinojosa, DYLAN. 10/29/2024  14:29 EDT

## 2024-10-29 NOTE — PROGRESS NOTES
"Orthopedic Daily Progress Note      CC: Patient with episode of a fib with RVR this afternoon. Cardiology consulted. ROSA otherwise.    Pain well controlled  General: no fevers, chills  Abdomen: no nausea, vomiting, or diarrhea    No other complaints    Physical Exam:  I have reviewed the vital signs.  Temp:  [97.5 °F (36.4 °C)-98.4 °F (36.9 °C)] 98.1 °F (36.7 °C)  Heart Rate:  [] 75  Resp:  [16] 16  BP: ()/(55-88) 95/59    Objective:  Vital signs: (most recent): Blood pressure 95/59, pulse 75, temperature 98.1 °F (36.7 °C), temperature source Oral, resp. rate 16, height 170.2 cm (67\"), weight 55.3 kg (122 lb), SpO2 94%, not currently breastfeeding.              General Appearance:    Alert, cooperative, no distress  Extremities: No clubbing, cyanosis, or edema to lower extremities  Pulses:  2+ in distal surgical extremity  Skin: Dressing Clean/dry/intact      Results Review:    I have reviewed the labs, radiology results and diagnostic studies:Hgb 12.2    Results from last 7 days   Lab Units 10/28/24  0640   WBC 10*3/mm3 10.75   HEMOGLOBIN g/dL 12.2   PLATELETS 10*3/mm3 126*     Results from last 7 days   Lab Units 10/28/24  2303   SODIUM mmol/L 140   POTASSIUM mmol/L 4.2   CO2 mmol/L 26.0   CREATININE mg/dL 0.95   GLUCOSE mg/dL 109*       I have reviewed the medications.    Assessment/Problem List  POD# 2 Days Post-Op   S/p right hip closed reduction and pinning of femoral neck fracture    Plan  WBAT RLE  PT/OT  DVT prophylaxis: ok to begin chemical ppx for Afib with RVR. Obviously, this does raise her bleeding risk, but benefits outweigh the risks.      Discharge Planning: I expect patient to be discharged to home with HH when medically appropriate.    Rubin Parkinson Jr., MD  10/29/24  14:19 EDT            "

## 2024-10-30 LAB
ANION GAP SERPL CALCULATED.3IONS-SCNC: 10 MMOL/L (ref 5–15)
BUN SERPL-MCNC: 18 MG/DL (ref 8–23)
BUN/CREAT SERPL: 22.5 (ref 7–25)
CALCIUM SPEC-SCNC: 8.6 MG/DL (ref 8.6–10.5)
CHLORIDE SERPL-SCNC: 108 MMOL/L (ref 98–107)
CO2 SERPL-SCNC: 26 MMOL/L (ref 22–29)
CREAT SERPL-MCNC: 0.8 MG/DL (ref 0.57–1)
DEPRECATED RDW RBC AUTO: 45 FL (ref 37–54)
EGFRCR SERPLBLD CKD-EPI 2021: 76.9 ML/MIN/1.73
ERYTHROCYTE [DISTWIDTH] IN BLOOD BY AUTOMATED COUNT: 12.6 % (ref 12.3–15.4)
GLUCOSE BLDC GLUCOMTR-MCNC: 105 MG/DL (ref 70–130)
GLUCOSE BLDC GLUCOMTR-MCNC: 127 MG/DL (ref 70–130)
GLUCOSE BLDC GLUCOMTR-MCNC: 132 MG/DL (ref 70–130)
GLUCOSE BLDC GLUCOMTR-MCNC: 89 MG/DL (ref 70–130)
GLUCOSE BLDC GLUCOMTR-MCNC: 94 MG/DL (ref 70–130)
GLUCOSE SERPL-MCNC: 96 MG/DL (ref 65–99)
HCT VFR BLD AUTO: 35.4 % (ref 34–46.6)
HGB BLD-MCNC: 11.5 G/DL (ref 12–15.9)
MCH RBC QN AUTO: 31.5 PG (ref 26.6–33)
MCHC RBC AUTO-ENTMCNC: 32.5 G/DL (ref 31.5–35.7)
MCV RBC AUTO: 97 FL (ref 79–97)
PLATELET # BLD AUTO: 130 10*3/MM3 (ref 140–450)
PMV BLD AUTO: 10.7 FL (ref 6–12)
POTASSIUM SERPL-SCNC: 3.9 MMOL/L (ref 3.5–5.2)
QT INTERVAL: 344 MS
QTC INTERVAL: 441 MS
RBC # BLD AUTO: 3.65 10*6/MM3 (ref 3.77–5.28)
SODIUM SERPL-SCNC: 144 MMOL/L (ref 136–145)
WBC NRBC COR # BLD AUTO: 5.2 10*3/MM3 (ref 3.4–10.8)

## 2024-10-30 PROCEDURE — 97116 GAIT TRAINING THERAPY: CPT

## 2024-10-30 PROCEDURE — 97530 THERAPEUTIC ACTIVITIES: CPT

## 2024-10-30 PROCEDURE — 82948 REAGENT STRIP/BLOOD GLUCOSE: CPT

## 2024-10-30 PROCEDURE — 85027 COMPLETE CBC AUTOMATED: CPT | Performed by: INTERNAL MEDICINE

## 2024-10-30 PROCEDURE — P9612 CATHETERIZE FOR URINE SPEC: HCPCS

## 2024-10-30 PROCEDURE — 93005 ELECTROCARDIOGRAM TRACING: CPT | Performed by: INTERNAL MEDICINE

## 2024-10-30 PROCEDURE — 97110 THERAPEUTIC EXERCISES: CPT

## 2024-10-30 PROCEDURE — 93010 ELECTROCARDIOGRAM REPORT: CPT | Performed by: INTERNAL MEDICINE

## 2024-10-30 PROCEDURE — 99232 SBSQ HOSP IP/OBS MODERATE 35: CPT | Performed by: INTERNAL MEDICINE

## 2024-10-30 PROCEDURE — 80048 BASIC METABOLIC PNL TOTAL CA: CPT | Performed by: INTERNAL MEDICINE

## 2024-10-30 PROCEDURE — 97535 SELF CARE MNGMENT TRAINING: CPT

## 2024-10-30 PROCEDURE — 25010000002 PROCHLORPERAZINE 10 MG/2ML SOLUTION: Performed by: PEDIATRICS

## 2024-10-30 RX ORDER — ALUMINA, MAGNESIA, AND SIMETHICONE 2400; 2400; 240 MG/30ML; MG/30ML; MG/30ML
30 SUSPENSION ORAL ONCE
Status: COMPLETED | OUTPATIENT
Start: 2024-10-30 | End: 2024-10-30

## 2024-10-30 RX ORDER — ENALAPRILAT 1.25 MG/ML
1.25 INJECTION INTRAVENOUS ONCE
Status: COMPLETED | OUTPATIENT
Start: 2024-10-30 | End: 2024-10-30

## 2024-10-30 RX ORDER — PROCHLORPERAZINE EDISYLATE 5 MG/ML
5 INJECTION INTRAMUSCULAR; INTRAVENOUS ONCE
Status: COMPLETED | OUTPATIENT
Start: 2024-10-30 | End: 2024-10-30

## 2024-10-30 RX ADMIN — CARVEDILOL 6.25 MG: 6.25 TABLET, FILM COATED ORAL at 07:49

## 2024-10-30 RX ADMIN — PANTOPRAZOLE SODIUM 40 MG: 40 TABLET, DELAYED RELEASE ORAL at 07:49

## 2024-10-30 RX ADMIN — Medication 10 ML: at 09:06

## 2024-10-30 RX ADMIN — ATORVASTATIN CALCIUM 40 MG: 40 TABLET, FILM COATED ORAL at 20:08

## 2024-10-30 RX ADMIN — OXYCODONE HYDROCHLORIDE AND ACETAMINOPHEN 1 TABLET: 5; 325 TABLET ORAL at 07:49

## 2024-10-30 RX ADMIN — OXYCODONE HYDROCHLORIDE AND ACETAMINOPHEN 1 TABLET: 5; 325 TABLET ORAL at 22:28

## 2024-10-30 RX ADMIN — CARVEDILOL 6.25 MG: 6.25 TABLET, FILM COATED ORAL at 17:16

## 2024-10-30 RX ADMIN — OXYCODONE HYDROCHLORIDE AND ACETAMINOPHEN 1 TABLET: 5; 325 TABLET ORAL at 00:37

## 2024-10-30 RX ADMIN — ALUMINUM HYDROXIDE, MAGNESIUM HYDROXIDE, DIMETHICONE 30 ML: 400; 400; 40 SUSPENSION ORAL at 05:30

## 2024-10-30 RX ADMIN — PROCHLORPERAZINE EDISYLATE 5 MG: 5 INJECTION INTRAMUSCULAR; INTRAVENOUS at 06:29

## 2024-10-30 RX ADMIN — ENALAPRILAT 1.25 MG: 1.25 INJECTION INTRAVENOUS at 04:38

## 2024-10-30 RX ADMIN — ASPIRIN 325 MG: 325 TABLET ORAL at 09:06

## 2024-10-30 RX ADMIN — OXYCODONE HYDROCHLORIDE AND ACETAMINOPHEN 1 TABLET: 5; 325 TABLET ORAL at 15:41

## 2024-10-30 NOTE — THERAPY TREATMENT NOTE
Patient Name: Marifer Ricardo  : 1950    MRN: [id number]                              Today's Date: 10/30/2024       Admit Date: 10/27/2024    Visit Dx:     ICD-10-CM ICD-9-CM   1. 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    Gastroparesis    Rosacea    Age related osteoporosis    GERD without esophagitis    Gastritis    PAD (peripheral artery disease)    Pure hypercholesterolemia    Hip fracture     Past Medical History:   Diagnosis Date    Abdominal pain     Abnormal bone density screening 2014    osteopenia    Arthritis     Asthma     Belching     Body piercing     BOTH EARS    Cataract, bilateral     Chronic renal insufficiency 2016    Diabetes mellitus     Essential hypertension 2016    GERD (gastroesophageal reflux disease)     Hx of mammogram     Hyperparathyroidism     Mastoiditis     Pregnancy      s/p  x 3    Renal insufficiency     Urticaria     Wears glasses     Weight loss      Past Surgical History:   Procedure Laterality Date    BYPASS GRAFT SUBCLAVIAN      LEFT SUBCLAVIAN TO RIGHT ATRIUM VENOUS GRAFT in past for ?TPN    CARDIAC CATHETERIZATION N/A 2024    Procedure: Left Heart Cath;  Surgeon: Isaac Whalen MD;  Location: [facility] INVASIVE LOCATION;  Service: Cardiology;  Laterality: N/A;    CATARACT EXTRACTION, BILATERAL      COLECTOMY PARTIAL / TOTAL      Pt reports multiple abd surgeries for ?pseudo-bowel obstruction    COLONOSCOPY      COLONOSCOPY N/A 2018    Procedure: COLONOSCOPY WITH RANDOM COLON BIOPSIES;  Surgeon: Taras Morillo,  MD;  Location: [facility] ENDOSCOPY;  Service:     COLOSTOMY      and revision    ENDOSCOPY N/A 01/25/2018    Procedure: ESOPHAGOGASTRODUODENOSCOPY WITH BIOPSIES;  Surgeon: Taras Morillo MD;  Location: [facility] ENDOSCOPY;  Service:     ENDOSCOPY N/A 8/14/2024    Procedure: ESOPHAGOGASTRODUODENOSCOPY;  Surgeon: Brunner, Mark I, MD;  Location: [facility] ENDOSCOPY;  Service: Gastroenterology;  Laterality: N/A;    HYSTERECTOMY      age 31 for DUB, ovaries intact    TONSILLECTOMY AND ADENOIDECTOMY        General Information       Row Name 10/30/24 0913          Physical Therapy Time and Intention    Document Type therapy note (daily note)  -CK     Mode of Treatment physical therapy;individual therapy  -CK       Row Name 10/30/24 0913          General Information    Patient Profile Reviewed yes  -CK     Existing Precautions/Restrictions fall;other (see comments)  RLE WBAT, demos adequate quad strength so KI discontinued  -CK     Barriers to Rehab none identified  -CK       Row Name 10/30/24 0913          Cognition    Orientation Status (Cognition) oriented x 4  -CK       Row Name 10/30/24 0913          Safety Issues/Impairments Affecting Functional Mobility    Safety Issues Affecting Function (Mobility) awareness of need for assistance;insight into deficits/self-awareness;safety precaution awareness;safety precautions follow-through/compliance  -CK     Impairments Affecting Function (Mobility) balance;endurance/activity tolerance;pain;range of motion (ROM);strength  -CK               User Key  (r) = Recorded By, (t) = Taken By, (c) = Cosigned By      Initials Name Provider Type    CK Irene Aden PT Physical Therapist                   Mobility       Row Name 10/30/24 0914          Bed Mobility    Bed Mobility supine-sit  -CK     Sit-Supine [place] (Bed Mobility) supervision  -CK     Assistive Device (Bed Mobility) head of bed elevated  -CK       Row Name 10/30/24 0914          Sit-Stand Transfer    Sit-Stand  [place] (Transfers) contact guard;verbal cues  -CK     Assistive Device (Sit-Stand Transfers) walker, front-wheeled  -CK     Comment, (Sit-Stand Transfer) cues for grab bar use in bathroom  -CK       Row Name 10/30/24 0914          Gait/Stairs (Locomotion)    [place] Level (Gait) contact guard;1 person assist;verbal cues  -CK     Assistive Device (Gait) walker, front-wheeled  -CK     Patient was able to Ambulate yes  -CK     Distance in Feet (Gait) 260  -CK     Deviations/Abnormal Patterns (Gait) bilateral deviations;yazmin decreased;gait speed decreased;stride length decreased  -CK     Right Sided Gait Deviations weight shift ability decreased;heel strike decreased  -CK     Comment, (Gait/Stairs) Patient ambulated in henry with step through gait pattern, no KI today and no knee buckling. Mildly decreased weightshifting onto RLE. Cues for improved RLE heel strike. Otherwise patient demos very good sequencing with AD and had no LOB.  -CK       Row Name 10/30/24 0914          Mobility    Extremity Weight-bearing Status right lower extremity  -CK     Right Lower Extremity (Weight-bearing Status) weight-bearing as tolerated (WBAT)  -CK               User Key  (r) = Recorded By, (t) = Taken By, (c) = Cosigned By      Initials Name Provider Type    CK Irene Aden PT Physical Therapist                   Obj/Interventions       Row Name 10/30/24 0916          Motor Skills    Therapeutic Exercise hip;knee;ankle  -CK       Row Name 10/30/24 0916          Hip (Therapeutic Exercise)    Hip (Therapeutic Exercise) isometric exercises;strengthening exercise  -CK     Hip Isometrics (Therapeutic Exercise) bilateral;gluteal sets;10 repetitions;3 second hold  -CK     Hip Strengthening (Therapeutic Exercise) aBduction;heel slides;10 repetitions;bilateral  -CK       Row Name 10/30/24 0916          Knee (Therapeutic Exercise)    Knee (Therapeutic Exercise) isometric exercises;strengthening exercise  -CK     Knee  Isometrics (Therapeutic Exercise) bilateral;quad sets;10 repetitions;3 second hold  -CK     Knee Strengthening (Therapeutic Exercise) LAQ (long arc quad);10 repetitions;bilateral  -CK       Row Name 10/30/24 0916          Ankle (Therapeutic Exercise)    Ankle (Therapeutic Exercise) AROM (active range of motion)  -CK     Ankle AROM (Therapeutic Exercise) bilateral;dorsiflexion;plantarflexion;10 repetitions  -CK       Row Name 10/30/24 0916          Balance    Balance Assessment sitting static balance;standing static balance;standing dynamic balance  -CK     Static Sitting Balance supervision  -CK     Position, Sitting Balance unsupported;sitting edge of bed  -CK     Static Standing Balance contact guard  -CK     Dynamic Standing Balance contact guard  -CK     Position/Device Used, Standing Balance supported;walker, front-wheeled  -CK     Comment, Balance no LOB or buckling  -CK               User Key  (r) = Recorded By, (t) = Taken By, (c) = Cosigned By      Initials Name Provider Type    CK Irene Aden, PT Physical Therapist                   Goals/Plan    No documentation.                  Clinical Impression       Row Name 10/30/24 0916          Pain    Pretreatment Pain Rating 7/10  -CK     Posttreatment Pain Rating 7/10  -CK     Pain Location hip  -CK     Pain Side/Orientation right  -CK     Pain Management Interventions premedicated for activity;exercise or physical activity utilized  -CK     Response to Pain Interventions activity participation with tolerable pain  -CK       Row Name 10/30/24 0916          Plan of Care Review    Plan of Care Reviewed With patient  -CK     Progress improving  -CK     Outcome Evaluation Patient showing improvement with ability to ambulate 260' CGA with FWW and without KI today. She had no LOB or buckling and gave very good effort with BLE therex. IPPT remains indicated to address ongoing deficits in strength, endurance, and balance. Continue to recommend D/C home with  assist and HHPT when medically appropriate.  -CK       Row Name 10/30/24 0916          Vital Signs    Pre Systolic BP Rehab 159  -CK     Pre Treatment Diastolic BP 72  -CK     Post Systolic BP Rehab 136  -CK     Post Treatment Diastolic BP 71  -CK     Pretreatment Heart Rate (beats/min) 77  -CK     Intratreatment Heart Rate (beats/min) 96  -CK     Posttreatment Heart Rate (beats/min) 83  -CK     Pre SpO2 (%) 95  -CK     O2 Delivery Pre Treatment nasal cannula  -CK     O2 Delivery Intra Treatment room air  -CK     Post SpO2 (%) 94  -CK     O2 Delivery Post Treatment room air  -CK     Pre Patient Position Supine  -CK     Intra Patient Position Standing  -CK     Post Patient Position Sitting  -CK       Row Name 10/30/24 0916          Positioning and Restraints    Pre-Treatment Position in bed  -CK     Post Treatment Position chair  -CK     In Chair reclined;call light within reach;encouraged to call for assist;exit alarm on;waffle cushion;compression device;notified nsg  -CK               User Key  (r) = Recorded By, (t) = Taken By, (c) = Cosigned By      Initials Name Provider Type    CK Irene Aden, PT Physical Therapist                   Outcome Measures       Row Name 10/30/24 0920          How much help from another person do you currently need...    Turning from your back to your side while in flat bed without using bedrails? 4  -CK     Moving from lying on back to sitting on the side of a flat bed without bedrails? 4  -CK     Moving to and from a bed to a chair (including a wheelchair)? 3  -CK     Standing up from a chair using your arms (e.g., wheelchair, bedside chair)? 3  -CK     Climbing 3-5 steps with a railing? 3  -CK     To walk in hospital room? 3  -CK     AM-PAC 6 Clicks Score (PT) 20  -CK     Highest Level of Mobility Goal 6 --> Walk 10 steps or more  -CK       Row Name 10/30/24 0920          Functional Assessment    Outcome Measure Options AM-PAC 6 Clicks Basic Mobility (PT)  -CK                User Key  (r) = Recorded By, (t) = Taken By, (c) = Cosigned By      Initials Name Provider Type    Irene Pascual PT Physical Therapist                                 Physical Therapy Education       Title: PT OT SLP Therapies (Done)       Topic: Physical Therapy (Done)       Point: Mobility training (Done)       Learning Progress Summary            Patient Acceptance, E, VU by CK at 10/30/2024 0920    Acceptance, E,D, VU,NR by LR at 10/28/2024 1547    Comment: Educated on precautions, weight bearing status, HEP, safety and benefits of mobility, correct sit to supine t/f technique, correct sit<->stand t/f technique, correct gait mechanics, and progression of POC.    Acceptance, E,D, VU,NR by LR at 10/28/2024 0845    Comment: Educated on precautions, weight bearing status, correct supine<->sit t/f technique, correct sit<->stand t/f technique, correct gait mechanics, LE HEP, and progression of POC.   Family Acceptance, E,D, VU,NR by LR at 10/28/2024 1547    Comment: Educated on precautions, weight bearing status, HEP, safety and benefits of mobility, correct sit to supine t/f technique, correct sit<->stand t/f technique, correct gait mechanics, and progression of POC.    Acceptance, E,D, VU,NR by LR at 10/28/2024 0845    Comment: Educated on precautions, weight bearing status, correct supine<->sit t/f technique, correct sit<->stand t/f technique, correct gait mechanics, LE HEP, and progression of POC.                      Point: Home exercise program (Done)       Learning Progress Summary            Patient Acceptance, E, VU by CK at 10/30/2024 0920    Acceptance, E,D, VU,NR by LR at 10/28/2024 1547    Comment: Educated on precautions, weight bearing status, HEP, safety and benefits of mobility, correct sit to supine t/f technique, correct sit<->stand t/f technique, correct gait mechanics, and progression of POC.    Acceptance, E,D, VU,NR by LR at 10/28/2024 0845    Comment: Educated on precautions, weight  bearing status, correct supine<->sit t/f technique, correct sit<->stand t/f technique, correct gait mechanics, LE HEP, and progression of POC.   Family Acceptance, E,D, VU,NR by LR at 10/28/2024 1547    Comment: Educated on precautions, weight bearing status, HEP, safety and benefits of mobility, correct sit to supine t/f technique, correct sit<->stand t/f technique, correct gait mechanics, and progression of POC.    Acceptance, E,D, VU,NR by LR at 10/28/2024 0845    Comment: Educated on precautions, weight bearing status, correct supine<->sit t/f technique, correct sit<->stand t/f technique, correct gait mechanics, LE HEP, and progression of POC.                      Point: Body mechanics (Done)       Learning Progress Summary            Patient Acceptance, E, VU by CK at 10/30/2024 0920    Acceptance, E,D, VU,NR by LR at 10/28/2024 1547    Comment: Educated on precautions, weight bearing status, HEP, safety and benefits of mobility, correct sit to supine t/f technique, correct sit<->stand t/f technique, correct gait mechanics, and progression of POC.    Acceptance, E,D, VU,NR by LR at 10/28/2024 0845    Comment: Educated on precautions, weight bearing status, correct supine<->sit t/f technique, correct sit<->stand t/f technique, correct gait mechanics, LE HEP, and progression of POC.   Family Acceptance, E,D, VU,NR by LR at 10/28/2024 1547    Comment: Educated on precautions, weight bearing status, HEP, safety and benefits of mobility, correct sit to supine t/f technique, correct sit<->stand t/f technique, correct gait mechanics, and progression of POC.    Acceptance, E,D, VU,NR by LR at 10/28/2024 0845    Comment: Educated on precautions, weight bearing status, correct supine<->sit t/f technique, correct sit<->stand t/f technique, correct gait mechanics, LE HEP, and progression of POC.                      Point: Precautions (Done)       Learning Progress Summary            Patient Acceptance, E, VU by CK at  10/30/2024 0920    Acceptance, E,D, VU,NR by LR at 10/28/2024 1547    Comment: Educated on precautions, weight bearing status, HEP, safety and benefits of mobility, correct sit to supine t/f technique, correct sit<->stand t/f technique, correct gait mechanics, and progression of POC.    Acceptance, E,D, VU,NR by LR at 10/28/2024 0845    Comment: Educated on precautions, weight bearing status, correct supine<->sit t/f technique, correct sit<->stand t/f technique, correct gait mechanics, LE HEP, and progression of POC.   Family Acceptance, E,D, VU,NR by LR at 10/28/2024 1547    Comment: Educated on precautions, weight bearing status, HEP, safety and benefits of mobility, correct sit to supine t/f technique, correct sit<->stand t/f technique, correct gait mechanics, and progression of POC.    Acceptance, E,D, VU,NR by LR at 10/28/2024 0845    Comment: Educated on precautions, weight bearing status, correct supine<->sit t/f technique, correct sit<->stand t/f technique, correct gait mechanics, LE HEP, and progression of POC.                                      User Key       Initials Effective Dates Name Provider Type Discipline    LR 02/03/23 -  Leslie Mooney, PT Physical Therapist PT    CK 02/06/24 -  Irene Aden, PT Physical Therapist PT                  PT Recommendation and Plan     Progress: improving  Outcome Evaluation: Patient showing improvement with ability to ambulate 260' CGA with FWW and without KI today. She had no LOB or buckling and gave very good effort with BLE therex. IPPT remains indicated to address ongoing deficits in strength, endurance, and balance. Continue to recommend D/C home with assist and HHPT when medically appropriate.     Time Calculation:         PT Charges       Row Name 10/30/24 0920             Time Calculation    Start Time 0830  -CK      PT Received On 10/30/24  -CK         Timed Charges    91151 - PT Therapeutic Exercise Minutes 10  -CK      51571 - Gait Training  Minutes  10  -CK      31645 - PT Therapeutic Activity Minutes 5  -CK         Total Minutes    Timed Charges Total Minutes 25  -CK       Total Minutes 25  -CK                User Key  (r) = Recorded By, (t) = Taken By, (c) = Cosigned By      Initials Name Provider Type    CK Irene Aden, PT Physical Therapist                  Therapy Charges for Today       Code Description Service Date Service Provider Modifiers Qty    13182158259 HC PT THER PROC EA 15 MIN 10/30/2024 Irene Aden, PT GP 1    [id number] HC GAIT TRAINING EA 15 MIN 10/30/2024 Irene Aden, PT GP 1            PT G-Codes  Outcome Measure Options: AM-PAC 6 Clicks Basic Mobility (PT)  AM-PAC 6 Clicks Score (PT): 20  AM-PAC 6 Clicks Score (OT): 17  PT Discharge Summary  Anticipated Discharge Disposition (PT): home with assist, home with home health    Irene Aden, DANTE  10/30/2024

## 2024-10-30 NOTE — SIGNIFICANT NOTE
"At 0510, RRT called for acute chest pain described as  \" heavy pressure like bricks on my chest \" localized at mid sternal. NSR per EKG. Previously anti nausea medication for constant belching refused by patient.. GI cocktail / IV compazine greatly subsided belching along with chest pain. SBP reduced from 200s to 124 after Vasotec administration.   "

## 2024-10-30 NOTE — PROGRESS NOTES
"          Clinical Nutrition Assessment     Patient Name: Marifer Ricardo  YOB: 1950  MRN: [id number]  Date of Encounter: 10/30/24 19:18 EDT  Admission date: 10/27/2024  Reason for Visit: Identified at risk by screening criteria    Assessment   Nutrition Assessment   Admission Diagnosis:  Hip fracture [S72.009A]    Problem List:    Hip fracture    Type 2 diabetes mellitus    Essential hypertension    History of hypothyroidism    GERD without esophagitis    PAD (peripheral artery disease)      PMH:   She  has a past medical history of Abdominal pain, Abnormal bone density screening (03/27/2014), Arthritis, Asthma, Belching, Body piercing, Cataract, bilateral, Chronic renal insufficiency (8/4/2016), Diabetes mellitus, Essential hypertension (8/4/2016), GERD (gastroesophageal reflux disease), mammogram (2009), Hyperparathyroidism, Mastoiditis, Pregnancy, Renal insufficiency, Urticaria, Wears glasses, and Weight loss.    PSH:  She  has a past surgical history that includes Hysterectomy; Colectomy partial / total; Tonsillectomy and adenoidectomy; Colostomy; Colonoscopy (2005); Cataract extraction, bilateral; Esophagogastroduodenoscopy (N/A, 01/25/2018); Colonoscopy (N/A, 02/07/2018); Bypass graft subclavian; Cardiac catheterization (N/A, 8/13/2024); and Esophagogastroduodenoscopy (N/A, 8/14/2024).    Applicable Nutrition History:     s/p R hip surgery 10-27-24    Anthropometrics     Height: Height: 170.2 cm (67\")  Last Filed Weight: Weight: 55.3 kg (122 lb) (10/27/24 0933)  Method: Weight Method: Stated  BMI: BMI (Calculated): 19.1    UBW:  123lb's    Weight change:  per EMR ~ 13lb wt loss over past 2 years, pt reports she has not had any wt loss for quite some time     Weight       Weight (kg) Weight (lbs) Weight Method Visit Report   1/22/2018 58.877 kg  129 lb 12.8 oz   --    1/23/2018 58.514 kg  129 lb  Stated     1/29/2018 58.5 kg  128 lb 15.5 oz   --    1/30/2018 58.06 kg  128 lb      2/21/2018 " 56.246 kg  124 lb   --    2/28/2018 56.337 kg  124 lb 3.2 oz   --    4/9/2018 56.7 kg  125 lb   --    5/3/2018 56.7 kg  125 lb   --    11/5/2018 58.968 kg  130 lb   --    5/6/2019 56.7 kg  125 lb   --    11/6/2019 57.777 kg  127 lb 6 oz   --    8/18/2021 58.877 kg  129 lb 12.8 oz   --    2/21/2022 56.7 kg  125 lb   --    6/1/2022 58.968 kg  130 lb  Stated      58.968 kg  130 lb  Stated      61.4 kg  135 lb 5.8 oz  Bed scale     6/2/2022 61.236 kg  135 lb      6/6/2022 57.153 kg  126 lb   --    8/15/2022 59.421 kg  131 lb   --    8/23/2022 59.875 kg  132 lb   --    2/27/2023 57.607 kg  127 lb   --    5/3/2023 58.968 kg  130 lb   --    6/26/2023 59.421 kg  131 lb   --    8/28/2023 58.423 kg  128 lb 12.8 oz   --    8/12/2024 56.246 kg  124 lb  Stated      55.9 kg  123 lb 3.8 oz  Bed scale     8/13/2024 55.9 kg  123 lb 3.8 oz      8/14/2024 56.246 kg  124 lb  Stated     9/13/2024 56.518 kg  124 lb 9.6 oz   --    10/27/2024 55.339 kg  122 lb  Stated         Nutrition Focused Physical Exam    Date:     Unable to perform due to RD to f/u for NFPE whne feasible*     Subjective   Reported/Observed/Food/Nutrition Related History:     Pt resting in bed, reports poor appetite, states she is able to swallow ok, UBW ~ 123lb's, has not had any recent wt loss, she avoids gluten in her diet as it upsets her stomach, does not have a diagnosis of celiac disease    Current Nutrition Prescription   PO: Diet: Regular/House, Diabetic, Gastrointestinal; Consistent Carbohydrate; Low Irritant; Texture: Regular (IDDSI 7); Fluid Consistency: Thin (IDDSI 0)  Oral Nutrition Supplement:   Intake:  75% of 4 meals    Assessment & Plan   Nutrition Diagnosis   Date:  10-30-24            Updated:    Problem Inadequate oral intake    Etiology Per Clinical Status: Post op   Signs/Symptoms Reported poor appetite, po intake ~ 75%   Status: New    Goal / Objectives:   Nutrition to support treatment and Increase intake      Nutrition Intervention       Follow treatment progress, Care plan reviewed, Advised available snacks, Interview for preferences, Encourage intake, Supplement offered/refused    Add Gluten Restriction per pt preference    Monitoring/Evaluation:   Per protocol    Mercy Hensley RD  Time Spent:30min

## 2024-10-30 NOTE — PROGRESS NOTES
[facility] Medicine Services  PROGRESS NOTE    Patient Name: Marifer Ricardo  : 1950  MRN: [id number]    Date of Admission: 10/27/2024  Primary Care Physician: Norma Spangler APRN    Subjective   Subjective     CC:  R hip fracture    HPI:  Denies chest pain or palpitations, no abdominal discomfort.      Objective   Objective     Vital Signs:   Temp:  [98.1 °F (36.7 °C)-98.7 °F (37.1 °C)] 98.1 °F (36.7 °C)  Heart Rate:  [] 75  Resp:  [16] 16  BP: ()/() 157/76  Flow (L/min) (Oxygen Therapy):  [2] 2     Physical Exam:  Non toxic, in bed  MM moist  RRR  CTAB  Abd soft, NT  Normal affect  Alert, speech clear    Results Reviewed:  LAB RESULTS:      Lab 10/29/24  1630 10/28/24  0640 10/27/24  1138   WBC 7.02 10.75 5.95   HEMOGLOBIN 11.5* 12.2 14.2   HEMATOCRIT 34.5 36.7 43.1   PLATELETS 136* 126* 120*   NEUTROS ABS 4.87 9.42* 4.59   IMMATURE GRANS (ABS) 0.01 0.13* 0.01   LYMPHS ABS 1.30 0.65* 0.77   MONOS ABS 0.59 0.54 0.48   EOS ABS 0.22 0.00 0.07   MCV 94.3 93.9 94.5   PROTIME  --   --  13.8         Lab 10/29/24  1432 10/28/24  2303 10/27/24  1138   SODIUM 143 140 138   POTASSIUM 3.8 4.2 3.9   CHLORIDE 107 103 103   CO2 21.0* 26.0 22.0   ANION GAP 15.0 11.0 13.0   BUN 20 30* 16   CREATININE 0.84 0.95 0.70   EGFR 72.6 62.6 90.3   GLUCOSE 101* 109* 93   CALCIUM 8.4* 8.3* 8.9   MAGNESIUM 2.0 2.0  --    PHOSPHORUS  --  3.2  --    TSH 4.520*  --   --          Lab 10/29/24  1432   TOTAL PROTEIN 5.7*   ALBUMIN 3.5   GLOBULIN 2.2   ALT (SGPT) 5   AST (SGOT) 17   BILIRUBIN 0.4   ALK PHOS 73         Lab 10/27/24  1138   PROTIME 13.8   INR 1.05                 Brief Urine Lab Results       None            Microbiology Results Abnormal       None            NM HIDA SCAN WITH PHARMACOLOGICAL INTERVENTION    Result Date: 10/29/2024  DATE OF EXAM: 10/29/2024 10:03 AM EDT PROCEDURE: NM HIDA SCAN WITH PHARMACOLOGICAL INTERVENTION INDICATIONS: chronic abd pain, indigestion  COMPARISON: Gallbladder ultrasound 8/14/2021. TECHNIQUE: The patient received 4.7 mCi of technetium 99m Choletec intravenously and images were obtained of the abdomen in the anterior projection through 60 minutes. Patient was administered 1.1 mcg of Kinevac to assess gallbladder emptying. Counts were obtained over the gallbladder to calculate the ejection fraction. FINDINGS: Normal radiopharmaceutical uptake within the liver. Gallbladder is visualized within the first 15 to 20 minutes of imaging indicating patency of the cystic duct. No scintigraphic evidence of acute or chronic cholecystitis. Radiopharmaceutical advancement  into the small bowel indicates patency of the common bile duct. The calculated gallbladder ejection fraction is 81%     Impression: Normal HIDA scan. Normal gallbladder ejection fraction 81%. Electronically Signed: Cierra Sanz MD  10/29/2024 12:12 PM EDT  Workstation ID: [id number]     Results for orders placed during the hospital encounter of 08/12/24    Adult Transthoracic Echo Complete W/ Cont if Necessary Per Protocol    Interpretation Summary    Left ventricular systolic function is normal. Left ventricular ejection fraction appears to be 61 - 65%.    Left ventricular diastolic function is consistent with (grade I) impaired relaxation.    The aortic valve exhibits sclerosis.      Current medications:  Scheduled Meds:aspirin, 325 mg, Oral, Daily  atorvastatin, 40 mg, Oral, Nightly  carvedilol, 6.25 mg, Oral, BID With Meals  dilTIAZem, 10 mg, Intravenous, Once  insulin lispro, 2-7 Units, Subcutaneous, 4x Daily AC & at Bedtime  pantoprazole, 40 mg, Oral, QAM AC  sodium chloride, 10 mL, Intravenous, Q12H      Continuous Infusions:dilTIAZem, 5-15 mg/hr  lactated ringers, 100 mL/hr, Last Rate: 100 mL/hr (10/29/24 1603)        PRN Meds:.  acetaminophen **OR** acetaminophen **OR** acetaminophen    senna-docusate sodium **AND** polyethylene glycol **AND** bisacodyl **AND** bisacodyl    dextrose     dextrose    glucagon (human recombinant)    Magnesium Standard Dose Replacement - Follow Nurse / BPA Driven Protocol    metoprolol tartrate    nitroglycerin    ondansetron ODT **OR** ondansetron    oxyCODONE-acetaminophen    Phosphorus Replacement - Follow Nurse / BPA Driven Protocol    Potassium Replacement - Follow Nurse / BPA Driven Protocol    [COMPLETED] Insert Peripheral IV **AND** sodium chloride    sodium chloride    Assessment & Plan   Assessment & Plan     Active Hospital Problems    Diagnosis  POA    **Hip fracture [S72.009A]  Yes    PAD (peripheral artery disease) [I73.9]  Yes    GERD without esophagitis [K21.9]  Yes    Essential hypertension [I10]  Yes    History of hypothyroidism [Z86.39]  Yes    Type 2 diabetes mellitus [E11.9]  Yes      Resolved Hospital Problems   No resolved problems to display.        Brief Hospital Course to date:  Marifer Ricardo is a 74 y.o. female with hx of DM, HTN, PAD, GERD, here with R hip fracture.     R hip fracture  --s/p right hip closed reduction and pinning of femoral neck fracture 10/27  --PT/OT     Atrial fibrillation with RVR  - Echo 8/2024 with normal EF, LHC 8/2024 with some mild atherosclerosis  - cardiology evaluated, defer AC for now, holter monitor at discharge.  - coreg 6.25 PO BID     GERD  -Cont PPI and Pepcid.   -HIDA unremarkable, 80% ejection fraction  -has undergone workups previously with GI     HTN  -- No longer on any meds for this.  Monitor BP, may need to resume an antihypertensive     PAD   -Continue ASA, Plavix, atorvastatin.     DM2 with hyperglycemia  -not on home medications  -A1c 5.8 8/24. Hold SSI as she tends to become hypoglycemic  - glucose reviewed      Chronic pain on chronic opioids  -Continue home Percocet.      Expected Discharge Location and Transportation: pending  Expected Discharge   Expected Discharge Date: 10/29/2024; Expected Discharge Time:      VTE Prophylaxis:  No VTE prophylaxis order currently exists.         AM-PAC  6 Clicks Score (PT): 20 (10/30/24 0920)    CODE STATUS:   Code Status and Medical Interventions: No CPR (Do Not Attempt to Resuscitate); Limited Support; No intubation (DNI)   Ordered at: 10/27/24 1421     Medical Intervention Limits:    No intubation (DNI)     Level Of Support Discussed With:    Patient     Code Status (Patient has no pulse and is not breathing):    No CPR (Do Not Attempt to Resuscitate)     Medical Interventions (Patient has pulse or is breathing):    Limited Support       Fan Cazares MD  10/30/24

## 2024-10-30 NOTE — THERAPY TREATMENT NOTE
Patient Name: Marifer Ricardo  : 1950    MRN: [id number]                              Today's Date: 10/30/2024       Admit Date: 10/27/2024    Visit Dx:     ICD-10-CM ICD-9-CM   1. 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    Gastroparesis    Rosacea    Age related osteoporosis    GERD without esophagitis    Gastritis    PAD (peripheral artery disease)    Pure hypercholesterolemia    Hip fracture     Past Medical History:   Diagnosis Date    Abdominal pain     Abnormal bone density screening 2014    osteopenia    Arthritis     Asthma     Belching     Body piercing     BOTH EARS    Cataract, bilateral     Chronic renal insufficiency 2016    Diabetes mellitus     Essential hypertension 2016    GERD (gastroesophageal reflux disease)     Hx of mammogram     Hyperparathyroidism     Mastoiditis     Pregnancy      s/p  x 3    Renal insufficiency     Urticaria     Wears glasses     Weight loss      Past Surgical History:   Procedure Laterality Date    BYPASS GRAFT SUBCLAVIAN      LEFT SUBCLAVIAN TO RIGHT ATRIUM VENOUS GRAFT in past for ?TPN    CARDIAC CATHETERIZATION N/A 2024    Procedure: Left Heart Cath;  Surgeon: Isaac Whalen MD;  Location: [facility] INVASIVE LOCATION;  Service: Cardiology;  Laterality: N/A;    CATARACT EXTRACTION, BILATERAL      COLECTOMY PARTIAL / TOTAL      Pt reports multiple abd surgeries for ?pseudo-bowel obstruction    COLONOSCOPY      COLONOSCOPY N/A 2018    Procedure: COLONOSCOPY WITH RANDOM COLON BIOPSIES;  Surgeon: Taras Morillo,  MD;  Location: [facility] ENDOSCOPY;  Service:     COLOSTOMY      and revision    ENDOSCOPY N/A 01/25/2018    Procedure: ESOPHAGOGASTRODUODENOSCOPY WITH BIOPSIES;  Surgeon: Taras Morillo MD;  Location: [facility] ENDOSCOPY;  Service:     ENDOSCOPY N/A 8/14/2024    Procedure: ESOPHAGOGASTRODUODENOSCOPY;  Surgeon: Brunner, Mark I, MD;  Location: [facility] ENDOSCOPY;  Service: Gastroenterology;  Laterality: N/A;    HYSTERECTOMY      age 31 for DUB, ovaries intact    TONSILLECTOMY AND ADENOIDECTOMY        General Information       Row Name 10/30/24 1543 10/30/24 0913       Physical Therapy Time and Intention    Document Type therapy note (daily note)  -CK therapy note (daily note)  -CK    Mode of Treatment physical therapy;individual therapy  -CK physical therapy;individual therapy  -CK      Row Name 10/30/24 1543 10/30/24 0913       General Information    Patient Profile Reviewed yes  -CK yes  -CK    Existing Precautions/Restrictions fall;other (see comments)  RLE WBAT  -CK fall;other (see comments)  RLE WBAT, demos adequate quad strength so KI discontinued  -CK    Barriers to Rehab none identified  -CK none identified  -CK      Row Name 10/30/24 1543 10/30/24 0913       Cognition    Orientation Status (Cognition) oriented x 4  -CK oriented x 4  -CK      Row Name 10/30/24 1543 10/30/24 0913       Safety Issues/Impairments Affecting Functional Mobility    Safety Issues Affecting Function (Mobility) awareness of need for assistance;insight into deficits/self-awareness;safety precaution awareness  -CK awareness of need for assistance;insight into deficits/self-awareness;safety precaution awareness;safety precautions follow-through/compliance  -CK    Impairments Affecting Function (Mobility) balance;endurance/activity tolerance;pain;range of motion (ROM);strength  -CK balance;endurance/activity tolerance;pain;range of motion (ROM);strength  -CK              User Key  (r) = Recorded By, (t) = Taken By, (c) = Cosigned By       Initials Name Provider Type    CK Irene Aden, PT Physical Therapist                   Mobility       Row Name 10/30/24 1544 10/30/24 0914       Bed Mobility    Bed Mobility supine-sit  -CK supine-sit  -CK    Supine-Sit [place] (Bed Mobility) supervision  -CK --    Sit-Supine [place] (Bed Mobility) -- supervision  -CK    Assistive Device (Bed Mobility) head of bed elevated  -CK head of bed elevated  -CK      Row Name 10/30/24 1544 10/30/24 0914       Sit-Stand Transfer    Sit-Stand [place] (Transfers) contact guard  -CK contact guard;verbal cues  -CK    Assistive Device (Sit-Stand Transfers) walker, front-wheeled  -CK walker, front-wheeled  -CK    Comment, (Sit-Stand Transfer) -- cues for grab bar use in bathroom  -CK      Row Name 10/30/24 1544 10/30/24 0914       Gait/Stairs (Locomotion)    [place] Level (Gait) contact guard;1 person assist;verbal cues  -CK contact guard;1 person assist;verbal cues  -CK    Assistive Device (Gait) walker, front-wheeled  -CK walker, front-wheeled  -CK    Patient was able to Ambulate -- yes  -CK    Distance in Feet (Gait) 700  -  -CK    Deviations/Abnormal Patterns (Gait) bilateral deviations;gait speed decreased  -CK bilateral deviations;yazmin decreased;gait speed decreased;stride length decreased  -CK    Right Sided Gait Deviations weight shift ability decreased  -CK weight shift ability decreased;heel strike decreased  -CK    Comment, (Gait/Stairs) Patient ambulates without difficulty in henry with step through gait pattern. She has very mildly decreased stance time on RLE, but demonstrates good sequencing with walker and no LOB or buckling.  -CK Patient ambulated in henry with step through gait pattern, no KI today and no knee buckling. Mildly decreased weightshifting onto RLE. Cues for improved RLE heel strike. Otherwise patient demos very good sequencing with AD and had no LOB.  -CK      Row Name 10/30/24 1544 10/30/24 0914       Mobility     Extremity Weight-bearing Status right lower extremity  -CK right lower extremity  -CK    Right Lower Extremity (Weight-bearing Status) weight-bearing as tolerated (WBAT)  -CK weight-bearing as tolerated (WBAT)  -CK              User Key  (r) = Recorded By, (t) = Taken By, (c) = Cosigned By      Initials Name Provider Type    CK Irene Aden PT Physical Therapist                   Obj/Interventions       Row Name 10/30/24 1545 10/30/24 0916       Motor Skills    Therapeutic Exercise hip;knee;ankle;other (see comments)  standing in walker heel raises and minisquats x10 reps  -CK hip;knee;ankle  -CK      Row Name 10/30/24 1545 10/30/24 0916       Hip (Therapeutic Exercise)    Hip (Therapeutic Exercise) isometric exercises;strengthening exercise  -CK isometric exercises;strengthening exercise  -CK    Hip Isometrics (Therapeutic Exercise) bilateral;gluteal sets;3 second hold;other (see comments)  15 reps  -CK bilateral;gluteal sets;10 repetitions;3 second hold  -CK    Hip Strengthening (Therapeutic Exercise) aBduction;heel slides;bilateral;15 repititions  -CK aBduction;heel slides;10 repetitions;bilateral  -CK      Row Name 10/30/24 1545 10/30/24 0916       Knee (Therapeutic Exercise)    Knee (Therapeutic Exercise) isometric exercises;strengthening exercise  -CK isometric exercises;strengthening exercise  -CK    Knee Isometrics (Therapeutic Exercise) bilateral;quad sets;3 second hold;other (see comments)  15 reps  -CK bilateral;quad sets;10 repetitions;3 second hold  -CK    Knee Strengthening (Therapeutic Exercise) LAQ (long arc quad);bilateral;15 repititions  -CK LAQ (long arc quad);10 repetitions;bilateral  -CK      Row Name 10/30/24 1545 10/30/24 0916       Ankle (Therapeutic Exercise)    Ankle (Therapeutic Exercise) AROM (active range of motion)  -CK AROM (active range of motion)  -CK    Ankle AROM (Therapeutic Exercise) bilateral;dorsiflexion;plantarflexion;20 repititions  -CK  bilateral;dorsiflexion;plantarflexion;10 repetitions  -CK      Row Name 10/30/24 1545 10/30/24 0916       Balance    Balance Assessment sitting static balance;standing static balance;standing dynamic balance  -CK sitting static balance;standing static balance;standing dynamic balance  -CK    Static Sitting Balance supervision  -CK supervision  -CK    Position, Sitting Balance unsupported;sitting edge of bed  -CK unsupported;sitting edge of bed  -CK    Static Standing Balance standby assist  -CK contact guard  -CK    Dynamic Standing Balance contact guard  -CK contact guard  -CK    Position/Device Used, Standing Balance supported;walker, front-wheeled  -CK supported;walker, front-wheeled  -CK    Comment, Balance -- no LOB or buckling  -CK              User Key  (r) = Recorded By, (t) = Taken By, (c) = Cosigned By      Initials Name Provider Type    CK Irene Aden, DANTE Physical Therapist                   Goals/Plan    No documentation.                  Clinical Impression       Row Name 10/30/24 1547 10/30/24 0916       Pain    Pretreatment Pain Rating 6/10  -CK 7/10  -CK    Posttreatment Pain Rating 6/10  -CK 7/10  -CK    Pain Location hip  -CK hip  -CK    Pain Side/Orientation right  -CK right  -CK    Pain Management Interventions exercise or physical activity utilized  -CK premedicated for activity;exercise or physical activity utilized  -CK    Response to Pain Interventions activity participation with tolerable pain  -CK activity participation with tolerable pain  -CK      Row Name 10/30/24 1547 10/30/24 0916       Plan of Care Review    Plan of Care Reviewed With patient;family  -CK patient  -CK    Progress improving  -CK improving  -CK    Outcome Evaluation Patient able to ambulate 700' CGA with FWW this afternoon and tolerated progression of BLE therex well with good form. She remains limited by mild deficits in strength and balance. IPPT is indicated to address these deficits. Continue to recommend D/C  home with assist and HHPT.  -CK Patient showing improvement with ability to ambulate 260' CGA with FWW and without KI today. She had no LOB or buckling and gave very good effort with BLE therex. IPPT remains indicated to address ongoing deficits in strength, endurance, and balance. Continue to recommend D/C home with assist and HHPT when medically appropriate.  -CK      Row Name 10/30/24 1547 10/30/24 0922       Vital Signs    Pre Systolic BP Rehab -- 159  -CK    Pre Treatment Diastolic BP -- 72  -CK    Post Systolic BP Rehab -- 136  -CK    Post Treatment Diastolic BP -- 71  -CK    Pretreatment Heart Rate (beats/min) -- 77  -CK    Intratreatment Heart Rate (beats/min) -- 96  -CK    Posttreatment Heart Rate (beats/min) -- 83  -CK    Pre SpO2 (%) -- 95  -CK    O2 Delivery Pre Treatment room air  -CK nasal cannula  -CK    O2 Delivery Intra Treatment room air  -CK room air  -CK    Post SpO2 (%) -- 94  -CK    O2 Delivery Post Treatment room air  -CK room air  -CK    Pre Patient Position Supine  -CK Supine  -CK    Intra Patient Position -- Standing  -CK    Post Patient Position Sitting  -CK Sitting  -CK      Row Name 10/30/24 1547 10/30/24 0916       Positioning and Restraints    Pre-Treatment Position in bed  -CK in bed  -CK    Post Treatment Position chair  -CK chair  -CK    In Chair reclined;call light within reach;encouraged to call for assist;exit alarm on;with family/caregiver;waffle cushion;LUE elevated;notified nsg  -CK reclined;call light within reach;encouraged to call for assist;exit alarm on;waffle cushion;compression device;notified nsg  -CK              User Key  (r) = Recorded By, (t) = Taken By, (c) = Cosigned By      Initials Name Provider Type    CK Irene Aden PT Physical Therapist                   Outcome Measures       Row Name 10/30/24 1549 10/30/24 0917       How much help from another person do you currently need...    Turning from your back to your side while in flat bed without using  bedrails? 4  -CK 4  -CK    Moving from lying on back to sitting on the side of a flat bed without bedrails? 4  -CK 4  -CK    Moving to and from a bed to a chair (including a wheelchair)? 3  -CK 3  -CK    Standing up from a chair using your arms (e.g., wheelchair, bedside chair)? 3  -CK 3  -CK    Climbing 3-5 steps with a railing? 3  -CK 3  -CK    To walk in hospital room? 3  -CK 3  -CK    AM-PAC 6 Clicks Score (PT) 20  -CK 20  -CK    Highest Level of Mobility Goal 6 --> Walk 10 steps or more  -CK 6 --> Walk 10 steps or more  -CK      Row Name 10/30/24 1549 10/30/24 1441       Functional Assessment    Outcome Measure Options AM-PAC 6 Clicks Basic Mobility (PT)  -CK AM-PAC 6 Clicks Daily Activity (OT)  -MR      Row Name 10/30/24 0920          Functional Assessment    Outcome Measure Options AM-PAC 6 Clicks Basic Mobility (PT)  -CK               User Key  (r) = Recorded By, (t) = Taken By, (c) = Cosigned By      Initials Name Provider Type    Mercy Rajan, OT Occupational Therapist    CK Irene Aden, PT Physical Therapist                                 Physical Therapy Education       Title: PT OT SLP Therapies (Done)       Topic: Physical Therapy (Done)       Point: Mobility training (Done)       Learning Progress Summary            Patient Acceptance, E, VU by CK at 10/30/2024 1549    Acceptance, E, VU by CK at 10/30/2024 0920    Acceptance, E,D, VU,NR by LR at 10/28/2024 1547    Comment: Educated on precautions, weight bearing status, HEP, safety and benefits of mobility, correct sit to supine t/f technique, correct sit<->stand t/f technique, correct gait mechanics, and progression of POC.    Acceptance, E,D, VU,NR by LR at 10/28/2024 0845    Comment: Educated on precautions, weight bearing status, correct supine<->sit t/f technique, correct sit<->stand t/f technique, correct gait mechanics, LE HEP, and progression of POC.   Family Acceptance, E,D, VU,NR by LR at 10/28/2024 1547    Comment: Educated on  precautions, weight bearing status, HEP, safety and benefits of mobility, correct sit to supine t/f technique, correct sit<->stand t/f technique, correct gait mechanics, and progression of POC.    Acceptance, E,D, VU,NR by LR at 10/28/2024 0845    Comment: Educated on precautions, weight bearing status, correct supine<->sit t/f technique, correct sit<->stand t/f technique, correct gait mechanics, LE HEP, and progression of POC.                      Point: Home exercise program (Done)       Learning Progress Summary            Patient Acceptance, E, VU by CK at 10/30/2024 1549    Acceptance, E, VU by CK at 10/30/2024 0920    Acceptance, E,D, VU,NR by LR at 10/28/2024 1547    Comment: Educated on precautions, weight bearing status, HEP, safety and benefits of mobility, correct sit to supine t/f technique, correct sit<->stand t/f technique, correct gait mechanics, and progression of POC.    Acceptance, E,D, VU,NR by LR at 10/28/2024 0845    Comment: Educated on precautions, weight bearing status, correct supine<->sit t/f technique, correct sit<->stand t/f technique, correct gait mechanics, LE HEP, and progression of POC.   Family Acceptance, E,D, VU,NR by LR at 10/28/2024 1547    Comment: Educated on precautions, weight bearing status, HEP, safety and benefits of mobility, correct sit to supine t/f technique, correct sit<->stand t/f technique, correct gait mechanics, and progression of POC.    Acceptance, E,D, VU,NR by LR at 10/28/2024 0845    Comment: Educated on precautions, weight bearing status, correct supine<->sit t/f technique, correct sit<->stand t/f technique, correct gait mechanics, LE HEP, and progression of POC.                      Point: Body mechanics (Done)       Learning Progress Summary            Patient Acceptance, E, VU by CK at 10/30/2024 1549    Acceptance, E, VU by CK at 10/30/2024 0920    Acceptance, E,D, VU,NR by LR at 10/28/2024 1547    Comment: Educated on precautions, weight bearing status,  HEP, safety and benefits of mobility, correct sit to supine t/f technique, correct sit<->stand t/f technique, correct gait mechanics, and progression of POC.    Acceptance, E,D, VU,NR by LR at 10/28/2024 0845    Comment: Educated on precautions, weight bearing status, correct supine<->sit t/f technique, correct sit<->stand t/f technique, correct gait mechanics, LE HEP, and progression of POC.   Family Acceptance, E,D, VU,NR by LR at 10/28/2024 1547    Comment: Educated on precautions, weight bearing status, HEP, safety and benefits of mobility, correct sit to supine t/f technique, correct sit<->stand t/f technique, correct gait mechanics, and progression of POC.    Acceptance, E,D, VU,NR by LR at 10/28/2024 0845    Comment: Educated on precautions, weight bearing status, correct supine<->sit t/f technique, correct sit<->stand t/f technique, correct gait mechanics, LE HEP, and progression of POC.                      Point: Precautions (Done)       Learning Progress Summary            Patient Acceptance, E, VU by CK at 10/30/2024 1549    Acceptance, E, VU by CK at 10/30/2024 0920    Acceptance, E,D, VU,NR by LR at 10/28/2024 1547    Comment: Educated on precautions, weight bearing status, HEP, safety and benefits of mobility, correct sit to supine t/f technique, correct sit<->stand t/f technique, correct gait mechanics, and progression of POC.    Acceptance, E,D, VU,NR by LR at 10/28/2024 0845    Comment: Educated on precautions, weight bearing status, correct supine<->sit t/f technique, correct sit<->stand t/f technique, correct gait mechanics, LE HEP, and progression of POC.   Family Acceptance, E,D, VU,NR by LR at 10/28/2024 1547    Comment: Educated on precautions, weight bearing status, HEP, safety and benefits of mobility, correct sit to supine t/f technique, correct sit<->stand t/f technique, correct gait mechanics, and progression of POC.    Acceptance, E,D, VU,NR by LR at 10/28/2024 0845    Comment: Educated  on precautions, weight bearing status, correct supine<->sit t/f technique, correct sit<->stand t/f technique, correct gait mechanics, LE HEP, and progression of POC.                                      User Key       Initials Effective Dates Name Provider Type Discipline    LR 02/03/23 -  Leslie Mooney, PT Physical Therapist PT    CK 02/06/24 -  Irene Aden PT Physical Therapist PT                  PT Recommendation and Plan     Progress: improving  Outcome Evaluation: Patient able to ambulate 700' CGA with FWW this afternoon and tolerated progression of BLE therex well with good form. She remains limited by mild deficits in strength and balance. IPPT is indicated to address these deficits. Continue to recommend D/C home with assist and HHPT.     Time Calculation:         PT Charges       Row Name 10/30/24 1549 10/30/24 0920          Time Calculation    Start Time 1455  -CK 0830  -CK     PT Received On 10/30/24  -CK 10/30/24  -CK        Timed Charges    02162 - PT Therapeutic Exercise Minutes 15  -CK 10  -CK     05525 - Gait Training Minutes  10  -CK 10  -CK     82344 - PT Therapeutic Activity Minutes -- 5  -CK        Total Minutes    Timed Charges Total Minutes 25  -CK 25  -CK      Total Minutes 25  -CK 25  -CK               User Key  (r) = Recorded By, (t) = Taken By, (c) = Cosigned By      Initials Name Provider Type    CK Irene Aden, DANTE Physical Therapist                  Therapy Charges for Today       Code Description Service Date Service Provider Modifiers Qty    41889312400 HC PT THER PROC EA 15 MIN 10/30/2024 Irene Aden, PT GP 1    32109316849 HC GAIT TRAINING EA 15 MIN 10/30/2024 Irene Aden, PT GP 1    77190822779 HC PT THER PROC EA 15 MIN 10/30/2024 Irene Aden, PT GP 1    88400678675 HC GAIT TRAINING EA 15 MIN 10/30/2024 Irene Aden, PT GP 1            PT G-Codes  Outcome Measure Options: AM-PAC 6 Clicks Basic Mobility (PT)  AM-PAC 6 Clicks Score  (PT): 20  AM-PAC 6 Clicks Score (OT): 17  PT Discharge Summary  Anticipated Discharge Disposition (PT): home with assist, home with home health    Irene Aden, PT  10/30/2024

## 2024-10-30 NOTE — PLAN OF CARE
BG at HS 90, due to the hypoglycemic episode earlier today, a cup of orange juice provided. Patient denies pain / dizziness or weakness at this time. Hydrofiber dressing CDI. External catheter reinstated per pday shift for hypotensive episode / dizziness. VSS on RA. SR in 70s on cardiac mx BP / BG closely monitored Call light in reach. Grand daughter at bedside.

## 2024-10-30 NOTE — PLAN OF CARE
Goal Outcome Evaluation:  Plan of Care Reviewed With: patient, daughter        Progress: improving  Outcome Evaluation: Pt demonstrating improvements w/ transfers, HH distances of functional mobility and ADLs this date. Pt continues to be below her functional baseline d/t R hip pain and decreased endurance. Continue to progress as able per current POC.    Anticipated Discharge Disposition (OT): home with assist, home with home health

## 2024-10-30 NOTE — THERAPY TREATMENT NOTE
Patient Name: Marifer Ricardo  : 1950    MRN: [id number]                              Today's Date: 10/30/2024       Admit Date: 10/27/2024    Visit Dx:     ICD-10-CM ICD-9-CM   1. 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    Gastroparesis    Rosacea    Age related osteoporosis    GERD without esophagitis    Gastritis    PAD (peripheral artery disease)    Pure hypercholesterolemia    Hip fracture     Past Medical History:   Diagnosis Date    Abdominal pain     Abnormal bone density screening 2014    osteopenia    Arthritis     Asthma     Belching     Body piercing     BOTH EARS    Cataract, bilateral     Chronic renal insufficiency 2016    Diabetes mellitus     Essential hypertension 2016    GERD (gastroesophageal reflux disease)     Hx of mammogram     Hyperparathyroidism     Mastoiditis     Pregnancy      s/p  x 3    Renal insufficiency     Urticaria     Wears glasses     Weight loss      Past Surgical History:   Procedure Laterality Date    BYPASS GRAFT SUBCLAVIAN      LEFT SUBCLAVIAN TO RIGHT ATRIUM VENOUS GRAFT in past for ?TPN    CARDIAC CATHETERIZATION N/A 2024    Procedure: Left Heart Cath;  Surgeon: Isaac Whalen MD;  Location: [facility] INVASIVE LOCATION;  Service: Cardiology;  Laterality: N/A;    CATARACT EXTRACTION, BILATERAL      COLECTOMY PARTIAL / TOTAL      Pt reports multiple abd surgeries for ?pseudo-bowel obstruction    COLONOSCOPY      COLONOSCOPY N/A 2018    Procedure: COLONOSCOPY WITH RANDOM COLON BIOPSIES;  Surgeon: Taras Morillo,  MD;  Location: [facility] ENDOSCOPY;  Service:     COLOSTOMY      and revision    ENDOSCOPY N/A 01/25/2018    Procedure: ESOPHAGOGASTRODUODENOSCOPY WITH BIOPSIES;  Surgeon: Taras Morillo MD;  Location: [facility] ENDOSCOPY;  Service:     ENDOSCOPY N/A 8/14/2024    Procedure: ESOPHAGOGASTRODUODENOSCOPY;  Surgeon: Brunner, Mark I, MD;  Location: [facility] ENDOSCOPY;  Service: Gastroenterology;  Laterality: N/A;    HYSTERECTOMY      age 31 for DUB, ovaries intact    TONSILLECTOMY AND ADENOIDECTOMY        General Information       Row Name 10/30/24 1422          OT Time and Intention    Document Type therapy note (daily note)  -MR     Mode of Treatment occupational therapy  -MR     Patient Effort good  -MR       Row Name 10/30/24 1422          General Information    Patient Profile Reviewed yes  -MR     Existing Precautions/Restrictions fall;other (see comments)  RLE WBAT, demos adequate quad strength so KI discontinued  -MR     Barriers to Rehab none identified  -MR       Row Name 10/30/24 1422          Cognition    Orientation Status (Cognition) oriented x 4  -MR       Row Name 10/30/24 1422          Safety Issues/Impairments Affecting Functional Mobility    Safety Issues Affecting Function (Mobility) awareness of need for assistance;insight into deficits/self-awareness;safety precaution awareness;safety precautions follow-through/compliance  -MR     Impairments Affecting Function (Mobility) balance;endurance/activity tolerance;pain;range of motion (ROM);strength  -MR               User Key  (r) = Recorded By, (t) = Taken By, (c) = Cosigned By      Initials Name Provider Type    MR Mercy Montano, OT Occupational Therapist                     Mobility/ADL's       Row Name 10/30/24 1424          Bed Mobility    Bed Mobility sit-supine  -MR     Sit-Supine [place] (Bed Mobility) supervision  -MR     Assistive Device (Bed Mobility) head of bed elevated  -MR       Row Name 10/30/24 1424          Transfers    Transfers  sit-stand transfer;toilet transfer  -MR     Comment, (Transfers) v/c for hand placement, sequencing and safety.  -MR       Row Name 10/30/24 1424          Sit-Stand Transfer    Sit-Stand [place] (Transfers) contact guard;verbal cues  -MR     Assistive Device (Sit-Stand Transfers) walker, front-wheeled  -MR     Comment, (Sit-Stand Transfer) v/c for use of grabbars for transfers from toilet.  -MR       Row Name 10/30/24 1424          Toilet Transfer    Type (Toilet Transfer) sit-stand;stand-sit  -MR     [place] Level (Toilet Transfer) contact guard;verbal cues  -MR     Assistive Device (Toilet Transfer) commode;grab bars/safety frame  -MR       Row Name 10/30/24 1424          Functional Mobility    Functional Mobility- Ind. Level contact guard assist  -MR     Functional Mobility- Device walker, front-wheeled  -MR     Functional Mobility-Distance (Feet) --  > HH distances  -MR       Row Name 10/30/24 1424          Activities of Daily Living    BADL Assessment/Intervention toileting;lower body dressing  -MR       Row Name 10/30/24 1424          Mobility    Extremity Weight-bearing Status right lower extremity  -MR     Right Lower Extremity (Weight-bearing Status) weight-bearing as tolerated (WBAT)  -MR       Row Name 10/30/24 1424          Lower Body Dressing Assessment/Training    [place] Level (Lower Body Dressing) other (see comments);maximum assist (25% patient effort)  adjusting socks prior to mobility  -MR     Position (Lower Body Dressing) supported sitting  -MR       Row Name 10/30/24 1424          Grooming Assessment/Training    [place] Level (Grooming) wash face, hands;contact guard assist  -MR     Position (Grooming) sink side  -MR       Row Name 10/30/24 1424          Toileting Assessment/Training    [place] Level (Toileting) adjust/manage clothing;contact guard assist;perform perineal hygiene;set up  -MR     Assistive Devices (Toileting) commode  -MR     Position (Toileting)  supported sitting;supported standing  -MR               User Key  (r) = Recorded By, (t) = Taken By, (c) = Cosigned By      Initials Name Provider Type    Mercy Rajan OT Occupational Therapist                   Obj/Interventions       Row Name 10/30/24 1435          Balance    Balance Assessment sitting static balance;sitting dynamic balance;standing static balance;standing dynamic balance  -MR     Static Sitting Balance standby assist  -MR     Dynamic Sitting Balance standby assist  -MR     Position, Sitting Balance unsupported;sitting in chair;sitting edge of bed;other (see comments)  commode  -MR     Static Standing Balance contact guard  -MR     Dynamic Standing Balance contact guard  -MR     Position/Device Used, Standing Balance supported;walker, front-wheeled  -MR     Balance Interventions sitting;standing;sit to stand;supported;static;dynamic;occupation based/functional task  -MR               User Key  (r) = Recorded By, (t) = Taken By, (c) = Cosigned By      Initials Name Provider Type    Mercy Rajan, NAVID Occupational Therapist                   Goals/Plan    No documentation.                  Clinical Impression       Row Name 10/30/24 1437          Pain Assessment    Pretreatment Pain Rating 6/10  -MR     Posttreatment Pain Rating 6/10  -MR     Pain Location hip  -MR     Pain Side/Orientation right  -MR       Row Name 10/30/24 1436          Plan of Care Review    Plan of Care Reviewed With patient;daughter  -MR     Progress improving  -MR     Outcome Evaluation Pt demonstrating improvements w/ transfers, HH distances of functional mobility and ADLs this date. Pt continues to be below her functional baseline d/t R hip pain and decreased endurance. Continue to progress as able per current POC.  -MR       Row Name 10/30/24 1436          Therapy Plan Review/Discharge Plan (OT)    Anticipated Discharge Disposition (OT) home with assist;home with home health  -MR       Row Name 10/30/24 7806           Vital Signs    O2 Delivery Pre Treatment room air  -MR     O2 Delivery Intra Treatment room air  -MR     O2 Delivery Post Treatment room air  -MR     Pre Patient Position Supine  -MR     Intra Patient Position Standing  -MR     Post Patient Position Sitting  -MR       Row Name 10/30/24 1435          Positioning and Restraints    Pre-Treatment Position sitting in chair/recliner  -MR     Post Treatment Position bed  -MR     In Bed notified nsg;supine;encouraged to call for assist;exit alarm on;call light within reach;with family/caregiver;side rails up x2;SCD pump applied  -MR               User Key  (r) = Recorded By, (t) = Taken By, (c) = Cosigned By      Initials Name Provider Type    MR DustyMercy, OT Occupational Therapist                   Outcome Measures       Row Name 10/30/24 1441          How much help from another is currently needed...    Putting on and taking off regular lower body clothing? 2  -MR     Bathing (including washing, rinsing, and drying) 2  -MR     Toileting (which includes using toilet bed pan or urinal) 3  -MR     Putting on and taking off regular upper body clothing 3  -MR     Taking care of personal grooming (such as brushing teeth) 3  -MR     Eating meals 4  -MR     AM-PAC 6 Clicks Score (OT) 17  -MR       Row Name 10/30/24 0920          How much help from another person do you currently need...    Turning from your back to your side while in flat bed without using bedrails? 4  -CK     Moving from lying on back to sitting on the side of a flat bed without bedrails? 4  -CK     Moving to and from a bed to a chair (including a wheelchair)? 3  -CK     Standing up from a chair using your arms (e.g., wheelchair, bedside chair)? 3  -CK     Climbing 3-5 steps with a railing? 3  -CK     To walk in hospital room? 3  -CK     AM-PAC 6 Clicks Score (PT) 20  -CK     Highest Level of Mobility Goal 6 --> Walk 10 steps or more  -CK       Row Name 10/30/24 1441 10/30/24 0920       Functional  Assessment    Outcome Measure Options AM-PAC 6 Clicks Daily Activity (OT)  - AM-PAC 6 Clicks Basic Mobility (PT)  -CK              User Key  (r) = Recorded By, (t) = Taken By, (c) = Cosigned By      Initials Name Provider Type    Mercy Rajan, OT Occupational Therapist    CK Irene Aden, PT Physical Therapist                    Occupational Therapy Education       Title: PT OT SLP Therapies (Done)       Topic: Occupational Therapy (Done)       Point: ADL training (Done)       Description:   Instruct learner(s) on proper safety adaptation and remediation techniques during self care or transfers.   Instruct in proper use of assistive devices.                  Learning Progress Summary            Patient Acceptance, E, VU by MR at 10/30/2024 1441    Acceptance, E, VU by  at 10/28/2024 0941   Family Acceptance, E, VU by  at 10/28/2024 0941                      Point: Home exercise program (Done)       Description:   Instruct learner(s) on appropriate technique for monitoring, assisting and/or progressing therapeutic exercises/activities.                  Learning Progress Summary            Patient Acceptance, E, VU by MR at 10/30/2024 1441    Acceptance, E, VU by  at 10/28/2024 0941   Family Acceptance, E, VU by  at 10/28/2024 0941                      Point: Precautions (Done)       Description:   Instruct learner(s) on prescribed precautions during self-care and functional transfers.                  Learning Progress Summary            Patient Acceptance, E, VU by MR at 10/30/2024 1441    Acceptance, E, VU by  at 10/28/2024 0941   Family Acceptance, E, VU by  at 10/28/2024 0941                      Point: Body mechanics (Done)       Description:   Instruct learner(s) on proper positioning and spine alignment during self-care, functional mobility activities and/or exercises.                  Learning Progress Summary            Patient Acceptance, E, VU by MR at 10/30/2024 1441     Acceptance, E, VU by  at 10/28/2024 0941   Family Acceptance, E, VU by  at 10/28/2024 0941                                      User Key       Initials Effective Dates Name Provider Type Discipline     09/22/22 -  Mercy Montano OT Occupational Therapist OT     08/08/24 -  Elana Talley OT Student OT Student OT                  OT Recommendation and Plan     Plan of Care Review  Plan of Care Reviewed With: patient, daughter  Progress: improving  Outcome Evaluation: Pt demonstrating improvements w/ transfers, HH distances of functional mobility and ADLs this date. Pt continues to be below her functional baseline d/t R hip pain and decreased endurance. Continue to progress as able per current POC.     Time Calculation:         Time Calculation- OT       Row Name 10/30/24 1441 10/30/24 0920          Time Calculation- OT    OT Start Time 1309  -MR --     OT Received On 10/30/24  -MR --        Timed Charges    31525 - Gait Training Minutes  -- 10  -CK     94731 - OT Therapeutic Activity Minutes 13  -MR --     32633 - OT Self Care/Mgmt Minutes 10  -MR --        Total Minutes    Timed Charges Total Minutes 23  -MR 10  -CK      Total Minutes 23  -MR 10  -CK               User Key  (r) = Recorded By, (t) = Taken By, (c) = Cosigned By      Initials Name Provider Type    MR Mercy Montano, OT Occupational Therapist    CK Irene Aden, PT Physical Therapist                  Therapy Charges for Today       Code Description Service Date Service Provider Modifiers Qty    75381995489 HC OT THERAPEUTIC ACT EA 15 MIN 10/30/2024 Mercy Montano OT GO 1    07808513848 HC OT SELF CARE/MGMT/TRAIN EA 15 MIN 10/30/2024 Mercy Montano OT GO 1                 Mercy Montano OT  10/30/2024

## 2024-10-30 NOTE — NURSING NOTE
Provider notified of significantly elevated SBP. Patient was transferred to BS to prompt voiding with two assist, Unable to void on her own, 800 of urine return noted via straight cath. Random BG 89 at 0300. [place] juice provided.

## 2024-10-30 NOTE — THERAPY EVALUATION
Patient Name: Marifer Ricardo  : 1950    MRN: [id number]                              Today's Date: 10/30/2024       Admit Date: 10/27/2024    Visit Dx:     ICD-10-CM ICD-9-CM   1. 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    Gastroparesis    Rosacea    Age related osteoporosis    GERD without esophagitis    Gastritis    PAD (peripheral artery disease)    Pure hypercholesterolemia    Hip fracture     Past Medical History:   Diagnosis Date    Abdominal pain     Abnormal bone density screening 2014    osteopenia    Arthritis     Asthma     Belching     Body piercing     BOTH EARS    Cataract, bilateral     Chronic renal insufficiency 2016    Diabetes mellitus     Essential hypertension 2016    GERD (gastroesophageal reflux disease)     Hx of mammogram     Hyperparathyroidism     Mastoiditis     Pregnancy      s/p  x 3    Renal insufficiency     Urticaria     Wears glasses     Weight loss      Past Surgical History:   Procedure Laterality Date    BYPASS GRAFT SUBCLAVIAN      LEFT SUBCLAVIAN TO RIGHT ATRIUM VENOUS GRAFT in past for ?TPN    CARDIAC CATHETERIZATION N/A 2024    Procedure: Left Heart Cath;  Surgeon: Isaac Whalen MD;  Location: [facility] INVASIVE LOCATION;  Service: Cardiology;  Laterality: N/A;    CATARACT EXTRACTION, BILATERAL      COLECTOMY PARTIAL / TOTAL      Pt reports multiple abd surgeries for ?pseudo-bowel obstruction    COLONOSCOPY      COLONOSCOPY N/A 2018    Procedure: COLONOSCOPY WITH RANDOM COLON BIOPSIES;  Surgeon: Taras Morillo,  MD;  Location: [facility] ENDOSCOPY;  Service:     COLOSTOMY      and revision    ENDOSCOPY N/A 01/25/2018    Procedure: ESOPHAGOGASTRODUODENOSCOPY WITH BIOPSIES;  Surgeon: Taras Morillo MD;  Location: [facility] ENDOSCOPY;  Service:     ENDOSCOPY N/A 8/14/2024    Procedure: ESOPHAGOGASTRODUODENOSCOPY;  Surgeon: Brunner, Mark I, MD;  Location: [facility] ENDOSCOPY;  Service: Gastroenterology;  Laterality: N/A;    HYSTERECTOMY      age 31 for DUB, ovaries intact    TONSILLECTOMY AND ADENOIDECTOMY        General Information       10/28/24 0845   General Information   Patient Profile Reviewed yes   Prior Level of Function independent:;all household mobility;community mobility;gait;transfer;bed mobility;ADL's  (used RW after her heart attack)   Existing Precautions/Restrictions (S)  fall;brace worn when out of bed;other (see comments)  (s/p R hip nailing, knee immobilizer to R LE when weight bearing d/t R quad weakness from single femoral shot)   Barriers to Rehab medically complex;previous functional deficit                     Mobility         10/28/24 0845   Mobility   Extremity Weight-bearing Status right lower extremity   Right Lower Extremity (Weight-bearing Status) weight-bearing as tolerated (WBAT)   Bed Mobility   Bed Mobility supine-sit;sit-supine   Supine-Sit [place] (Bed Mobility) verbal cues;standby assist   Sit-Supine [place] (Bed Mobility) verbal cues;contact guard   Assistive Device (Bed Mobility) head of bed elevated   Comment, (Bed Mobility) Verbal cues for correct technique for t/f in and out of bed. Had difficulty with lifting R LE back into bed d/t R quad weakness. Denied dizziness upon sitting up. Returned to supine in bed for donning of knee immobilizer to R LE d/t R quad weakness for single femoral shot.   Transfers   Comment, (Transfers) On first stand, patient pulled up on RW to stand despite cues to push up from bed. Verbal cues to step R LE out before t/f. Patient with significant R knee  buckling with weight bearing upon standing. Cued to return to sitting and then supine for donning of knee immobilizer. Verbal cues to push up from bed to stand.   Bed-Chair Transfer   Bed-Chair [place] (Transfers) not tested   Sit-Stand Transfer   Sit-Stand [place] (Transfers) verbal cues;1 person to manage equipment;contact guard;2 person assist   Assistive Device (Sit-Stand Transfers) walker, front-wheeled   Gait/Stairs (Locomotion)   [place] Level (Gait) verbal cues;contact guard;1 person to manage equipment   Assistive Device (Gait) walker, front-wheeled   Patient was able to Ambulate yes   Distance in Feet (Gait) 110   Deviations/Abnormal Patterns (Gait) yazmin decreased;gait speed decreased;stride length decreased;bilateral deviations;right sided deviations;antalgic   Bilateral Gait Deviations heel strike decreased   Right Sided Gait Deviations weight shift ability decreased;knee buckling, right side   [place] Level (Stairs) not tested   Comment, (Gait/Stairs) Patient ambulated with step to gait pattern at slow pace. Verbal cues for correct sequencing of steps, increased R LE weight bearing/stance phase, decreased UE weight bearing, and equal step length. Improved with cues for correction. Patient initially with large step on R, causing mild knee buckling. Improved with cues for shorter step on R. Gait limited by fatigue, weakness, and pain.                   Obj/Interventions       10/28/24 0845   Balance   Balance Assessment sitting static balance;sitting dynamic balance;standing static balance;standing dynamic balance   Static Sitting Balance standby assist   Dynamic Sitting Balance standby assist   Position, Sitting Balance unsupported;sitting edge of bed   Static Standing Balance contact guard;1 person to manage equipment   Dynamic Standing Balance contact guard;1 person to manage equipment   Position/Device Used, Standing Balance supported;walker, rolling   Motor Skills   Therapeutic  Exercise ankle;knee;hip;other (see comments)  (cues for technique; max assist R SLR, R LAQ d/t R quad weakness, min assist R hip abd and R heel slides, fair R quad set)   Hip (Therapeutic Exercise)   Hip (Therapeutic Exercise) strengthening exercise;isometric exercises   Hip Isometrics (Therapeutic Exercise) bilateral;gluteal sets;sitting;10 repetitions   Hip Strengthening (Therapeutic Exercise) bilateral;heel slides;aBduction;sitting;10 repetitions   Knee (Therapeutic Exercise)   Knee (Therapeutic Exercise) strengthening exercise;isometric exercises   Knee Isometrics (Therapeutic Exercise) bilateral;quad sets;sitting;10 repetitions   Knee Strengthening (Therapeutic Exercise) bilateral;SLR (straight leg raise);LAQ (long arc quad);sitting;10 repetitions   Ankle (Therapeutic Exercise)   Ankle (Therapeutic Exercise) AROM (active range of motion)   Ankle AROM (Therapeutic Exercise) bilateral;dorsiflexion;plantarflexion;sitting;10 repetitions                     Goals/Plan       10/28/24 0845   Bed Mobility Goal 1 (PT)   Activity/Assistive Device (Bed Mobility Goal 1, PT) sit to supine/supine to sit   [place] Level/Cues Needed (Bed Mobility Goal 1, PT) modified independence   Time Frame (Bed Mobility Goal 1, PT) short term goal (STG);3 days   Progress/Outcomes (Bed Mobility Goal 1, PT) goal ongoing   Transfer Goal 1 (PT)   Activity/Assistive Device (Transfer Goal 1, PT) sit-to-stand/stand-to-sit;walker, rolling   [place] Level/Cues Needed (Transfer Goal 1, PT) modified independence   Time Frame (Transfer Goal 1, PT) long term goal (LTG);5 days   Progress/Outcome (Transfer Goal 1, PT) goal ongoing   Gait Training Goal 1 (PT)   Activity/Assistive Device (Gait Training Goal 1, PT) gait (walking locomotion);walker, rolling   [place] Level (Gait Training Goal 1, PT) modified independence   Distance (Gait Training Goal 1, PT) 150 feet   Time Frame (Gait Training Goal 1, PT) long term goal (LTG);5 days    Progress/Outcome (Gait Training Goal 1, PT) goal ongoing                     Clinical Impression       10/28/24 0845   Plan of Care Review   Outcome Evaluation Patient ambulated 110 feet with RW, CGA, step to gait pattern, limited by fatigue, weakness, and pain. Patient demonstrates R LE weakness and impaired motor control in R LE d/t single femoral shot, demonstrating R knee buckling with weight bearing and unable to perform SLR independently. Donned knee immobilizer to R LE for ambulation, will need on R LE any time she is weight bearing. Patient currently below baseline functioning, demonstrating decreased functional mobility status, decreased endurance, and decreased R LE strength/ROM. Will address these deficits to promote return to PLOF. Recommend d/c home with assist and HHPT.                     Outcome measures:                                   Physical Therapy Education       Title: PT OT SLP Therapies (Done)       Topic: Physical Therapy (Done)       Point: Mobility training (Done)       Learning Progress Summary            Patient Acceptance, E,D, VU,NR by LR at 10/28/2024 1547    Comment: Educated on precautions, weight bearing status, HEP, safety and benefits of mobility, correct sit to supine t/f technique, correct sit<->stand t/f technique, correct gait mechanics, and progression of POC.    Acceptance, E,D, VU,NR by LR at 10/28/2024 0845    Comment: Educated on precautions, weight bearing status, correct supine<->sit t/f technique, correct sit<->stand t/f technique, correct gait mechanics, LE HEP, and progression of POC.   Family Acceptance, E,D, VU,NR by LR at 10/28/2024 1547    Comment: Educated on precautions, weight bearing status, HEP, safety and benefits of mobility, correct sit to supine t/f technique, correct sit<->stand t/f technique, correct gait mechanics, and progression of POC.    Acceptance, E,D, VU,NR by LR at 10/28/2024 0845    Comment: Educated on precautions, weight bearing  status, correct supine<->sit t/f technique, correct sit<->stand t/f technique, correct gait mechanics, LE HEP, and progression of POC.                      Point: Home exercise program (Done)       Learning Progress Summary            Patient Acceptance, E,D, VU,NR by LR at 10/28/2024 1547    Comment: Educated on precautions, weight bearing status, HEP, safety and benefits of mobility, correct sit to supine t/f technique, correct sit<->stand t/f technique, correct gait mechanics, and progression of POC.    Acceptance, E,D, VU,NR by LR at 10/28/2024 0845    Comment: Educated on precautions, weight bearing status, correct supine<->sit t/f technique, correct sit<->stand t/f technique, correct gait mechanics, LE HEP, and progression of POC.   Family Acceptance, E,D, VU,NR by LR at 10/28/2024 1547    Comment: Educated on precautions, weight bearing status, HEP, safety and benefits of mobility, correct sit to supine t/f technique, correct sit<->stand t/f technique, correct gait mechanics, and progression of POC.    Acceptance, E,D, VU,NR by LR at 10/28/2024 0845    Comment: Educated on precautions, weight bearing status, correct supine<->sit t/f technique, correct sit<->stand t/f technique, correct gait mechanics, LE HEP, and progression of POC.                      Point: Body mechanics (Done)       Learning Progress Summary            Patient Acceptance, E,D, VU,NR by LR at 10/28/2024 1547    Comment: Educated on precautions, weight bearing status, HEP, safety and benefits of mobility, correct sit to supine t/f technique, correct sit<->stand t/f technique, correct gait mechanics, and progression of POC.    Acceptance, E,D, VU,NR by LR at 10/28/2024 0845    Comment: Educated on precautions, weight bearing status, correct supine<->sit t/f technique, correct sit<->stand t/f technique, correct gait mechanics, LE HEP, and progression of POC.   Family Acceptance, E,D, VU,NR by LR at 10/28/2024 1547    Comment: Educated on  precautions, weight bearing status, HEP, safety and benefits of mobility, correct sit to supine t/f technique, correct sit<->stand t/f technique, correct gait mechanics, and progression of POC.    Acceptance, E,D, VU,NR by LR at 10/28/2024 0845    Comment: Educated on precautions, weight bearing status, correct supine<->sit t/f technique, correct sit<->stand t/f technique, correct gait mechanics, LE HEP, and progression of POC.                      Point: Precautions (Done)       Learning Progress Summary            Patient Acceptance, E,D, VU,NR by LR at 10/28/2024 1547    Comment: Educated on precautions, weight bearing status, HEP, safety and benefits of mobility, correct sit to supine t/f technique, correct sit<->stand t/f technique, correct gait mechanics, and progression of POC.    Acceptance, E,D, VU,NR by LR at 10/28/2024 0845    Comment: Educated on precautions, weight bearing status, correct supine<->sit t/f technique, correct sit<->stand t/f technique, correct gait mechanics, LE HEP, and progression of POC.   Family Acceptance, E,D, VU,NR by LR at 10/28/2024 1547    Comment: Educated on precautions, weight bearing status, HEP, safety and benefits of mobility, correct sit to supine t/f technique, correct sit<->stand t/f technique, correct gait mechanics, and progression of POC.    Acceptance, E,D, VU,NR by LR at 10/28/2024 0845    Comment: Educated on precautions, weight bearing status, correct supine<->sit t/f technique, correct sit<->stand t/f technique, correct gait mechanics, LE HEP, and progression of POC.                                      User Key       Initials Effective Dates Name Provider Type Discipline    LR 02/03/23 -  Leslie Mooney, PT Physical Therapist PT                  PT Recommendation and Plan  Planned Therapy Interventions (PT): balance training, bed mobility training, gait training, home exercise program, patient/family education, transfer training, strengthening, ROM  (range of motion)  Progress: improving  Outcome Evaluation: Patient increased gait distance with improved gait mechanics, ambulating 150 feet with CGA, RW, step through gait pattern, limited by pain. No knee buckling observed with gait, continued use of knee immobilizer and recommend continued use until reassess in AM d/t continued motor control deficits noted with R quad ther ex, although improved compared to this AM. Patient currently below baseline functioning, demonstrating decreased functional mobility status, decreased endurance, and decreased R hip strength/ROM. Will address these deficits to promote return to PLOF. Recommend d/c home with assist and HHPT.     Time Calculation:   PT Evaluation Complexity  History, PT Evaluation Complexity: 3 or more personal factors and/or comorbidities  Examination of Body Systems (PT Eval Complexity): total of 3 or more elements  Clinical Presentation (PT Evaluation Complexity): stable  Clinical Decision Making (PT Evaluation Complexity): low complexity  Overall Complexity (PT Evaluation Complexity): low complexity          PT G-Codes  Outcome Measure Options: AM-PAC 6 Clicks Basic Mobility (PT)  AM-PAC 6 Clicks Score (PT): 18  AM-PAC 6 Clicks Score (OT): 17  PT Discharge Summary  Anticipated Discharge Disposition (PT): home with assist, home with home health    Leslie Mooney, PT  10/30/2024

## 2024-10-30 NOTE — PROGRESS NOTES
"Orthopedic Daily Progress Note      CC: Patient with some continued CP overnight. Slightly improved this AM.    Pain well controlled  General: no fevers, chills  Abdomen: no nausea, vomiting, or diarrhea    No other complaints    Physical Exam:  I have reviewed the vital signs.  Temp:  [98.1 °F (36.7 °C)-98.7 °F (37.1 °C)] 98.7 °F (37.1 °C)  Heart Rate:  [] 78  Resp:  [16] 16  BP: ()/() 124/94    Objective:  Vital signs: (most recent): Blood pressure 124/94, pulse 78, temperature 98.7 °F (37.1 °C), temperature source Oral, resp. rate 16, height 170.2 cm (67\"), weight 55.3 kg (122 lb), SpO2 95%, not currently breastfeeding.              General Appearance:    Alert, cooperative, no distress  Extremities: No clubbing, cyanosis, or edema to lower extremities  Pulses:  2+ in distal surgical extremity  Skin: Dressing Clean/dry/intact      Results Review:    I have reviewed the labs, radiology results and diagnostic studies    Results from last 7 days   Lab Units 10/29/24  1630   WBC 10*3/mm3 7.02   HEMOGLOBIN g/dL 11.5*   PLATELETS 10*3/mm3 136*     Results from last 7 days   Lab Units 10/29/24  1432   SODIUM mmol/L 143   POTASSIUM mmol/L 3.8   CO2 mmol/L 21.0*   CREATININE mg/dL 0.84   GLUCOSE mg/dL 101*       I have reviewed the medications.    Assessment/Problem List  POD# 3 Days Post-Op   S/p right hip closed reduction and pinning of femoral neck fracture    Plan  WBAT RLE  PT/OT  DVT prophylaxis: ok to continue chemical ppx for Afib with RVR. Obviously, this does raise her bleeding risk, but benefits outweigh the risks.      Discharge Planning: I expect patient to be discharged to home with  when medically appropriate.    Rubin Parkinson Jr., MD  10/30/24  07:15 EDT            "

## 2024-10-30 NOTE — PLAN OF CARE
Goal Outcome Evaluation:  Plan of Care Reviewed With: patient        Progress: improving  Outcome Evaluation: Patient showing improvement with ability to ambulate 260' CGA with FWW and without KI today. She had no LOB or buckling and gave very good effort with BLE therex. IPPT remains indicated to address ongoing deficits in strength, endurance, and balance. Continue to recommend D/C home with assist and HHPT when medically appropriate.    Anticipated Discharge Disposition (PT): home with assist, home with home health

## 2024-10-30 NOTE — PLAN OF CARE
Goal Outcome Evaluation:  Plan of Care Reviewed With: patient, family        Progress: improving  Outcome Evaluation: Patient able to ambulate 700' CGA with FWW this afternoon and tolerated progression of BLE therex well with good form. She remains limited by mild deficits in strength and balance. IPPT is indicated to address these deficits. Continue to recommend D/C home with assist and HHPT.    Anticipated Discharge Disposition (PT): home with assist, home with home health

## 2024-10-31 ENCOUNTER — READMISSION MANAGEMENT (OUTPATIENT)
Dept: CALL CENTER | Facility: HOSPITAL | Age: 74
End: 2024-10-31
Payer: MEDICARE

## 2024-10-31 VITALS
HEIGHT: 67 IN | WEIGHT: 122 LBS | OXYGEN SATURATION: 95 % | RESPIRATION RATE: 18 BRPM | HEART RATE: 69 BPM | SYSTOLIC BLOOD PRESSURE: 165 MMHG | BODY MASS INDEX: 19.15 KG/M2 | TEMPERATURE: 98.1 F | DIASTOLIC BLOOD PRESSURE: 80 MMHG

## 2024-10-31 LAB
GLUCOSE BLDC GLUCOMTR-MCNC: 100 MG/DL (ref 70–130)
GLUCOSE BLDC GLUCOMTR-MCNC: 85 MG/DL (ref 70–130)

## 2024-10-31 PROCEDURE — 82948 REAGENT STRIP/BLOOD GLUCOSE: CPT

## 2024-10-31 PROCEDURE — 97110 THERAPEUTIC EXERCISES: CPT

## 2024-10-31 PROCEDURE — 99232 SBSQ HOSP IP/OBS MODERATE 35: CPT

## 2024-10-31 PROCEDURE — 99239 HOSP IP/OBS DSCHRG MGMT >30: CPT | Performed by: INTERNAL MEDICINE

## 2024-10-31 PROCEDURE — 97116 GAIT TRAINING THERAPY: CPT

## 2024-10-31 RX ORDER — ASPIRIN 81 MG/1
81 TABLET ORAL DAILY
Start: 2024-12-18

## 2024-10-31 RX ORDER — VALSARTAN 160 MG/1
160 TABLET ORAL
Status: DISCONTINUED | OUTPATIENT
Start: 2024-10-31 | End: 2024-10-31 | Stop reason: HOSPADM

## 2024-10-31 RX ORDER — ASPIRIN 325 MG
325 TABLET, DELAYED RELEASE (ENTERIC COATED) ORAL DAILY
Qty: 45 TABLET | Refills: 0 | Status: SHIPPED | OUTPATIENT
Start: 2024-10-31 | End: 2024-12-15

## 2024-10-31 RX ORDER — CARVEDILOL 6.25 MG/1
6.25 TABLET ORAL 2 TIMES DAILY WITH MEALS
Qty: 60 TABLET | Refills: 0 | Status: SHIPPED | OUTPATIENT
Start: 2024-10-31

## 2024-10-31 RX ORDER — ENALAPRILAT 1.25 MG/ML
1.25 INJECTION INTRAVENOUS EVERY 6 HOURS PRN
Status: DISCONTINUED | OUTPATIENT
Start: 2024-10-31 | End: 2024-10-31

## 2024-10-31 RX ORDER — VALSARTAN 160 MG/1
160 TABLET ORAL
Start: 2024-10-31

## 2024-10-31 RX ORDER — ALUMINA, MAGNESIA, AND SIMETHICONE 2400; 2400; 240 MG/30ML; MG/30ML; MG/30ML
30 SUSPENSION ORAL ONCE
Status: COMPLETED | OUTPATIENT
Start: 2024-10-31 | End: 2024-10-31

## 2024-10-31 RX ADMIN — VALSARTAN 160 MG: 160 TABLET, FILM COATED ORAL at 14:14

## 2024-10-31 RX ADMIN — CARVEDILOL 6.25 MG: 6.25 TABLET, FILM COATED ORAL at 08:10

## 2024-10-31 RX ADMIN — ACETAMINOPHEN 650 MG: 325 TABLET ORAL at 02:38

## 2024-10-31 RX ADMIN — ALUMINUM HYDROXIDE, MAGNESIUM HYDROXIDE, DIMETHICONE 30 ML: 400; 400; 40 SUSPENSION ORAL at 03:30

## 2024-10-31 RX ADMIN — PANTOPRAZOLE SODIUM 40 MG: 40 TABLET, DELAYED RELEASE ORAL at 08:10

## 2024-10-31 RX ADMIN — OXYCODONE HYDROCHLORIDE AND ACETAMINOPHEN 1 TABLET: 5; 325 TABLET ORAL at 11:16

## 2024-10-31 RX ADMIN — ASPIRIN 325 MG: 325 TABLET ORAL at 08:10

## 2024-10-31 RX ADMIN — OXYCODONE HYDROCHLORIDE AND ACETAMINOPHEN 1 TABLET: 5; 325 TABLET ORAL at 05:10

## 2024-10-31 RX ADMIN — Medication 10 ML: at 08:13

## 2024-10-31 NOTE — PLAN OF CARE
"Patient states, \" I feel much better \". No c/o chest pain / belching at this time. VSS on RA. SR with frequent PVCs on cardiac mx. Hydrofiber dressing remains CDI. BG / BP closely monitored. Call light in reach. Grand daughter at bedside.                                         "

## 2024-10-31 NOTE — OUTREACH NOTE
Prep Survey      Flowsheet Row Responses   [facility] facility patient discharged from? [facility]   Is LACE score < 7 ? No   Eligibility Readm Mgmt   Discharge diagnosis Hip fracture   Does the patient have one of the following disease processes/diagnoses(primary or secondary)? Other   Does the patient have Home health ordered? Yes   What is the Home health agency?  [facility]   Is there a DME ordered? No   Prep survey completed? Yes            LUKAS ERANNDEZ - Registered Nurse

## 2024-10-31 NOTE — PROGRESS NOTES
"[facility] Cardiology  Hospital Progress Note     LOS: 4 days   Patient Care Team:  Norma Spangler APRN as PCP - General (Nurse Practitioner)  Jonathan Mondragon MD as Consulting Physician (Neurology)  Ming Stiles MD, GARY (Nephrology)  Rosmery Nicholson DC as Consulting Physician (Chiropractic Medicine)  Taras Morillo MD as Consulting Physician (General Surgery)  Camilo Sunshine MD as Surgeon (Orthopedic Surgery)  Javan Witt MD as Consulting Physician (Cardiology)  PCP:  Norma Spangler APRN    Chief Complaint: PAF    SUBJECTIVE:  No further episodes of afib with re-initiation of beta blocker. Plan to go home today with HH.       OBJECTIVE:         Vital Sign Min/Max for last 24 hours  Temp  Min: 97.8 °F (36.6 °C)  Max: 98.1 °F (36.7 °C)   BP  Min: 122/64  Max: 205/91   Pulse  Min: 61  Max: 85   Resp  Min: 18  Max: 18   SpO2  Min: 91 %  Max: 95 %   No data recorded   No data recorded     Flowsheet Rows      Flowsheet Row First Filed Value   Admission Height 170.2 cm (67\") Documented at 10/27/2024 0933   Admission Weight 55.3 kg (122 lb) Documented at 10/27/2024 0933            Telemetry: SR, rate 60s      Intake/Output Summary (Last 24 hours) at 10/31/2024 1225  Last data filed at 10/31/2024 0900  Gross per 24 hour   Intake 238 ml   Output 1250 ml   Net -1012 ml     Intake & Output (last 3 days)         10/28 0701  10/29 0700 10/29 0701  10/30 0700 10/30 0701  10/31 0700 10/31 0701 11/01 0700    P.O. 240 680 120 118    I.V. (mL/kg)        IV Piggyback  50      Total Intake(mL/kg) 240 (4.3) 730 (13.2) 120 (2.2) 118 (2.1)    Urine (mL/kg/hr) 100 (0.1) 1650 (1.2) 1950 (1.5)     Stool   0     Total Output 100 1650 1950     Net +140 -920 -1830 +118            Urine Unmeasured Occurrence 1 x 2 x  1 x    Stool Unmeasured Occurrence  1 x 1 x              Physical Exam:  Vitals reviewed.   Constitutional:       General: Not in acute distress.     Appearance: Normal appearance. Not in " distress.   Pulmonary:      Effort: Pulmonary effort is normal.   Cardiovascular:      Normal rate. Regular rhythm.   Skin:     General: Skin is warm and dry.   Neurological:      General: No focal deficit present.      Mental Status: Alert.   Psychiatric:         Behavior: Behavior is cooperative.          LABS/DIAGNOSTIC DATA:  Results from last 7 days   Lab Units 10/30/24  0922 10/29/24  1630 10/28/24  0640   WBC 10*3/mm3 5.20 7.02 10.75   HEMOGLOBIN g/dL 11.5* 11.5* 12.2   HEMATOCRIT % 35.4 34.5 36.7   PLATELETS 10*3/mm3 130* 136* 126*     Lab Results   Lab Value Date/Time    TROPONINT 22 (H) 08/13/2024 0447    TROPONINT 24 (H) 08/12/2024 1513    TROPONINT 20 (H) 08/12/2024 1228    TROPONINT <0.010 06/01/2022 1820    TROPONINT <0.010 06/01/2022 1610    TROPONINT <0.010 06/01/2022 1610     Results from last 7 days   Lab Units 10/27/24  1138   INR  1.05     Results from last 7 days   Lab Units 10/30/24  0922 10/29/24  1432 10/28/24  2303   SODIUM mmol/L 144 143 140   POTASSIUM mmol/L 3.9 3.8 4.2   CHLORIDE mmol/L 108* 107 103   CO2 mmol/L 26.0 21.0* 26.0   BUN mg/dL 18 20 30*   CREATININE mg/dL 0.80 0.84 0.95   CALCIUM mg/dL 8.6 8.4* 8.3*   BILIRUBIN mg/dL  --  0.4  --    ALK PHOS U/L  --  73  --    ALT (SGPT) U/L  --  5  --    AST (SGOT) U/L  --  17  --    GLUCOSE mg/dL 96 101* 109*             Results from last 7 days   Lab Units 10/29/24  1432   TSH uIU/mL 4.520*           Medication Review:   aspirin, 325 mg, Oral, Daily  atorvastatin, 40 mg, Oral, Nightly  carvedilol, 6.25 mg, Oral, BID With Meals  dilTIAZem, 10 mg, Intravenous, Once  pantoprazole, 40 mg, Oral, QAM AC  sodium chloride, 10 mL, Intravenous, Q12H  valsartan, 160 mg, Oral, Q24H       lactated ringers, 100 mL/hr, Last Rate: 100 mL/hr (10/29/24 1603)         ASSESSMENT/PLAN:    Hip fracture    Type 2 diabetes mellitus    Essential hypertension    History of hypothyroidism    GERD without esophagitis    PAD (peripheral artery disease)    PAF - 2  hour episode of Afib with RVR in the setting of right hip fracture, s/p right hip closed reduction and pinning of femoral neck fracture and beta blocker discontinuation.   No recurrence since beta blocker resumption 10/29/2024.   - Continue aspirin  - Continue carvedilol 6.25 mg BID at discharge  - Restart home valsartan for BP control.   - Follow up in 2 weeks with primary cardiologist, Dr. Witt.  - She will follow home BP and HR closely.        Juju Hinojosa, DYLAN   10/31/24  12:25 EDT

## 2024-10-31 NOTE — THERAPY TREATMENT NOTE
Patient Name: Marifer Ricardo  : 1950    MRN: [id number]                              Today's Date: 10/31/2024       Admit Date: 10/27/2024    Visit Dx:     ICD-10-CM ICD-9-CM   1. 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    Gastroparesis    Rosacea    Age related osteoporosis    GERD without esophagitis    Gastritis    PAD (peripheral artery disease)    Pure hypercholesterolemia    Hip fracture     Past Medical History:   Diagnosis Date    Abdominal pain     Abnormal bone density screening 2014    osteopenia    Arthritis     Asthma     Belching     Body piercing     BOTH EARS    Cataract, bilateral     Chronic renal insufficiency 2016    Diabetes mellitus     Essential hypertension 2016    GERD (gastroesophageal reflux disease)     Hx of mammogram     Hyperparathyroidism     Mastoiditis     Pregnancy      s/p  x 3    Renal insufficiency     Urticaria     Wears glasses     Weight loss      Past Surgical History:   Procedure Laterality Date    BYPASS GRAFT SUBCLAVIAN      LEFT SUBCLAVIAN TO RIGHT ATRIUM VENOUS GRAFT in past for ?TPN    CARDIAC CATHETERIZATION N/A 2024    Procedure: Left Heart Cath;  Surgeon: Isaac Whalen MD;  Location: [facility] INVASIVE LOCATION;  Service: Cardiology;  Laterality: N/A;    CATARACT EXTRACTION, BILATERAL      COLECTOMY PARTIAL / TOTAL      Pt reports multiple abd surgeries for ?pseudo-bowel obstruction    COLONOSCOPY      COLONOSCOPY N/A 2018    Procedure: COLONOSCOPY WITH RANDOM COLON BIOPSIES;  Surgeon: Taras Morillo,  MD;  Location: [facility] ENDOSCOPY;  Service:     COLOSTOMY      and revision    ENDOSCOPY N/A 01/25/2018    Procedure: ESOPHAGOGASTRODUODENOSCOPY WITH BIOPSIES;  Surgeon: Taras Morillo MD;  Location: [facility] ENDOSCOPY;  Service:     ENDOSCOPY N/A 8/14/2024    Procedure: ESOPHAGOGASTRODUODENOSCOPY;  Surgeon: Brunner, Mark I, MD;  Location: [facility] ENDOSCOPY;  Service: Gastroenterology;  Laterality: N/A;    HYSTERECTOMY      age 31 for DUB, ovaries intact    TONSILLECTOMY AND ADENOIDECTOMY        General Information       Row Name 10/31/24 1028          Physical Therapy Time and Intention    Document Type therapy note (daily note)  -AB     Mode of Treatment physical therapy  -AB       Row Name 10/31/24 1028          General Information    Patient Profile Reviewed yes  -AB     Existing Precautions/Restrictions fall;other (see comments)  RLE WBAT  -AB     Barriers to Rehab none identified  -AB       Row Name 10/31/24 1028          Cognition    Orientation Status (Cognition) oriented x 4  -AB       Row Name 10/31/24 1028          Safety Issues/Impairments Affecting Functional Mobility    Safety Issues Affecting Function (Mobility) awareness of need for assistance;insight into deficits/self-awareness;safety precaution awareness  -AB     Impairments Affecting Function (Mobility) balance;endurance/activity tolerance;pain;range of motion (ROM);strength  -AB               User Key  (r) = Recorded By, (t) = Taken By, (c) = Cosigned By      Initials Name Provider Type    AB Karlee Triana, PT Physical Therapist                   Mobility       Row Name 10/31/24 1031          Bed Mobility    Comment, (Bed Mobility) Pt received in bathroom  -AB       Row Name 10/31/24 1031          Transfers    Comment, (Transfers) Cues for hand placement and sequencing.  -AB       Row Name 10/31/24 1031          Sit-Stand Transfer    Sit-Stand [place] (Transfers) contact guard;1 person assist  -AB     Assistive Device (Sit-Stand Transfers)  walker, front-wheeled  -AB     Comment, (Sit-Stand Transfer) STS from chair and toilet  -AB       Row Name 10/31/24 1031          Gait/Stairs (Locomotion)    [place] Level (Gait) contact guard;1 person assist;verbal cues  -AB     Assistive Device (Gait) walker, front-wheeled  -AB     Patient was able to Ambulate yes  -AB     Distance in Feet (Gait) 350  -AB     Deviations/Abnormal Patterns (Gait) bilateral deviations;gait speed decreased  -AB     Bilateral Gait Deviations forward flexed posture;heel strike decreased  -AB     Right Sided Gait Deviations weight shift ability decreased  -AB     Comment, (Gait/Stairs) Pt ambulated with step through gait pattern, appropriate yazmin, and decreased weight acceptance onto RLE. No overt LOB or knee buckling. Cues for upright posture. Further activity limited by fatigue.  -AB       Row Name 10/31/24 1031          Mobility    Extremity Weight-bearing Status right lower extremity  -AB     Right Lower Extremity (Weight-bearing Status) weight-bearing as tolerated (WBAT)  -AB               User Key  (r) = Recorded By, (t) = Taken By, (c) = Cosigned By      Initials Name Provider Type    AB Karlee Triana, PT Physical Therapist                   Obj/Interventions       Row Name 10/31/24 1033          Motor Skills    Therapeutic Exercise hip;knee;ankle  -AB       Row Name 10/31/24 1033          Hip (Therapeutic Exercise)    Hip Isometrics (Therapeutic Exercise) bilateral;gluteal sets;10 repetitions  -AB       Row Name 10/31/24 1033          Knee (Therapeutic Exercise)    Knee Isometrics (Therapeutic Exercise) bilateral;quad sets;10 repetitions  -AB     Knee Strengthening (Therapeutic Exercise) bilateral;SLR (straight leg raise);LAQ (long arc quad);heel slides;10 repetitions  -AB       Row Name 10/31/24 1033          Ankle (Therapeutic Exercise)    Ankle (Therapeutic Exercise) AROM (active range of motion)  -AB     Ankle AROM (Therapeutic Exercise)  bilateral;dorsiflexion;plantarflexion;10 repetitions  -AB       Row Name 10/31/24 1033          Balance    Balance Assessment sitting static balance;sitting dynamic balance;standing static balance;standing dynamic balance  -AB     Static Sitting Balance independent  -AB     Dynamic Sitting Balance independent  -AB     Position, Sitting Balance unsupported;sitting in chair  -AB     Static Standing Balance standby assist  -AB     Dynamic Standing Balance contact guard;1-person assist  -AB     Position/Device Used, Standing Balance walker, front-wheeled;supported  -AB     Balance Interventions sitting;standing;sit to stand;supported;static;dynamic;occupation based/functional task  -AB     Comment, Balance no LOB or buckling.  -AB               User Key  (r) = Recorded By, (t) = Taken By, (c) = Cosigned By      Initials Name Provider Type    AB Karlee Triana, PT Physical Therapist                   Goals/Plan    No documentation.                  Clinical Impression       Row Name 10/31/24 1034          Pain    Pretreatment Pain Rating 6/10  -AB     Posttreatment Pain Rating 6/10  -AB     Pain Location hip  -AB     Pain Side/Orientation right  -AB     Pain Management Interventions activity modification encouraged;exercise or physical activity utilized;positioning techniques utilized;cold applied  -AB     Response to Pain Interventions activity participation with decreased pain  -AB     Pre/Posttreatment Pain Comment tolerated  -AB       Row Name 10/31/24 1034          Plan of Care Review    Plan of Care Reviewed With patient  -AB     Progress no change  -AB     Outcome Evaluation Pt continues to present below baseline with RLE weakness, impaired endurance, and pain. Ambulation of 350 with CGA ansd RW was well tolerated. No overt LOB or knee buckling. HEP completed. PT will continue to progress as able.  -AB       Row Name 10/31/24 1034          Vital Signs    Pre Systolic BP Rehab 147  -AB     Pre Treatment Diastolic  BP 87  -AB     Post Systolic BP Rehab 189   RN aware  -AB     Post Treatment Diastolic BP 89   -AB     Pretreatment Heart Rate (beats/min) 78  -AB     Intratreatment Heart Rate (beats/min) 90  -AB     Posttreatment Heart Rate (beats/min) 77  -AB     O2 Delivery Pre Treatment room air  -AB     O2 Delivery Intra Treatment room air  -AB     O2 Delivery Post Treatment room air  -AB     Pre Patient Position Standing  -AB     Intra Patient Position Standing  -AB     Post Patient Position Sitting  -AB       Row Name 10/31/24 1034          Positioning and Restraints    Pre-Treatment Position bathroom  -AB     Post Treatment Position chair  -AB     In Chair notified nsg;reclined;sitting;call light within reach;encouraged to call for assist;exit alarm on;legs elevated;waffle cushion;compression device  -AB               User Key  (r) = Recorded By, (t) = Taken By, (c) = Cosigned By      Initials Name Provider Type    Karlee Toussaint, PT Physical Therapist                   Outcome Measures       Row Name 10/31/24 1037          How much help from another person do you currently need...    Turning from your back to your side while in flat bed without using bedrails? 3  -AB     Moving from lying on back to sitting on the side of a flat bed without bedrails? 3  -AB     Moving to and from a bed to a chair (including a wheelchair)? 3  -AB     Standing up from a chair using your arms (e.g., wheelchair, bedside chair)? 3  -AB     Climbing 3-5 steps with a railing? 3  -AB     To walk in hospital room? 3  -AB     AM-PAC 6 Clicks Score (PT) 18  -AB     Highest Level of Mobility Goal 6 --> Walk 10 steps or more  -AB       Row Name 10/31/24 [facility]          Functional Assessment    Outcome Measure Options AM-PAC 6 Clicks Basic Mobility (PT)  -AB               User Key  (r) = Recorded By, (t) = Taken By, (c) = Cosigned By      Initials Name Provider Type    Karlee Toussaint, PT Physical Therapist                                 Physical  Therapy Education       Title: PT OT SLP Therapies (Done)       Topic: Physical Therapy (Done)       Point: Mobility training (Done)       Learning Progress Summary            Patient Acceptance, E,D, VU,DU by AB at 10/31/2024 1037    Acceptance, E, VU by CK at 10/30/2024 1549    Acceptance, E, VU by CK at 10/30/2024 0920    Acceptance, E,D, VU,NR by LR at 10/28/2024 1547    Comment: Educated on precautions, weight bearing status, HEP, safety and benefits of mobility, correct sit to supine t/f technique, correct sit<->stand t/f technique, correct gait mechanics, and progression of POC.    Acceptance, E,D, VU,NR by LR at 10/28/2024 0845    Comment: Educated on precautions, weight bearing status, correct supine<->sit t/f technique, correct sit<->stand t/f technique, correct gait mechanics, LE HEP, and progression of POC.   Family Acceptance, E,D, VU,NR by LR at 10/28/2024 1547    Comment: Educated on precautions, weight bearing status, HEP, safety and benefits of mobility, correct sit to supine t/f technique, correct sit<->stand t/f technique, correct gait mechanics, and progression of POC.    Acceptance, E,D, VU,NR by LR at 10/28/2024 0845    Comment: Educated on precautions, weight bearing status, correct supine<->sit t/f technique, correct sit<->stand t/f technique, correct gait mechanics, LE HEP, and progression of POC.                      Point: Home exercise program (Done)       Learning Progress Summary            Patient Acceptance, E,D, VU,DU by AB at 10/31/2024 1037    Acceptance, E, VU by CK at 10/30/2024 1549    Acceptance, E, VU by CK at 10/30/2024 0920    Acceptance, E,D, VU,NR by LR at 10/28/2024 1547    Comment: Educated on precautions, weight bearing status, HEP, safety and benefits of mobility, correct sit to supine t/f technique, correct sit<->stand t/f technique, correct gait mechanics, and progression of POC.    Acceptance, E,D, VU,NR by LR at 10/28/2024 0845    Comment: Educated on precautions,  weight bearing status, correct supine<->sit t/f technique, correct sit<->stand t/f technique, correct gait mechanics, LE HEP, and progression of POC.   Family Acceptance, E,D, VU,NR by LR at 10/28/2024 1547    Comment: Educated on precautions, weight bearing status, HEP, safety and benefits of mobility, correct sit to supine t/f technique, correct sit<->stand t/f technique, correct gait mechanics, and progression of POC.    Acceptance, E,D, VU,NR by LR at 10/28/2024 0845    Comment: Educated on precautions, weight bearing status, correct supine<->sit t/f technique, correct sit<->stand t/f technique, correct gait mechanics, LE HEP, and progression of POC.                      Point: Body mechanics (Done)       Learning Progress Summary            Patient Acceptance, E,D, VU,DU by AB at 10/31/2024 1037    Acceptance, E, VU by CK at 10/30/2024 1549    Acceptance, E, VU by CK at 10/30/2024 0920    Acceptance, E,D, VU,NR by LR at 10/28/2024 1547    Comment: Educated on precautions, weight bearing status, HEP, safety and benefits of mobility, correct sit to supine t/f technique, correct sit<->stand t/f technique, correct gait mechanics, and progression of POC.    Acceptance, E,D, VU,NR by LR at 10/28/2024 0845    Comment: Educated on precautions, weight bearing status, correct supine<->sit t/f technique, correct sit<->stand t/f technique, correct gait mechanics, LE HEP, and progression of POC.   Family Acceptance, E,D, VU,NR by LR at 10/28/2024 1547    Comment: Educated on precautions, weight bearing status, HEP, safety and benefits of mobility, correct sit to supine t/f technique, correct sit<->stand t/f technique, correct gait mechanics, and progression of POC.    Acceptance, E,D, VU,NR by LR at 10/28/2024 0845    Comment: Educated on precautions, weight bearing status, correct supine<->sit t/f technique, correct sit<->stand t/f technique, correct gait mechanics, LE HEP, and progression of POC.                       Point: Precautions (Done)       Learning Progress Summary            Patient Acceptance, E,D, VU,DU by AB at 10/31/2024 1037    Acceptance, E, VU by CK at 10/30/2024 1549    Acceptance, E, VU by CK at 10/30/2024 0920    Acceptance, E,D, VU,NR by LR at 10/28/2024 1547    Comment: Educated on precautions, weight bearing status, HEP, safety and benefits of mobility, correct sit to supine t/f technique, correct sit<->stand t/f technique, correct gait mechanics, and progression of POC.    Acceptance, E,D, VU,NR by LR at 10/28/2024 0845    Comment: Educated on precautions, weight bearing status, correct supine<->sit t/f technique, correct sit<->stand t/f technique, correct gait mechanics, LE HEP, and progression of POC.   Family Acceptance, E,D, VU,NR by LR at 10/28/2024 1547    Comment: Educated on precautions, weight bearing status, HEP, safety and benefits of mobility, correct sit to supine t/f technique, correct sit<->stand t/f technique, correct gait mechanics, and progression of POC.    Acceptance, E,D, VU,NR by LR at 10/28/2024 0845    Comment: Educated on precautions, weight bearing status, correct supine<->sit t/f technique, correct sit<->stand t/f technique, correct gait mechanics, LE HEP, and progression of POC.                                      User Key       Initials Effective Dates Name Provider Type Discipline    LR 02/03/23 -  Leslie Mooney, PT Physical Therapist PT    AB 09/22/22 -  Karlee Triana, PT Physical Therapist PT    CK 02/06/24 -  Irene Aden PT Physical Therapist PT                  PT Recommendation and Plan     Progress: no change  Outcome Evaluation: Pt continues to present below baseline with RLE weakness, impaired endurance, and pain. Ambulation of 350 with CGA ansd RW was well tolerated. No overt LOB or knee buckling. HEP completed. PT will continue to progress as able.     Time Calculation:         PT Charges       Row Name 10/31/24 1037             Time Calculation     Start Time 0850  -AB      PT Received On 10/31/24  -AB         Timed Charges    90698 - PT Therapeutic Exercise Minutes 10  -AB      28874 - Gait Training Minutes  8  -AB      74945 - PT Therapeutic Activity Minutes 5  -AB         Total Minutes    Timed Charges Total Minutes 23  -AB       Total Minutes 23  -AB                User Key  (r) = Recorded By, (t) = Taken By, (c) = Cosigned By      Initials Name Provider Type    AB Karlee Triana, PT Physical Therapist                  Therapy Charges for Today       Code Description Service Date Service Provider Modifiers Qty    13475308880 HC PT THER PROC EA 15 MIN 10/31/2024 Karlee Triana, PT GP 1    [id number] HC GAIT TRAINING EA 15 MIN 10/31/2024 Karlee Triana, PT GP 1            PT G-Codes  Outcome Measure Options: AM-PAC 6 Clicks Basic Mobility (PT)  AM-PAC 6 Clicks Score (PT): 18  AM-PAC 6 Clicks Score (OT): 17  PT Discharge Summary  Anticipated Discharge Disposition (PT): home with assist, home with home health    Karlee Triana PT  10/31/2024

## 2024-10-31 NOTE — PLAN OF CARE
Goal Outcome Evaluation:  Plan of Care Reviewed With: patient        Progress: no change  Outcome Evaluation: Pt continues to present below baseline with RLE weakness, impaired endurance, and pain. Ambulation of 350 with CGA ansd RW was well tolerated. No overt LOB or knee buckling. HEP completed. PT will continue to progress as able.    Anticipated Discharge Disposition (PT): home with assist, home with home health

## 2024-10-31 NOTE — DISCHARGE SUMMARY
[facility]  DISCHARGE SUMMARY    Patient Name: Marifer Ricardo  : 1950  MRN: [id number]    Date of Admission: 10/27/2024  9:34 AM  Date of Discharge:  10/31/24  Primary Care Physician: Norma Spangler APRN    Consults       Date and Time Order Name Status Description    10/29/2024  1:27 PM Inpatient Cardiology Consult Completed     10/27/2024  2:21 PM Inpatient Orthopedic Surgery Consult              Hospital Course     Presenting Problem: Hip fracture    Active Hospital Problems    Diagnosis  POA    **Hip fracture [S72.009A]  Yes    PAD (peripheral artery disease) [I73.9]  Yes    GERD without esophagitis [K21.9]  Yes    Essential hypertension [I10]  Yes    History of hypothyroidism [Z86.39]  Yes    Type 2 diabetes mellitus [E11.9]  Yes      Resolved Hospital Problems   No resolved problems to display.          Hospital Course:  Marifer Ricardo is a 74 y.o. female with hx of DM, HTN, PAD, GERD, here with R hip fracture.  Patient states that she was doing well and actually cleaning up the house when all of a sudden her right leg buckled on her. She did not fall or have any specific trauma to the area. Afterwards she had severe pain to that leg and really struggled to walk. The following day due to her pain persisting she came to the emergency department.      R hip fracture  --s/p right hip closed reduction and pinning of femoral neck fracture 10/27, Dr Parkinson  - Aspirin 325 mg daily for 6 weeks for DVT prophylaxis, then resume aspirin 81 mg daily thereafter.    - Patient seen by PT/OT, recommended home with home health.  Patient and family in agreement  --follow up with Orthopedics in 2 weeks      Atrial fibrillation with RVR  - Echo 2024 with normal EF, LHC 2024 with some mild atherosclerosis  - cardiology evaluated, defer AC for now, 2 week holter monitor at discharge.  - coreg 6.25 PO BID resumed  - follow up with primary cardiologist in 6  weeks     GERD  -some dyspepsia while hospitalized   -HIDA obtained and unremarkable, 80% ejection fraction  -has undergone workups previously with GI, can followup with outpatient GI as needed  -Cont home PPI and Pepcid.     HTN  -- No longer on any meds for this.  Coreg resumed due to afib prior to discharge.  Can also resume low dose norvasc at discharge  - asked patient/family to check BP twice daily for the next week at home and take results to PCP followup     PAD   -Continue ASA, atorvastatin.     DM2 with hyperglycemia  -not on home medications  -A1c 5.8 8/24. Held SSI while hospitalized as the patient tended to become hypoglycemic with insulin administration     Chronic pain on chronic opioids  -Continue home Percocet.     Thrombocytopenia  - platelets 130 at discharge  - consider CBC at PCP followup      Discharge Follow Up Recommendations for outpatient labs/diagnostics:     Consider CBC at PCP followup    Day of Discharge     HPI:   Feels ok.  Stomach better, very mild symptoms this morning, says this is typical and chronic for her.  No further palpitations.  Would like to go home today.    Granddaughter at bedside    Review of Systems  Gen: no fevers    Vital Signs:   Temp:  [97.8 °F (36.6 °C)-98 °F (36.7 °C)] 97.8 °F (36.6 °C)  Heart Rate:  [61-85] 69  Resp:  [16-18] 18  BP: (122-205)/(58-91) 147/87      Physical Exam:  Non toxic appearing, in bed  MM moist  RRR  Breath sounds clear bilaterally  Abdomen soft, Non tender  Awake, speech clear  Slightly flat affect    Pertinent  and/or Most Recent Results     LAB RESULTS:      Lab 10/30/24  0922 10/29/24  1630 10/28/24  0640 10/27/24  1138   WBC 5.20 7.02 10.75 5.95   HEMOGLOBIN 11.5* 11.5* 12.2 14.2   HEMATOCRIT 35.4 34.5 36.7 43.1   PLATELETS 130* 136* 126* 120*   NEUTROS ABS  --  4.87 9.42* 4.59   IMMATURE GRANS (ABS)  --  0.01 0.13* 0.01   LYMPHS ABS  --  1.30 0.65* 0.77   MONOS ABS  --  0.59 0.54 0.48   EOS ABS  --  0.22 0.00 0.07   MCV 97.0 94.3  93.9 94.5   PROTIME  --   --   --  13.8         Lab 10/30/24  0922 10/29/24  1432 10/28/24  2303 10/27/24  1138   SODIUM 144 143 140 138   POTASSIUM 3.9 3.8 4.2 3.9   CHLORIDE 108* 107 103 103   CO2 26.0 21.0* 26.0 22.0   ANION GAP 10.0 15.0 11.0 13.0   BUN 18 20 30* 16   CREATININE 0.80 0.84 0.95 0.70   EGFR 76.9 72.6 62.6 90.3   GLUCOSE 96 101* 109* 93   CALCIUM 8.6 8.4* 8.3* 8.9   MAGNESIUM  --  2.0 2.0  --    PHOSPHORUS  --   --  3.2  --    TSH  --  4.520*  --   --          Lab 10/29/24  1432   TOTAL PROTEIN 5.7*   ALBUMIN 3.5   GLOBULIN 2.2   ALT (SGPT) 5   AST (SGOT) 17   BILIRUBIN 0.4   ALK PHOS 73         Lab 10/27/24  1138   PROTIME 13.8   INR 1.05                 Brief Urine Lab Results       None          Microbiology Results (last 10 days)       ** No results found for the last 240 hours. **            NM HIDA SCAN WITH PHARMACOLOGICAL INTERVENTION    Result Date: 10/29/2024  DATE OF EXAM: 10/29/2024 10:03 AM EDT PROCEDURE: NM HIDA SCAN WITH PHARMACOLOGICAL INTERVENTION INDICATIONS: chronic abd pain, indigestion COMPARISON: Gallbladder ultrasound 8/14/2021. TECHNIQUE: The patient received 4.7 mCi of technetium 99m Choletec intravenously and images were obtained of the abdomen in the anterior projection through 60 minutes. Patient was administered 1.1 mcg of Kinevac to assess gallbladder emptying. Counts were obtained over the gallbladder to calculate the ejection fraction. FINDINGS: Normal radiopharmaceutical uptake within the liver. Gallbladder is visualized within the first 15 to 20 minutes of imaging indicating patency of the cystic duct. No scintigraphic evidence of acute or chronic cholecystitis. Radiopharmaceutical advancement  into the small bowel indicates patency of the common bile duct. The calculated gallbladder ejection fraction is 81%     Normal HIDA scan. Normal gallbladder ejection fraction 81%. Electronically Signed: Cierra Sanz MD  10/29/2024 12:12 PM EDT  Workstation ID:  [id number]    FL C Arm During Surgery    Result Date: 10/27/2024  This procedure was auto-finalized with no dictation required.    Peripheral Block    Result Date: 10/27/2024  Meera Rice CRNA     10/27/2024  2:30 PM Peripheral Block Patient reassessed immediately prior to procedure Reason for block: at surgeon's request and post-op pain management Performed by Anesthesiologist: Karlee Barrera DO Preanesthetic Checklist Completed: patient identified, IV checked, site marked, risks and benefits discussed, surgical consent, monitors and equipment checked, pre-op evaluation and timeout performed Prep: Pt Position: supine Sterile barriers:gloves, cap, sterile barriers, mask and washed/disinfected hands Prep: ChloraPrep Patient monitoring: blood pressure monitoring, continuous pulse oximetry and EKG Procedure Guidance:ultrasound guided ULTRASOUND INTERPRETATION.  Using ultrasound guidance a 20 G gauge needle was placed in close proximity to the femoral nerve, at which point, under ultrasound guidance anesthetic was injected in the area of the nerve and spread of the anesthesia was seen on ultrasound in close proximity thereto.  There were no abnormalities seen on ultrasound; a digital image was taken; and the patient tolerated the procedure with no complications. Images:still images obtained, printed/placed on chart Block Type:femoral Injection Technique:single-shot Needle Type:short-bevel Needle Gauge:20 G Resistance on Injection: none Medications Used: bupivacaine PF (MARCAINE) 0.25 % injection - Injection  30 mL - 10/27/2024 2:25:00 PM dexamethasone sodium phosphate injection - Injection  4 mg - 10/27/2024 2:25:00 PM Post Assessment Injection Assessment: negative aspiration for heme, no paresthesia on injection and incremental injection Patient Tolerance:comfortable throughout block Complications:no Additional Notes SINGLE Shot A high-frequency linear transducer, with sterile cover, was placed in the inguinal  "crease to visualize the Femoral Vein, Artery, and Nerve (medial to lateral). The insertion site was prepped in sterile fashion. Skin and cutaneous tissue was infiltrated with 2-5 ml of 1% Lidocaine. Using ultrasound-guidance, a 20-gauge B-Cotton 4\" Ultraplex 360 non-stimulating echogenic needle was then inserted and advanced in-plane from lateral to medial with ultrasound guidance. The needle was directed below Fascia Iliacus towards the Femoral nerve. Preservative-free normal saline was utilized for hydro-dissection of tissue. Local anesthetic injection spread, in incremental 3-5 ml injections, was visualized lateral to the artery to surround the femoral nerve. Aspiration every 5 ml to prevent intravascular injection. Injection was completed with negative aspiration of blood and negative intravascular injection. Injection pressures were normal with minimal resistance. Performed by: Karlee Barrera DO     CT Lower Extremity Right Without Contrast    Result Date: 10/27/2024  CT LOWER EXTREMITY RIGHT WO CONTRAST Date of Exam: 10/27/2024 11:51 AM EDT Indication: Rt hip / femur concern for fracture.. Comparison: 2018 CT abdomen/pelvis and same-day radiographs Technique: Axial CT images were obtained of the right lower extremity without contrast administration.  Reconstructed coronal and sagittal images were also obtained. Automated exposure control and iterative construction methods were used. Findings: There is a minimally impacted fracture of the femoral neck. There is no evidence of additional fracture. Normal joint alignment. Mild right hip arthritis. No suspicious bone lesion. No CT evidence of avascular necrosis. Osteopenia. Postsurgical changes involving the right femoral vasculature. There are also surgical changes within the included pelvis. Large colonic stool burden. Hysterectomy. No acute abnormality.     Impression: Minimally impacted right femoral neck fracture. No evidence of additional fracture. " Electronically Signed: Rubin Campos MD  10/27/2024 12:18 PM EDT  Workstation ID: [id number]    XR Hip With or Without Pelvis 2 - 3 View Right    Addendum Date: 10/27/2024    ADDENDUM #1 Addendum begins. 1. Asymmetric sclerosis in the right subcapital femoral neck. Questionable nondisplaced fracture, possibly related to insufficiency fracture in the setting of demineralization. Recommend dedicated CT for further assessment. No joint malalignment. 2. Mild degenerative osteoarthritis of the right hip. 3. No acute radiographic findings in the lumbar spine. Grossly similar multilevel lumbar spondylosis and anterolisthesis L4 on L5 since 2021 comparison. Addendum ends. Electronically Signed: Jamie Perkins MD  10/27/2024 10:39 AM EDT  Workstation ID: [id number] ORIGINAL REPORT: XR HIP W OR WO PELVIS 2-3 VIEW RIGHT, XR SPINE LUMBAR 2 OR 3 VW Date of Exam: 10/27/2024 10:08 AM EDT Indication: Rt hip pain Comparison: Pelvic radiograph 8/24/2021, lumbar spine radiograph 8/24/2021 Findings: Right hip: Osseous demineralization. Asymmetric sclerosis in the subcapital region of the right femoral neck new from 2021. Questionable ill-defined lucency along the lateral aspect without a completed fracture. Mild degenerative osteoarthritis of the right hip joint. No erosions or chondrocalcinosis. Multiple surgical clips in the pelvis and proximal right thigh. Peripheral vascular disease noted. Pelvic phleboliths. Lumbar spine: 5 nonrib-bearing lumbar-type vertebral bodies. Mild dextrocurvature. No visualized displaced fracture or significant vertebral body height loss. Minimal anterolisthesis L4 and L5 stable from prior. Mild degenerative disc disease most notable at L2-L3. Mild to moderate lower lumbar facet arthropathy similar to prior. Aortic atherosclerotic disease. Pelvic phleboliths. Multiple surgical clips noted. Impression: 1. Asymmetric sclerosis in the right subcapital femoral neck. Questionable nondisplaced versus stress  fracture, without evidence of a completed fracture or joint malalignment. Findings are in the setting of demineralization. Recommend dedicated CT for further assessment. 2. Mild degenerative osteoarthritis of the right hip. 3. No acute radiographic findings in the lumbar spine. Grossly similar multilevel lumbar spondylosis and anterolisthesis L4 on L5 since 2021 comparison. Electronically Signed: Jamie Perkins MD  10/27/2024 10:34 AM EDT  Workstation ID: [id number]    Result Date: 10/27/2024  XR HIP W OR WO PELVIS 2-3 VIEW RIGHT, XR SPINE LUMBAR 2 OR 3 VW Date of Exam: 10/27/2024 10:08 AM EDT Indication: Rt hip pain Comparison: Pelvic radiograph 8/24/2021, lumbar spine radiograph 8/24/2021 Findings: Right hip: Osseous demineralization. Asymmetric sclerosis in the subcapital region of the right femoral neck new from 2021. Questionable ill-defined lucency along the lateral aspect without a completed fracture. Mild degenerative osteoarthritis of the right hip joint. No erosions or chondrocalcinosis. Multiple surgical clips in the pelvis and proximal right thigh. Peripheral vascular disease noted. Pelvic phleboliths. Lumbar spine: 5 nonrib-bearing lumbar-type vertebral bodies. Mild dextrocurvature. No visualized displaced fracture or significant vertebral body height loss. Minimal anterolisthesis L4 and L5 stable from prior. Mild degenerative disc disease most notable at L2-L3. Mild to moderate lower lumbar facet arthropathy similar to prior. Aortic atherosclerotic disease. Pelvic phleboliths. Multiple surgical clips noted.     Impression: 1. Asymmetric sclerosis in the right subcapital femoral neck. Questionable nondisplaced versus stress fracture, without evidence of a completed fracture or joint malalignment. Findings are in the setting of demineralization. Recommend dedicated CT for further assessment. 2. Mild degenerative osteoarthritis of the right hip. 3. No acute radiographic findings in the lumbar spine.  Grossly similar multilevel lumbar spondylosis and anterolisthesis L4 on L5 since 2021 comparison. Electronically Signed: Jamie Perkins MD  10/27/2024 10:34 AM EDT  Workstation ID: [id number]    XR Spine Lumbar 2 or 3 View    Addendum Date: 10/27/2024    ADDENDUM #1 Addendum begins. 1. Asymmetric sclerosis in the right subcapital femoral neck. Questionable nondisplaced fracture, possibly related to insufficiency fracture in the setting of demineralization. Recommend dedicated CT for further assessment. No joint malalignment. 2. Mild degenerative osteoarthritis of the right hip. 3. No acute radiographic findings in the lumbar spine. Grossly similar multilevel lumbar spondylosis and anterolisthesis L4 on L5 since 2021 comparison. Addendum ends. Electronically Signed: Jamie Perkins MD  10/27/2024 10:39 AM EDT  Workstation ID: [id number] ORIGINAL REPORT: XR HIP W OR WO PELVIS 2-3 VIEW RIGHT, XR SPINE LUMBAR 2 OR 3 VW Date of Exam: 10/27/2024 10:08 AM EDT Indication: Rt hip pain Comparison: Pelvic radiograph 8/24/2021, lumbar spine radiograph 8/24/2021 Findings: Right hip: Osseous demineralization. Asymmetric sclerosis in the subcapital region of the right femoral neck new from 2021. Questionable ill-defined lucency along the lateral aspect without a completed fracture. Mild degenerative osteoarthritis of the right hip joint. No erosions or chondrocalcinosis. Multiple surgical clips in the pelvis and proximal right thigh. Peripheral vascular disease noted. Pelvic phleboliths. Lumbar spine: 5 nonrib-bearing lumbar-type vertebral bodies. Mild dextrocurvature. No visualized displaced fracture or significant vertebral body height loss. Minimal anterolisthesis L4 and L5 stable from prior. Mild degenerative disc disease most notable at L2-L3. Mild to moderate lower lumbar facet arthropathy similar to prior. Aortic atherosclerotic disease. Pelvic phleboliths. Multiple surgical clips noted. Impression: 1. Asymmetric sclerosis in  the right subcapital femoral neck. Questionable nondisplaced versus stress fracture, without evidence of a completed fracture or joint malalignment. Findings are in the setting of demineralization. Recommend dedicated CT for further assessment. 2. Mild degenerative osteoarthritis of the right hip. 3. No acute radiographic findings in the lumbar spine. Grossly similar multilevel lumbar spondylosis and anterolisthesis L4 on L5 since 2021 comparison. Electronically Signed: Jamie Perkins MD  10/27/2024 10:34 AM EDT  Workstation ID: [id number]    Result Date: 10/27/2024  XR HIP W OR WO PELVIS 2-3 VIEW RIGHT, XR SPINE LUMBAR 2 OR 3 VW Date of Exam: 10/27/2024 10:08 AM EDT Indication: Rt hip pain Comparison: Pelvic radiograph 8/24/2021, lumbar spine radiograph 8/24/2021 Findings: Right hip: Osseous demineralization. Asymmetric sclerosis in the subcapital region of the right femoral neck new from 2021. Questionable ill-defined lucency along the lateral aspect without a completed fracture. Mild degenerative osteoarthritis of the right hip joint. No erosions or chondrocalcinosis. Multiple surgical clips in the pelvis and proximal right thigh. Peripheral vascular disease noted. Pelvic phleboliths. Lumbar spine: 5 nonrib-bearing lumbar-type vertebral bodies. Mild dextrocurvature. No visualized displaced fracture or significant vertebral body height loss. Minimal anterolisthesis L4 and L5 stable from prior. Mild degenerative disc disease most notable at L2-L3. Mild to moderate lower lumbar facet arthropathy similar to prior. Aortic atherosclerotic disease. Pelvic phleboliths. Multiple surgical clips noted.     Impression: 1. Asymmetric sclerosis in the right subcapital femoral neck. Questionable nondisplaced versus stress fracture, without evidence of a completed fracture or joint malalignment. Findings are in the setting of demineralization. Recommend dedicated CT for further assessment. 2. Mild degenerative osteoarthritis of  the right hip. 3. No acute radiographic findings in the lumbar spine. Grossly similar multilevel lumbar spondylosis and anterolisthesis L4 on L5 since 2021 comparison. Electronically Signed: Jamie Perkins MD  10/27/2024 10:34 AM EDT  Workstation ID: [id number]     Results for orders placed during the hospital encounter of 08/12/24    Duplex Lower Extremity Art / Grafts - Bilateral CAR    Interpretation Summary    Right lower extremity: Normal SELAM 1.3.  No significant obstructive disease.  Multiphasic waveforms to the level of the distal anterior and posterior tibial arteries.  Monophasic waveforms but color Doppler flow present in the dorsalis pedis.    Left lower extremity: Normal SELAM 1.1. No significant obstructive disease. Multiphasic waveforms to the level of the distal anterior and posterior tibial arteries. Monophasic waveforms but color Doppler flow present in the dorsalis pedis.      Results for orders placed during the hospital encounter of 08/12/24    Duplex Lower Extremity Art / Grafts - Bilateral CAR    Interpretation Summary    Right lower extremity: Normal SELAM 1.3.  No significant obstructive disease.  Multiphasic waveforms to the level of the distal anterior and posterior tibial arteries.  Monophasic waveforms but color Doppler flow present in the dorsalis pedis.    Left lower extremity: Normal SELAM 1.1. No significant obstructive disease. Multiphasic waveforms to the level of the distal anterior and posterior tibial arteries. Monophasic waveforms but color Doppler flow present in the dorsalis pedis.      Results for orders placed during the hospital encounter of 08/12/24    Adult Transthoracic Echo Complete W/ Cont if Necessary Per Protocol    Interpretation Summary    Left ventricular systolic function is normal. Left ventricular ejection fraction appears to be 61 - 65%.    Left ventricular diastolic function is consistent with (grade I) impaired relaxation.    The aortic valve exhibits  sclerosis.      Plan for Follow-up of Pending Labs/Results:     Discharge Details        Discharge Medications        Changes to Medications        Instructions Start Date   atorvastatin 40 MG tablet  Commonly known as: LIPITOR  What changed: when to take this   40 mg, Oral, Daily      carvedilol 6.25 MG tablet  Commonly known as: COREG  What changed:   medication strength  how much to take   6.25 mg, Oral, 2 Times Daily With Meals             Continue These Medications        Instructions Start Date   alendronate 70 MG tablet  Commonly known as: FOSAMAX   70 mg, Every 7 Days      amLODIPine 5 MG tablet  Commonly known as: NORVASC   2.5 mg, Oral, Every Night at Bedtime      aspirin 81 MG EC tablet  Commonly known as: EQ Aspirin Adult Low Dose   81 mg, Oral, Daily      Biotin 1000 MCG tablet   1,000 mcg, 3 Times Daily      cetirizine 10 MG tablet  Commonly known as: zyrTEC   10 mg, Oral, Daily, OTC      cholecalciferol 25 MCG (1000 UT) tablet  Commonly known as: VITAMIN D3   1,000 Units, Daily      Cinnamon 500 MG tablet   1 tablet, 2 Times Daily      OMEGA 3-6-9 COMPLEX PO   1 capsule, Daily      oxyCODONE-acetaminophen 5-325 MG per tablet  Commonly known as: PERCOCET   1 tablet, Every 12 Hours PRN      pantoprazole 40 MG EC tablet  Commonly known as: PROTONIX   40 mg, Oral, Every Morning Before Breakfast      Turmeric 400 MG capsule   400 mg, Daily      Vitamin B-12 5000 MCG sublingual tablet   1 tablet, Daily             Stop These Medications      ondansetron ODT 4 MG disintegrating tablet  Commonly known as: ZOFRAN-ODT     polyethylene glycol 17 g packet  Commonly known as: MIRALAX     valsartan 160 MG tablet  Commonly known as: DIOVAN     valsartan-hydrochlorothiazide 160-12.5 MG per tablet  Commonly known as: DIOVAN-HCT              Allergies   Allergen Reactions    Penicillins Hives    Codeine Hives    Contrast Dye (Echo Or Unknown Ct/Mr) Hives    Metoclopramide Hives    Norco [Hydrocodone-Acetaminophen] GI  Intolerance         Discharge Disposition:  Home or Self Care    Diet:  Hospital:  Diet Order   Procedures    Diet: Regular/House, Diabetic, Gastrointestinal; Consistent Carbohydrate; Gluten-Sensitive; Texture: Regular (IDDSI 7); Fluid Consistency: Thin (IDDSI 0)       Diet Instructions    Regular, diabetic< Gastro intestinal, Consistent Carbohydrate, Gluten sensitive Diet            Activity:  Activity Instructions    Weight Bearing as tolerated           Restrictions or Other Recommendations:         CODE STATUS:    Code Status and Medical Interventions: No CPR (Do Not Attempt to Resuscitate); Limited Support; No intubation (DNI)   Ordered at: 10/27/24 1421     Medical Intervention Limits:    No intubation (DNI)     Level Of Support Discussed With:    Patient     Code Status (Patient has no pulse and is not breathing):    No CPR (Do Not Attempt to Resuscitate)     Medical Interventions (Patient has pulse or is breathing):    Limited Support       No future appointments.    Additional Instructions for the Follow-ups that You Need to Schedule       Ambulatory Referral to Home Health   As directed      Face to Face Visit Date: 10/29/2024   Follow-up provider for Plan of Care?: I treated the patient in an acute care facility and will not continue treatment after discharge.   Follow-up provider: NORMA MOORE [[id number]]   Reason/Clinical Findings: hip fx   Describe mobility limitations that make leaving home difficult: impaired mobility   Nursing/Therapeutic Services Requested: Physical Therapy   Frequency: 1 Week 1        Discharge Follow-up with PCP   As directed       Currently Documented PCP:    Norma Moore APRN    PCP Phone Number:    [phone]     Follow Up Details: follow up with PCP in 1 week        Discharge Follow-up with Specialty: follow up with primary Cardiologist in 6 weeks   As directed      Specialty: follow up with primary Cardiologist in 6 weeks                      Fan Cazares,  MD  10/31/24      Time Spent on Discharge:  I spent  35  minutes on this discharge activity which included: face-to-face encounter with the patient, reviewing the data in the system, coordination of the care with the nursing staff as well as consultants, documentation, and entering orders.

## 2024-11-03 LAB
QT INTERVAL: 284 MS
QT INTERVAL: 386 MS
QTC INTERVAL: 447 MS
QTC INTERVAL: 448 MS

## 2024-11-05 ENCOUNTER — READMISSION MANAGEMENT (OUTPATIENT)
Dept: CALL CENTER | Facility: HOSPITAL | Age: 74
End: 2024-11-05
Payer: MEDICARE

## 2024-11-05 LAB
QT INTERVAL: 396 MS
QTC INTERVAL: 471 MS

## 2024-11-05 NOTE — OUTREACH NOTE
Medical Week 1 Survey      Flowsheet Row Responses   [facility] patient discharged from? [place]   Does the patient have one of the following disease processes/diagnoses(primary or secondary)? Other   Week 1 attempt successful? No   Unsuccessful attempts Attempt 1  [All numbers listed were attempted-no answer]            Carolyn H - Registered Nurse

## 2024-11-12 ENCOUNTER — READMISSION MANAGEMENT (OUTPATIENT)
Dept: CALL CENTER | Facility: HOSPITAL | Age: 74
End: 2024-11-12
Payer: MEDICARE

## 2024-11-12 NOTE — OUTREACH NOTE
Medical Week 2 Survey      Flowsheet Row Responses   [facility] patient discharged from? [place]   Does the patient have one of the following disease processes/diagnoses(primary or secondary)? Other   Week 2 attempt successful? No   Unsuccessful attempts Attempt 1            Chandni Wheeler Registered Nurse

## 2024-11-18 ENCOUNTER — READMISSION MANAGEMENT (OUTPATIENT)
Dept: CALL CENTER | Facility: HOSPITAL | Age: 74
End: 2024-11-18
Payer: MEDICARE

## 2024-11-18 NOTE — OUTREACH NOTE
Medical Week 3 Survey      Flowsheet Row Responses   [facility] patient discharged from? [place]   Does the patient have one of the following disease processes/diagnoses(primary or secondary)? Other   Week 3 attempt successful? No   Unsuccessful attempts Attempt 1   Revoke Decline to participate  [attempted x 3 to contact, unsuccessful. Revoked per unit policy.]            Suzy GERMAN - Registered Nurse

## 2024-12-04 ENCOUNTER — OFFICE VISIT (OUTPATIENT)
Dept: CARDIOLOGY | Facility: CLINIC | Age: 74
End: 2024-12-04
Payer: MEDICARE

## 2024-12-04 VITALS
HEIGHT: 67 IN | SYSTOLIC BLOOD PRESSURE: 128 MMHG | BODY MASS INDEX: 19.78 KG/M2 | OXYGEN SATURATION: 99 % | WEIGHT: 126 LBS | HEART RATE: 69 BPM | DIASTOLIC BLOOD PRESSURE: 80 MMHG

## 2024-12-04 DIAGNOSIS — I48.0 PAROXYSMAL ATRIAL FIBRILLATION: ICD-10-CM

## 2024-12-04 DIAGNOSIS — I25.10 CORONARY ARTERY DISEASE INVOLVING NATIVE CORONARY ARTERY OF NATIVE HEART WITHOUT ANGINA PECTORIS: Primary | ICD-10-CM

## 2024-12-04 DIAGNOSIS — I10 PRIMARY HYPERTENSION: ICD-10-CM

## 2024-12-04 DIAGNOSIS — E78.2 MIXED HYPERLIPIDEMIA: ICD-10-CM

## 2024-12-04 PROCEDURE — 99214 OFFICE O/P EST MOD 30 MIN: CPT | Performed by: INTERNAL MEDICINE

## 2024-12-04 PROCEDURE — 3079F DIAST BP 80-89 MM HG: CPT | Performed by: INTERNAL MEDICINE

## 2024-12-04 PROCEDURE — 3074F SYST BP LT 130 MM HG: CPT | Performed by: INTERNAL MEDICINE

## 2024-12-04 NOTE — PROGRESS NOTES
[facility] Cardiology  Office Progress Note  Marifer Ricardo  1950  [address] [facility] [address]       Visit Date: 12/04/24    PCP: Norma Spangler APRN  [address] [facility] [address]    IDENTIFICATION: A 74 y.o. 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[place], [place]   Former Dr Witt/[place] pt pre 2024    PROBLEM LIST:   Expand All Collapse All       Marifer Ricardo  [id number]  1950   LOS: 0 days   Patient Care Team:  Norma Spangler APRN as PCP - General (Nurse Practitioner)  Jonathan Mondragon MD as Consulting Physician (Neurology)  Ming Stiles MD, GARY (Nephrology)  Rosmery Nicholson DC as Consulting Physician (Chiropractic Medicine)  Taras Morillo MD as Consulting Physician (General Surgery)  Malcom Gilman MD as Consulting Physician (Interventional Cardiology)  Camilo Sunshine MD as Surgeon (Orthopedic Surgery)          Problem List:     CAD    8/24 LHC minimal CAD LVEDP 9    8/24 Echo EF >60% IR AV scler  Dyslipidemia on moderate dose atorvastatin  Chronic hypertension-probably essential     8/24 RA us wnl   Chronic pain syndrome on oxycodone  Glucose intolerance with probable type II prediabetes mellitus (hemoglobin A1c 5.8%)  Intermittent recurrent syncope of uncertain etiology without injury with recent apparent event monitor with unknown results (summer 2004)-? vasovagal  Bilateral extremity peripheral arterial disease on dual antiplatelet therapy  Remote operations:  A.  Left subclavian to right atrium bypass graft-data deficit  B.  Cataract extraction, O.  U.  C.  Partial with subsequent total colectomy-data deficit  D.  Remote colostomy with revision-data deficit  E.  Hysterectomy  F.  Tonsillectomy and adenoidectomy  9.  DNI/DNR  10.  Diminished bilateral lower extremity pulses      8/24 B SELAM wnl   11.  Chronic odynophagia and dysphagia with eructation and apparent pseudoobstruction with  remote multiple EGDs and remote assessments [facility] x 40 years  12. Hip Fx 10/24 ORIF   13. Periop Hip sx AFib resolved w BB              CC:   Chief Complaint   Patient presents with    Right hip fracture     FHU    Atrial Fibrillation       Allergies  Allergies   Allergen Reactions    Penicillins Hives    Codeine Hives    Contrast Dye (Echo Or Unknown Ct/Mr) Hives    Metoclopramide Hives    Norco [Hydrocodone-Acetaminophen] GI Intolerance       Current Medications    Current Outpatient Medications:     alendronate (FOSAMAX) 70 MG tablet, Take 1 tablet by mouth Every 7 (Seven) Days. Thursday, Disp: , Rfl:     amLODIPine (NORVASC) 5 MG tablet, Take 0.5 tablets by mouth every night at bedtime., Disp: , Rfl:     [START ON 12/18/2024] aspirin (EQ Aspirin Adult Low Dose) 81 MG EC tablet, Take 1 tablet by mouth Daily., Disp: , Rfl:     aspirin 325 MG EC tablet, Take 1 tablet by mouth Daily for 45 days. Take a full dose aspirin (325 mg) daily until 12/15/2024, then resume normal baby aspirin (81 mg) daily thereafter, Disp: 45 tablet, Rfl: 0    atorvastatin (LIPITOR) 40 MG tablet, Take 1 tablet by mouth Daily. (Patient taking differently: Take 1 tablet by mouth Every Night.), Disp: 90 tablet, Rfl: 3    Biotin 1000 MCG tablet, Take 1,000 mcg by mouth 3 (Three) Times a Day. OTC, Disp: , Rfl:     carvedilol (COREG) 6.25 MG tablet, Take 1 tablet by mouth 2 (Two) Times a Day With Meals., Disp: 60 tablet, Rfl: 0    cetirizine (zyrTEC) 10 MG tablet, Take 1 tablet by mouth Daily. OTC, Disp: , Rfl:     cholecalciferol (VITAMIN D3) 1000 UNITS tablet, Take 1 tablet by mouth Daily. OTC, Disp: , Rfl:     Cinnamon 500 MG tablet, Take 1 tablet by mouth 2 (Two) Times a Day. OTC, Disp: , Rfl:     Cyanocobalamin (VITAMIN B-12) 5000 MCG sublingual tablet, Place 1 tablet under the tongue Daily. OTC, Disp: , Rfl:     Omega 3-6-9 Fatty Acids (OMEGA 3-6-9 COMPLEX PO), Take 1 capsule by mouth Daily. OTC, Disp: , Rfl:      "oxyCODONE-acetaminophen (PERCOCET) 5-325 MG per tablet, Take 1 tablet by mouth Every 12 (Twelve) Hours As Needed for Moderate Pain., Disp: , Rfl:     pantoprazole (PROTONIX) 40 MG EC tablet, Take 1 tablet by mouth Every Morning Before Breakfast., Disp: 90 tablet, Rfl: 3    Turmeric 400 MG capsule, Take 400 mg by mouth Daily. OTC, Disp: , Rfl:     valsartan (DIOVAN) 160 MG tablet, Take 1 tablet by mouth Daily., Disp: , Rfl:       History of Present Illness   Marifer Ricardo is a 74 y.o. year old female here for hospital follow up.  She been compliant with medications.  States her hip pain is improving.  She walks with a cane.  She has had no more recurrent palpitations or atrial fibrillation that she is aware        OBJECTIVE:  Vitals:    12/04/24 1103   BP: 128/80   BP Location: Right arm   Patient Position: Sitting   Cuff Size: Adult   Pulse: 69   SpO2: 99%   Weight: 57.2 kg (126 lb)   Height: 170.2 cm (67\")     Body mass index is 19.73 kg/m².    Constitutional:       Appearance: Not in distress. Frail.   Neck:      Vascular: No JVR. JVD normal.   Pulmonary:      Effort: Pulmonary effort is normal.      Breath sounds: Normal breath sounds. No wheezing. No rhonchi. No rales.   Chest:      Chest wall: Not tender to palpatation.   Cardiovascular:      PMI at left midclavicular line. Normal rate. Regular rhythm. Normal S1. Normal S2.       Murmurs: There is a systolic murmur.      No gallop.  No click. No rub.   Pulses:     Intact distal pulses.   Edema:     Peripheral edema absent.   Abdominal:      General: Bowel sounds are normal.      Palpations: Abdomen is soft.      Tenderness: There is no abdominal tenderness.   Musculoskeletal: Normal range of motion.         General: No tenderness. Skin:     General: Skin is warm and dry.   Neurological:      General: No focal deficit present.      Mental Status: Alert and oriented to person, place and time.         Diagnostic Data:  Procedures        ASSESSMENT:   " Diagnosis Plan   1. Coronary artery disease involving native coronary artery of native heart without angina pectoris        2. Primary hypertension        3. Mixed hyperlipidemia        4. Paroxysmal atrial fibrillation            PLAN:  CAD historically minimal nonobstructive continue GDMT    Hypertension controlled carvedilol amlodipine valsartan    Mixed dyslipidemia controlled on atorvastatin    Paroxysmal A-fib situational due to hip fracture no recurrence on beta-blockade          Donal Enriquez MD, FACC

## 2025-03-11 RX ORDER — ASPIRIN 81 MG/1
81 TABLET, COATED ORAL DAILY
Qty: 90 TABLET | OUTPATIENT
Start: 2025-03-11

## 2025-03-15 DIAGNOSIS — I10 ESSENTIAL HYPERTENSION: ICD-10-CM

## 2025-03-17 RX ORDER — CARVEDILOL 12.5 MG/1
12.5 TABLET ORAL 2 TIMES DAILY WITH MEALS
Qty: 180 TABLET | Refills: 3 | Status: SHIPPED | OUTPATIENT
Start: 2025-03-17

## 2025-04-18 ENCOUNTER — APPOINTMENT (OUTPATIENT)
Dept: GENERAL RADIOLOGY | Facility: HOSPITAL | Age: 75
End: 2025-04-18
Payer: MEDICARE

## 2025-04-18 ENCOUNTER — HOSPITAL ENCOUNTER (EMERGENCY)
Facility: HOSPITAL | Age: 75
Discharge: HOME | End: 2025-04-18
Attending: STUDENT IN AN ORGANIZED HEALTH CARE EDUCATION/TRAINING PROGRAM
Payer: MEDICARE

## 2025-04-18 VITALS
HEIGHT: 67 IN | BODY MASS INDEX: 18.83 KG/M2 | RESPIRATION RATE: 18 BRPM | HEART RATE: 83 BPM | TEMPERATURE: 98.3 F | OXYGEN SATURATION: 95 % | WEIGHT: 120 LBS | SYSTOLIC BLOOD PRESSURE: 152 MMHG | DIASTOLIC BLOOD PRESSURE: 78 MMHG

## 2025-04-18 DIAGNOSIS — R20.2 HAND PARESTHESIA: ICD-10-CM

## 2025-04-18 DIAGNOSIS — S43.002A: Primary | ICD-10-CM

## 2025-04-18 DIAGNOSIS — S42.292A HILL SACHS DEFORMITY, LEFT: ICD-10-CM

## 2025-04-18 DIAGNOSIS — M25.512 ACUTE PAIN OF LEFT SHOULDER: ICD-10-CM

## 2025-04-18 LAB
ALBUMIN SERPL-MCNC: 4.6 G/DL (ref 3.5–5.2)
ALBUMIN/GLOB SERPL: 1.6 G/DL
ALP SERPL-CCNC: 102 U/L (ref 39–117)
ALT SERPL W P-5'-P-CCNC: 13 U/L (ref 1–33)
ANION GAP SERPL CALCULATED.3IONS-SCNC: 14 MMOL/L (ref 5–15)
AST SERPL-CCNC: 21 U/L (ref 1–32)
BASOPHILS # BLD AUTO: 0.03 10*3/MM3 (ref 0–0.2)
BASOPHILS NFR BLD AUTO: 0.5 % (ref 0–1.5)
BILIRUB SERPL-MCNC: 0.5 MG/DL (ref 0–1.2)
BUN SERPL-MCNC: 25 MG/DL (ref 8–23)
BUN/CREAT SERPL: 26 (ref 7–25)
CALCIUM SPEC-SCNC: 9.7 MG/DL (ref 8.6–10.5)
CHLORIDE SERPL-SCNC: 103 MMOL/L (ref 98–107)
CO2 SERPL-SCNC: 22 MMOL/L (ref 22–29)
CREAT SERPL-MCNC: 0.96 MG/DL (ref 0.57–1)
DEPRECATED RDW RBC AUTO: 44.3 FL (ref 37–54)
EGFRCR SERPLBLD CKD-EPI 2021: 61.4 ML/MIN/1.73
EOSINOPHIL # BLD AUTO: 0.06 10*3/MM3 (ref 0–0.4)
EOSINOPHIL NFR BLD AUTO: 0.9 % (ref 0.3–6.2)
ERYTHROCYTE [DISTWIDTH] IN BLOOD BY AUTOMATED COUNT: 12.9 % (ref 12.3–15.4)
GLOBULIN UR ELPH-MCNC: 2.8 GM/DL
GLUCOSE SERPL-MCNC: 101 MG/DL (ref 65–99)
HCT VFR BLD AUTO: 43.4 % (ref 34–46.6)
HGB BLD-MCNC: 14.6 G/DL (ref 12–15.9)
IMM GRANULOCYTES # BLD AUTO: 0.02 10*3/MM3 (ref 0–0.05)
IMM GRANULOCYTES NFR BLD AUTO: 0.3 % (ref 0–0.5)
LYMPHOCYTES # BLD AUTO: 1.05 10*3/MM3 (ref 0.7–3.1)
LYMPHOCYTES NFR BLD AUTO: 16.6 % (ref 19.6–45.3)
MCH RBC QN AUTO: 31.6 PG (ref 26.6–33)
MCHC RBC AUTO-ENTMCNC: 33.6 G/DL (ref 31.5–35.7)
MCV RBC AUTO: 93.9 FL (ref 79–97)
MONOCYTES # BLD AUTO: 0.37 10*3/MM3 (ref 0.1–0.9)
MONOCYTES NFR BLD AUTO: 5.8 % (ref 5–12)
NEUTROPHILS NFR BLD AUTO: 4.8 10*3/MM3 (ref 1.7–7)
NEUTROPHILS NFR BLD AUTO: 75.9 % (ref 42.7–76)
NRBC BLD AUTO-RTO: 0 /100 WBC (ref 0–0.2)
PLATELET # BLD AUTO: 177 10*3/MM3 (ref 140–450)
PMV BLD AUTO: 9.7 FL (ref 6–12)
POTASSIUM SERPL-SCNC: 4.5 MMOL/L (ref 3.5–5.2)
PROT SERPL-MCNC: 7.4 G/DL (ref 6–8.5)
RBC # BLD AUTO: 4.62 10*6/MM3 (ref 3.77–5.28)
SODIUM SERPL-SCNC: 139 MMOL/L (ref 136–145)
WBC NRBC COR # BLD AUTO: 6.33 10*3/MM3 (ref 3.4–10.8)

## 2025-04-18 PROCEDURE — 25010000002 ONDANSETRON PER 1 MG: Performed by: STUDENT IN AN ORGANIZED HEALTH CARE EDUCATION/TRAINING PROGRAM

## 2025-04-18 PROCEDURE — 93005 ELECTROCARDIOGRAM TRACING: CPT | Performed by: STUDENT IN AN ORGANIZED HEALTH CARE EDUCATION/TRAINING PROGRAM

## 2025-04-18 PROCEDURE — 80053 COMPREHEN METABOLIC PANEL: CPT

## 2025-04-18 PROCEDURE — 73030 X-RAY EXAM OF SHOULDER: CPT

## 2025-04-18 PROCEDURE — 85025 COMPLETE CBC W/AUTO DIFF WBC: CPT

## 2025-04-18 PROCEDURE — 96375 TX/PRO/DX INJ NEW DRUG ADDON: CPT

## 2025-04-18 PROCEDURE — 25010000002 PROPOFOL 10 MG/ML EMULSION: Performed by: STUDENT IN AN ORGANIZED HEALTH CARE EDUCATION/TRAINING PROGRAM

## 2025-04-18 PROCEDURE — 99284 EMERGENCY DEPT VISIT MOD MDM: CPT

## 2025-04-18 PROCEDURE — 96374 THER/PROPH/DIAG INJ IV PUSH: CPT

## 2025-04-18 PROCEDURE — 25010000002 MORPHINE PER 10 MG: Performed by: STUDENT IN AN ORGANIZED HEALTH CARE EDUCATION/TRAINING PROGRAM

## 2025-04-18 PROCEDURE — 36415 COLL VENOUS BLD VENIPUNCTURE: CPT

## 2025-04-18 RX ORDER — ONDANSETRON 2 MG/ML
4 INJECTION INTRAMUSCULAR; INTRAVENOUS ONCE
Status: COMPLETED | OUTPATIENT
Start: 2025-04-18 | End: 2025-04-18

## 2025-04-18 RX ORDER — PROPOFOL 10 MG/ML
40 VIAL (ML) INTRAVENOUS ONCE
Status: DISCONTINUED | OUTPATIENT
Start: 2025-04-18 | End: 2025-04-18 | Stop reason: HOSPADM

## 2025-04-18 RX ORDER — OXYCODONE AND ACETAMINOPHEN 7.5; 325 MG/1; MG/1
1 TABLET ORAL ONCE
Refills: 0 | Status: COMPLETED | OUTPATIENT
Start: 2025-04-18 | End: 2025-04-18

## 2025-04-18 RX ORDER — MORPHINE SULFATE 4 MG/ML
4 INJECTION, SOLUTION INTRAMUSCULAR; INTRAVENOUS ONCE
Status: COMPLETED | OUTPATIENT
Start: 2025-04-18 | End: 2025-04-18

## 2025-04-18 RX ORDER — PROPOFOL 10 MG/ML
VIAL (ML) INTRAVENOUS
Status: COMPLETED | OUTPATIENT
Start: 2025-04-18 | End: 2025-04-18

## 2025-04-18 RX ADMIN — PROPOFOL 40 MG: 10 INJECTION, EMULSION INTRAVENOUS at 15:17

## 2025-04-18 RX ADMIN — OXYCODONE HYDROCHLORIDE AND ACETAMINOPHEN 1 TABLET: 7.5; 325 TABLET ORAL at 16:12

## 2025-04-18 RX ADMIN — MORPHINE SULFATE 4 MG: 4 INJECTION, SOLUTION INTRAMUSCULAR; INTRAVENOUS at 14:54

## 2025-04-18 RX ADMIN — ONDANSETRON 4 MG: 2 INJECTION INTRAMUSCULAR; INTRAVENOUS at 14:54

## 2025-04-18 NOTE — ED PROVIDER NOTES
"Subjective   History of Present Illness patient is a 75-year-old female who presents complaining of injury to her left upper extremity, with obvious deformity.  Patient reports that she was lifting her left arm up to close the rear trunk of her SUV, when her arm shifted at the shoulder.  She said it felt \"floppy\", and has had pain in that area as well as some numbness to the fingers and swelling to the hand subsequently.    Review of Systems   Constitutional: Negative.    HENT: Negative.     Respiratory: Negative.     Cardiovascular: Negative.    Gastrointestinal: Negative.    Genitourinary: Negative.    Musculoskeletal:  Positive for arthralgias and joint swelling.   Skin: Negative.    Neurological: Negative.        Past Medical History:   Diagnosis Date    Abdominal pain     Abnormal bone density screening 2014    osteopenia    Arthritis     Asthma     Belching     Body piercing     BOTH EARS    Cataract, bilateral     Chronic renal insufficiency 2016    Diabetes mellitus     Essential hypertension 2016    GERD (gastroesophageal reflux disease)     Hx of mammogram     Hyperparathyroidism     Mastoiditis     Pregnancy      s/p  x 3    Renal insufficiency     Urticaria     Wears glasses     Weight loss        Allergies   Allergen Reactions    Penicillins Hives    Codeine Hives    Contrast Dye (Echo Or Unknown Ct/Mr) Hives    Metoclopramide Hives    Norco [Hydrocodone-Acetaminophen] GI Intolerance       Past Surgical History:   Procedure Laterality Date    BYPASS GRAFT SUBCLAVIAN      LEFT SUBCLAVIAN TO RIGHT ATRIUM VENOUS GRAFT in past for ?TPN    CARDIAC CATHETERIZATION N/A 2024    Procedure: Left Heart Cath;  Surgeon: Isaac Whalen MD;  Location: [facility] CATH INVASIVE LOCATION;  Service: Cardiology;  Laterality: N/A;    CATARACT EXTRACTION, BILATERAL      COLECTOMY PARTIAL / TOTAL      Pt reports multiple abd surgeries for ?pseudo-bowel obstruction    COLONOSCOPY      COLONOSCOPY " N/A 02/07/2018    Procedure: COLONOSCOPY WITH RANDOM COLON BIOPSIES;  Surgeon: Taras Morillo MD;  Location: [facility] ENDOSCOPY;  Service:     COLOSTOMY      and revision    ENDOSCOPY N/A 01/25/2018    Procedure: ESOPHAGOGASTRODUODENOSCOPY WITH BIOPSIES;  Surgeon: Taras Morillo MD;  Location:  [facility] ENDOSCOPY;  Service:     ENDOSCOPY N/A 8/14/2024    Procedure: ESOPHAGOGASTRODUODENOSCOPY;  Surgeon: Brunner, Mark I, MD;  Location:  [facility] ENDOSCOPY;  Service: Gastroenterology;  Laterality: N/A;    HIP PERCUTANEOUS PINNING Right 10/27/2024    Procedure: HIP PERCUTANEOUS PINNING RIGHT;  Surgeon: Rubin Parkinson Jr., MD;  Location:  [facility] OR;  Service: Orthopedics;  Laterality: Right;    HYSTERECTOMY      age 31 for DUB, ovaries intact    TONSILLECTOMY AND ADENOIDECTOMY         Family History   Problem Relation Age of Onset    Arthritis Mother     Migraines Mother     Thyroid disease Mother     Diabetes Mother     Hypertension Mother     Stroke Mother     Tuberculosis Mother     Heart attack Father     Heart disease Father     Cancer Maternal Aunt     Cancer Paternal Aunt     Diabetes Maternal Grandmother     Cancer Cousin     Colon cancer Neg Hx        Social History     Socioeconomic History    Marital status: Single   Tobacco Use    Smoking status: Never     Passive exposure: Never    Smokeless tobacco: Never   Vaping Use    Vaping status: Never Used   Substance and Sexual Activity    Alcohol use: No    Drug use: No    Sexual activity: Defer           Objective   Physical Exam  Constitutional:       Appearance: Normal appearance.   HENT:      Head: Normocephalic and atraumatic.   Eyes:      Extraocular Movements: Extraocular movements intact.   Cardiovascular:      Rate and Rhythm: Normal rate.      Pulses: Normal pulses.   Pulmonary:      Effort: Pulmonary effort is normal.   Abdominal:      General: Abdomen is flat.   Musculoskeletal:         General: Swelling, tenderness and deformity present. Normal range of  motion.      Cervical back: Normal range of motion.   Skin:     General: Skin is dry.      Capillary Refill: Capillary refill takes 2 to 3 seconds.   Neurological:      General: No focal deficit present.      Mental Status: She is alert and oriented to person, place, and time.         FX Dislocation    Date/Time: 4/18/2025 3:39 PM    Performed by: Brian Tripathi APRN  Authorized by: aDin Ayala MD    Consent:     Consent obtained:  Verbal and written    Consent given by:  Patient    Risks, benefits, and alternatives were discussed: yes      Risks discussed:  Nerve damage, pain and vascular damage    Alternatives discussed:  Delayed treatment, alternative treatment, observation, referral and no treatment  Universal protocol:     Procedure explained and questions answered to patient or proxy's satisfaction: yes      Relevant documents present and verified: yes      Test results available: yes      Imaging studies available: yes      Required blood products, implants, devices, and special equipment available: yes      Site/side marked: yes      Immediately prior to procedure, a time out was called: yes      Patient identity confirmed:  Verbally with patient and arm band  Injury:     Injury location:  Shoulder    Shoulder injury location:  L shoulder    Hill-Sachs deformity: no      Shoulder fracture type: greater humeral tuberosity fracture      Dislocation present: yes    Pre-procedure details:     Distal neurologic exam:  Numbness    Distal perfusion: distal pulses strong and brisk capillary refill      Range of motion: reduced    Sedation:     Sedation type:  Moderate sedation  Anesthesia:     Anesthesia method:  None  Procedure details:     Manipulation performed: yes      Skin traction used: no      Skeletal traction used: yes      Reduction successful: yes      X-ray confirmed reduction: yes      Immobilization:  Sling    Supplies used:  Sling  Post-procedure details:     Distal neurologic exam:   Normal    Distal perfusion: distal pulses strong and brisk capillary refill      Range of motion: improved      Procedure completion:  Tolerated well, no immediate complications    Fracture management: I provided definitive fracture management    Procedural Sedation    Date/Time: 4/18/2025 4:03 PM    Performed by: Dain Ayala MD  Authorized by: Dain Ayala MD    Consent:     Consent obtained:  Written    Consent given by:  Patient    Risks, benefits, and alternatives were discussed: yes      Risks discussed:  Allergic reaction, prolonged hypoxia resulting in organ damage, prolonged sedation necessitating reversal, dysrhythmia, inadequate sedation, respiratory compromise necessitating ventilatory assistance and intubation, vomiting and nausea  Universal protocol:     Patient identity confirmed:  Verbally with patient and hospital-assigned identification number  Indications:     Procedure performed:  Dislocation reduction    Procedure necessitating sedation performed by:  Different physician (Reduction performed by Brian GAONA)    Intended level of sedation:  Moderate  Immediate pre-procedure details:     Reassessment: Patient reassessed immediately prior to procedure      Reviewed: vital signs, relevant labs/tests and NPO status      Verified: bag valve mask available, emergency equipment available, intubation equipment available, IV patency confirmed, oxygen available, reversal medications available and suction available    Procedure details (see MAR for exact dosages):     Sedation start time:  4/18/2025 3:17 PM    Preoxygenation:  Nasal cannula    Sedation:  Propofol    Intra-procedure monitoring:  Blood pressure monitoring, frequent LOC assessments, frequent vital sign checks, continuous pulse oximetry, cardiac monitor and continuous capnometry    Intra-procedure events: none      Sedation end time:  4/18/2025 3:40 PM  Post-procedure details:     Post-sedation assessment completed:   4/18/2025 4:05 PM    Attendance: Constant attendance by certified staff until patient recovered      Recovery: Patient returned to pre-procedure baseline      Procedure completion:  Tolerated well, no immediate complications             ED Course  ED Course as of 04/18/25 1605   Fri Apr 18, 2025   1349 Patient initially evaluated in the ED pit area, traveling via wheelchair, guarding her left upper extremity, accompanied by her sister. [JH]   1403 Patient transported via wheelchair to the x-ray [JH]   1419 Plain film imaging of the patient's left shoulder independently by myself with anterior subluxation of the humerus. [JH]   1428 I conferred with Dr. Ayala, for this patient's x-ray and planned sedation reduction procedure. [JH]   1439 Evaluated the patient in ED room 17, accompanied by her sister and brother-in-law, explaining the results of imaging, and providing informed consent for the anticipated procedural sedation and reduction procedure.  They endorsed understanding.  IV access is being obtained. [JH]   1507 CBC within normal limits. [JH]   1522 Reduction attempt completed, xr confirmation forthcoming [JH]   1537 I spoke with the patient's daughter Yajaira, regarding the reduction in sedation procedure, they will contact the orthopedist to schedule the patient's follow-up.  Also placed the patient in a sling.  She is awake and answering questions appropriately. [JH]   1603 Reevaluated patient, she remains alert and oriented, I communicated result of the Hill-Sachs deformity suspected posterior superior aspect of the humeral head.  She endorses understanding will follow-up with orthopedist. [JH]      ED Course User Index  [] Brian Triapthi, APRN                                                       Medical Decision Making  Given the patient's reported injury, approximately 2 hours prior to arrival, my exam, differential includes subluxation of the humerus of the left side, cannot exclude an avulsion  fracture to the glenohumeral joint or the humerus, and patient will have plain film imaging left shoulder, screening labs, anticipation of procedural sedation for reduction.  Patient and her sister who is present with her are agreeable with this plan, as well as her daughter Yajaira who I spoke to via phone.  We will administer IV analgesia and antiemetic medication and reevaluate for improvement.  We will communicate x-ray and other workup results, and consider consultation and or referral on the patient's disposition to follow.    Problems Addressed:  Acute pain of left shoulder: complicated acute illness or injury  Hand paresthesia: complicated acute illness or injury  Hill Sachs deformity, left: complicated acute illness or injury  Subluxation of shoulder joint, left, initial encounter: complicated acute illness or injury    Amount and/or Complexity of Data Reviewed  Labs: ordered.  Radiology: ordered.  ECG/medicine tests: ordered.    Risk  Prescription drug management.        Final diagnoses:   Subluxation of shoulder joint, left, initial encounter   Acute pain of left shoulder   Hand paresthesia   Hill Sachs deformity, left       ED Disposition  ED Disposition       ED Disposition   Discharge    Condition   Stable    Comment   --               Norma Spangler, APRN  [address] [facility] [address]  [phone]      As needed    Rubin Parkinson Jr., MD  [address]  [address]  [facility] [id number] [phone]    Call            Medication List      No changes were made to your prescriptions during this visit.            Dain Ayala MD  04/18/25 2040       Brian Tripathi APRN  04/19/25 0822

## 2025-04-18 NOTE — DISCHARGE INSTRUCTIONS
Please call Dr Parkinson's office to make a follow up appointment for your left shoulder dislocation. Avoid lifting, pushing, or pulling greater than a glass of water with left hand/arm until cleared by Orthopedics.  You have an area of the posterior humerus that appears to have a Hill-Sachs deformity, which is a small area that is fractured.

## 2025-04-19 LAB
QT INTERVAL: 390 MS
QTC INTERVAL: 464 MS

## 2025-06-25 ENCOUNTER — APPOINTMENT (OUTPATIENT)
Dept: CARDIOLOGY | Facility: HOSPITAL | Age: 75
End: 2025-06-25
Payer: MEDICARE

## 2025-06-25 ENCOUNTER — APPOINTMENT (OUTPATIENT)
Dept: GENERAL RADIOLOGY | Facility: HOSPITAL | Age: 75
End: 2025-06-25
Payer: MEDICARE

## 2025-06-25 ENCOUNTER — HOSPITAL ENCOUNTER (INPATIENT)
Facility: HOSPITAL | Age: 75
LOS: 2 days | Discharge: HOME | End: 2025-06-27
Attending: STUDENT IN AN ORGANIZED HEALTH CARE EDUCATION/TRAINING PROGRAM | Admitting: FAMILY MEDICINE
Payer: MEDICARE

## 2025-06-25 ENCOUNTER — APPOINTMENT (OUTPATIENT)
Dept: CT IMAGING | Facility: HOSPITAL | Age: 75
End: 2025-06-25
Payer: MEDICARE

## 2025-06-25 DIAGNOSIS — R65.21 SEPSIS WITH ACUTE ORGAN DYSFUNCTION AND SEPTIC SHOCK, DUE TO UNSPECIFIED ORGANISM, UNSPECIFIED ORGAN DYSFUNCTION TYPE: Primary | ICD-10-CM

## 2025-06-25 DIAGNOSIS — A41.9 SEPSIS WITH ACUTE ORGAN DYSFUNCTION AND SEPTIC SHOCK, DUE TO UNSPECIFIED ORGANISM, UNSPECIFIED ORGAN DYSFUNCTION TYPE: Primary | ICD-10-CM

## 2025-06-25 PROBLEM — R07.9 CHEST PAIN: Status: ACTIVE | Noted: 2025-06-25

## 2025-06-25 LAB
ALBUMIN SERPL-MCNC: 4.1 G/DL (ref 3.5–5.2)
ALBUMIN/GLOB SERPL: 1.2 G/DL
ALP SERPL-CCNC: 92 U/L (ref 39–117)
ALT SERPL W P-5'-P-CCNC: 11 U/L (ref 1–33)
ANION GAP SERPL CALCULATED.3IONS-SCNC: 18.5 MMOL/L (ref 5–15)
AORTIC DIMENSIONLESS INDEX: 0.5 (DI)
AST SERPL-CCNC: 18 U/L (ref 1–32)
AV MEAN PRESS GRAD SYS DOP V1V2: 5 MMHG
AV VMAX SYS DOP: 163 CM/SEC
BASOPHILS # BLD AUTO: 0.03 10*3/MM3 (ref 0–0.2)
BASOPHILS NFR BLD AUTO: 0.2 % (ref 0–1.5)
BH CV ECHO MEAS - AO MAX PG: 10.6 MMHG
BH CV ECHO MEAS - AO ROOT DIAM: 2.6 CM
BH CV ECHO MEAS - AO V2 VTI: 35.3 CM
BH CV ECHO MEAS - AVA(I,D): 1.41 CM2
BH CV ECHO MEAS - EDV(CUBED): 81.2 ML
BH CV ECHO MEAS - EDV(MOD-SP2): 45.1 ML
BH CV ECHO MEAS - EDV(MOD-SP4): 38.9 ML
BH CV ECHO MEAS - EF(MOD-SP2): 59.2 %
BH CV ECHO MEAS - EF(MOD-SP4): 65.3 %
BH CV ECHO MEAS - ESV(CUBED): 9.8 ML
BH CV ECHO MEAS - ESV(MOD-SP2): 18.4 ML
BH CV ECHO MEAS - ESV(MOD-SP4): 13.5 ML
BH CV ECHO MEAS - FS: 50.6 %
BH CV ECHO MEAS - IVS/LVPW: 0.94 CM
BH CV ECHO MEAS - IVSD: 0.74 CM
BH CV ECHO MEAS - LA DIMENSION: 3.9 CM
BH CV ECHO MEAS - LAT PEAK E' VEL: 9.9 CM/SEC
BH CV ECHO MEAS - LV DIASTOLIC VOL/BSA (35-75): 23.5 CM2
BH CV ECHO MEAS - LV MASS(C)D: 100.5 GRAMS
BH CV ECHO MEAS - LV MAX PG: 2.6 MMHG
BH CV ECHO MEAS - LV MEAN PG: 1 MMHG
BH CV ECHO MEAS - LV SYSTOLIC VOL/BSA (12-30): 8.2 CM2
BH CV ECHO MEAS - LV V1 MAX: 80 CM/SEC
BH CV ECHO MEAS - LV V1 VTI: 17.8 CM
BH CV ECHO MEAS - LVIDD: 4.3 CM
BH CV ECHO MEAS - LVIDS: 2.14 CM
BH CV ECHO MEAS - LVOT AREA: 2.8 CM2
BH CV ECHO MEAS - LVOT DIAM: 1.89 CM
BH CV ECHO MEAS - LVPWD: 0.79 CM
BH CV ECHO MEAS - MED PEAK E' VEL: 10.2 CM/SEC
BH CV ECHO MEAS - MV DEC SLOPE: 446 CM/SEC2
BH CV ECHO MEAS - MV DEC TIME: 0.2 SEC
BH CV ECHO MEAS - MV E MAX VEL: 86.9 CM/SEC
BH CV ECHO MEAS - MV MAX PG: 4.6 MMHG
BH CV ECHO MEAS - MV MEAN PG: 2 MMHG
BH CV ECHO MEAS - MV V2 VTI: 22.4 CM
BH CV ECHO MEAS - MVA(VTI): 2.23 CM2
BH CV ECHO MEAS - PA ACC TIME: 0.13 SEC
BH CV ECHO MEAS - PULM A REVS DUR: 0.09 SEC
BH CV ECHO MEAS - PULM A REVS VEL: 23.6 CM/SEC
BH CV ECHO MEAS - PULM DIAS VEL: 48.2 CM/SEC
BH CV ECHO MEAS - PULM S/D: 1.34
BH CV ECHO MEAS - PULM SYS VEL: 64.7 CM/SEC
BH CV ECHO MEAS - RV MAX PG: 1.31 MMHG
BH CV ECHO MEAS - RV V1 MAX: 57.3 CM/SEC
BH CV ECHO MEAS - RV V1 VTI: 12.4 CM
BH CV ECHO MEAS - SV(LVOT): 49.9 ML
BH CV ECHO MEAS - SV(MOD-SP2): 26.7 ML
BH CV ECHO MEAS - SV(MOD-SP4): 25.4 ML
BH CV ECHO MEAS - SVI(LVOT): 30.2 ML/M2
BH CV ECHO MEAS - SVI(MOD-SP2): 16.1 ML/M2
BH CV ECHO MEAS - SVI(MOD-SP4): 15.3 ML/M2
BH CV ECHO MEAS - TAPSE (>1.6): 2.09 CM
BH CV ECHO MEAS - TR MAX PG: 27.1 MMHG
BH CV ECHO MEAS - TR MAX VEL: 260.5 CM/SEC
BH CV ECHO MEASUREMENTS AVERAGE E/E' RATIO: 8.65
BH CV XLRA - RV BASE: 2.7 CM
BH CV XLRA - RV LENGTH: 7 CM
BH CV XLRA - RV MID: 2.7 CM
BH CV XLRA - TDI S': 12.6 CM/SEC
BILIRUB SERPL-MCNC: 1.3 MG/DL (ref 0–1.2)
BUN SERPL-MCNC: 26 MG/DL (ref 8–23)
BUN/CREAT SERPL: 22.8 (ref 7–25)
CALCIUM SPEC-SCNC: 9.8 MG/DL (ref 8.6–10.5)
CHLORIDE SERPL-SCNC: 103 MMOL/L (ref 98–107)
CO2 SERPL-SCNC: 20.5 MMOL/L (ref 22–29)
CREAT SERPL-MCNC: 1.14 MG/DL (ref 0.57–1)
CRP SERPL-MCNC: 24.19 MG/DL (ref 0–0.5)
D-LACTATE SERPL-SCNC: 0.9 MMOL/L (ref 0.5–2)
DEPRECATED RDW RBC AUTO: 42.6 FL (ref 37–54)
EGFRCR SERPLBLD CKD-EPI 2021: 50 ML/MIN/1.73
EOSINOPHIL # BLD AUTO: 0.01 10*3/MM3 (ref 0–0.4)
EOSINOPHIL NFR BLD AUTO: 0.1 % (ref 0.3–6.2)
ERYTHROCYTE [DISTWIDTH] IN BLOOD BY AUTOMATED COUNT: 12.5 % (ref 12.3–15.4)
FLUAV RNA RESP QL NAA+PROBE: NOT DETECTED
FLUBV RNA RESP QL NAA+PROBE: NOT DETECTED
GEN 5 1HR TROPONIN T REFLEX: 46 NG/L
GLOBULIN UR ELPH-MCNC: 3.3 GM/DL
GLUCOSE SERPL-MCNC: 101 MG/DL (ref 65–99)
HCT VFR BLD AUTO: 45 % (ref 34–46.6)
HGB BLD-MCNC: 15.1 G/DL (ref 12–15.9)
IMM GRANULOCYTES # BLD AUTO: 0.05 10*3/MM3 (ref 0–0.05)
IMM GRANULOCYTES NFR BLD AUTO: 0.4 % (ref 0–0.5)
LEFT ATRIUM VOLUME INDEX: 29.9 ML/M2
LV EF 3D SEGMENTATION: 65 %
LV EF BIPLANE MOD: 62.1 %
LYMPHOCYTES # BLD AUTO: 1.06 10*3/MM3 (ref 0.7–3.1)
LYMPHOCYTES NFR BLD AUTO: 8.2 % (ref 19.6–45.3)
MAGNESIUM SERPL-MCNC: 2.2 MG/DL (ref 1.6–2.4)
MCH RBC QN AUTO: 31 PG (ref 26.6–33)
MCHC RBC AUTO-ENTMCNC: 33.6 G/DL (ref 31.5–35.7)
MCV RBC AUTO: 92.4 FL (ref 79–97)
MONOCYTES # BLD AUTO: 0.48 10*3/MM3 (ref 0.1–0.9)
MONOCYTES NFR BLD AUTO: 3.7 % (ref 5–12)
NEUTROPHILS NFR BLD AUTO: 11.36 10*3/MM3 (ref 1.7–7)
NEUTROPHILS NFR BLD AUTO: 87.4 % (ref 42.7–76)
NRBC BLD AUTO-RTO: 0 /100 WBC (ref 0–0.2)
NT-PROBNP SERPL-MCNC: 2135 PG/ML (ref 0–1800)
PLATELET # BLD AUTO: 154 10*3/MM3 (ref 140–450)
PMV BLD AUTO: 10.8 FL (ref 6–12)
POTASSIUM SERPL-SCNC: 4.3 MMOL/L (ref 3.5–5.2)
PROCALCITONIN SERPL-MCNC: 3.95 NG/ML (ref 0–0.25)
PROT SERPL-MCNC: 7.4 G/DL (ref 6–8.5)
RBC # BLD AUTO: 4.87 10*6/MM3 (ref 3.77–5.28)
SARS-COV-2 RNA RESP QL NAA+PROBE: NOT DETECTED
SODIUM SERPL-SCNC: 142 MMOL/L (ref 136–145)
TROPONIN T % DELTA: -6
TROPONIN T NUMERIC DELTA: -3 NG/L
TROPONIN T SERPL HS-MCNC: 28 NG/L
TROPONIN T SERPL HS-MCNC: 49 NG/L
TSH SERPL DL<=0.05 MIU/L-ACNC: 1.21 UIU/ML (ref 0.27–4.2)
WBC NRBC COR # BLD AUTO: 12.99 10*3/MM3 (ref 3.4–10.8)

## 2025-06-25 PROCEDURE — 84443 ASSAY THYROID STIM HORMONE: CPT | Performed by: FAMILY MEDICINE

## 2025-06-25 PROCEDURE — 93005 ELECTROCARDIOGRAM TRACING: CPT | Performed by: STUDENT IN AN ORGANIZED HEALTH CARE EDUCATION/TRAINING PROGRAM

## 2025-06-25 PROCEDURE — 71045 X-RAY EXAM CHEST 1 VIEW: CPT

## 2025-06-25 PROCEDURE — 25010000002 VANCOMYCIN HCL 1.25 G RECONSTITUTED SOLUTION 1 EACH VIAL: Performed by: STUDENT IN AN ORGANIZED HEALTH CARE EDUCATION/TRAINING PROGRAM

## 2025-06-25 PROCEDURE — 84484 ASSAY OF TROPONIN QUANT: CPT | Performed by: STUDENT IN AN ORGANIZED HEALTH CARE EDUCATION/TRAINING PROGRAM

## 2025-06-25 PROCEDURE — 86140 C-REACTIVE PROTEIN: CPT | Performed by: STUDENT IN AN ORGANIZED HEALTH CARE EDUCATION/TRAINING PROGRAM

## 2025-06-25 PROCEDURE — 84145 PROCALCITONIN (PCT): CPT | Performed by: STUDENT IN AN ORGANIZED HEALTH CARE EDUCATION/TRAINING PROGRAM

## 2025-06-25 PROCEDURE — 25810000003 SODIUM CHLORIDE 0.9 % SOLUTION 250 ML FLEX CONT: Performed by: STUDENT IN AN ORGANIZED HEALTH CARE EDUCATION/TRAINING PROGRAM

## 2025-06-25 PROCEDURE — 87636 SARSCOV2 & INF A&B AMP PRB: CPT | Performed by: STUDENT IN AN ORGANIZED HEALTH CARE EDUCATION/TRAINING PROGRAM

## 2025-06-25 PROCEDURE — 74175 CTA ABDOMEN W/CONTRAST: CPT

## 2025-06-25 PROCEDURE — 83735 ASSAY OF MAGNESIUM: CPT | Performed by: STUDENT IN AN ORGANIZED HEALTH CARE EDUCATION/TRAINING PROGRAM

## 2025-06-25 PROCEDURE — 93306 TTE W/DOPPLER COMPLETE: CPT

## 2025-06-25 PROCEDURE — 99291 CRITICAL CARE FIRST HOUR: CPT | Performed by: STUDENT IN AN ORGANIZED HEALTH CARE EDUCATION/TRAINING PROGRAM

## 2025-06-25 PROCEDURE — 85025 COMPLETE CBC W/AUTO DIFF WBC: CPT | Performed by: STUDENT IN AN ORGANIZED HEALTH CARE EDUCATION/TRAINING PROGRAM

## 2025-06-25 PROCEDURE — 87040 BLOOD CULTURE FOR BACTERIA: CPT | Performed by: STUDENT IN AN ORGANIZED HEALTH CARE EDUCATION/TRAINING PROGRAM

## 2025-06-25 PROCEDURE — 25810000003 LACTATED RINGERS SOLUTION: Performed by: STUDENT IN AN ORGANIZED HEALTH CARE EDUCATION/TRAINING PROGRAM

## 2025-06-25 PROCEDURE — 25810000003 SODIUM CHLORIDE 0.9 % SOLUTION: Performed by: FAMILY MEDICINE

## 2025-06-25 PROCEDURE — 84484 ASSAY OF TROPONIN QUANT: CPT | Performed by: FAMILY MEDICINE

## 2025-06-25 PROCEDURE — 25010000002 METHYLPREDNISOLONE PER 40 MG: Performed by: STUDENT IN AN ORGANIZED HEALTH CARE EDUCATION/TRAINING PROGRAM

## 2025-06-25 PROCEDURE — 36415 COLL VENOUS BLD VENIPUNCTURE: CPT

## 2025-06-25 PROCEDURE — 99223 1ST HOSP IP/OBS HIGH 75: CPT | Performed by: FAMILY MEDICINE

## 2025-06-25 PROCEDURE — 25010000002 CEFEPIME PER 500 MG: Performed by: STUDENT IN AN ORGANIZED HEALTH CARE EDUCATION/TRAINING PROGRAM

## 2025-06-25 PROCEDURE — 83880 ASSAY OF NATRIURETIC PEPTIDE: CPT | Performed by: STUDENT IN AN ORGANIZED HEALTH CARE EDUCATION/TRAINING PROGRAM

## 2025-06-25 PROCEDURE — 80053 COMPREHEN METABOLIC PANEL: CPT | Performed by: STUDENT IN AN ORGANIZED HEALTH CARE EDUCATION/TRAINING PROGRAM

## 2025-06-25 PROCEDURE — 25010000002 AZITHROMYCIN PER 500 MG: Performed by: STUDENT IN AN ORGANIZED HEALTH CARE EDUCATION/TRAINING PROGRAM

## 2025-06-25 PROCEDURE — 87641 MR-STAPH DNA AMP PROBE: CPT | Performed by: FAMILY MEDICINE

## 2025-06-25 PROCEDURE — 25510000001 IOPAMIDOL 61 % SOLUTION: Performed by: STUDENT IN AN ORGANIZED HEALTH CARE EDUCATION/TRAINING PROGRAM

## 2025-06-25 PROCEDURE — 25010000002 DIPHENHYDRAMINE PER 50 MG: Performed by: STUDENT IN AN ORGANIZED HEALTH CARE EDUCATION/TRAINING PROGRAM

## 2025-06-25 PROCEDURE — 83605 ASSAY OF LACTIC ACID: CPT | Performed by: FAMILY MEDICINE

## 2025-06-25 RX ORDER — PANTOPRAZOLE SODIUM 40 MG/1
40 TABLET, DELAYED RELEASE ORAL
Status: DISCONTINUED | OUTPATIENT
Start: 2025-06-26 | End: 2025-06-27 | Stop reason: HOSPADM

## 2025-06-25 RX ORDER — ACETAMINOPHEN 650 MG/1
650 SUPPOSITORY RECTAL EVERY 4 HOURS PRN
Status: DISCONTINUED | OUTPATIENT
Start: 2025-06-25 | End: 2025-06-27 | Stop reason: HOSPADM

## 2025-06-25 RX ORDER — SODIUM CHLORIDE 0.9 % (FLUSH) 0.9 %
10 SYRINGE (ML) INJECTION EVERY 12 HOURS SCHEDULED
Status: DISCONTINUED | OUTPATIENT
Start: 2025-06-25 | End: 2025-06-27 | Stop reason: HOSPADM

## 2025-06-25 RX ORDER — ACETAMINOPHEN 160 MG/5ML
650 SOLUTION ORAL EVERY 4 HOURS PRN
Status: DISCONTINUED | OUTPATIENT
Start: 2025-06-25 | End: 2025-06-27 | Stop reason: HOSPADM

## 2025-06-25 RX ORDER — ATORVASTATIN CALCIUM 40 MG/1
40 TABLET, FILM COATED ORAL DAILY
Status: DISCONTINUED | OUTPATIENT
Start: 2025-06-25 | End: 2025-06-27 | Stop reason: HOSPADM

## 2025-06-25 RX ORDER — ENOXAPARIN SODIUM 100 MG/ML
40 INJECTION SUBCUTANEOUS EVERY 24 HOURS
Status: DISCONTINUED | OUTPATIENT
Start: 2025-06-25 | End: 2025-06-26

## 2025-06-25 RX ORDER — IPRATROPIUM BROMIDE AND ALBUTEROL SULFATE 2.5; .5 MG/3ML; MG/3ML
3 SOLUTION RESPIRATORY (INHALATION) EVERY 4 HOURS PRN
Status: DISCONTINUED | OUTPATIENT
Start: 2025-06-25 | End: 2025-06-27 | Stop reason: HOSPADM

## 2025-06-25 RX ORDER — CETIRIZINE HYDROCHLORIDE 10 MG/1
10 TABLET ORAL DAILY
Status: DISCONTINUED | OUTPATIENT
Start: 2025-06-25 | End: 2025-06-27 | Stop reason: HOSPADM

## 2025-06-25 RX ORDER — SODIUM CHLORIDE 9 MG/ML
40 INJECTION, SOLUTION INTRAVENOUS AS NEEDED
Status: DISCONTINUED | OUTPATIENT
Start: 2025-06-25 | End: 2025-06-27 | Stop reason: HOSPADM

## 2025-06-25 RX ORDER — OXYCODONE AND ACETAMINOPHEN 5; 325 MG/1; MG/1
1 TABLET ORAL EVERY 8 HOURS PRN
Refills: 0 | Status: DISCONTINUED | OUTPATIENT
Start: 2025-06-25 | End: 2025-06-27 | Stop reason: HOSPADM

## 2025-06-25 RX ORDER — METHYLPREDNISOLONE SODIUM SUCCINATE 40 MG/ML
40 INJECTION, POWDER, LYOPHILIZED, FOR SOLUTION INTRAMUSCULAR; INTRAVENOUS EVERY 4 HOURS
Status: DISCONTINUED | OUTPATIENT
Start: 2025-06-25 | End: 2025-06-25

## 2025-06-25 RX ORDER — BISACODYL 5 MG/1
5 TABLET, DELAYED RELEASE ORAL DAILY PRN
Status: DISCONTINUED | OUTPATIENT
Start: 2025-06-25 | End: 2025-06-27 | Stop reason: HOSPADM

## 2025-06-25 RX ORDER — DIPHENHYDRAMINE HYDROCHLORIDE 50 MG/ML
50 INJECTION, SOLUTION INTRAMUSCULAR; INTRAVENOUS
Status: COMPLETED | OUTPATIENT
Start: 2025-06-25 | End: 2025-06-25

## 2025-06-25 RX ORDER — ACETAMINOPHEN 325 MG/1
650 TABLET ORAL EVERY 4 HOURS PRN
Status: DISCONTINUED | OUTPATIENT
Start: 2025-06-25 | End: 2025-06-27 | Stop reason: HOSPADM

## 2025-06-25 RX ORDER — BISACODYL 10 MG
10 SUPPOSITORY, RECTAL RECTAL DAILY PRN
Status: DISCONTINUED | OUTPATIENT
Start: 2025-06-25 | End: 2025-06-27 | Stop reason: HOSPADM

## 2025-06-25 RX ORDER — NITROGLYCERIN 0.4 MG/1
0.4 TABLET SUBLINGUAL
Status: DISCONTINUED | OUTPATIENT
Start: 2025-06-25 | End: 2025-06-27 | Stop reason: HOSPADM

## 2025-06-25 RX ORDER — ONDANSETRON 2 MG/ML
4 INJECTION INTRAMUSCULAR; INTRAVENOUS EVERY 6 HOURS PRN
Status: DISCONTINUED | OUTPATIENT
Start: 2025-06-25 | End: 2025-06-27 | Stop reason: HOSPADM

## 2025-06-25 RX ORDER — POLYETHYLENE GLYCOL 3350 17 G/17G
17 POWDER, FOR SOLUTION ORAL DAILY PRN
Status: DISCONTINUED | OUTPATIENT
Start: 2025-06-25 | End: 2025-06-27 | Stop reason: HOSPADM

## 2025-06-25 RX ORDER — AMOXICILLIN 250 MG
2 CAPSULE ORAL 2 TIMES DAILY PRN
Status: DISCONTINUED | OUTPATIENT
Start: 2025-06-25 | End: 2025-06-27 | Stop reason: HOSPADM

## 2025-06-25 RX ORDER — IOPAMIDOL 612 MG/ML
100 INJECTION, SOLUTION INTRAVASCULAR
Status: COMPLETED | OUTPATIENT
Start: 2025-06-25 | End: 2025-06-25

## 2025-06-25 RX ORDER — CARVEDILOL 12.5 MG/1
12.5 TABLET ORAL 2 TIMES DAILY WITH MEALS
Status: DISCONTINUED | OUTPATIENT
Start: 2025-06-25 | End: 2025-06-27 | Stop reason: HOSPADM

## 2025-06-25 RX ORDER — SODIUM CHLORIDE 9 MG/ML
100 INJECTION, SOLUTION INTRAVENOUS CONTINUOUS
Status: ACTIVE | OUTPATIENT
Start: 2025-06-25 | End: 2025-06-26

## 2025-06-25 RX ORDER — ASPIRIN 81 MG/1
81 TABLET ORAL DAILY
Status: DISCONTINUED | OUTPATIENT
Start: 2025-06-25 | End: 2025-06-27 | Stop reason: HOSPADM

## 2025-06-25 RX ORDER — SODIUM CHLORIDE 0.9 % (FLUSH) 0.9 %
10 SYRINGE (ML) INJECTION AS NEEDED
Status: DISCONTINUED | OUTPATIENT
Start: 2025-06-25 | End: 2025-06-27 | Stop reason: HOSPADM

## 2025-06-25 RX ADMIN — SODIUM CHLORIDE 100 ML/HR: 9 INJECTION, SOLUTION INTRAVENOUS at 18:27

## 2025-06-25 RX ADMIN — SODIUM CHLORIDE, POTASSIUM CHLORIDE, SODIUM LACTATE AND CALCIUM CHLORIDE 1000 ML: 600; 310; 30; 20 INJECTION, SOLUTION INTRAVENOUS at 09:31

## 2025-06-25 RX ADMIN — DIPHENHYDRAMINE HYDROCHLORIDE 50 MG: 50 INJECTION, SOLUTION INTRAMUSCULAR; INTRAVENOUS at 09:29

## 2025-06-25 RX ADMIN — METHYLPREDNISOLONE SODIUM SUCCINATE 40 MG: 40 INJECTION INTRAMUSCULAR; INTRAVENOUS at 09:30

## 2025-06-25 RX ADMIN — ASPIRIN 81 MG: 81 TABLET, COATED ORAL at 18:26

## 2025-06-25 RX ADMIN — AZITHROMYCIN DIHYDRATE 500 MG: 500 INJECTION, POWDER, LYOPHILIZED, FOR SOLUTION INTRAVENOUS at 13:08

## 2025-06-25 RX ADMIN — CETIRIZINE HYDROCHLORIDE 10 MG: 10 TABLET, FILM COATED ORAL at 18:26

## 2025-06-25 RX ADMIN — CARVEDILOL 12.5 MG: 12.5 TABLET, FILM COATED ORAL at 18:26

## 2025-06-25 RX ADMIN — VANCOMYCIN HYDROCHLORIDE 1250 MG: 1.25 INJECTION, POWDER, LYOPHILIZED, FOR SOLUTION INTRAVENOUS at 11:02

## 2025-06-25 RX ADMIN — IOPAMIDOL 100 ML: 612 INJECTION, SOLUTION INTRAVENOUS at 11:19

## 2025-06-25 RX ADMIN — Medication 10 ML: at 20:54

## 2025-06-25 RX ADMIN — CEFEPIME 2000 MG: 2 INJECTION, POWDER, FOR SOLUTION INTRAVENOUS at 11:27

## 2025-06-25 RX ADMIN — Medication 5 MG: at 20:54

## 2025-06-25 RX ADMIN — ATORVASTATIN CALCIUM 40 MG: 40 TABLET, FILM COATED ORAL at 18:26

## 2025-06-25 RX ADMIN — METHYLPREDNISOLONE SODIUM SUCCINATE 40 MG: 40 INJECTION INTRAMUSCULAR; INTRAVENOUS at 12:47

## 2025-06-25 NOTE — PROGRESS NOTES
Pharmacy Consult-Vancomycin Dosing    Marifer Ricardo is a  75 y.o. female receiving vancomycin therapy.     Indication: Empiric  Consulting Provider: Dr. Silva    Goal AUC: 400-600 mg/:L*hr    Current Antimicrobial Therapy  Anti-Infectives (From admission, onward)      Ordered     Dose/Rate Route Frequency Start Stop    06/25/25 1810  vancomycin 750 mg in sodium chloride 0.9 % 250 mL IVPB-VTB        Ordering Provider: Niki Silva MD    750 mg  333.3 mL/hr over 45 Minutes Intravenous Every 24 Hours 06/26/25 0900 06/30/25 0859    06/25/25 1625  cefepime 2000 mg IVPB in 100 mL NS (VTB)        Ordering Provider: Niki Silva MD    2,000 mg  over 4 Hours Intravenous Every 12 Hours 06/26/25 0000 06/30/25 2359    06/25/25 1615  Pharmacy To Dose: Cefepime        Ordering Provider: Niki Silva MD     Not Applicable Continuous PRN 06/25/25 1612 06/30/25 1611    06/25/25 1615  Pharmacy to dose vancomycin        Ordering Provider: Niki Silva MD     Not Applicable Continuous PRN 06/25/25 1612 06/30/25 1611    06/25/25 1247  azithromycin (ZITHROMAX) 500 mg in sodium chloride 0.9 % 250 mL IVPB-VTB        Ordering Provider: Jamie Rubio MD    500 mg  over 60 Minutes Intravenous Every 24 Hours 06/25/25 1303 06/28/25 1302    06/25/25 1001  Vancomycin HCl 1,250 mg in sodium chloride 0.9 % 250 mL VTB        Ordering Provider: Jamie Rubio MD    20 mg/kg × 56.7 kg  200 mL/hr over 75 Minutes Intravenous Once 06/25/25 1017 06/25/25 1230    06/25/25 1001  cefepime 2000 mg IVPB in 100 mL NS (VTB)        Ordering Provider: Jamie Rubio MD    2,000 mg  over 30 Minutes Intravenous Once 06/25/25 1017 06/25/25 1230            Labs  Results from last 7 days   Lab Units 06/25/25  0926   WBC 10*3/mm3 12.99*   CREATININE mg/dL 1.14*      Estimated Creatinine Clearance: 37.6 mL/min (A) (by C-G formula based on SCr of 1.14 mg/dL (H)).  Temp Readings from Last 1 Encounters:   06/25/25 97.9 °F (36.6 °C)  "(Oral)       Microbiology Culture results  Microbiology Results (last 10 days)       Procedure Component Value - Date/Time    COVID-19 and FLU A/B PCR, 1 HR TAT - Swab, Nasopharynx [[id number]]  (Normal) Collected: 06/25/25 0935    Lab Status: Final result Specimen: Swab from Nasopharynx Updated: 06/25/25 1011     COVID19 Not Detected     Influenza A PCR Not Detected     Influenza B PCR Not Detected    Narrative:      Fact sheet for providers: https://www.fda.gov/media/827336/download    Fact sheet for patients: https://www.fda.gov/media/886285/download    Test performed by PCR.            Evaluation of Dosing     Last Dose Received in the ED/Outside Facility: Yes  Is Patient on Dialysis or Renal Replacement: No    Ht - 170.2 cm (67.01\")  Wt - 56.7 kg (125 lb)    Evaluation of Level                      Insight[employer] AUC Calculation    Current AUC:   285 mg/L*hr    Predicted Steady State AUC on Current Dose: New start  _________________________________    Predicted Steady State AUC on New Dose:   423 mg/L*hr    Assessment/Plan    Pharmacy to dose vancomycin for empiric coverage. Goal -600 mg/L*hr.  Patient received loading dose of vancomycin 1250mg (~22mg/kg) IV on 6/25 @ 1102. Initiate maintenance dose of vancomycin 750mg (~13.2mg/kg) IV Q24hr on 6/26 @ 0900.  Assess clearance by vancomycin random level on 6/27 @ 0600.  Pharmacy will continue to monitor renal function, cultures and sensitivities, and clinical status to adjust regimen as necessary.    Thank you for the consult,    Alexandro SoteloD, BCPS   06/25/25 18:11 EDT  "

## 2025-06-25 NOTE — H&P
[facility]   HISTORY AND PHYSICAL      Name:  Marifer Ricardo   Age:  75 y.o.  Sex:  female  :  1950  MRN:  [id number]   Visit Number:  [id number]  Admission Date:  2025  Date Of Service:  25  Primary Care Physician:  Norma Spangler APRN    Chief Complaint:     Chest pain    History Of Presenting Illness:      Patient is 75 years old female with a past medical history of CAD, paroxysmal A-fib, hyperlipidemia, hypertension, asthma, GERD, presented to the ER with a chief complaint of chest pain.  Patient reports that she started getting sick yesterday with intractable nausea and vomiting even when she drinks fluids.  She had mild watery diarrhea which has already resolved.  She was throwing up all night.  After waking up this morning, she reports she started feeling indigestion and frequent burping along with substernal chest pain.  Reporting associated numbness sensation in her left arm. Her nausea, vomiting and diarrhea currently resolved.  Denies any shortness of breath, cough, fever or abdominal pain.  Patient follows up with Dr. Witt with cardiology.  She is currently on aspirin and statin.    On ER evaluation, patient was in A-fib with RVR on arrival with heart rate up to 140s, she was hypotensive with a blood pressure of 80s over 60s but responded to IV fluids in the ER.  Remained on room air and afebrile. Workup in the ER was significant for HS Troponin of 28 (chronically elevated), proBNP 2135, creatinine 1.14, BUN 26, T. bili Kristian 1.3, Pro-Rocco 3.95, CRP 24.1, WBC 12.9, COVID and flu negative.  Chest x-ray with stable chest per radiology.  CTA aorta showed Occluded left subclavian to right atrial graft as seen on prior study. Right upper and middle lobe groundglass opacities new from , acute versus chronic. No aortic dissection. Mildly dilated main pulmonary artery suggesting pulmonary arterial hypertension.  Patient received Benadryl and steroids  for contrast allergy, received 1 L bolus of LR and was started on vancomycin, cefepime and azithromycin while in the ER.  Hospitalist consulted for admission, further management and treatment.    Review Of Systems:    All systems were reviewed and negative except as mentioned in history of presenting illness, assessment and plan.    Past Medical History: Patient  has a past medical history of Abdominal pain, Abnormal bone density screening (03/27/2014), Arthritis, Asthma, Belching, Body piercing, Cataract, bilateral, Chronic renal insufficiency (8/4/2016), Diabetes mellitus, Essential hypertension (8/4/2016), GERD (gastroesophageal reflux disease), mammogram (2009), Hyperparathyroidism, Mastoiditis, Pregnancy, Renal insufficiency, Urticaria, Wears glasses, and Weight loss.    Past Surgical History: Patient  has a past surgical history that includes Hysterectomy; Colectomy partial / total; Tonsillectomy and adenoidectomy; Colostomy; Colonoscopy (2005); Cataract extraction, bilateral; Esophagogastroduodenoscopy (N/A, 01/25/2018); Colonoscopy (N/A, 02/07/2018); Bypass graft subclavian; Cardiac catheterization (N/A, 8/13/2024); Esophagogastroduodenoscopy (N/A, 8/14/2024); and Hip Percutaneous Pinning (Right, 10/27/2024).    Social History: Patient  reports that she has never smoked. She has never been exposed to tobacco smoke. She has never used smokeless tobacco. She reports that she does not drink alcohol and does not use drugs.    Family History:  Patient's family history has been reviewed and found to be noncontributory.     Allergies:      Penicillins, Codeine, Contrast dye (echo or unknown ct/mr), Metoclopramide, and Norco [hydrocodone-acetaminophen]    Home Medications:    Prior to Admission Medications       Prescriptions Last Dose Informant Patient Reported? Taking?    alendronate (FOSAMAX) 70 MG tablet   Yes No    Take 1 tablet by mouth Every 7 (Seven) Days. Thursday    amLODIPine (NORVASC) 5 MG tablet   No No     Take 0.5 tablets by mouth every night at bedtime.    aspirin (EQ Aspirin Adult Low Dose) 81 MG EC tablet   No No    Take 1 tablet by mouth Daily.    atorvastatin (LIPITOR) 40 MG tablet   No No    Take 1 tablet by mouth Daily.    Patient taking differently:  Take 1 tablet by mouth Every Night.    Biotin 1000 MCG tablet   Yes No    Take 1,000 mcg by mouth 3 (Three) Times a Day. OTC    carvedilol (COREG) 12.5 MG tablet   No No    TAKE 1 TABLET BY MOUTH TWICE DAILY WITH MEALS    cetirizine (zyrTEC) 10 MG tablet   No No    Take 1 tablet by mouth Daily. OTC    cholecalciferol (VITAMIN D3) 1000 UNITS tablet   Yes No    Take 1 tablet by mouth Daily. OTC    Cinnamon 500 MG tablet   Yes No    Take 1 tablet by mouth 2 (Two) Times a Day. OTC    Cyanocobalamin (VITAMIN B-12) 5000 MCG sublingual tablet   Yes No    Place 1 tablet under the tongue Daily. OTC    Omega 3-6-9 Fatty Acids (OMEGA 3-6-9 COMPLEX PO)   Yes No    Take 1 capsule by mouth Daily. OTC    oxyCODONE-acetaminophen (PERCOCET) 5-325 MG per tablet   Yes No    Take 1 tablet by mouth Every 12 (Twelve) Hours As Needed for Moderate Pain.    pantoprazole (PROTONIX) 40 MG EC tablet   No No    Take 1 tablet by mouth Every Morning Before Breakfast.    Turmeric 400 MG capsule   Yes No    Take 400 mg by mouth Daily. OTC    valsartan (DIOVAN) 160 MG tablet   No No    Take 1 tablet by mouth Daily.          ED Medications:    Medications   methylPREDNISolone sodium succinate (SOLU-Medrol) injection 40 mg (40 mg Intravenous Given 6/25/25 1247)   azithromycin (ZITHROMAX) 500 mg in sodium chloride 0.9 % 250 mL IVPB-VTB (500 mg Intravenous New Bag 6/25/25 1308)   lactated ringers bolus 1,000 mL (0 mL Intravenous Stopped 6/25/25 1049)   diphenhydrAMINE (BENADRYL) injection 50 mg (50 mg Intravenous Given 6/25/25 0929)   Vancomycin HCl 1,250 mg in sodium chloride 0.9 % 250 mL VTB (0 mg Intravenous Stopped 6/25/25 1230)   cefepime 2000 mg IVPB in 100 mL NS (VTB) (0 mg Intravenous  "Stopped 6/25/25 1230)   iopamidol (ISOVUE-300) 61 % injection 100 mL (100 mL Intravenous Given 6/25/25 1119)     Vital Signs:  Temp:  [97.7 °F (36.5 °C)-97.9 °F (36.6 °C)] 97.9 °F (36.6 °C)  Heart Rate:  [] 73  Resp:  [16] 16  BP: ()/(56-91) 136/70        06/25/25  0827 06/25/25  1247   Weight: 56.7 kg (125 lb) 56.7 kg (125 lb)     Body mass index is 19.58 kg/m².    Physical Exam:     Most recent vital Signs: /70   Pulse 73   Temp 97.9 °F (36.6 °C) (Oral)   Resp 16   Ht 170.2 cm (67\")   Wt 56.7 kg (125 lb)   LMP  (LMP Unknown)   SpO2 95%   BMI 19.58 kg/m²     Physical Exam  Vitals and nursing note reviewed.   Constitutional:       General: She is not in acute distress.     Appearance: She is ill-appearing.   HENT:      Head: Normocephalic and atraumatic.      Right Ear: External ear normal.      Left Ear: External ear normal.      Nose: Nose normal.      Mouth/Throat:      Mouth: Mucous membranes are moist.   Eyes:      Extraocular Movements: Extraocular movements intact.      Conjunctiva/sclera: Conjunctivae normal.      Pupils: Pupils are equal, round, and reactive to light.   Cardiovascular:      Rate and Rhythm: Normal rate and regular rhythm.      Pulses: Normal pulses.      Heart sounds: Normal heart sounds.   Pulmonary:      Effort: Pulmonary effort is normal. No respiratory distress.      Breath sounds: Normal breath sounds. Decreased air movement present. No wheezing or rhonchi.   Abdominal:      General: Bowel sounds are normal. There is no distension.      Palpations: Abdomen is soft.      Tenderness: There is no abdominal tenderness.   Musculoskeletal:         General: Normal range of motion.      Cervical back: Normal range of motion and neck supple.      Right lower leg: No edema.      Left lower leg: No edema.   Skin:     General: Skin is warm and dry.      Findings: No rash.   Neurological:      General: No focal deficit present.      Mental Status: She is alert and oriented " "to person, place, and time. Mental status is at baseline.      Motor: No weakness.   Psychiatric:         Mood and Affect: Mood normal.         Behavior: Behavior normal.         Thought Content: Thought content normal.         Laboratory data:    I have reviewed the labs done in the emergency room.    Results from last 7 days   Lab Units 06/25/25  0926   SODIUM mmol/L 142   POTASSIUM mmol/L 4.3   CHLORIDE mmol/L 103   CO2 mmol/L 20.5*   BUN mg/dL 26.0*   CREATININE mg/dL 1.14*   CALCIUM mg/dL 9.8   BILIRUBIN mg/dL 1.3*   ALK PHOS U/L 92   ALT (SGPT) U/L 11   AST (SGOT) U/L 18   GLUCOSE mg/dL 101*     Results from last 7 days   Lab Units 06/25/25  0926   WBC 10*3/mm3 12.99*   HEMOGLOBIN g/dL 15.1   HEMATOCRIT % 45.0   PLATELETS 10*3/mm3 154         Results from last 7 days   Lab Units 06/25/25  0926   HSTROP T ng/L 28*     Results from last 7 days   Lab Units 06/25/25  0926   PROBNP pg/mL 2,135.0*                       Invalid input(s): \"USDES\", \"NITRITITE\", \"BACT\", \"EP\"    Pain Management Panel  More data exists         Latest Ref Rng & Units 2/28/2023 4/26/2022   Pain Management Panel   Creatinine, Urine Not Estab. mg/dL 57.3  37.8        EKG:      A-fib with RVR, heart rate 139, ST depressions noted in multiple leads including lead I, lead II, V4 through V6. other nonspecific ST/T wave changes.    Radiology:    CT Angiogram Aorta  Result Date: 6/25/2025  CT ANGIOGRAPHY, ABDOMEN AND PELVIS     06/25/2025 11:09 AM  HISTORY: Chest pain, hypotension, evaluate aortic dissection, PE..  COMPARISON: 06/01/2022..  PROCEDURE: Postcontrast images of the chest, abdomen and pelvis were performed by computed tomography before and after intravenous administration of contrast.. Extensive 3 D reconstruction images were performed. A CTA was performed. This study was performed with techniques to keep radiation doses as low as reasonably achievable, (ALARA). Individualized dose reduction techniques using automated exposure control " or adjustment of mA and/or kV according to the patient size were employed.  FINDINGS:  ABDOMEN: There is stable chronic change posterior lower lobes bilaterally. Partial atelectasis/scarring of the right middle lobe inferiorly is new from the prior exam. There are some groundglass opacities posteriorly in the right upper lobe, acute versus chronic. Left upper lobe is clear.. The heart is upper limits of normal in size. The liver demonstrates diffuse fatty infiltration. The spleen is unremarkable. No adrenal mass is present.  The pancreas is not well visualized on this exam but question the dilatation of the pancreatic duct. IVC is mildly dilated and there is reflux of contrast into the IVC. Streak artifact from metallic surgical clips noted. The kidneys are unremarkable. The aorta is proper caliber. There is no free fluid or adenopathy. Gallbladder present with no CT visible stones.  Pre-contrast images demonstrate left nephrolithiasis.  PELVIS: The appendix is not identified. The urinary bladder is unremarkable. There is no significant free fluid or adenopathy. Calcifications noted in the subcutaneous fat of the buttocks bilaterally, likely calcified injection granulomas.  CTA: The previously described left subclavian right atrial graft is occluded as was seen on the prior exam. Mediastinal collaterals identified as was seen previously. Moderate stenosis at the origin of the celiac axis noted. Mild stenosis at the origin of the SMA noted. SJ is patent. There are single bilateral nonstenotic renal arteries. Bilateral common iliac arteries are patent. No aortic dissection identified. Main pulmonary artery is mildly dilated suggesting pulmonary arterial hypertension.      Occluded left subclavian to right atrial graft as seen on prior study.  Right upper and middle lobe groundglass opacities new from 2022, acute versus chronic.  No aortic dissection.  Mildly dilated main pulmonary artery suggesting pulmonary arterial  hypertension.  CTDI: 2.55 mGy DLP:292.6 mGy.cm   This report was signed and finalized on 6/25/2025 12:57 PM by Cierra Barros MD.      XR Chest 1 View  Result Date: 6/25/2025  PROCEDURE: XR CHEST 1 VW-  HISTORY: Chest pain, mid chest pain and excessive burping.  COMPARISON: August 12, 2024..  FINDINGS: The heart is normal in size. Stable chronic change noted bilaterally; no acute infiltrate seen.. The mediastinum is unremarkable. There is no pneumothorax.  There are no acute osseous abnormalities. Right hilar surgical clips noted. Tortuosity of the thoracic aorta noted. Aortic mural calcifications noted. There is evidence of old calcified granulomatous disease. Patient is rotated to the right. Sclerosis noted in the left humeral neck, stable from prior.      Stable chest..      This report was signed and finalized on 6/25/2025 10:12 AM by Cierra Barros MD.        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    Chest pain  -She has a history of CAD and angina, she had a heart cath back in August 2024 showing minimal nonobstructive CAD, torturous coronary anatomy.  -Continue aspirin and statin  -Trending troponins  -Lipid panel and A1c with a.m. labs  - CTA aorta showed chronically occluded left subclavian to right atrial graft as seen on previous imaging.  Was previously discussed with CT surgery on previous admission in 2022, at that time CT surgery recommended given her significant collaterals, suspected that the patient's completely occluded subclavian has been occluded for quite some time.  -2D echo ordered  - Dr. Witt with cardiology consulted and notified, appreciate recommendations    Right lung pneumonia  Sepsis  - Met SIRS  criteria with tachycardia and leukocytosis  - Coverage for pneumonia with vancomycin and cefepime, de-escalate as able to  - MRSA screen ordered  - Follow-up on blood cultures  - Hypotension improved with IV fluid bolus given in the ER, hold on antihypertensives including valsartan and amlodipine  - Continue gentle IV fluids  - Not requiring supplemental oxygen currently    Paroxysmal A-fib  - Was with A-fib RVR on arrival, improved with IV fluids  - Currently rate controlled  - Continue carvedilol  - Not currently on anticoagulation  -Will check TSH  - Dr. Witt with cardiology consulted, appreciate recommendations    -Continue home meds as warranted.  -Further orders as indicated per clinical course.    -I discussed management treatment plan with the patient and her friends who are at bedside, all questions were answered to her satisfaction.  Patient does not want to be intubated or resuscitated.  CODE STATUS was ordered as DNR/DNI per her wishes.    Risk Assessment: High  DVT Prophylaxis: Lovenox prophylaxis (benefit> risk)  Code Status: DNR/DNI  Diet: Cardiac    Advance Care Planning   ACP discussion was held with the patient during this visit. Patient does not have an advance directive, information provided.       Niki Silva MD  06/25/25  14:01 EDT    Dictated utilizing Dragon dictation.

## 2025-06-25 NOTE — PROGRESS NOTES
Pharmacokinetic Consult - Cefepime Dosing  Marifer Ricardo is a 75 y.o. female who has been consulted to dose Cefepime for pneumonia.    Current Antimicrobial Therapy    Anti-Infectives (From admission, onward)      Ordered     Dose/Rate Route Frequency Start Stop    06/25/25 1625  cefepime 2000 mg IVPB in 100 mL NS (VTB)        Ordering Provider: Niki Silva MD    2,000 mg  over 4 Hours Intravenous Every 12 Hours 06/26/25 0000 06/30/25 2359    06/25/25 1615  Pharmacy To Dose: Cefepime        Ordering Provider: Niki Silva MD     Not Applicable Continuous PRN 06/25/25 1612 06/30/25 1611    06/25/25 1615  Pharmacy to dose vancomycin        Ordering Provider: Niki Silva MD     Not Applicable Continuous PRN 06/25/25 1612 06/30/25 1611    06/25/25 1247  azithromycin (ZITHROMAX) 500 mg in sodium chloride 0.9 % 250 mL IVPB-VTB        Ordering Provider: Jamie Rubio MD    500 mg  over 60 Minutes Intravenous Every 24 Hours 06/25/25 1303 06/28/25 1302    06/25/25 1001  Vancomycin HCl 1,250 mg in sodium chloride 0.9 % 250 mL VTB        Ordering Provider: Jamie Rubio MD    20 mg/kg × 56.7 kg  200 mL/hr over 75 Minutes Intravenous Once 06/25/25 1017 06/25/25 1230    06/25/25 1001  cefepime 2000 mg IVPB in 100 mL NS (VTB)        Ordering Provider: Jamie Rubio MD    2,000 mg  over 30 Minutes Intravenous Once 06/25/25 1017 06/25/25 1230            Microbiology Results (last 10 days)       Procedure Component Value - Date/Time    COVID-19 and FLU A/B PCR, 1 HR TAT - Swab, Nasopharynx [[id number]]  (Normal) Collected: 06/25/25 0935    Lab Status: Final result Specimen: Swab from Nasopharynx Updated: 06/25/25 1011     COVID19 Not Detected     Influenza A PCR Not Detected     Influenza B PCR Not Detected    Narrative:      Fact sheet for providers: https://www.fda.gov/media/040875/download    Fact sheet for patients: https://www.fda.gov/media/695936/download    Test performed by PCR.              Allergies  Penicillins, Codeine, Contrast dye (echo or unknown ct/mr), Metoclopramide, and Norco [hydrocodone-acetaminophen]    Relevant clinical data and objective history reviewed:  Creatinine   Date Value Ref Range Status   06/25/2025 1.14 (H) 0.57 - 1.00 mg/dL Final     Estimated Creatinine Clearance: 37.6 mL/min (A) (by C-G formula based on SCr of 1.14 mg/dL (H)).  No intake/output data recorded.  Patient weight: 56.7 kg (125 lb)    Asessment/Plan  Initiate Cefepime 2 gm  IV every 12 hours for CRCL 30-60 mL/min per system guidance.  Pharmacy will monitor Ms. Ricardo's renal function and clinical status and adjust the Cefepime dose and/or frequency as needed.    Thank you for the consult,     Bruce Cabral, AlexandroD, BCPS   6/25/2025  18:05 EDT

## 2025-06-25 NOTE — PROGRESS NOTES
"Pharmacy Consult - Enoxaparin Dosing  Marifer Ricardo is a 75 y.o. female who has been consulted to dose enoxaparin for VTE prophylaxis.     Allergies  Penicillins, Codeine, Contrast dye (echo or unknown ct/mr), Metoclopramide, and Norco [hydrocodone-acetaminophen]    Relevant clinical data and objective history reviewed:   [Ht: 170.2 cm (67.01\"); Wt: 56.7 kg (125 lb)]  Body mass index is 19.57 kg/m².  Estimated Creatinine Clearance: 37.6 mL/min (A) (by C-G formula based on SCr of 1.14 mg/dL (H)).  Results from last 7 days   Lab Units 06/25/25  0926   HEMOGLOBIN g/dL 15.1   HEMATOCRIT % 45.0   PLATELETS 10*3/mm3 154   CREATININE mg/dL 1.14*       Asessment/Plan  Initiate enoxaparin 40 mg SQ every 24 hours  Pharmacy will monitor Ms. Ricardo's renal function and clinical status and adjust the enoxaparin dose and/or frequency as needed.    Thank you for the consult,     Bruce Cabral, PharmD, BCPS   6/25/2025  18:06 EDT  "

## 2025-06-25 NOTE — ED PROVIDER NOTES
Subjective:  History of Present Illness:    Patient is a 75-year-old female with history of CKD, hypertension, GERD, diabetes, who presents today for chest pain.  Reports that she had nausea and vomiting yesterday.  Reports increased chest pain this morning.  Had nausea vomiting and substernal pain.  Denies any abdominal pain.  No new leg swelling or leg pain.  No past history of PE/DVT.      Nurses Notes reviewed and agree, including vitals, allergies, social history and prior medical history.     REVIEW OF SYSTEMS: All systems reviewed and not pertinent unless noted.  Review of Systems   Constitutional:  Positive for activity change. Negative for appetite change, chills, fatigue and fever.   HENT:  Negative for congestion, rhinorrhea, sinus pressure and sinus pain.    Eyes:  Negative for discharge and itching.   Respiratory:  Negative for cough, shortness of breath and wheezing.    Cardiovascular:  Positive for chest pain. Negative for leg swelling.   Gastrointestinal:  Positive for nausea and vomiting. Negative for abdominal distention, abdominal pain, constipation and diarrhea.   Endocrine: Negative for cold intolerance and heat intolerance.   Genitourinary:  Negative for decreased urine volume, difficulty urinating, flank pain, frequency, urgency, vaginal bleeding, vaginal discharge and vaginal pain.   Musculoskeletal:  Negative for gait problem, neck pain and neck stiffness.   Skin:  Negative for color change.   Allergic/Immunologic: Negative for environmental allergies.   Neurological:  Negative for seizures, syncope, facial asymmetry and speech difficulty.   Psychiatric/Behavioral:  Negative for self-injury and suicidal ideas.        Past Medical History:   Diagnosis Date    Abdominal pain     Abnormal bone density screening 03/27/2014    osteopenia    Arthritis     Asthma     Belching     Body piercing     BOTH EARS    Cataract, bilateral     Chronic renal insufficiency 8/4/2016    Diabetes mellitus      Essential hypertension 2016    GERD (gastroesophageal reflux disease)     Hx of mammogram     Hyperparathyroidism     Mastoiditis     Pregnancy      s/p  x 3    Renal insufficiency     Urticaria     Wears glasses     Weight loss        Allergies:    Penicillins, Codeine, Contrast dye (echo or unknown ct/mr), Metoclopramide, and Norco [hydrocodone-acetaminophen]      Past Surgical History:   Procedure Laterality Date    BYPASS GRAFT SUBCLAVIAN      LEFT SUBCLAVIAN TO RIGHT ATRIUM VENOUS GRAFT in past for ?TPN    CARDIAC CATHETERIZATION N/A 2024    Procedure: Left Heart Cath;  Surgeon: Isaac Whalen MD;  Location:  [facility];  Service: Cardiology;  Laterality: N/A;    CATARACT EXTRACTION, BILATERAL      COLECTOMY PARTIAL / TOTAL      Pt reports multiple abd surgeries for ?pseudo-bowel obstruction    COLONOSCOPY      COLONOSCOPY N/A 2018    Procedure: COLONOSCOPY WITH RANDOM COLON BIOPSIES;  Surgeon: Taras Morillo MD;  Location:  [facility];  Service:     COLOSTOMY      and revision    ENDOSCOPY N/A 2018    Procedure: ESOPHAGOGASTRODUODENOSCOPY WITH BIOPSIES;  Surgeon: Taras Morillo MD;  Location:  [facility];  Service:     ENDOSCOPY N/A 2024    Procedure: ESOPHAGOGASTRODUODENOSCOPY;  Surgeon: Brunner, Mark I, MD;  Location:  [facility];  Service: Gastroenterology;  Laterality: N/A;    HIP PERCUTANEOUS PINNING Right 10/27/2024    Procedure: HIP PERCUTANEOUS PINNING RIGHT;  Surgeon: Rubin Parkinson Jr., MD;  Location:  [facility];  Service: Orthopedics;  Laterality: Right;    HYSTERECTOMY      age 31 for DUB, ovaries intact    TONSILLECTOMY AND ADENOIDECTOMY           Social History     Socioeconomic History    Marital status: Single   Tobacco Use    Smoking status: Never     Passive exposure: Never    Smokeless tobacco: Never   Vaping Use    Vaping status: Never Used   Substance and Sexual Activity    Alcohol use: No    Drug use: No     "Sexual activity: Defer         Family History   Problem Relation Age of Onset    Arthritis Mother     Migraines Mother     Thyroid disease Mother     Diabetes Mother     Hypertension Mother     Stroke Mother     Tuberculosis Mother     Heart attack Father     Heart disease Father     Cancer Maternal Aunt     Cancer Paternal Aunt     Diabetes Maternal Grandmother     Cancer Cousin     Colon cancer Neg Hx        Objective  Physical Exam:  /93   Pulse 79   Temp 97.9 °F (36.6 °C) (Oral)   Resp 16   Ht 170.2 cm (67\")   Wt 56.7 kg (125 lb)   LMP  (LMP Unknown)   SpO2 94%   BMI 19.58 kg/m²      Physical Exam  Constitutional:       General: She is not in acute distress.     Appearance: Normal appearance. She is not ill-appearing.   HENT:      Head: Normocephalic and atraumatic.      Nose: Nose normal. No congestion or rhinorrhea.      Mouth/Throat:      Mouth: Mucous membranes are dry.      Pharynx: Oropharynx is clear. No oropharyngeal exudate or posterior oropharyngeal erythema.   Eyes:      Extraocular Movements: Extraocular movements intact.      Conjunctiva/sclera: Conjunctivae normal.      Pupils: Pupils are equal, round, and reactive to light.   Cardiovascular:      Rate and Rhythm: Tachycardia present. Rhythm irregular.      Pulses: Normal pulses.      Heart sounds: Normal heart sounds.   Pulmonary:      Effort: Pulmonary effort is normal. No respiratory distress.      Breath sounds: Normal breath sounds.   Abdominal:      General: Abdomen is flat. Bowel sounds are normal. There is no distension.      Palpations: Abdomen is soft.      Tenderness: There is no abdominal tenderness.   Musculoskeletal:         General: No swelling or tenderness. Normal range of motion.      Cervical back: Normal range of motion and neck supple.   Skin:     General: Skin is warm and dry.      Capillary Refill: Capillary refill takes less than 2 seconds.   Neurological:      General: No focal deficit present.      Mental " Status: She is alert and oriented to person, place, and time. Mental status is at baseline.      Cranial Nerves: No cranial nerve deficit.      Sensory: No sensory deficit.      Motor: No weakness.      Coordination: Coordination normal.   Psychiatric:         Mood and Affect: Mood normal.         Behavior: Behavior normal.         Thought Content: Thought content normal.         Judgment: Judgment normal.         Critical Care    Performed by: Jamie Rubio MD  Authorized by: Niki Silva MD    Critical care provider statement:     Critical care time (minutes):  30    Critical care time was exclusive of:  Separately billable procedures and treating other patients    Critical care was necessary to treat or prevent imminent or life-threatening deterioration of the following conditions:  Respiratory failure    Critical care was time spent personally by me on the following activities:  Blood draw for specimens, development of treatment plan with patient or surrogate, discussions with primary provider, evaluation of patient's response to treatment, examination of patient, obtaining history from patient or surrogate, ordering and performing treatments and interventions, ordering and review of laboratory studies, ordering and review of radiographic studies, pulse oximetry, re-evaluation of patient's condition and review of old charts    Care discussed with: admitting provider        ED Course:    ED Course as of 06/25/25 1540   Wed Jun 25, 2025   0851 EKG interpreted by me, atrial fibrillation with rapid trickle response, rate of 139, ST depressions in leads V4 through V6 with no reciprocal ST elevation, abnormal EKG [JE]   0928 Chest x-ray independently interpreted by me showing hyperinflation with no acute process [JE]   1143 No concern for aortic dissection on my independent interpretation of CT aorta [JE]      ED Course User Index  [JE] Jamie Rubio MD       Lab Results (last 24 hours)        Procedure Component Value Units Date/Time    CBC & Differential [[id number]]  (Abnormal) Collected: 06/25/25 0926    Specimen: Blood Updated: 06/25/25 0934    Narrative:      The following orders were created for panel order CBC & Differential.  Procedure                               Abnormality         Status                     ---------                               -----------         ------                     CBC Auto Differential[[id number]]        Abnormal            Final result                 Please view results for these tests on the individual orders.    Comprehensive Metabolic Panel [[id number]]  (Abnormal) Collected: 06/25/25 0926    Specimen: Blood Updated: 06/25/25 0957     Glucose 101 mg/dL      BUN 26.0 mg/dL      Creatinine 1.14 mg/dL      Sodium 142 mmol/L      Potassium 4.3 mmol/L      Chloride 103 mmol/L      CO2 20.5 mmol/L      Calcium 9.8 mg/dL      Total Protein 7.4 g/dL      Albumin 4.1 g/dL      ALT (SGPT) 11 U/L      AST (SGOT) 18 U/L      Alkaline Phosphatase 92 U/L      Total Bilirubin 1.3 mg/dL      Globulin 3.3 gm/dL      A/G Ratio 1.2 g/dL      BUN/Creatinine Ratio 22.8     Anion Gap 18.5 mmol/L      eGFR 50.0 mL/min/1.73     Narrative:      GFR Categories in Chronic Kidney Disease (CKD)              GFR Category          GFR (mL/min/1.73)    Interpretation  G1                    90 or greater        Normal or high (1)  G2                    60-89                Mild decrease (1)  G3a                   45-59                Mild to moderate decrease  G3b                   30-44                Moderate to severe decrease  G4                    15-29                Severe decrease  G5                    14 or less           Kidney failure    (1)In the absence of evidence of kidney disease, neither GFR category G1 or G2 fulfill the criteria for CKD.    eGFR calculation 2021 CKD-EPI creatinine equation, which does not include race as a factor    High Sensitivity Troponin T [[id number]]   (Abnormal) Collected: 06/25/25 0926    Specimen: Blood Updated: 06/25/25 0957     HS Troponin T 28 ng/L     Narrative:      High Sensitive Troponin T Reference Range:  <14.0 ng/L- Negative Female for AMI  <22.0 ng/L- Negative Male for AMI  >=14 - Abnormal Female indicating possible myocardial injury.  >=22 - Abnormal Male indicating possible myocardial injury.   Clinicians would have to utilize clinical acumen, EKG, Troponin, and serial changes to determine if it is an Acute Myocardial Infarction or myocardial injury due to an underlying chronic condition.         BNP [[id number]]  (Abnormal) Collected: 06/25/25 0926    Specimen: Blood Updated: 06/25/25 0952     proBNP 2,135.0 pg/mL     Narrative:      This assay is used as an aid in the diagnosis of individuals suspected of having heart failure. It can be used as an aid in the diagnosis of acute decompensated heart failure (ADHF) in patients presenting with signs and symptoms of ADHF to the emergency department (ED). In addition, NT-proBNP of <300 pg/mL indicates ADHF is not likely.    Age Range Result Interpretation  NT-proBNP Concentration (pg/mL:      <50             Positive            >450                   Gray                 300-450                    Negative             <300    50-75           Positive            >900                  Gray                300-900                  Negative            <300      >75             Positive            >1800                  Gray                300-1800                  Negative            <300    C-reactive Protein [[id number]]  (Abnormal) Collected: 06/25/25 0926    Specimen: Blood Updated: 06/25/25 0957     C-Reactive Protein 24.19 mg/dL     Procalcitonin [[id number]]  (Abnormal) Collected: 06/25/25 0926    Specimen: Blood Updated: 06/25/25 1002     Procalcitonin 3.95 ng/mL     Narrative:      As a Marker for Sepsis (Non-Neonates):    1. <0.5 ng/mL represents a low risk of severe sepsis and/or septic shock.  2. >2  "ng/mL represents a high risk of severe sepsis and/or septic shock.    As a Marker for Lower Respiratory Tract Infections that require antibiotic therapy:    PCT on Admission    Antibiotic Therapy       6-12 Hrs later    >0.5                Strongly Recommended  >0.25 - <0.5        Recommended   0.1 - 0.25          Discouraged              Remeasure/reassess PCT  <0.1                Strongly Discouraged     Remeasure/reassess PCT    As 28 day mortality risk marker: \"Change in Procalcitonin Result\" (>80% or <=80%) if Day 0 (or Day 1) and Day 4 values are available. Refer to http://www.[employer] [facility]-pct-calculator.com    Change in PCT <=80%  A decrease of PCT levels below or equal to 80% defines a positive change in PCT test result representing a higher risk for 28-day all-cause mortality of patients diagnosed with severe sepsis for septic shock.    Change in PCT >80%  A decrease of PCT levels of more than 80% defines a negative change in PCT result representing a lower risk for 28-day all-cause mortality of patients diagnosed with severe sepsis or septic shock.       Magnesium [[id number]]  (Normal) Collected: 06/25/25 0926    Specimen: Blood Updated: 06/25/25 0957     Magnesium 2.2 mg/dL     CBC Auto Differential [[id number]]  (Abnormal) Collected: 06/25/25 0926    Specimen: Blood Updated: 06/25/25 0934     WBC 12.99 10*3/mm3      RBC 4.87 10*6/mm3      Hemoglobin 15.1 g/dL      Hematocrit 45.0 %      MCV 92.4 fL      MCH 31.0 pg      MCHC 33.6 g/dL      RDW 12.5 %      RDW-SD 42.6 fl      MPV 10.8 fL      Platelets 154 10*3/mm3      Neutrophil % 87.4 %      Lymphocyte % 8.2 %      Monocyte % 3.7 %      Eosinophil % 0.1 %      Basophil % 0.2 %      Immature Grans % 0.4 %      Neutrophils, Absolute 11.36 10*3/mm3      Lymphocytes, Absolute 1.06 10*3/mm3      Monocytes, Absolute 0.48 10*3/mm3      Eosinophils, Absolute 0.01 10*3/mm3      Basophils, Absolute 0.03 10*3/mm3      Immature Grans, Absolute 0.05 10*3/mm3      nRBC 0.0 " /100 WBC     COVID-19 and FLU A/B PCR, 1 HR TAT - Swab, Nasopharynx [[id number]]  (Normal) Collected: 06/25/25 0935    Specimen: Swab from Nasopharynx Updated: 06/25/25 1011     COVID19 Not Detected     Influenza A PCR Not Detected     Influenza B PCR Not Detected    Narrative:      Fact sheet for providers: https://www.fda.gov/media/466465/download    Fact sheet for patients: https://www.fda.gov/media/882163/download    Test performed by PCR.    Blood Culture - Blood, Hand, Left [[id number]] Collected: 06/25/25 1045    Specimen: Blood from Hand, Left Updated: 06/25/25 1130    Blood Culture - Blood, Arm, Right [[id number]] Collected: 06/25/25 1053    Specimen: Blood from Arm, Right Updated: 06/25/25 1130             CT Angiogram Aorta  Result Date: 6/25/2025  CT ANGIOGRAPHY, ABDOMEN AND PELVIS     06/25/2025 11:09 AM  HISTORY: Chest pain, hypotension, evaluate aortic dissection, PE..  COMPARISON: 06/01/2022..  PROCEDURE: Postcontrast images of the chest, abdomen and pelvis were performed by computed tomography before and after intravenous administration of contrast.. Extensive 3 D reconstruction images were performed. A CTA was performed. This study was performed with techniques to keep radiation doses as low as reasonably achievable, (ALARA). Individualized dose reduction techniques using automated exposure control or adjustment of mA and/or kV according to the patient size were employed.  FINDINGS:  ABDOMEN: There is stable chronic change posterior lower lobes bilaterally. Partial atelectasis/scarring of the right middle lobe inferiorly is new from the prior exam. There are some groundglass opacities posteriorly in the right upper lobe, acute versus chronic. Left upper lobe is clear.. The heart is upper limits of normal in size. The liver demonstrates diffuse fatty infiltration. The spleen is unremarkable. No adrenal mass is present.  The pancreas is not well visualized on this exam but question the dilatation of the  pancreatic duct. IVC is mildly dilated and there is reflux of contrast into the IVC. Streak artifact from metallic surgical clips noted. The kidneys are unremarkable. The aorta is proper caliber. There is no free fluid or adenopathy. Gallbladder present with no CT visible stones.  Pre-contrast images demonstrate left nephrolithiasis.  PELVIS: The appendix is not identified. The urinary bladder is unremarkable. There is no significant free fluid or adenopathy. Calcifications noted in the subcutaneous fat of the buttocks bilaterally, likely calcified injection granulomas.  CTA: The previously described left subclavian right atrial graft is occluded as was seen on the prior exam. Mediastinal collaterals identified as was seen previously. Moderate stenosis at the origin of the celiac axis noted. Mild stenosis at the origin of the SMA noted. SJ is patent. There are single bilateral nonstenotic renal arteries. Bilateral common iliac arteries are patent. No aortic dissection identified. Main pulmonary artery is mildly dilated suggesting pulmonary arterial hypertension.      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  Right upper and middle lobe groundglass opacities new from 2022, acute versus chronic.  No aortic dissection.  Mildly dilated main pulmonary artery suggesting pulmonary arterial hypertension.  CTDI: 2.55 mGy DLP:292.6 mGy.cm   This report was signed and finalized on 6/25/2025 12:57 PM by Cierra Barros MD.      XR Chest 1 View  Result Date: 6/25/2025  PROCEDURE: XR CHEST 1 VW-  HISTORY: Chest pain, mid chest pain and excessive burping.  COMPARISON: August 12, 2024..  FINDINGS: The heart is normal in size. Stable chronic change noted bilaterally; no acute infiltrate seen.. The mediastinum is unremarkable. There is no pneumothorax.  There are no acute osseous abnormalities. Right hilar surgical clips noted. Tortuosity of the thoracic aorta noted. Aortic mural calcifications  noted. There is evidence of old calcified granulomatous disease. Patient is rotated to the right. Sclerosis noted in the left humeral neck, stable from prior.      Impression: Stable chest..      This report was signed and finalized on 6/25/2025 10:12 AM by Cierra Barros MD.           MDM     Amount and/or Complexity of Data Reviewed  Independent visualization of images, tracings, or specimens: yes        Initial impression of presenting illness: Chest pain    DDX: includes but is not limited to: ACS, MI come back pneumonia, viral URI, PE    Patient arrives guarded with vitals interpreted by myself.     Pertinent features from physical exam: Clear to auscultation, irregular rate and rhythm, tachycardic, no murmur, nontender to abdominal palpation.    Initial diagnostic plan: CBC, CMP, troponin, BNP, EKG, chest x-ray, CRP, Pro-Rocco, magnesium, CT aortogram    Results from initial plan were reviewed and interpreted by me revealing patient with possible pneumonia seen on CT imaging with no concern for PE or aortic dissection.  Occluded graft once again seen on CT imaging with no concerns for any new process    Diagnostic information from other sources: Discussed with EMS on arrival and reviewed past medical records    Interventions / Re-evaluation: Given IV fluids due to concerns of dehydration, treated with broad-spectrum antibiotics given concerns for sepsis.  Blood cultures were obtained.  Further IV fluids were not administered given concerns for fluid overload    Results/clinical rationale were discussed with patient at bedside    Consultations/Discussion of results with other physicians: Given my concern for sepsis possibly secondary to pneumonia seen on CT imaging I discussed patient's case with Dr. Silva, hospitalist, and admitted to his service for further management    Disposition plan: Admit  -----        Final diagnoses:   Sepsis with acute organ dysfunction and septic shock, due to unspecified organism,  unspecified organ dysfunction type          Jamie Rubio MD  06/25/25 4951

## 2025-06-25 NOTE — CONSULTS
Javan Witt MD  CARDIOLOGY CONSULT    PATIENT: Marifer Ricardo                                                   DATE: 25   @BED@  : 1950     PRIMARY PHYSICIAN: Dee Dee veloz    CHIEF COMPLAINT: History of chest discomfort    Patient is a 74 year old female with risk profile for atherosclerotic cardiovascular disease comprising of history of long-standing diabetes, dyslipidemia, truncal obesity and hypertension, who has potential for peripheral arterial disease, minimal coronary artery disease and known history of apparent chronic central venous occlusion, status post left subclavian venous graft to the right atrium graft which has occluded, who has prior history of paroxysmal atrial dysrhythmia with possible prior history of recurrent syncopal episodes, who was hospitalized on account of recurrent nausea, vomiting with possible radiological evidence of pneumonitis, who also volunteered history of rather atypical pleuritic chest discomfort.    Patient has history of chronic pain syndrome with prior history of hip fracture with substantial functional decline.  Patient also has history of lumbago for which patient has been under care of pain management services.  Before hip fracture, patient was able to walk in the local [employer] track several times a week but after hip fracture surgery she can only walk short distances despite undergoing physical therapy. Patient denies orthopnea or PNDs but she has had dependent edema which was attributed to venous insufficiency.    Patient is known to have essentially normal coronary with only mild plaque in LAD distribution based on relatively recent studies.  There is no recent history of effort related angina.  Patient had recurrent nausea and vomiting yesterday and started noticing chest discomfort earlier in the morning which lasted 4 to 5 hours.  Patient is known to have mild chronic elevation of troponins without significant change demonstrated compared  with prior diagnostic studies.    Patient has had multiple episodes of multiple syncopal episodes which were preceded by significant dizziness but only when patient was in the upright with no definitive history of associated palpitation.  Patient has prior history of paroxysmal atrial fibrillation and had another episode while hospitalized for hip fracture.  Patient did not undergo anticoagulation on account of recent surgery.    REVIEW OF SYSTEMS: CONSTITUTIONAL: Patient has had stable weight and has been afebrile. HEENT: No history of nasal discharge/nasal obstruction or sore throat. CNS: No history of headache and no history of visual complaints, seizure disorder, or sensory or motor complaints. BRONCHOPULMONARY: Patient has no history of cough, pleuritic chest pain or hemoptysis. GI: Patient does have history of nausea, vomiting without hematemesis, melena; No history of rectal bleeding : History of occasional nocturia without hematuria or dysuria; MUSCULOSKELETAL: History of occasional myalgias and athralgias DERM: No history of skin rash. ENDO: No history of cold or heat intolerance or thyromegaly. HEMATOPOIETEC: No history of bleeding from any site, anemia or lymphadenopathy. PSYCH: History depression or anxiety; SLEEP: Normal sleeping pattern     PAST MEDICAL HISTORY:  Atherosclerotic Cardiovascular Disease:   History of carotid disease with moderate plaque involving both bulbs without any evidence of hemodynamically significant plaque based on CT of the neck performed in June 2022.   Potential for coronary artery disease. Her prior CTA in 2022 did not mention any coronary calcification.  Patient had borderline elevation of cardiac markers in a setting of accelerated hypertension prompting angiographic studies in August 2024 did not demonstrate evidence of hemodynamically significant coronary artery disease.  Patient had only mild plaque in LAD distribution.  Patient had fairly tortuous coronary  vessels.  History of peripheral arterial disease. Patient reportedly had monophasic waveforms involving “left foot” based on study performed in 2021. In 2024, her right SELAM was reported as 1.0 and left SELAM at 0.9.  On subsequent studies in 2024, patient had abnormal pressures & Doppler waveforms with evidence of mild, mildly calcified plaque demonstrated in all lower extremity vessels, especially involving SFA without evidence of hemodynamically stenosis demonstrated. Three vessel run-off was seen in the calf with evidence of biphasic arterial waveform throughout lower extremity vessels. Proximal SFA demonstrated monophasic waveforms on the right side and her anterior tibial artery demonstrated monophasic waveforms on the left side. Patient is currently on dual antiplatelet therapy.  History of renal duplex Doppler with unremarkable anatomy the study was technically limited.  History of dyslipidemia with high HDL levels and normal LDL levels. Patient has been on atorvastatin therapy.  History of obesity predominantly truncal  History of diabetes with micro-and macrovascular, claudication. Her glycosylated hemoglobin was reported as 5.9 in 2024.  History of hypertension with suggestion of apparent “mild to moderate” left ventricular hypertrophy based on remote studies.  On subsequent studies in 2024, patient had evidence of moderate concentric left hypertrophy.  Patient has been on ARB therapy and carvedilol therapy.  Her amlodipine therapy was discontinued on account of concerns about low blood pressure.  History of normal global left ventricular systolic function with calculated ejection fraction of 67% with normal filling pressures based on study performed in 2024.  History of mild aortic sclerosis, mild to moderate tricuspid regurgitation and moderate mitral regurgitation.  Her pulmonary artery pressure was 50 mm of mercury based on study performed in 2024.  History of apparent central venous occlusion secondary  to indwelling catheter/port eventually prompting left subclavian venous graft to the right atrium which was also thought to have developed occlusion and was thought to be thrombosed based on study performed in 2022. There was evidence of multiple mediastinal venous collaterals based on CTA performed in 2022. Patient also has cutaneous collaterals.  History of chronic venous insufficiency prompting duplex venous Dopplers in 2024 which did not demonstrate significant venous disease.  Patient did have pathological reflux involving both saphenofemoral junctions.  There was no evidence of deep venous thrombosis.  History of negative CTA for pulmonary embolism in 2022.  History of transient nocturnal desaturation based on sleep oximetry performed in 2024.  History of possible femoral AV fistula prompting repair in remote past.  On subsequent studies in 2024, patient had cystic mass without definitive evidence of any blood flow demonstrated.  History of syncope prompting emergency room encounter in August 2024.  Her CT scan did not demonstrate any acute process.  Patient had previously undergone event monitoring in July 2024, which demonstrated predominant normal sinus rhythm with heart rate ranging between 58 bpm 217 BPM with an average heart rate of 79 BPM.  Patient had very frequent episodes of atrial tachycardia but overall patient had relatively low ectopy burden.  No significant pauses were demonstrated.  Patient was hospitalized for hip fracture and was noticed to have paroxysmal atrial fibrillation in 2024.  On subsequent surveillance in November 2024, patient had predominant normal sinus rhythm with heart rate ranging between 57 bpm 224 BPM with an average heart rate of 81 BPM.  Patient had 27 episodes of atrial dysrhythmia.  Longest was 14 beats at an average heart rate of 112 bpm 105 bpm.  Fastest was 3 beat run.  Patient had 2.8% ventricular ectopy burden and fairly low atrial ectopy burden.  History of chronic  "kidney disease with reported GFR of 50 based on studies performed in 2024.  Patient is not known to have renovascular disease based on technically limited study.  On subsequent studies in fall 2024, her GFR was reported as 72.6%.  History of unspecified gastrointestinal disorder. Apparently, patient had gastroparesis and possible constipation of uncertain etiology prompting colectomy.  History of reflux esophagitis.  Patient also has history of apparent dysphagia.    History of anemia and thrombocytopenia  History of migraine headaches  History of osteopenia and degenerative joint disease.  Patient has been on narcotic analgesic therapy the past.  History of kyphoscoliosis     PAST SURGICAL HISTORY:   Port-A-Cath placement   Partial Colectomy   History of minimally impacted fracture of the femoral neck prompting orthopedic surgery in 2024    SOCIAL HISTORY: Patient is . Patient is non-smoker. Patient had worked for [employer].    FAMILY HISTORY: Not contributory    PHYSICAL EXAMINATION:  GENERAL: Very pleasant elderly female /93   Pulse 79   Temp 97.9 °F (36.6 °C) (Oral)   Resp 16   Ht 170.2 cm (67\")   Wt 56.7 kg (125 lb)   LMP  (LMP Unknown)   SpO2 94%   BMI 19.58 kg/m²  Body mass index is 19.58 kg/m². HEENT:  Head: Atraumatic, normocephalic, No tenderness over paranasal sinuses, external nares normal. No oral or nasal mucosal lesion. Sclerae non-icteric ocular movements are normal with pupils reacting both to light and accommodations. Ocular fundi not seen. NECK: Carotid upstroke is normal, there are no carotid bruits, no JVD, no thyromegaly, no cervical or axillary lymphadenopathy. HEART: Ashley beat is not displaced, no thrill is appreciated and both heart sounds are normal.  There is no murmur or rub audible. BRONCHOPULMONARY: Patient has good air entry bilaterally with bronchovesicular sounds. No adventious sounds audible. GI & : Soft, no tenderness elicited and no viscera are " "palpable. Bowel sounds are positive and there is no renal bruits audible. EXTREMITIES:  There is no radio-femoral delay . All vessels are normally palpable and there are no femoral bruits audible. Patient does not have dependent edema. DERM: No skin rash. CNS: No gross motor neurological deficit. MUSCULOSKELETAL: No joint swelling noticed and patient has normal range of motion in lower extremity.       Intake/Output Summary (Last 24 hours) at 6/25/2025 1645  Last data filed at 6/25/2025 1414  Gross per 24 hour   Intake 1600 ml   Output --   Net 1600 ml     Wt Readings from Last 7 Encounters:   06/25/25 56.7 kg (125 lb)   04/18/25 54.4 kg (120 lb)   12/04/24 57.2 kg (126 lb)   10/27/24 55.3 kg (122 lb)   09/13/24 56.5 kg (124 lb 9.6 oz)   08/14/24 56.2 kg (124 lb)   08/28/23 58.4 kg (128 lb 12.8 oz)     LABS:   Results from last 7 days   Lab Units 06/25/25  0926   WBC 10*3/mm3 12.99*   HEMOGLOBIN g/dL 15.1   HEMATOCRIT % 45.0   PLATELETS 10*3/mm3 154     Results from last 7 days   Lab Units 06/25/25  0926   SODIUM mmol/L 142   POTASSIUM mmol/L 4.3   CHLORIDE mmol/L 103   CO2 mmol/L 20.5*   BUN mg/dL 26.0*   CREATININE mg/dL 1.14*   GLUCOSE mg/dL 101*   CALCIUM mg/dL 9.8           Invalid input(s): \"TROPONININT\"           Lab Results   Component Value Date    HGBA1C 5.80 (H) 08/12/2024           RADIOLOGY:   Imaging Results (Last 24 Hours)       Procedure Component Value Units Date/Time    CT Angiogram Aorta [[id number]] Collected: 06/25/25 1246     Updated: 06/25/25 1259    Narrative:      CT ANGIOGRAPHY, ABDOMEN AND PELVIS     06/25/2025 11:09 AM      HISTORY: Chest pain, hypotension, evaluate aortic dissection, PE..     COMPARISON: 06/01/2022..     PROCEDURE: Postcontrast images of the chest, abdomen and pelvis were  performed by computed tomography before and after intravenous  administration of contrast.. Extensive 3 D reconstruction images were  performed. A CTA was performed. This study was performed with " techniques  to keep radiation doses as low as reasonably achievable, (ALARA).  Individualized dose reduction techniques using automated exposure  control or adjustment of mA and/or kV according to the patient size were  employed.     FINDINGS:     ABDOMEN: There is stable chronic change posterior lower lobes  bilaterally. Partial atelectasis/scarring of the right middle lobe  inferiorly is new from the prior exam. There are some groundglass  opacities posteriorly in the right upper lobe, acute versus chronic.  Left upper lobe is clear.. The heart is upper limits of normal in size.  The liver demonstrates diffuse fatty infiltration. The spleen is  unremarkable. No adrenal mass is present.  The pancreas is not well  visualized on this exam but question the dilatation of the pancreatic  duct. IVC is mildly dilated and there is reflux of contrast into the  IVC. Streak artifact from metallic surgical clips noted. The kidneys are  unremarkable. The aorta is proper caliber. There is no free fluid or  adenopathy. Gallbladder present with no CT visible stones.     Pre-contrast images demonstrate left nephrolithiasis.     PELVIS: The appendix is not identified. The urinary bladder is  unremarkable. There is no significant free fluid or adenopathy.  Calcifications noted in the subcutaneous fat of the buttocks  bilaterally, likely calcified injection granulomas.     CTA: The previously described left subclavian right atrial graft is  occluded as was seen on the prior exam. Mediastinal collaterals  identified as was seen previously. Moderate stenosis at the origin of  the celiac axis noted. Mild stenosis at the origin of the SMA noted. SJ  is patent. There are single bilateral nonstenotic renal arteries.  Bilateral common iliac arteries are patent. No aortic dissection  identified. Main pulmonary artery is mildly dilated suggesting pulmonary  arterial hypertension.       Impression:      Occluded left subclavian to right atrial  graft as seen on  prior study.     Right upper and middle lobe groundglass opacities new from 2022, acute  versus chronic.     No aortic dissection.     Mildly dilated main pulmonary artery suggesting pulmonary arterial  hypertension.     CTDI: 2.55 mGy  DLP:292.6 mGy.cm        This report was signed and finalized on 6/25/2025 12:57 PM by Cierra Barros MD.       XR Chest 1 View [[id number]] Collected: 06/25/25 1011     Updated: 06/25/25 1014    Narrative:      PROCEDURE: XR CHEST 1 VW-     HISTORY: Chest pain, mid chest pain and excessive burping.     COMPARISON: August 12, 2024..     FINDINGS: The heart is normal in size. Stable chronic change noted  bilaterally; no acute infiltrate seen.. The mediastinum is unremarkable.  There is no pneumothorax.  There are no acute osseous abnormalities.  Right hilar surgical clips noted. Tortuosity of the thoracic aorta  noted. Aortic mural calcifications noted. There is evidence of old  calcified granulomatous disease. Patient is rotated to the right.  Sclerosis noted in the left humeral neck, stable from prior.       Impression:      Stable chest..                 This report was signed and finalized on 6/25/2025 10:12 AM by Cierra Barros MD.               Allergies   Allergen Reactions    Penicillins Hives    Codeine Hives    Contrast Dye (Echo Or Unknown Ct/Mr) Hives    Metoclopramide Hives    Norco [Hydrocodone-Acetaminophen] GI Intolerance     aspirin, 81 mg, Oral, Daily  atorvastatin, 40 mg, Oral, Daily  azithromycin, 500 mg, Intravenous, Q24H  carvedilol, 12.5 mg, Oral, BID With Meals  [START ON 6/26/2025] cefepime, 2,000 mg, Intravenous, Q12H  cetirizine, 10 mg, Oral, Daily  enoxaparin sodium, 40 mg, Subcutaneous, Q24H  melatonin, 5 mg, Oral, Nightly  [START ON 6/26/2025] pantoprazole, 40 mg, Oral, QAM AC  sodium chloride, 10 mL, Intravenous, Q12H         ASSESSMENT /PLAN:    Patient is 75 y.o. with risk profile for atherosclerotic cardiovascular disease as outlined  previously, who was hospitalized on account of nausea and vomiting with fairly atypical chest discomfort with pleuritic characteristics with possible underlying pneumonitis which lasted more than 4 hours and she had mildly elevated high-sensitivity troponins.  Patient history of chronic elevation of high-sensitivity troponins likely reflecting microvascular disease.  Her relatively recent angiographic studies have demonstrated only mild plaque in LAD distribution and at this stage it does not appear that the patient has sustained any plaque rupture which is quite reassuring.  Nonetheless, patient will benefit from continued antiplatelet therapy, beta-blocker therapy and statin.  Patient had past medical history of recurrent syncopal episode with no previously documented significant bradycardia arrhythmias or tachyarrhythmias.  She does have a history of paroxysmal atrial fibrillation though she appears to have maintained normal sinus rhythm.  Patient has history of apparent chronic central venous occlusion possibly secondary to indwelling catheter in remote past, eventually prompting left subclavian venous graft to the right atrium graft which also appears to have already occluded.  Now patient has multiple venous collaterals. Patient does not appear to have significant edema involving left upper extremity or any venous claudication.                      In closing, I sincerely appreciate opportunity to participate in care of this patient. If I can be of any further assistance with the management of this patient, please don’t hesitate to contact me.    ROMA NUNEZ M.D. [facility]

## 2025-06-26 LAB
ALBUMIN SERPL-MCNC: 3.6 G/DL (ref 3.5–5.2)
ALBUMIN/GLOB SERPL: 1.3 G/DL
ALP SERPL-CCNC: 86 U/L (ref 39–117)
ALT SERPL W P-5'-P-CCNC: 23 U/L (ref 1–33)
ANION GAP SERPL CALCULATED.3IONS-SCNC: 12.6 MMOL/L (ref 5–15)
AST SERPL-CCNC: 24 U/L (ref 1–32)
BILIRUB SERPL-MCNC: 0.7 MG/DL (ref 0–1.2)
BUN SERPL-MCNC: 31 MG/DL (ref 8–23)
BUN/CREAT SERPL: 29.2 (ref 7–25)
CALCIUM SPEC-SCNC: 8.7 MG/DL (ref 8.6–10.5)
CHLORIDE SERPL-SCNC: 106 MMOL/L (ref 98–107)
CHOLEST SERPL-MCNC: 224 MG/DL (ref 0–200)
CO2 SERPL-SCNC: 21.4 MMOL/L (ref 22–29)
CREAT SERPL-MCNC: 1.06 MG/DL (ref 0.57–1)
DEPRECATED RDW RBC AUTO: 42.5 FL (ref 37–54)
EGFRCR SERPLBLD CKD-EPI 2021: 54.6 ML/MIN/1.73
ERYTHROCYTE [DISTWIDTH] IN BLOOD BY AUTOMATED COUNT: 12.6 % (ref 12.3–15.4)
GLOBULIN UR ELPH-MCNC: 2.7 GM/DL
GLUCOSE SERPL-MCNC: 181 MG/DL (ref 65–99)
HBA1C MFR BLD: 5.7 % (ref 4.8–5.6)
HCT VFR BLD AUTO: 39.7 % (ref 34–46.6)
HDLC SERPL-MCNC: 79 MG/DL (ref 40–60)
HGB BLD-MCNC: 13.7 G/DL (ref 12–15.9)
LDLC SERPL CALC-MCNC: 135 MG/DL (ref 0–100)
LDLC/HDLC SERPL: 1.69 {RATIO}
MCH RBC QN AUTO: 31.6 PG (ref 26.6–33)
MCHC RBC AUTO-ENTMCNC: 34.5 G/DL (ref 31.5–35.7)
MCV RBC AUTO: 91.7 FL (ref 79–97)
MRSA DNA SPEC QL NAA+PROBE: NORMAL
PLATELET # BLD AUTO: 164 10*3/MM3 (ref 140–450)
PMV BLD AUTO: 10.7 FL (ref 6–12)
POTASSIUM SERPL-SCNC: 3.8 MMOL/L (ref 3.5–5.2)
PROT SERPL-MCNC: 6.3 G/DL (ref 6–8.5)
RBC # BLD AUTO: 4.33 10*6/MM3 (ref 3.77–5.28)
SODIUM SERPL-SCNC: 140 MMOL/L (ref 136–145)
TRIGL SERPL-MCNC: 56 MG/DL (ref 0–150)
VLDLC SERPL-MCNC: 10 MG/DL (ref 5–40)
WBC NRBC COR # BLD AUTO: 13.98 10*3/MM3 (ref 3.4–10.8)

## 2025-06-26 PROCEDURE — 25010000002 CEFEPIME PER 500 MG: Performed by: FAMILY MEDICINE

## 2025-06-26 PROCEDURE — 80053 COMPREHEN METABOLIC PANEL: CPT | Performed by: FAMILY MEDICINE

## 2025-06-26 PROCEDURE — 25010000002 VANCOMYCIN 750 MG RECONSTITUTED SOLUTION 1 EACH VIAL: Performed by: FAMILY MEDICINE

## 2025-06-26 PROCEDURE — 25010000002 AZITHROMYCIN PER 500 MG: Performed by: STUDENT IN AN ORGANIZED HEALTH CARE EDUCATION/TRAINING PROGRAM

## 2025-06-26 PROCEDURE — 80061 LIPID PANEL: CPT | Performed by: FAMILY MEDICINE

## 2025-06-26 PROCEDURE — 85027 COMPLETE CBC AUTOMATED: CPT | Performed by: FAMILY MEDICINE

## 2025-06-26 PROCEDURE — 83036 HEMOGLOBIN GLYCOSYLATED A1C: CPT | Performed by: FAMILY MEDICINE

## 2025-06-26 PROCEDURE — 97166 OT EVAL MOD COMPLEX 45 MIN: CPT

## 2025-06-26 PROCEDURE — 25810000003 SODIUM CHLORIDE 0.9 % SOLUTION 250 ML FLEX CONT: Performed by: FAMILY MEDICINE

## 2025-06-26 PROCEDURE — 97161 PT EVAL LOW COMPLEX 20 MIN: CPT

## 2025-06-26 PROCEDURE — 25810000003 SODIUM CHLORIDE 0.9 % SOLUTION 250 ML FLEX CONT: Performed by: STUDENT IN AN ORGANIZED HEALTH CARE EDUCATION/TRAINING PROGRAM

## 2025-06-26 PROCEDURE — 99232 SBSQ HOSP IP/OBS MODERATE 35: CPT | Performed by: NURSE PRACTITIONER

## 2025-06-26 RX ORDER — OXYCODONE AND ACETAMINOPHEN 7.5; 325 MG/1; MG/1
1 TABLET ORAL 3 TIMES DAILY PRN
COMMUNITY

## 2025-06-26 RX ADMIN — Medication 10 ML: at 20:36

## 2025-06-26 RX ADMIN — AZITHROMYCIN DIHYDRATE 500 MG: 500 INJECTION, POWDER, LYOPHILIZED, FOR SOLUTION INTRAVENOUS at 13:28

## 2025-06-26 RX ADMIN — CEFEPIME HYDROCHLORIDE 2000 MG: 2 INJECTION, POWDER, FOR SOLUTION INTRAVENOUS at 11:31

## 2025-06-26 RX ADMIN — CARVEDILOL 12.5 MG: 12.5 TABLET, FILM COATED ORAL at 09:22

## 2025-06-26 RX ADMIN — Medication 10 ML: at 09:22

## 2025-06-26 RX ADMIN — ASPIRIN 81 MG: 81 TABLET, COATED ORAL at 09:22

## 2025-06-26 RX ADMIN — OXYCODONE HYDROCHLORIDE AND ACETAMINOPHEN 1 TABLET: 5; 325 TABLET ORAL at 11:37

## 2025-06-26 RX ADMIN — ATORVASTATIN CALCIUM 40 MG: 40 TABLET, FILM COATED ORAL at 09:22

## 2025-06-26 RX ADMIN — CARVEDILOL 12.5 MG: 12.5 TABLET, FILM COATED ORAL at 17:45

## 2025-06-26 RX ADMIN — VANCOMYCIN HYDROCHLORIDE 750 MG: 750 INJECTION, POWDER, LYOPHILIZED, FOR SOLUTION INTRAVENOUS at 09:24

## 2025-06-26 RX ADMIN — CETIRIZINE HYDROCHLORIDE 10 MG: 10 TABLET, FILM COATED ORAL at 09:22

## 2025-06-26 RX ADMIN — Medication 5 MG: at 20:35

## 2025-06-26 RX ADMIN — CEFEPIME HYDROCHLORIDE 2000 MG: 2 INJECTION, POWDER, FOR SOLUTION INTRAVENOUS at 23:44

## 2025-06-26 RX ADMIN — PANTOPRAZOLE SODIUM 40 MG: 40 TABLET, DELAYED RELEASE ORAL at 09:22

## 2025-06-26 RX ADMIN — CEFEPIME HYDROCHLORIDE 2000 MG: 2 INJECTION, POWDER, FOR SOLUTION INTRAVENOUS at 00:24

## 2025-06-26 NOTE — PROGRESS NOTES
[facility]    Name:  Marifer Ricardo   Age:  75 y.o.  Sex:  female  :  1950  MRN:  [id number]   Visit Number:  [id number]  Admission Date:  2025  Date Of Service:  25  Primary Care Physician:  Norma Spangler APRN     LOS: 1 day :    Chief Complaint:      Chest pain    Subjective:    Patient seen and examined with friend at bedside. Feeling much improved. No chest pain. No fever. Advised she could have aspirated with now right sided pneumonia.    Hospital Course:    Patient is 75 years old female with a past medical history of CAD, paroxysmal A-fib, hyperlipidemia, hypertension, asthma, GERD, presented to the ER with a chief complaint of chest pain.  Patient reported intractable nausea and vomiting even when she drinks fluids.  She had mild watery diarrhea which had already resolved.  She was throwing up all night.  Upon awakening, she reported she started feeling indigestion and frequent burping along with substernal chest pain.  Reporting associated numbness sensation in her left arm. Her nausea, vomiting and diarrhea currently resolved.  Denies any shortness of breath, cough, fever or abdominal pain.  Patient follows up with Dr. Witt with cardiology.  She is currently on aspirin and statin.     On ER evaluation, patient was in A-fib with RVR on arrival with heart rate up to 140s, she was hypotensive with a blood pressure of 80s over 60s but responded to IV fluids in the ER.  Remained on room air and afebrile. Workup in the ER was significant for HS Troponin of 28 (chronically elevated), proBNP 2135, creatinine 1.14, BUN 26, T. bilirubin 1.3, Pro-Rocco 3.95, CRP 24.1, WBC 12.9, COVID and flu negative.  Chest x-ray with stable chest per radiology.  CTA aorta showed Occluded left subclavian to right atrial graft as seen on prior study. Right upper and middle lobe groundglass opacities new from , acute versus chronic. No aortic dissection.  Mildly dilated main pulmonary artery suggesting pulmonary arterial hypertension.  Patient received Benadryl and steroids for contrast allergy, received 1 L bolus of LR and was started on vancomycin, cefepime and azithromycin while in the ER.  Hospitalist consulted for admission, further management and treatment.  Cardiology consulted, do not suspect ACS at this time.    Review of Systems:     All systems were reviewed and negative except as mentioned in subjective, assessment and plan.    Vital Signs:    Temp:  [97.4 °F (36.3 °C)-98.3 °F (36.8 °C)] 97.6 °F (36.4 °C)  Heart Rate:  [70-89] 84  Resp:  [16] 16  BP: (103-186)/(58-94) 186/92    Intake and output:    I/O last 3 completed shifts:  In: 2255 [I.V.:555; IV Piggyback:1700]  Out: -   I/O this shift:  In: 950 [P.O.:600; IV Piggyback:350]  Out: -     Physical Examination:    General Appearance:  Alert and cooperative. Well appearing elderly female.   Head:  Atraumatic and normocephalic.   Eyes: Conjunctivae and sclerae normal, no icterus. No pallor.   Throat: No oral lesions, no thrush, oral mucosa moist.   Neck: Supple, trachea midline, no thyromegaly.   Lungs:   Breath sounds heard bilaterally equally.  No wheezing or crackles. No Pleural rub or bronchial breathing. Unlabored on room air.   Heart:  Normal S1 and S2, no murmur, no gallop, no rub. No JVD.   Abdomen:   Normal bowel sounds, no masses, no organomegaly. Soft, nontender, nondistended, no rebound tenderness.   Extremities: Supple, no edema, no cyanosis, no clubbing.   Skin: No bleeding or rash.   Neurologic: Alert and oriented x 3. No facial asymmetry. Moves all four limbs. No tremors.      Laboratory results:    Results from last 7 days   Lab Units 06/26/25  0853 06/25/25  0926   SODIUM mmol/L 140 142   POTASSIUM mmol/L 3.8 4.3   CHLORIDE mmol/L 106 103   CO2 mmol/L 21.4* 20.5*   BUN mg/dL 31.0* 26.0*   CREATININE mg/dL 1.06* 1.14*   CALCIUM mg/dL 8.7 9.8   BILIRUBIN mg/dL 0.7 1.3*   ALK PHOS U/L 86 92  "  ALT (SGPT) U/L 23 11   AST (SGOT) U/L 24 18   GLUCOSE mg/dL 181* 101*     Results from last 7 days   Lab Units 06/26/25  0853 06/25/25  0926   WBC 10*3/mm3 13.98* 12.99*   HEMOGLOBIN g/dL 13.7 15.1   HEMATOCRIT % 39.7 45.0   PLATELETS 10*3/mm3 164 154         Results from last 7 days   Lab Units 06/25/25  1758 06/25/25  1632 06/25/25  0926   HSTROP T ng/L 46* 49* 28*     Results from last 7 days   Lab Units 06/25/25  1053 06/25/25  1045   BLOODCX  No growth at 24 hours No growth at 24 hours     No results for input(s): \"PHART\", \"NUI5RZT\", \"PO2ART\", \"HBE5NCN\", \"BASEEXCESS\" in the last 8760 hours.   I have reviewed the patient's laboratory results.    Radiology results:    CT Angiogram Aorta  Result Date: 6/25/2025  CT ANGIOGRAPHY, ABDOMEN AND PELVIS     06/25/2025 11:09 AM  HISTORY: Chest pain, hypotension, evaluate aortic dissection, PE..  COMPARISON: 06/01/2022..  PROCEDURE: Postcontrast images of the chest, abdomen and pelvis were performed by computed tomography before and after intravenous administration of contrast.. Extensive 3 D reconstruction images were performed. A CTA was performed. This study was performed with techniques to keep radiation doses as low as reasonably achievable, (ALARA). Individualized dose reduction techniques using automated exposure control or adjustment of mA and/or kV according to the patient size were employed.  FINDINGS:  ABDOMEN: There is stable chronic change posterior lower lobes bilaterally. Partial atelectasis/scarring of the right middle lobe inferiorly is new from the prior exam. There are some groundglass opacities posteriorly in the right upper lobe, acute versus chronic. Left upper lobe is clear.. The heart is upper limits of normal in size. The liver demonstrates diffuse fatty infiltration. The spleen is unremarkable. No adrenal mass is present.  The pancreas is not well visualized on this exam but question the dilatation of the pancreatic duct. IVC is mildly dilated " and there is reflux of contrast into the IVC. Streak artifact from metallic surgical clips noted. The kidneys are unremarkable. The aorta is proper caliber. There is no free fluid or adenopathy. Gallbladder present with no CT visible stones.  Pre-contrast images demonstrate left nephrolithiasis.  PELVIS: The appendix is not identified. The urinary bladder is unremarkable. There is no significant free fluid or adenopathy. Calcifications noted in the subcutaneous fat of the buttocks bilaterally, likely calcified injection granulomas.  CTA: The previously described left subclavian right atrial graft is occluded as was seen on the prior exam. Mediastinal collaterals identified as was seen previously. Moderate stenosis at the origin of the celiac axis noted. Mild stenosis at the origin of the SMA noted. SJ is patent. There are single bilateral nonstenotic renal arteries. Bilateral common iliac arteries are patent. No aortic dissection identified. Main pulmonary artery is mildly dilated suggesting pulmonary arterial hypertension.      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  Right upper and middle lobe groundglass opacities new from 2022, acute versus chronic.  No aortic dissection.  Mildly dilated main pulmonary artery suggesting pulmonary arterial hypertension.  CTDI: 2.55 mGy DLP:292.6 mGy.cm   This report was signed and finalized on 6/25/2025 12:57 PM by Cierra Barros MD.      XR Chest 1 View  Result Date: 6/25/2025  PROCEDURE: XR CHEST 1 VW-  HISTORY: Chest pain, mid chest pain and excessive burping.  COMPARISON: August 12, 2024..  FINDINGS: The heart is normal in size. Stable chronic change noted bilaterally; no acute infiltrate seen.. The mediastinum is unremarkable. There is no pneumothorax.  There are no acute osseous abnormalities. Right hilar surgical clips noted. Tortuosity of the thoracic aorta noted. Aortic mural calcifications noted. There is evidence of old  calcified granulomatous disease. Patient is rotated to the right. Sclerosis noted in the left humeral neck, stable from prior.      Impression: Stable chest..      This report was signed and finalized on 6/25/2025 10:12 AM by Cierra Barros MD.      I have reviewed the patient's radiology reports.    Medication Review:     I have reviewed the patient's active and prn medications.     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  -Continue aspirin and statin  -No current chest pain.  -Lipid panel and A1c with a.m. labs  - CTA aorta showed chronically occluded left subclavian to right atrial graft as seen on previous imaging.  Was previously discussed with CT surgery on previous admission in 2022, at that time CT surgery recommended given her significant collaterals, suspected that the patient's completely occluded subclavian has been occluded for quite some time.  -2D echo ordered with preserved EF and no significant valvular abnormalities.  - Dr. Witt with cardiology consulted and notified, appreciate recommendations-not suspect ACS at this time.  -Lipid and A1c reviewed.     Right lung pneumonia  Sepsis  - Met SIRS criteria with tachycardia and leukocytosis-to suspect underlying aspiration due to vomiting.  - Coverage for pneumonia-initially placed on vancomycin with MRSA PCR negative, discontinued.  Continue cefepime.  Will discharge home on Augmentin tomorrow likely.  - MRSA screen ordered  - Follow-up on blood cultures-negative to date.  - Hypotension improved with IV fluid bolus  given in the ER, hold on antihypertensives including valsartan and amlodipine  - Continue gentle IV fluids  - Not requiring supplemental oxygen currently     Paroxysmal A-fib  - Was with A-fib RVR on arrival, improved with IV fluids  - Currently rate controlled  - Continue carvedilol  - Not currently on anticoagulation- currently in sinus rhythm. Does have history of PAF. Will let cardiology weigh in on need for further anticoagulation.  -TSH normal    I have reviewed the copied text and it is accurate as of 6/26/2025      DVT Prophylaxis: Lovenox prophylaxis  Code Status: DNR/DNI  Diet: Cardiac  Discharge Plan: home tomorrow likely    Vivien Olivia, APRN  06/26/25  13:00 EDT    Dictated utilizing Dragon dictation.

## 2025-06-26 NOTE — THERAPY DISCHARGE NOTE
Patient Name: Marifer Ricardo  : 1950    MRN: [id number]                              Today's Date: 2025       Admit Date: 2025    Visit Dx:     ICD-10-CM ICD-9-CM   1. 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    Gastroparesis    Rosacea    Age related osteoporosis    GERD without esophagitis    Gastritis    PAD (peripheral artery disease)    Pure hypercholesterolemia    Hip fracture    Chest pain     Past Medical History:   Diagnosis Date    Abdominal pain     Abnormal bone density screening 2014    osteopenia    Arthritis     Asthma     Belching     Body piercing     BOTH EARS    Cataract, bilateral     Chronic renal insufficiency 2016    Diabetes mellitus     Essential hypertension 2016    GERD (gastroesophageal reflux disease)     Hx of mammogram 2009    Hyperparathyroidism     Mastoiditis     Pregnancy      s/p  x 3    Renal insufficiency     Urticaria     Wears glasses     Weight loss      Past Surgical History:   Procedure Laterality Date    BYPASS GRAFT SUBCLAVIAN      LEFT SUBCLAVIAN TO RIGHT ATRIUM VENOUS GRAFT in past for ?TPN    CARDIAC CATHETERIZATION N/A 2024    Procedure: Left Heart Cath;  Surgeon: Isaac Whalen MD;  Location: [facility] CATH INVASIVE [facility];  Service: Cardiology;  Laterality: N/A;    CATARACT EXTRACTION, BILATERAL      COLECTOMY PARTIAL / TOTAL      Pt reports multiple abd surgeries for ?pseudo-bowel obstruction    COLONOSCOPY       COLONOSCOPY N/A 02/07/2018    Procedure: COLONOSCOPY WITH RANDOM COLON BIOPSIES;  Surgeon: Taras Morillo MD;  Location:  [facility];  Service:     COLOSTOMY      and revision    ENDOSCOPY N/A 01/25/2018    Procedure: ESOPHAGOGASTRODUODENOSCOPY WITH BIOPSIES;  Surgeon: Taras Morillo MD;  Location:  [facility];  Service:     ENDOSCOPY N/A 8/14/2024    Procedure: ESOPHAGOGASTRODUODENOSCOPY;  Surgeon: Brunner, Mark I, MD;  Location:  [facility];  Service: Gastroenterology;  Laterality: N/A;    HIP PERCUTANEOUS PINNING Right 10/27/2024    Procedure: HIP PERCUTANEOUS PINNING RIGHT;  Surgeon: Rubin Parkinson Jr., MD;  Location:  [facility];  Service: Orthopedics;  Laterality: Right;    HYSTERECTOMY      age 31 for DUB, ovaries intact    TONSILLECTOMY AND ADENOIDECTOMY        General Information       Row Name 06/26/25 0956          Physical Therapy Time and Intention    Document Type discharge evaluation/summary  -     Mode of Treatment physical therapy  -       Row Name 06/26/25 0956          General Information    Patient Profile Reviewed yes  -JR     Prior Level of Function independent:;all household mobility;community mobility  -JR     Existing Precautions/Restrictions no known precautions/restrictions  -JR     Barriers to Rehab none identified  -JR       Row Name 06/26/25 0956          Living Environment    Current Living Arrangements home  -JR     People in Home child(desi), adult  daughter is out of town and patient will be alone  -JR       Row Name 06/26/25 0956          Home Main Entrance    Number of Stairs, Main Entrance none  -JR       Row Name 06/26/25 0956          Stairs Within Home, Primary    Number of Stairs, Within Home, Primary twelve  -JR     Stair Railings, Within Home, Primary railings safe and in good condition;railing on left side (ascending)  -JR       Row Name 06/26/25 0956          Cognition    Orientation Status (Cognition) oriented x 4  -JR               User Key  (r) =  Recorded By, (t) = Taken By, (c) = Cosigned By      Initials Name Provider Type    JR Padmini Childress, PT Physical Therapist                   Mobility       Row Name 06/26/25 0956          Bed Mobility    Bed Mobility supine-sit  -JR     Supine-Sit [place] (Bed Mobility) modified independence  -JR     Assistive Device (Bed Mobility) head of bed elevated  -       Row Name 06/26/25 0956          Sit-Stand Transfer    Sit-Stand [place] (Transfers) standby assist  -     Assistive Device (Sit-Stand Transfers) other (see comments)  gait belt no AD--should use her cane upon discharge  -JR       Row Name 06/26/25 0956          Gait/Stairs (Locomotion)    [place] Level (Gait) standby assist  -     Assistive Device (Gait) other (see comments)  gait belt--should use cane at home  -     Patient was able to Ambulate yes  -JR     Distance in Feet (Gait) 136  -JR     Deviations/Abnormal Patterns (Gait) base of support, narrow  holding rail at times  -JR               User Key  (r) = Recorded By, (t) = Taken By, (c) = Cosigned By      Initials Name Provider Type    Padmini Gamboa, PT Physical Therapist                   Obj/Interventions       Row Name 06/26/25 0956          Range of Motion Comprehensive    General Range of Motion bilateral lower extremity ROM WFL  -       Row Name 06/26/25 0956          Strength Comprehensive (MMT)    Comment, General Manual Muscle Testing (MMT) Assessment BLE grossly 4/5  -       Row Name 06/26/25 0956          Balance    Balance Assessment sitting static balance;sitting dynamic balance;standing static balance;standing dynamic balance  -JR     Static Sitting Balance independent  -JR     Dynamic Sitting Balance independent  -JR     Position, Sitting Balance unsupported;sitting edge of bed  -JR     Static Standing Balance standby assist  -JR     Dynamic Standing Balance standby assist  -JR               User Key  (r) = Recorded By, (t) = Taken By, (c) = Cosigned By       Initials Name Provider Type    Padmini Gamboa, PT Physical Therapist                   Goals/Plan    No documentation.                  Clinical Impression       Row Name 06/26/25 0956          Pain    Pretreatment Pain Rating 6/10  -JR     Posttreatment Pain Rating 6/10  -JR     Pain Location back  -JR     Pain Side/Orientation left  -JR     Pain Management Interventions exercise or physical activity utilized  -JR     Response to Pain Interventions activity participation with tolerable pain  -JR       Row Name 06/26/25 0956          Plan of Care Review    Plan of Care Reviewed With patient  -JR     Progress no change  -JR     Outcome Evaluation Patient participated well in PT evaluation and demonstrated safe mobility.  She required no assistance for bed mobility and SBA to perform transfers and gait, without an assistive device.  She walked 136 feet with SBA while holding handrail in the hallway.  She is instructed to use her cane upon discharge home to improve safety and she agrees.  She does not require the skills of PT at this time but may benefit from outpatient PT for back pain/scoliosis upon discharge.  PT will sign off at this time.  -JR       Row Name 06/26/25 0956          Therapy Assessment/Plan (PT)    Patient/Family Therapy Goals Statement (PT) Patient is eager to go home  -JR     Criteria for Skilled Interventions Met (PT) no problems identified which require skilled intervention  -JR     Therapy Frequency (PT) evaluation only  -JR       Row Name 06/26/25 0956          Positioning and Restraints    Pre-Treatment Position in bed  -JR     Post Treatment Position chair  -JR     In Chair sitting;call light within reach;encouraged to call for assist;with family/caregiver;notified nsg  -JR               User Key  (r) = Recorded By, (t) = Taken By, (c) = Cosigned By      Initials Name Provider Type    Padmini Gamboa, PT Physical Therapist                   Outcome Measures       Row Name 06/26/25 0956           How much help from another person do you currently need...    Turning from your back to your side while in flat bed without using bedrails? 4  -JR     Moving from lying on back to sitting on the side of a flat bed without bedrails? 4  -JR     Moving to and from a bed to a chair (including a wheelchair)? 4  -JR     Standing up from a chair using your arms (e.g., wheelchair, bedside chair)? 3  -JR     Climbing 3-5 steps with a railing? 3  -JR     To walk in hospital room? 3  -JR     AM-PAC 6 Clicks Score (PT) 21  -JR     Highest Level of Mobility Goal Walk 10 Steps or More-6  -JR       Row Name 06/26/25 1247 06/26/25 0956       Functional Assessment    Outcome Measure Options AM-PAC 6 Clicks Daily Activity (OT)  -SD AM-PAC 6 Clicks Basic Mobility (PT)  -              User Key  (r) = Recorded By, (t) = Taken By, (c) = Cosigned By      Initials Name Provider Type    Padmini Gamboa, PT Physical Therapist    Olga Lobo, OT Occupational Therapist                  Physical Therapy Education       Title: PT OT SLP Therapies (In Progress)       Topic: Physical Therapy (In Progress)       Point: Mobility training (Done)       Learning Progress Summary            Patient Acceptance, E,TB, VU by  at 6/26/2025 8010    Comment: Importance of mobility to recovery                      Point: Home exercise program (Not Started)       Learner Progress:  Not documented in this visit.              Point: Body mechanics (Not Started)       Learner Progress:  Not documented in this visit.              Point: Precautions (Not Started)       Learner Progress:  Not documented in this visit.                              User Key       Initials Effective Dates Name Provider Type [facility] 08/22/23 -  Padmini Childress, PT Physical Therapist PT                  PT Recommendation and Plan     Progress: no change  Outcome Evaluation: Patient participated well in PT evaluation and demonstrated safe mobility.  She required no  assistance for bed mobility and SBA to perform transfers and gait, without an assistive device.  She walked 136 feet with SBA while holding handrail in the hallway.  She is instructed to use her cane upon discharge home to improve safety and she agrees.  She does not require the skills of PT at this time but may benefit from outpatient PT for back pain/scoliosis upon discharge.  PT will sign off at this time.     Time Calculation:   PT Evaluation Complexity  History, PT Evaluation Complexity: 1-2 personal factors and/or comorbidities  Examination of Body Systems (PT Eval Complexity): 1-2 elements  Clinical Presentation (PT Evaluation Complexity): evolving  Clinical Decision Making (PT Evaluation Complexity): low complexity  Overall Complexity (PT Evaluation Complexity): low complexity     PT Charges       Row Name 06/26/25 0956             Time Calculation    Start Time 0956  -JR      PT Received On 06/26/25  -JR         Untimed Charges    PT Eval/Re-eval Minutes 60  -JR         Total Minutes    Untimed Charges Total Minutes 60  -JR       Total Minutes 60  -JR                User Key  (r) = Recorded By, (t) = Taken By, (c) = Cosigned By      Initials Name Provider Type    Padmini Gamboa, PT Physical Therapist                  Therapy Charges for Today       Code Description Service Date Service Provider Modifiers Qty    [id number]  PT EVAL LOW COMPLEXITY 4 6/26/2025 Padmini Childress, PT GP 1            PT G-Codes  Outcome Measure Options: AM-PAC 6 Clicks Daily Activity (OT)  AM-PAC 6 Clicks Score (PT): 21  AM-PAC 6 Clicks Score (OT): 22    PT Discharge Summary  Anticipated Discharge Disposition (PT): home with outpatient therapy services    Padmini Childress PT  6/26/2025

## 2025-06-26 NOTE — PLAN OF CARE
Goal Outcome Evaluation:  Plan of Care Reviewed With: patient        Progress: no change  Outcome Evaluation: Patient participated well in PT evaluation and demonstrated safe mobility.  She required no assistance for bed mobility and SBA to perform transfers and gait, without an assistive device.  She walked 136 feet with SBA while holding handrail in the hallway.  She is instructed to use her cane upon discharge home to improve safety and she agrees.  She does not require the skills of PT at this time but may benefit from outpatient PT for back pain/scoliosis upon discharge.  PT will sign off at this time.    Anticipated Discharge Disposition (PT): home with outpatient therapy services

## 2025-06-26 NOTE — CASE MANAGEMENT/SOCIAL WORK
Discharge Planning Assessment   Siddharth     Patient Name: Marifer Ricardo  MRN: [id number]  Today's Date: 6/26/2025    Admit Date: 6/25/2025    Plan: pt stated she lives alone, cares for self, still drives; exercises about 15 minutes a day; friends will transport home; children live in [place]; has a rolling walker and straight cane if needed; has lw/poa, stated thinks in chart; agreed to meds to beds; otherwise [facility]; completed imm with pt; no dme or hh needs per pt; no financial concerns;  cm will continue to follow   Discharge Needs Assessment       Row Name 06/26/25 1610       Living Environment    People in Home alone    Unique Family Situation stated lives alone; children live in [place]    Current Living Arrangements home    Potentially Unsafe Housing Conditions none    Primary Care Provided by self    Provides Primary Care For no one    Family Caregiver if Needed friend(s);child(desi), adult    Quality of Family Relationships supportive    Able to Return to Prior Arrangements yes    Living Arrangement Comments plans home on d/c; friend will transport but pt does still drive       Resource/Environmental Concerns    Resource/Environmental Concerns none    Transportation Concerns none       Transition Planning    Patient/Family Anticipates Transition to home    Patient/Family Anticipated Services at Transition none    Transportation Anticipated family or friend will provide       Discharge Needs Assessment    Readmission Within the Last 30 Days no previous admission in last 30 days    Equipment Currently Used at Home cane, straight;walker, rolling    Concerns to be Addressed discharge planning    Do you want help finding or keeping work or a job? I do not need or want help    Do you want help with school or training? For example, starting or completing job training or getting a high school diploma, GED or equivalent No    Anticipated Changes Related to Illness none    Equipment Needed After Discharge  none    Provided Post Acute Provider List? N/A    Provided Post Acute Provider Quality & Resource List? N/A    Current Discharge Risk lives alone                   Discharge Plan       Row Name 06/26/25 1612       Plan    Plan pt stated she lives alone, cares for self, still drives; exercises about 15 minutes a day; friends will transport home; children live in [place]; has a rolling walker and straight cane if needed; has lw/poa, stated thinks in chart; agreed to meds to beds; otherwise [facility]; completed imm with pt; no dme or hh needs per pt; no financial concerns;  cm will continue to follow                    Expected Discharge Date and Time       Expected Discharge Date Expected Discharge Time    Jun 27, 2025            Demographic Summary       Row Name 06/26/25 1609       General Information    Admission Type inpatient    Arrived From emergency department    Required Notices Provided Important Message from Medicare  completed imm with pt    Referral Source admission list    Reason for Consult discharge planning    Preferred Language English       Contact Information    Permission Granted to Share Info With family/designee  if needed can speak with any child Yajaira or Daniel                   Functional Status       Row Name 06/26/25 1610       Functional Status    Usual Activity Tolerance good    Current Activity Tolerance good       Functional Status, IADL    Medications independent    Meal Preparation independent    Housekeeping independent    Laundry independent    Shopping independent    If for any reason you need help with day-to-day activities such as bathing, preparing meals, shopping, managing finances, etc., do you get the help you need? I don't need any help       Mental Status    General Appearance WDL WDL       Mental Status Summary    Recent Changes in Mental Status/Cognitive Functioning no changes       Employment/    Employment Status retired                        Micheline JI  CARLOS Weaver

## 2025-06-26 NOTE — PLAN OF CARE
Goal Outcome Evaluation:  Plan of Care Reviewed With: patient, friend        Progress: no change  Outcome Evaluation: OT eval completed. Patient is supine in bed, Ox4, reports 6/10 medial back pain. Patient's friend is at bedside. Patient lives with her daughter, but daughter is currently in [place] working so patient has been alone. No steps to enter home, has a basement where the laundry room is. Patient is ind with HH mobility, community mobility, ADLs, IADLs and driving. Patient has a RW, SPC, shower chair. Patient performed supine to sit modified ind, sit to stand SBA, walked 136' no AD with SBA-CGA, holding to handrail in henry at times. Patient is able to perform ADLs with SBA-sup. Encouraged mobility as tolerated with nursing, utilizing for walker for added stability. No further OT needs, would benefit from OP services for chronic back pain. Patient's friend will check in on her as needed.    Anticipated Discharge Disposition (OT): home with assist, home with outpatient therapy services

## 2025-06-26 NOTE — THERAPY DISCHARGE NOTE
Acute Care - Occupational Therapy Discharge  [facility]    Patient Name: Marifer Ricardo  : 1950    MRN: [id number]                              Today's Date: 2025       Admit Date: 2025    Visit Dx:     ICD-10-CM ICD-9-CM   1. 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    Gastroparesis    Rosacea    Age related osteoporosis    GERD without esophagitis    Gastritis    PAD (peripheral artery disease)    Pure hypercholesterolemia    Hip fracture    Chest pain     Past Medical History:   Diagnosis Date    Abdominal pain     Abnormal bone density screening 2014    osteopenia    Arthritis     Asthma     Belching     Body piercing     BOTH EARS    Cataract, bilateral     Chronic renal insufficiency 2016    Diabetes mellitus     Essential hypertension 2016    GERD (gastroesophageal reflux disease)     Hx of mammogram 2009    Hyperparathyroidism     Mastoiditis     Pregnancy      s/p  x 3    Renal insufficiency     Urticaria     Wears glasses     Weight loss      Past Surgical History:   Procedure Laterality Date    BYPASS GRAFT SUBCLAVIAN      LEFT SUBCLAVIAN TO RIGHT ATRIUM VENOUS GRAFT in past for ?TPN    CARDIAC CATHETERIZATION N/A 2024    Procedure: Left Heart Cath;  Surgeon: Isaac Whalen MD;  Location: [facility] CATH INVASIVE LOCATION;  Service: Cardiology;  Laterality: N/A;    CATARACT EXTRACTION, BILATERAL      COLECTOMY PARTIAL / TOTAL      Pt reports multiple abd  surgeries for ?pseudo-bowel obstruction    COLONOSCOPY  2005    COLONOSCOPY N/A 02/07/2018    Procedure: COLONOSCOPY WITH RANDOM COLON BIOPSIES;  Surgeon: Taras Morillo MD;  Location:  [facility];  Service:     COLOSTOMY      and revision    ENDOSCOPY N/A 01/25/2018    Procedure: ESOPHAGOGASTRODUODENOSCOPY WITH BIOPSIES;  Surgeon: Taras Morillo MD;  Location:  [facility];  Service:     ENDOSCOPY N/A 8/14/2024    Procedure: ESOPHAGOGASTRODUODENOSCOPY;  Surgeon: Brunner, Mark I, MD;  Location:  [facility];  Service: Gastroenterology;  Laterality: N/A;    HIP PERCUTANEOUS PINNING Right 10/27/2024    Procedure: HIP PERCUTANEOUS PINNING RIGHT;  Surgeon: Rubin Parkinson Jr., MD;  Location:  [facility];  Service: Orthopedics;  Laterality: Right;    HYSTERECTOMY      age 31 for DUB, ovaries intact    TONSILLECTOMY AND ADENOIDECTOMY        General Information       Row Name 06/26/25 1234          OT Time and Intention    Subjective Information complains of;pain  -SD     Document Type discharge evaluation/summary  -SD     Mode of Treatment occupational therapy  -SD     Patient Effort good  -SD     Symptoms Noted During/After Treatment none  -SD       Row Name 06/26/25 1234          General Information    Patient Profile Reviewed yes  -SD     Prior Level of Function independent:;all household mobility;community mobility;ADL's;driving  -SD     Existing Precautions/Restrictions no known precautions/restrictions  -SD     Barriers to Rehab none identified  -SD       Row Name 06/26/25 1234          Living Environment    Current Living Arrangements home  -SD     People in Home child(desi), adult;other (see comments)  daughter is currently in [place] for work so patient is alone  -SD       Row Name 06/26/25 1234          Home Main Entrance    Number of Stairs, Main Entrance none  -SD       Row Name 06/26/25 1234          Stairs Within Home, Primary    Number of Stairs, Within Home, Primary twelve  -SD     Stair  Railings, Within Home, Primary railings safe and in good condition  -SD     Stairs Comment, Within Home, Primary basement where laundry room is  -SD       Row Name 06/26/25 1234          Cognition    Orientation Status (Cognition) oriented x 4  -SD       Row Name 06/26/25 1234          Safety Issues/Impairments Affecting Functional Mobility    Safety Issues Affecting Function (Mobility) safety precautions follow-through/compliance  -SD     Impairments Affecting Function (Mobility) balance  -SD               User Key  (r) = Recorded By, (t) = Taken By, (c) = Cosigned By      Initials Name Provider Type    SD Olga Bee OT Occupational Therapist                   Mobility/ADL's       Row Name 06/26/25 1238          Bed Mobility    Bed Mobility supine-sit  -SD     Supine-Sit [place] (Bed Mobility) modified independence  -SD     Assistive Device (Bed Mobility) head of bed elevated  -SD       Row Name 06/26/25 1238          Transfers    Transfers sit-stand transfer  -SD       Row Name 06/26/25 1238          Sit-Stand Transfer    Sit-Stand [place] (Transfers) standby assist  -SD       Row Name 06/26/25 1238          Functional Mobility    Functional Mobility- Ind. Level standby assist;contact guard assist  -SD     Functional Mobility-Distance (Feet) 136  -SD     Functional Mobility- Safety Issues balance decreased during turns  -SD     Functional Mobility- Comment holding to handrail in henry at times  -SD       Row Name 06/26/25 1238          Activities of Daily Living    BADL Assessment/Intervention bathing;upper body dressing;lower body dressing;grooming;feeding;toileting  -SD       Row Name 06/26/25 1238          Bathing Assessment/Intervention    [place] Level (Bathing) standby assist;supervision  -SD       Row Name 06/26/25 1238          Upper Body Dressing Assessment/Training    [place] Level (Upper Body Dressing) set up  -SD       Row Name 06/26/25 1238          Lower Body Dressing  Assessment/Training    [place] Level (Lower Body Dressing) standby assist  -SD       Row Name 06/26/25 1238          Grooming Assessment/Training    [place] Level (Grooming) set up  -SD       Row Name 06/26/25 1238          Self-Feeding Assessment/Training    [place] Level (Feeding) set up  -SD       Row Name 06/26/25 1238          Toileting Assessment/Training    [place] Level (Toileting) standby assist  -SD               User Key  (r) = Recorded By, (t) = Taken By, (c) = Cosigned By      Initials Name Provider Type    Olga Lobo OT Occupational Therapist                   Obj/Interventions       Row Name 06/26/25 1239          Range of Motion Comprehensive    General Range of Motion bilateral upper extremity ROM WNL  -SD       [facility] Name 06/26/25 1239          Strength Comprehensive (MMT)    General Manual Muscle Testing (MMT) Assessment upper extremity strength deficits identified  -SD     Comment, General Manual Muscle Testing (MMT) Assessment UB 4-/5 WFL for age  -SD               User Key  (r) = Recorded By, (t) = Taken By, (c) = Cosigned By      Initials Name Provider Type    Olga Lobo OT Occupational Therapist                   Goals/Plan    No documentation.                  Clinical Impression       [facility] Name 06/26/25 1239          Pain Assessment    Pretreatment Pain Rating 6/10  -SD     Posttreatment Pain Rating 6/10  -SD     Pain Location back  -SD     Pain Side/Orientation medial;left  -SD     Pain Management Interventions exercise or physical activity utilized  -SD     Response to Pain Interventions no change per patient report  -SD       [facility] Name 06/26/25 1239          Plan of Care Review    Plan of Care Reviewed With patient;friend  -SD     Progress no change  -SD     Outcome Evaluation OT eval completed. Patient is supine in bed, Ox4, reports 6/10 medial back pain. Patient's friend is at bedside. Patient lives with her daughter, but daughter is currently in  [place] working so patient has been alone. No steps to enter home, has a basement where the laundry room is. Patient is ind with HH mobility, community mobility, ADLs, IADLs and driving. Patient has a RW, SPC, shower chair. Patient performed supine to sit modified ind, sit to stand SBA, walked 136' no AD with SBA-CGA, holding to handrail in henry at times. Patient is able to perform ADLs with SBA-sup. Encouraged mobility as tolerated with nursing, utilizing for walker for added stability. No further OT needs, would benefit from OP services for chronic back pain. Patient's friend will check in on her as needed.  -SD       Row Name 06/26/25 1239          Therapy Assessment/Plan (OT)    Patient/Family Therapy Goal Statement (OT) home  -SD     Rehab Potential (OT) good  -SD     Criteria for Skilled Therapeutic Interventions Met (OT) no problems identified which require skilled intervention  -SD     Therapy Frequency (OT) evaluation only  -SD       Row Name 06/26/25 1239          Therapy Plan Review/Discharge Plan (OT)    Anticipated Discharge Disposition (OT) home with assist;home with outpatient therapy services  -SD       Row Name 06/26/25 1239          Vital Signs    O2 Delivery Pre Treatment room air  -SD     O2 Delivery Intra Treatment room air  -SD     O2 Delivery Post Treatment room air  -SD       Row Name 06/26/25 1239          Positioning and Restraints    Pre-Treatment Position in bed  -SD     Post Treatment Position chair  -SD     In Chair sitting;call light within reach;encouraged to call for assist;with family/caregiver;notified nsg  -SD               User Key  (r) = Recorded By, (t) = Taken By, (c) = Cosigned By      Initials Name Provider Type    Olga Lobo OT Occupational Therapist                   Outcome Measures       Row Name 06/26/25 6165          How much help from another is currently needed...    Putting on and taking off regular lower body clothing? 4  -SD     Bathing (including  washing, rinsing, and drying) 3  -SD     Toileting (which includes using toilet bed pan or urinal) 3  -SD     Putting on and taking off regular upper body clothing 4  -SD     Taking care of personal grooming (such as brushing teeth) 4  -SD     Eating meals 4  -SD     AM-PAC 6 Clicks Score (OT) 22  -SD       Row Name 06/26/25 1247          Functional Assessment    Outcome Measure Options AM-PAC 6 Clicks Daily Activity (OT)  -SD               User Key  (r) = Recorded By, (t) = Taken By, (c) = Cosigned By      Initials Name Provider Type    SD Olga Bee OT Occupational Therapist                  Occupational Therapy Education       Title: PT OT SLP Therapies (In Progress)       Topic: Occupational Therapy (In Progress)       Point: ADL training (Done)       Learning Progress Summary            Patient Acceptance, E,TB, VU by SD at 6/26/2025 1248    Comment: Benefit of OT                                      User Key       Initials Effective Dates Name Provider Type Discipline    SD 06/16/21 -  Olga Bee OT Occupational Therapist OT                  OT Recommendation and Plan  Therapy Frequency (OT): evaluation only  Plan of Care Review  Plan of Care Reviewed With: patient, friend  Progress: no change  Outcome Evaluation: OT eval completed. Patient is supine in bed, Ox4, reports 6/10 medial back pain. Patient's friend is at bedside. Patient lives with her daughter, but daughter is currently in [place] working so patient has been alone. No steps to enter home, has a basement where the laundry room is. Patient is ind with HH mobility, community mobility, ADLs, IADLs and driving. Patient has a RW, SPC, shower chair. Patient performed supine to sit modified ind, sit to stand SBA, walked 136' no AD with SBA-CGA, holding to handrail in henry at times. Patient is able to perform ADLs with SBA-sup. Encouraged mobility as tolerated with nursing, utilizing for walker for added stability. No further OT needs,  would benefit from OP services for chronic back pain. Patient's friend will check in on her as needed.  Plan of Care Reviewed With: patient, friend  Outcome Evaluation: OT eval completed. Patient is supine in bed, Ox4, reports 6/10 medial back pain. Patient's friend is at bedside. Patient lives with her daughter, but daughter is currently in [employer] working so patient has been alone. No steps to enter home, has a basement where the laundry room is. Patient is ind with HH mobility, community mobility, ADLs, IADLs and driving. Patient has a RW, SPC, shower chair. Patient performed supine to sit modified ind, sit to stand SBA, walked 136' no AD with SBA-CGA, holding to handrail in henry at times. Patient is able to perform ADLs with SBA-sup. Encouraged mobility as tolerated with nursing, utilizing for walker for added stability. No further OT needs, would benefit from OP services for chronic back pain. Patient's friend will check in on her as needed.     Time Calculation:   Evaluation Complexity (OT)  Review Occupational Profile/Medical/Therapy History Complexity: expanded/moderate complexity  Assessment, Occupational Performance/Identification of Deficit Complexity: 3-5 performance deficits  Clinical Decision Making Complexity (OT): detailed assessment/moderate complexity  Overall Complexity of Evaluation (OT): moderate complexity     Time Calculation- OT       Row Name 06/26/25 1250             Time Calculation- OT    OT Start Time 0958  -SD      OT Received On 06/26/25  -SD         Untimed Charges    OT Eval/Re-eval Minutes 60  -SD         Total Minutes    Untimed Charges Total Minutes 60  -SD       Total Minutes 60  -SD                User Key  (r) = Recorded By, (t) = Taken By, (c) = Cosigned By      Initials Name Provider Type    Olga Lobo OT Occupational Therapist                  Therapy Charges for Today       Code Description Service Date Service Provider Modifiers Qty    [id number]  OT EVAL  MOD COMPLEXITY 4 6/26/2025 Olga Bee, OT GO 1               OT Discharge Summary  Anticipated Discharge Disposition (OT): home with assist, home with outpatient therapy services    Olga Bee OT  6/26/2025

## 2025-06-26 NOTE — PROGRESS NOTES
Cardiology Progress Note  Javan Witt MD  PATIENT: Marifer Ricardo : 1950   DATE: 25   @BED@    Patient Care Team:  Norma Spangler APRN as PCP - General (Nurse Practitioner)  Jonathan Mondragon MD as Consulting Physician (Neurology)  Ming Stiles MD, GARY (Nephrology)  Rosmery Nicholson DC as Consulting Physician (Chiropractic Medicine)  Taras Morillo MD as Consulting Physician (General Surgery)  Camilo Sunshine MD as Surgeon (Orthopedic Surgery)  Javan Witt MD as Consulting Physician (Cardiology)    Subjective .  Patient has had symptomatic improvement and has not had any chest discomfort.  She has stayed hemodynamically stable and has not had any recurrent dysrhythmia.    Objective     Vital Sign Min/Max for last 24 hours  Temp  Min: 97.4 °F (36.3 °C)  Max: 98.3 °F (36.8 °C)   BP  Min: 70/56  Max: 154/81   Pulse  Min: 70  Max: 144   Resp  Min: 16  Max: 16   SpO2  Min: 90 %  Max: 98 %   No data recorded   Weight  Min: 56.7 kg (125 lb)  Max: 56.7 kg (125 lb)     Wt Readings from Last 7 Encounters:   25 56.7 kg (125 lb)   25 54.4 kg (120 lb)   24 57.2 kg (126 lb)   10/27/24 55.3 kg (122 lb)   24 56.5 kg (124 lb 9.6 oz)   24 56.2 kg (124 lb)   23 58.4 kg (128 lb 12.8 oz)          Physical Exam:    CONSTITUTIONAL: Not in acute distress.    NECK: Carotid upstroke is normal, there are no carotid bruits, no JVD, no thyromegaly, no cervical or axillary lymphadenopathy.     HEART: [place] beat is not displaced, no thrill is appreciated and both heart sounds are normal.  There is no murmur or rub audible.     BRONCHOPULMONARY: Patient has good air entry bilaterally with bronchovesicular sounds. No adventious sounds audible.     GI & : Soft, no tenderness elicited and no viscera are palpable. Bowel sounds are positive and there is no renal bruits audible.       Intake/Output Summary (Last 24 hours) at 2025 0751  Last data filed at 2025 0024  Gross  "per 24 hour   Intake 2255 ml   Output --   Net 2255 ml       Results Review:    LABS:   Results from last 7 days   Lab Units 06/25/25  0926   WBC 10*3/mm3 12.99*   HEMOGLOBIN g/dL 15.1   HEMATOCRIT % 45.0   PLATELETS 10*3/mm3 154     I reviewed the patient's new clinical results.  Results from last 7 days   Lab Units 06/25/25  0926   WBC 10*3/mm3 12.99*   HEMOGLOBIN g/dL 15.1   HEMATOCRIT % 45.0   PLATELETS 10*3/mm3 154     Results from last 7 days   Lab Units 06/25/25  0926   SODIUM mmol/L 142   POTASSIUM mmol/L 4.3   CHLORIDE mmol/L 103   CO2 mmol/L 20.5*   BUN mg/dL 26.0*   CREATININE mg/dL 1.14*   GLUCOSE mg/dL 101*   CALCIUM mg/dL 9.8           Invalid input(s): \"TROPONININT\"           Lab Results   Component Value Date    HGBA1C 5.80 (H) 08/12/2024     Results from last 7 days   Lab Units 06/25/25  1632   TSH uIU/mL 1.210           RADIOLOGY:   Imaging Results (Last 24 Hours)       Procedure Component Value Units Date/Time    CT Angiogram Aorta [[id number]] Collected: 06/25/25 1246     Updated: 06/25/25 1259    Narrative:      CT ANGIOGRAPHY, ABDOMEN AND PELVIS     06/25/2025 11:09 AM      HISTORY: Chest pain, hypotension, evaluate aortic dissection, PE..     COMPARISON: 06/01/2022..     PROCEDURE: Postcontrast images of the chest, abdomen and pelvis were  performed by computed tomography before and after intravenous  administration of contrast.. Extensive 3 D reconstruction images were  performed. A CTA was performed. This study was performed with techniques  to keep radiation doses as low as reasonably achievable, (ALARA).  Individualized dose reduction techniques using automated exposure  control or adjustment of mA and/or kV according to the patient size were  employed.     FINDINGS:     ABDOMEN: There is stable chronic change posterior lower lobes  bilaterally. Partial atelectasis/scarring of the right middle lobe  inferiorly is new from the prior exam. There are some groundglass  opacities posteriorly in " the right upper lobe, acute versus chronic.  Left upper lobe is clear.. The heart is upper limits of normal in size.  The liver demonstrates diffuse fatty infiltration. The spleen is  unremarkable. No adrenal mass is present.  The pancreas is not well  visualized on this exam but question the dilatation of the pancreatic  duct. IVC is mildly dilated and there is reflux of contrast into the  IVC. Streak artifact from metallic surgical clips noted. The kidneys are  unremarkable. The aorta is proper caliber. There is no free fluid or  adenopathy. Gallbladder present with no CT visible stones.     Pre-contrast images demonstrate left nephrolithiasis.     PELVIS: The appendix is not identified. The urinary bladder is  unremarkable. There is no significant free fluid or adenopathy.  Calcifications noted in the subcutaneous fat of the buttocks  bilaterally, likely calcified injection granulomas.     CTA: The previously described left subclavian right atrial graft is  occluded as was seen on the prior exam. Mediastinal collaterals  identified as was seen previously. Moderate stenosis at the origin of  the celiac axis noted. Mild stenosis at the origin of the SMA noted. SJ  is patent. There are single bilateral nonstenotic renal arteries.  Bilateral common iliac arteries are patent. No aortic dissection  identified. Main pulmonary artery is mildly dilated suggesting pulmonary  arterial hypertension.       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     Right upper and middle lobe groundglass opacities new from 2022, acute  versus chronic.     No aortic dissection.     Mildly dilated main pulmonary artery suggesting pulmonary arterial  hypertension.     CTDI: 2.55 mGy  DLP:292.6 mGy.cm        This report was signed and finalized on 6/25/2025 12:57 PM by Cierra Barros MD.       XR Chest 1 View [[id number]] Collected: 06/25/25 1011     Updated: 06/25/25 1014    Narrative:       PROCEDURE: XR CHEST 1 VW-     HISTORY: Chest pain, mid chest pain and excessive burping.     COMPARISON: August 12, 2024..     FINDINGS: The heart is normal in size. Stable chronic change noted  bilaterally; no acute infiltrate seen.. The mediastinum is unremarkable.  There is no pneumothorax.  There are no acute osseous abnormalities.  Right hilar surgical clips noted. Tortuosity of the thoracic aorta  noted. Aortic mural calcifications noted. There is evidence of old  calcified granulomatous disease. Patient is rotated to the right.  Sclerosis noted in the left humeral neck, stable from prior.       Impression:      Stable chest..                 This report was signed and finalized on 6/25/2025 10:12 AM by Cierra Barros MD.                      Allergies   Allergen Reactions    Penicillins Hives    Codeine Hives    Contrast Dye (Echo Or Unknown Ct/Mr) Hives    Metoclopramide Hives    Norco [Hydrocodone-Acetaminophen] GI Intolerance     aspirin, 81 mg, Oral, Daily  atorvastatin, 40 mg, Oral, Daily  azithromycin, 500 mg, Intravenous, Q24H  carvedilol, 12.5 mg, Oral, BID With Meals  cefepime, 2,000 mg, Intravenous, Q12H  cetirizine, 10 mg, Oral, Daily  enoxaparin sodium, 40 mg, Subcutaneous, Q24H  melatonin, 5 mg, Oral, Nightly  pantoprazole, 40 mg, Oral, QAM AC  sodium chloride, 10 mL, Intravenous, Q12H  vancomycin, 750 mg, Intravenous, Q24H           Chest pain    Assessment & Plan     Patient is 75 y.o. with risk profile for atherosclerotic cardiovascular disease as outlined previously, who was hospitalized on account of nausea and vomiting with fairly atypical chest discomfort with pleuritic characteristics with possible underlying pneumonitis, which lasted more than 4 hours and she had mildly elevated high-sensitivity troponins.  Patient history of chronic elevation of high-sensitivity troponins likely reflecting microvascular disease.  At this stage there is no evidence of acute coronary artery syndrome.   Patient did not have any wall motion abnormalities.  Her relatively recent angiographic studies have demonstrated only mild plaque in LAD distribution and at this stage it does not appear that the patient has sustained any plaque rupture which is quite reassuring.  Nonetheless, patient will benefit from continued antiplatelet therapy, beta-blocker therapy and statin.  Patient had past medical history of recurrent syncopal episode with no previously documented significant bradycardia arrhythmias or tachyarrhythmias.  She does have a history of paroxysmal atrial fibrillation though she appears to have maintained normal sinus rhythm.  Patient had atrial dysrhythmia on presentation but overall she does not have significant A-fib burden.  Patient has history of apparent chronic central venous occlusion possibly secondary to indwelling catheter in remote past, eventually prompting left subclavian venous graft to the right atrium graft which also appears to have already occluded.  Now patient has multiple venous collaterals. Patient does not appear to have significant edema involving left upper extremity or any venous claudication.  Patient has stable valvular disease which did not appear to be hemodynamically significant based on surveillance studies.    I discussed the patients findings and my recommendations with the patient and family.  I would sign off at this juncture    Javan Witt MD  06/26/25  07:53 EDT

## 2025-06-26 NOTE — PLAN OF CARE
Goal Outcome Evaluation:  Plan of Care Reviewed With: patient        Progress: improving  Outcome Evaluation: VSS on RA, Chest pain free, IV ABX continued, Independent with ADLs and tolerating diet without nausea or vomitting. Pssible discharge home tomorrow.

## 2025-06-26 NOTE — PLAN OF CARE
Goal Outcome Evaluation:  Plan of Care Reviewed With: patient        Progress: improving  Outcome Evaluation: VSS with 2LNC to keep O2 sats >90%. No complaints of pain or nausea. Plan of care ongoing.

## 2025-06-27 ENCOUNTER — READMISSION MANAGEMENT (OUTPATIENT)
Dept: CALL CENTER | Facility: HOSPITAL | Age: 75
End: 2025-06-27
Payer: MEDICARE

## 2025-06-27 VITALS
HEIGHT: 67 IN | BODY MASS INDEX: 19.62 KG/M2 | DIASTOLIC BLOOD PRESSURE: 71 MMHG | HEART RATE: 65 BPM | TEMPERATURE: 98.2 F | OXYGEN SATURATION: 96 % | RESPIRATION RATE: 18 BRPM | WEIGHT: 125 LBS | SYSTOLIC BLOOD PRESSURE: 135 MMHG

## 2025-06-27 LAB
ANION GAP SERPL CALCULATED.3IONS-SCNC: 11.6 MMOL/L (ref 5–15)
BASOPHILS # BLD AUTO: 0.03 10*3/MM3 (ref 0–0.2)
BASOPHILS NFR BLD AUTO: 0.4 % (ref 0–1.5)
BUN SERPL-MCNC: 25 MG/DL (ref 8–23)
BUN/CREAT SERPL: 31.6 (ref 7–25)
CALCIUM SPEC-SCNC: 8.6 MG/DL (ref 8.6–10.5)
CHLORIDE SERPL-SCNC: 108 MMOL/L (ref 98–107)
CO2 SERPL-SCNC: 17.4 MMOL/L (ref 22–29)
CREAT SERPL-MCNC: 0.79 MG/DL (ref 0.57–1)
DEPRECATED RDW RBC AUTO: 43.6 FL (ref 37–54)
EGFRCR SERPLBLD CKD-EPI 2021: 77.6 ML/MIN/1.73
EOSINOPHIL # BLD AUTO: 0.04 10*3/MM3 (ref 0–0.4)
EOSINOPHIL NFR BLD AUTO: 0.5 % (ref 0.3–6.2)
ERYTHROCYTE [DISTWIDTH] IN BLOOD BY AUTOMATED COUNT: 12.9 % (ref 12.3–15.4)
GLUCOSE SERPL-MCNC: 71 MG/DL (ref 65–99)
HCT VFR BLD AUTO: 36.3 % (ref 34–46.6)
HGB BLD-MCNC: 12.4 G/DL (ref 12–15.9)
IMM GRANULOCYTES # BLD AUTO: 0.05 10*3/MM3 (ref 0–0.05)
IMM GRANULOCYTES NFR BLD AUTO: 0.6 % (ref 0–0.5)
LYMPHOCYTES # BLD AUTO: 1.76 10*3/MM3 (ref 0.7–3.1)
LYMPHOCYTES NFR BLD AUTO: 21.8 % (ref 19.6–45.3)
MCH RBC QN AUTO: 31.6 PG (ref 26.6–33)
MCHC RBC AUTO-ENTMCNC: 34.2 G/DL (ref 31.5–35.7)
MCV RBC AUTO: 92.6 FL (ref 79–97)
MONOCYTES # BLD AUTO: 0.43 10*3/MM3 (ref 0.1–0.9)
MONOCYTES NFR BLD AUTO: 5.3 % (ref 5–12)
NEUTROPHILS NFR BLD AUTO: 5.78 10*3/MM3 (ref 1.7–7)
NEUTROPHILS NFR BLD AUTO: 71.4 % (ref 42.7–76)
NRBC BLD AUTO-RTO: 0 /100 WBC (ref 0–0.2)
PLATELET # BLD AUTO: 146 10*3/MM3 (ref 140–450)
PMV BLD AUTO: 10.7 FL (ref 6–12)
POTASSIUM SERPL-SCNC: 4.3 MMOL/L (ref 3.5–5.2)
RBC # BLD AUTO: 3.92 10*6/MM3 (ref 3.77–5.28)
SODIUM SERPL-SCNC: 137 MMOL/L (ref 136–145)
WBC NRBC COR # BLD AUTO: 8.09 10*3/MM3 (ref 3.4–10.8)

## 2025-06-27 PROCEDURE — 25010000002 AZITHROMYCIN PER 500 MG: Performed by: NURSE PRACTITIONER

## 2025-06-27 PROCEDURE — 99239 HOSP IP/OBS DSCHRG MGMT >30: CPT | Performed by: NURSE PRACTITIONER

## 2025-06-27 PROCEDURE — 25810000003 SODIUM CHLORIDE 0.9 % SOLUTION 250 ML FLEX CONT: Performed by: NURSE PRACTITIONER

## 2025-06-27 PROCEDURE — 80048 BASIC METABOLIC PNL TOTAL CA: CPT | Performed by: NURSE PRACTITIONER

## 2025-06-27 PROCEDURE — 85025 COMPLETE CBC W/AUTO DIFF WBC: CPT | Performed by: NURSE PRACTITIONER

## 2025-06-27 RX ORDER — CEFUROXIME AXETIL 500 MG/1
1000 TABLET ORAL 3 TIMES DAILY
Qty: 30 TABLET | Refills: 0 | Status: SHIPPED | OUTPATIENT
Start: 2025-06-27 | End: 2025-07-02

## 2025-06-27 RX ADMIN — CETIRIZINE HYDROCHLORIDE 10 MG: 10 TABLET, FILM COATED ORAL at 08:36

## 2025-06-27 RX ADMIN — CARVEDILOL 12.5 MG: 12.5 TABLET, FILM COATED ORAL at 08:37

## 2025-06-27 RX ADMIN — PANTOPRAZOLE SODIUM 40 MG: 40 TABLET, DELAYED RELEASE ORAL at 06:36

## 2025-06-27 RX ADMIN — AZITHROMYCIN DIHYDRATE 500 MG: 500 INJECTION, POWDER, LYOPHILIZED, FOR SOLUTION INTRAVENOUS at 08:38

## 2025-06-27 RX ADMIN — ATORVASTATIN CALCIUM 40 MG: 40 TABLET, FILM COATED ORAL at 08:36

## 2025-06-27 RX ADMIN — Medication 10 ML: at 08:37

## 2025-06-27 RX ADMIN — ASPIRIN 81 MG: 81 TABLET, COATED ORAL at 08:36

## 2025-06-27 NOTE — DISCHARGE INSTRUCTIONS
Patient to be discharged home.  Continue Augmentin x 5 days.  Continue holding antihypertensives Norvasc and Diovan.  Can continue Coreg.  If BP > 150 can resume Diovan.  Follow with PCP in 3 to 5 days.  Follow-up with Dr. Witt in 1 week.  Return to emergency department for any worsening symptoms.

## 2025-06-27 NOTE — DISCHARGE SUMMARY
[facility]   DISCHARGE SUMMARY      Name:  Marifer Ricardo   Age:  75 y.o.  Sex:  female  :  1950  MRN:  [id number]   Visit Number:  [id number]    Admission Date:  2025  Date of Discharge:  2025  Primary Care Physician:  Norma Spangler APRN    Important issues to note:    -Patient admitted for nausea/vomiting/diarrhea with chest pain.  Was noted to have right lung pneumonia likely aspiration event.  - Gentle fluids given as well as antibiotic therapy with overall improvement.  - She will be discharged home on Ceftin 3 times daily x 5 days for pneumonia coverage given her current allergies.  - Will continue holding Norvasc and Diovan at this time due to soft blood pressure.  - Discussed with Dr. Witt, given minimal burden of atrial fibrillation would continue holding anticoagulation at this time.  - Strict return precautions given.    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     Presenting Problem:    Chief Complaint   Patient presents with    Chest Pain     Pt arrives via [facility] for CP that's been going on approximately 4-5 hrs today. Pt was vomiting since yesterday. Pt has Hx of heart attacks and Afib. EMS reports Pt was Afib RVR prior to arrival and received 324 of ASA prior to arrival       Consults:     Consulting Physician(s)         Provider   Role Specialty     Javan Witt MD      Consulting Physician Cardiology          Procedures Performed:        History of presenting illness/Hospital Course:    Patient is 75 years old female with a past  medical history of CAD, paroxysmal A-fib, hyperlipidemia, hypertension, asthma, GERD, presented to the ER with a chief complaint of chest pain.  Patient reported intractable nausea and vomiting even when she drinks fluids.  She had mild watery diarrhea which had already resolved.  She was throwing up all night.  Upon awakening, she reported she started feeling indigestion and frequent burping along with substernal chest pain.  Reporting associated numbness sensation in her left arm. Her nausea, vomiting and diarrhea currently resolved.  Denies any shortness of breath, cough, fever or abdominal pain.  Patient follows up with Dr. Witt with cardiology.  She is currently on aspirin and statin.     On ER evaluation, patient was in A-fib with RVR on arrival with heart rate up to 140s, she was hypotensive with a blood pressure of 80s over 60s but responded to IV fluids in the ER.  Remained on room air and afebrile. Workup in the ER was significant for HS Troponin of 28 (chronically elevated), proBNP 2135, creatinine 1.14, BUN 26, T. bilirubin 1.3, Pro-Rocco 3.95, CRP 24.1, WBC 12.9, COVID and flu negative.  Chest x-ray with stable chest per radiology.  CTA aorta showed Occluded left subclavian to right atrial graft as seen on prior study. Right upper and middle lobe groundglass opacities new from 2022, acute versus chronic. No aortic dissection. Mildly dilated main pulmonary artery suggesting pulmonary arterial hypertension.  Patient received Benadryl and steroids for contrast allergy, received 1 L bolus of LR and was started on vancomycin, cefepime and azithromycin while in the ER.  Hospitalist consulted for admission, further management and treatment.  Cardiology consulted, do not suspect ACS at this time.    Patient continued on cefepime and completed azithromycin x 3 days for pneumonia.  Otherwise she remained on room air with reassuring labs and vitals noted.  Patient blood pressure soft during admission, would  continue holding Diovan and Norvasc.  Can continue Coreg for now.  She will follow-up closely with PCP and Dr. Witt.    Vital Signs:    Temp:  [97.5 °F (36.4 °C)-98.2 °F (36.8 °C)] 98.2 °F (36.8 °C)  Heart Rate:  [64-80] 65  Resp:  [16-18] 18  BP: ()/(49-79) 135/71    Physical Exam:    General Appearance:  Alert and cooperative.  Chronically ill middle-age female.   Head:  Atraumatic and normocephalic.   Eyes: Conjunctivae and sclerae normal, no icterus. No pallor.   Ears:  Ears with no abnormalities noted.   Throat: No oral lesions, no thrush, oral mucosa moist.   Neck: Supple, trachea midline, no thyromegaly.   Back:   No kyphoscoliosis present. No tenderness to palpation.   Lungs:   Breath sounds heard bilaterally equally.  No crackles or wheezing. No Pleural rub or bronchial breathing.  Unlabored on room air.   Heart:  Normal S1 and S2, no murmur, no gallop, no rub. No JVD.   Abdomen:   Normal bowel sounds, no masses, no organomegaly. Soft, nontender, nondistended, no rebound tenderness.   Extremities: Supple, no edema, no cyanosis, no clubbing.   Pulses: Pulses palpable bilaterally.   Skin: No bleeding or rash.   Neurologic: Alert and oriented x 3. No facial asymmetry. Moves all four limbs. No tremors.     Pertinent Lab Results:     Results from last 7 days   Lab Units 06/27/25  0803 06/26/25  0853 06/25/25  0926   SODIUM mmol/L 137 140 142   POTASSIUM mmol/L 4.3 3.8 4.3   CHLORIDE mmol/L 108* 106 103   CO2 mmol/L 17.4* 21.4* 20.5*   BUN mg/dL 25.0* 31.0* 26.0*   CREATININE mg/dL 0.79 1.06* 1.14*   CALCIUM mg/dL 8.6 8.7 9.8   BILIRUBIN mg/dL  --  0.7 1.3*   ALK PHOS U/L  --  86 92   ALT (SGPT) U/L  --  23 11   AST (SGOT) U/L  --  24 18   GLUCOSE mg/dL 71 181* 101*     Results from last 7 days   Lab Units 06/27/25  0803 06/26/25  0853 06/25/25  0926   WBC 10*3/mm3 8.09 13.98* 12.99*   HEMOGLOBIN g/dL 12.4 13.7 15.1   HEMATOCRIT % 36.3 39.7 45.0   PLATELETS 10*3/mm3 146 164 154         Results from last 7  days   Lab Units 06/25/25  1758 06/25/25  1632 06/25/25  0926   HSTROP T ng/L 46* 49* 28*     Results from last 7 days   Lab Units 06/25/25  0926   PROBNP pg/mL 2,135.0*                 Results from last 7 days   Lab Units 06/25/25  1053 06/25/25  1045   BLOODCX  No growth at 2 days No growth at 24 hours       Pertinent Radiology Results:    Imaging Results (All)       Procedure Component Value Units Date/Time    CT Angiogram Aorta [[id number]] Collected: 06/25/25 1246     Updated: 06/25/25 1259    Narrative:      CT ANGIOGRAPHY, ABDOMEN AND PELVIS     06/25/2025 11:09 AM      HISTORY: Chest pain, hypotension, evaluate aortic dissection, PE..     COMPARISON: 06/01/2022..     PROCEDURE: Postcontrast images of the chest, abdomen and pelvis were  performed by computed tomography before and after intravenous  administration of contrast.. Extensive 3 D reconstruction images were  performed. A CTA was performed. This study was performed with techniques  to keep radiation doses as low as reasonably achievable, (ALARA).  Individualized dose reduction techniques using automated exposure  control or adjustment of mA and/or kV according to the patient size were  employed.     FINDINGS:     ABDOMEN: There is stable chronic change posterior lower lobes  bilaterally. Partial atelectasis/scarring of the right middle lobe  inferiorly is new from the prior exam. There are some groundglass  opacities posteriorly in the right upper lobe, acute versus chronic.  Left upper lobe is clear.. The heart is upper limits of normal in size.  The liver demonstrates diffuse fatty infiltration. The spleen is  unremarkable. No adrenal mass is present.  The pancreas is not well  visualized on this exam but question the dilatation of the pancreatic  duct. IVC is mildly dilated and there is reflux of contrast into the  IVC. Streak artifact from metallic surgical clips noted. The kidneys are  unremarkable. The aorta is proper caliber. There is no free  fluid or  adenopathy. Gallbladder present with no CT visible stones.     Pre-contrast images demonstrate left nephrolithiasis.     PELVIS: The appendix is not identified. The urinary bladder is  unremarkable. There is no significant free fluid or adenopathy.  Calcifications noted in the subcutaneous fat of the buttocks  bilaterally, likely calcified injection granulomas.     CTA: The previously described left subclavian right atrial graft is  occluded as was seen on the prior exam. Mediastinal collaterals  identified as was seen previously. Moderate stenosis at the origin of  the celiac axis noted. Mild stenosis at the origin of the SMA noted. SJ  is patent. There are single bilateral nonstenotic renal arteries.  Bilateral common iliac arteries are patent. No aortic dissection  identified. Main pulmonary artery is mildly dilated suggesting pulmonary  arterial hypertension.       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     Right upper and middle lobe groundglass opacities new from 2022, acute  versus chronic.     No aortic dissection.     Mildly dilated main pulmonary artery suggesting pulmonary arterial  hypertension.     CTDI: 2.55 mGy  DLP:292.6 mGy.cm        This report was signed and finalized on 6/25/2025 12:57 PM by Cierra Barros MD.       XR Chest 1 View [[id number]] Collected: 06/25/25 1011     Updated: 06/25/25 1014    Narrative:      PROCEDURE: XR CHEST 1 VW-     HISTORY: Chest pain, mid chest pain and excessive burping.     COMPARISON: August 12, 2024..     FINDINGS: The heart is normal in size. Stable chronic change noted  bilaterally; no acute infiltrate seen.. The mediastinum is unremarkable.  There is no pneumothorax.  There are no acute osseous abnormalities.  Right hilar surgical clips noted. Tortuosity of the thoracic aorta  noted. Aortic mural calcifications noted. There is evidence of old  calcified granulomatous disease. Patient is rotated to the  right.  Sclerosis noted in the left humeral neck, stable from prior.       Impression:      Stable chest..                 This report was signed and finalized on 6/25/2025 10:12 AM by Cierra Barros MD.               Echo:    Results for orders placed during the hospital encounter of 06/25/25    Adult Transthoracic Echo Complete W/ Cont if Necessary Per Protocol    Interpretation Summary    Left ventricular systolic function is normal. Calculated left ventricular EF = 62.1%    Left ventricular wall thickness is consistent with mild to moderate concentric hypertrophy.    Left ventricular diastolic function was normal.    Estimated right ventricular systolic pressure from tricuspid regurgitation is normal (<35 mmHg).    Condition on Discharge:      Stable.    Code status during the hospital stay:    Code Status and Medical Interventions: No CPR (Do Not Attempt to Resuscitate); Limited Support; No intubation (DNI)   Ordered at: 06/25/25 1615     Code Status (Patient has no pulse and is not breathing):    No CPR (Do Not Attempt to Resuscitate)     Medical Interventions (Patient has pulse or is breathing):    Limited Support     Medical Intervention Limits:    No intubation (DNI)     Level Of Support Discussed With:    Patient     Discharge Disposition:    Home or Self Care    Discharge Medications:       Discharge Medications        PAUSE taking these medications        Instructions Start Date   amLODIPine 5 MG tablet  Wait to take this until your doctor or other care provider tells you to start again.  Commonly known as: NORVASC   2.5 mg, Oral, Every Night at Bedtime      valsartan 160 MG tablet  Wait to take this until your doctor or other care provider tells you to start again.  Commonly known as: DIOVAN   160 mg, Oral, Every 24 Hours Scheduled             New Medications        Instructions Start Date   cefuroxime 500 MG tablet  Commonly known as: CEFTIN   1,000 mg, Oral, 3 Times Daily             Changes to  Medications        Instructions Start Date   atorvastatin 40 MG tablet  Commonly known as: LIPITOR  What changed: when to take this   40 mg, Oral, Daily      oxyCODONE-acetaminophen 7.5-325 MG per tablet  Commonly known as: PERCOCET  What changed: Another medication with the same name was removed. Continue taking this medication, and follow the directions you see here.   1 tablet, 3 Times Daily PRN             Continue These Medications        Instructions Start Date   alendronate 70 MG tablet  Commonly known as: FOSAMAX   70 mg, Every 7 Days      aspirin 81 MG EC tablet  Commonly known as: EQ Aspirin Adult Low Dose   81 mg, Oral, Daily      Biotin 1000 MCG tablet   1,000 mcg, 3 Times Daily      carvedilol 12.5 MG tablet  Commonly known as: COREG   12.5 mg, Oral, 2 Times Daily With Meals      cetirizine 10 MG tablet  Commonly known as: zyrTEC   10 mg, Oral, Daily, OTC      cholecalciferol 25 MCG (1000 UT) tablet  Commonly known as: VITAMIN D3   1,000 Units, Daily      Cinnamon 500 MG tablet   1 tablet, 2 Times Daily      OMEGA 3-6-9 COMPLEX PO   1 capsule, Daily      pantoprazole 40 MG EC tablet  Commonly known as: PROTONIX   40 mg, Oral, Every Morning Before Breakfast      Vitamin B-12 5000 MCG sublingual tablet   1 tablet, Daily             Stop These Medications      Turmeric 400 MG capsule            Discharge Diet:     Diet Instructions       Diet: Cardiac Diets; Healthy Heart (2-3 Na+); Thin (IDDSI 0)      Discharge Diet: Cardiac Diets    Cardiac Diet: Healthy Heart (2-3 Na+)    Fluid Consistency: Thin (IDDSI 0)          Activity at Discharge:     Activity Instructions       Activity as Tolerated            Follow-up Appointments:     Follow-up Information       Norma Spangler APRN. Go on 7/2/2025.    Specialty: Nurse Practitioner  Why: @ 10:10am  Contact information:  [address] [place] [id number]  [phone]               Javan Witt MD Follow up on 7/7/2025.    Specialties: Cardiology,  Interventional Radiology  Why: @ 9:30amn  Contact information:  [address] [place] [address]  [phone]                           Future Appointments   Date Time Provider Department Center   2/4/2026 11:15 AM Donal Enriquez MD [facility]     Test Results Pending at Discharge:    Pending Results       None                 Vivien KALLIE Olivia, APRN  06/27/25  11:37 EDT    Time: I spent >30 minutes on this discharge activity which included: face-to-face encounter with the patient, reviewing the data in the system, coordination of the care with the nursing staff as well as consultants, documentation, and entering orders.     Dictated utilizing Dragon dictation.

## 2025-06-27 NOTE — CASE MANAGEMENT/SOCIAL WORK
Case Management/Social Work    Patient Name:  Marifer Ricardo  YOB: 1950  MRN: [id number]  Admit Date:  6/25/2025        09:35 EDT   Discharge Plan       Row Name 06/27/25 0934       Plan    Plan Patient plans to return home with her friend once medically ready, states no needs at this time. Friend will transport.                        Electronically signed by:  Rubin Mckinnon RN  06/27/25 09:35 EDT

## 2025-06-27 NOTE — PLAN OF CARE
Goal Outcome Evaluation:           Progress: no change  Outcome Evaluation: vss on room air. iv abx cont per mar.

## 2025-06-27 NOTE — OUTREACH NOTE
Prep Survey      Flowsheet Row Responses   [facility] facility patient discharged from? [place]   Is LACE score < 7 ? No   Eligibility Readm Mgmt   Discharge diagnosis Chest pain   Does the patient have one of the following disease processes/diagnoses(primary or secondary)? Other   Does the patient have Home health ordered? No   Is there a DME ordered? No   Prep survey completed? Yes            MARTINA MIX - Registered Nurse

## 2025-06-30 LAB
BACTERIA SPEC AEROBE CULT: NORMAL
BACTERIA SPEC AEROBE CULT: NORMAL

## 2025-06-30 NOTE — PAYOR COMM NOTE
"To:  [facility]  From: Anel Munoz RN  Phone: [phone]  Fax: [phone]  NPI: [id number]  TIN: [id number]  Member ID: [id number]   MRN: [id number]    Anastasia Womack (75 y.o. Female)       Date of Birth   1950    Social Security Number       Address   [address] [place] [address]    Home Phone   [phone]    MRN   [id number]       Judaism   Episcopal    Marital Status   Single                            Admission Date   6/25/2025    Admission Type   Emergency    Admitting Provider   Niki Silva MD    Attending Provider       [facility]       Discharge Date   6/27/2025    Discharge Disposition   Home or Self Care    Discharge Destination                                 Attending Provider: (none)   Allergies: Penicillins, Codeine, Contrast Dye (Echo Or Unknown Ct/mr), Metoclopramide, Norco [Hydrocodone-acetaminophen]    Isolation: None   Infection: None   Code Status: Prior    Ht: 170.2 cm (67.01\")   Wt: 56.7 kg (125 lb)    Admission Cmt: None   Principal Problem: None                  Active Insurance as of 6/25/2025       Primary Coverage       Payor Plan Insurance Group Employer/Plan Group    [facility] MEDICARE REPLACEMENT [facility] MEDICARE ADVANTAGE PPO [id number]       Payor Plan Address Payor Plan Phone Number Payor Plan Fax Number Effective Dates    [address]   11/1/2023 - None Entered    [facility] [id number]         Subscriber Name Subscriber Birth Date Member ID       ANASTASIA WOMACK 1950 [id number]                     Emergency Contacts        (Rel.) Home Phone Work Phone Mobile Phone    Yajaira Espinoza (Daughter) [phone] -- --    Daniel Bolivar (Son) [phone] -- --    LUKAS JIMENEZ (Sister) -- -- [phone]                 Discharge Summary        Vivien Olivia APRN at 06/27/25 1137       Attestation signed by Kerley, Brian Joseph, DO at 06/27/25 1415      I have " reviewed this documentation and agree.                          [facility]   DISCHARGE SUMMARY      Name:  Marifer Ricardo   Age:  75 y.o.  Sex:  female  :  1950  MRN:  [id number]   Visit Number:  [id number]    Admission Date:  2025  Date of Discharge:  2025  Primary Care Physician:  Norma Spangler APRN    Important issues to note:    -Patient admitted for nausea/vomiting/diarrhea with chest pain.  Was noted to have right lung pneumonia likely aspiration event.  - Gentle fluids given as well as antibiotic therapy with overall improvement.  - She will be discharged home on Ceftin 3 times daily x 5 days for pneumonia coverage given her current allergies.  - Will continue holding Norvasc and Diovan at this time due to soft blood pressure.  - Discussed with Dr. Witt, given minimal burden of atrial fibrillation would continue holding anticoagulation at this time.  - Strict return precautions given.    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     Presenting Problem:    Chief Complaint   Patient presents with    Chest Pain     Pt arrives via [facility] for CP that's been going on approximately 4-5 hrs today. Pt was vomiting since yesterday. Pt has Hx of heart attacks and Afib. EMS reports Pt was Afib RVR prior to arrival and received 324 of ASA prior to arrival       Consults:     Consulting Physician(s)         Provider   Role Specialty     Javan Witt MD      Consulting Physician Cardiology          Procedures Performed:        History of presenting illness/Hospital  Course:    Patient is 75 years old female with a past medical history of CAD, paroxysmal A-fib, hyperlipidemia, hypertension, asthma, GERD, presented to the ER with a chief complaint of chest pain.  Patient reported intractable nausea and vomiting even when she drinks fluids.  She had mild watery diarrhea which had already resolved.  She was throwing up all night.  Upon awakening, she reported she started feeling indigestion and frequent burping along with substernal chest pain.  Reporting associated numbness sensation in her left arm. Her nausea, vomiting and diarrhea currently resolved.  Denies any shortness of breath, cough, fever or abdominal pain.  Patient follows up with Dr. Witt with cardiology.  She is currently on aspirin and statin.     On ER evaluation, patient was in A-fib with RVR on arrival with heart rate up to 140s, she was hypotensive with a blood pressure of 80s over 60s but responded to IV fluids in the ER.  Remained on room air and afebrile. Workup in the ER was significant for HS Troponin of 28 (chronically elevated), proBNP 2135, creatinine 1.14, BUN 26, T. bilirubin 1.3, Pro-Rocco 3.95, CRP 24.1, WBC 12.9, COVID and flu negative.  Chest x-ray with stable chest per radiology.  CTA aorta showed Occluded left subclavian to right atrial graft as seen on prior study. Right upper and middle lobe groundglass opacities new from 2022, acute versus chronic. No aortic dissection. Mildly dilated main pulmonary artery suggesting pulmonary arterial hypertension.  Patient received Benadryl and steroids for contrast allergy, received 1 L bolus of LR and was started on vancomycin, cefepime and azithromycin while in the ER.  Hospitalist consulted for admission, further management and treatment.  Cardiology consulted, do not suspect ACS at this time.    Patient continued on cefepime and completed azithromycin x 3 days for pneumonia.  Otherwise she remained on room air with reassuring labs and vitals noted.   Patient blood pressure soft during admission, would continue holding Diovan and Norvasc.  Can continue Coreg for now.  She will follow-up closely with PCP and Dr. Witt.    Vital Signs:    Temp:  [97.5 °F (36.4 °C)-98.2 °F (36.8 °C)] 98.2 °F (36.8 °C)  Heart Rate:  [64-80] 65  Resp:  [16-18] 18  BP: ()/(49-79) 135/71    Physical Exam:    General Appearance:  Alert and cooperative.  Chronically ill middle-age female.   Head:  Atraumatic and normocephalic.   Eyes: Conjunctivae and sclerae normal, no icterus. No pallor.   Ears:  Ears with no abnormalities noted.   Throat: No oral lesions, no thrush, oral mucosa moist.   Neck: Supple, trachea midline, no thyromegaly.   Back:   No kyphoscoliosis present. No tenderness to palpation.   Lungs:   Breath sounds heard bilaterally equally.  No crackles or wheezing. No Pleural rub or bronchial breathing.  Unlabored on room air.   Heart:  Normal S1 and S2, no murmur, no gallop, no rub. No JVD.   Abdomen:   Normal bowel sounds, no masses, no organomegaly. Soft, nontender, nondistended, no rebound tenderness.   Extremities: Supple, no edema, no cyanosis, no clubbing.   Pulses: Pulses palpable bilaterally.   Skin: No bleeding or rash.   Neurologic: Alert and oriented x 3. No facial asymmetry. Moves all four limbs. No tremors.     Pertinent Lab Results:     Results from last 7 days   Lab Units 06/27/25  0803 06/26/25  0853 06/25/25  0926   SODIUM mmol/L 137 140 142   POTASSIUM mmol/L 4.3 3.8 4.3   CHLORIDE mmol/L 108* 106 103   CO2 mmol/L 17.4* 21.4* 20.5*   BUN mg/dL 25.0* 31.0* 26.0*   CREATININE mg/dL 0.79 1.06* 1.14*   CALCIUM mg/dL 8.6 8.7 9.8   BILIRUBIN mg/dL  --  0.7 1.3*   ALK PHOS U/L  --  86 92   ALT (SGPT) U/L  --  23 11   AST (SGOT) U/L  --  24 18   GLUCOSE mg/dL 71 181* 101*     Results from last 7 days   Lab Units 06/27/25  0803 06/26/25  0853 06/25/25  0926   WBC 10*3/mm3 8.09 13.98* 12.99*   HEMOGLOBIN g/dL 12.4 13.7 15.1   HEMATOCRIT % 36.3 39.7 45.0    PLATELETS 10*3/mm3 146 164 154         Results from last 7 days   Lab Units 06/25/25  1758 06/25/25  1632 06/25/25  0926   HSTROP T ng/L 46* 49* 28*     Results from last 7 days   Lab Units 06/25/25  0926   PROBNP pg/mL 2,135.0*                 Results from last 7 days   Lab Units 06/25/25  1053 06/25/25  1045   BLOODCX  No growth at 2 days No growth at 24 hours       Pertinent Radiology Results:    Imaging Results (All)       Procedure Component Value Units Date/Time    CT Angiogram Aorta [[id number]] Collected: 06/25/25 1246     Updated: 06/25/25 1259    Narrative:      CT ANGIOGRAPHY, ABDOMEN AND PELVIS     06/25/2025 11:09 AM      HISTORY: Chest pain, hypotension, evaluate aortic dissection, PE..     COMPARISON: 06/01/2022..     PROCEDURE: Postcontrast images of the chest, abdomen and pelvis were  performed by computed tomography before and after intravenous  administration of contrast.. Extensive 3 D reconstruction images were  performed. A CTA was performed. This study was performed with techniques  to keep radiation doses as low as reasonably achievable, (ALARA).  Individualized dose reduction techniques using automated exposure  control or adjustment of mA and/or kV according to the patient size were  employed.     FINDINGS:     ABDOMEN: There is stable chronic change posterior lower lobes  bilaterally. Partial atelectasis/scarring of the right middle lobe  inferiorly is new from the prior exam. There are some groundglass  opacities posteriorly in the right upper lobe, acute versus chronic.  Left upper lobe is clear.. The heart is upper limits of normal in size.  The liver demonstrates diffuse fatty infiltration. The spleen is  unremarkable. No adrenal mass is present.  The pancreas is not well  visualized on this exam but question the dilatation of the pancreatic  duct. IVC is mildly dilated and there is reflux of contrast into the  IVC. Streak artifact from metallic surgical clips noted. The kidneys  are  unremarkable. The aorta is proper caliber. There is no free fluid or  adenopathy. Gallbladder present with no CT visible stones.     Pre-contrast images demonstrate left nephrolithiasis.     PELVIS: The appendix is not identified. The urinary bladder is  unremarkable. There is no significant free fluid or adenopathy.  Calcifications noted in the subcutaneous fat of the buttocks  bilaterally, likely calcified injection granulomas.     CTA: The previously described left subclavian right atrial graft is  occluded as was seen on the prior exam. Mediastinal collaterals  identified as was seen previously. Moderate stenosis at the origin of  the celiac axis noted. Mild stenosis at the origin of the SMA noted. SJ  is patent. There are single bilateral nonstenotic renal arteries.  Bilateral common iliac arteries are patent. No aortic dissection  identified. Main pulmonary artery is mildly dilated suggesting pulmonary  arterial hypertension.       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     Right upper and middle lobe groundglass opacities new from 2022, acute  versus chronic.     No aortic dissection.     Mildly dilated main pulmonary artery suggesting pulmonary arterial  hypertension.     CTDI: 2.55 mGy  DLP:292.6 mGy.cm        This report was signed and finalized on 6/25/2025 12:57 PM by Cierra Barros MD.       XR Chest 1 View [[id number]] Collected: 06/25/25 1011     Updated: 06/25/25 1014    Narrative:      PROCEDURE: XR CHEST 1 VW-     HISTORY: Chest pain, mid chest pain and excessive burping.     COMPARISON: August 12, 2024..     FINDINGS: The heart is normal in size. Stable chronic change noted  bilaterally; no acute infiltrate seen.. The mediastinum is unremarkable.  There is no pneumothorax.  There are no acute osseous abnormalities.  Right hilar surgical clips noted. Tortuosity of the thoracic aorta  noted. Aortic mural calcifications noted. There is evidence of  old  calcified granulomatous disease. Patient is rotated to the right.  Sclerosis noted in the left humeral neck, stable from prior.       Impression:      Stable chest..                 This report was signed and finalized on 6/25/2025 10:12 AM by Cierra Barros MD.               Echo:    Results for orders placed during the hospital encounter of 06/25/25    Adult Transthoracic Echo Complete W/ Cont if Necessary Per Protocol    Interpretation Summary    Left ventricular systolic function is normal. Calculated left ventricular EF = 62.1%    Left ventricular wall thickness is consistent with mild to moderate concentric hypertrophy.    Left ventricular diastolic function was normal.    Estimated right ventricular systolic pressure from tricuspid regurgitation is normal (<35 mmHg).    Condition on Discharge:      Stable.    Code status during the hospital stay:    Code Status and Medical Interventions: No CPR (Do Not Attempt to Resuscitate); Limited Support; No intubation (DNI)   Ordered at: 06/25/25 1615     Code Status (Patient has no pulse and is not breathing):    No CPR (Do Not Attempt to Resuscitate)     Medical Interventions (Patient has pulse or is breathing):    Limited Support     Medical Intervention Limits:    No intubation (DNI)     Level Of Support Discussed With:    Patient     Discharge Disposition:    Home or Self Care    Discharge Medications:       Discharge Medications        PAUSE taking these medications        Instructions Start Date   amLODIPine 5 MG tablet  Wait to take this until your doctor or other care provider tells you to start again.  Commonly known as: NORVASC   2.5 mg, Oral, Every Night at Bedtime      valsartan 160 MG tablet  Wait to take this until your doctor or other care provider tells you to start again.  Commonly known as: DIOVAN   160 mg, Oral, Every 24 Hours Scheduled             New Medications        Instructions Start Date   cefuroxime 500 MG tablet  Commonly known as:  CEFTIN   1,000 mg, Oral, 3 Times Daily             Changes to Medications        Instructions Start Date   atorvastatin 40 MG tablet  Commonly known as: LIPITOR  What changed: when to take this   40 mg, Oral, Daily      oxyCODONE-acetaminophen 7.5-325 MG per tablet  Commonly known as: PERCOCET  What changed: Another medication with the same name was removed. Continue taking this medication, and follow the directions you see here.   1 tablet, 3 Times Daily PRN             Continue These Medications        Instructions Start Date   alendronate 70 MG tablet  Commonly known as: FOSAMAX   70 mg, Every 7 Days      aspirin 81 MG EC tablet  Commonly known as: EQ Aspirin Adult Low Dose   81 mg, Oral, Daily      Biotin 1000 MCG tablet   1,000 mcg, 3 Times Daily      carvedilol 12.5 MG tablet  Commonly known as: COREG   12.5 mg, Oral, 2 Times Daily With Meals      cetirizine 10 MG tablet  Commonly known as: zyrTEC   10 mg, Oral, Daily, OTC      cholecalciferol 25 MCG (1000 UT) tablet  Commonly known as: VITAMIN D3   1,000 Units, Daily      Cinnamon 500 MG tablet   1 tablet, 2 Times Daily      OMEGA 3-6-9 COMPLEX PO   1 capsule, Daily      pantoprazole 40 MG EC tablet  Commonly known as: PROTONIX   40 mg, Oral, Every Morning Before Breakfast      Vitamin B-12 5000 MCG sublingual tablet   1 tablet, Daily             Stop These Medications      Turmeric 400 MG capsule            Discharge Diet:     Diet Instructions       Diet: Cardiac Diets; Healthy Heart (2-3 Na+); Thin (IDDSI 0)      Discharge Diet: Cardiac Diets    Cardiac Diet: Healthy Heart (2-3 Na+)    Fluid Consistency: Thin (IDDSI 0)          Activity at Discharge:     Activity Instructions       Activity as Tolerated            Follow-up Appointments:     Follow-up Information       Norma Spangler APRN. Go on 7/2/2025.    Specialty: Nurse Practitioner  Why: @ 10:10am  Contact information:  [address] [place] [id number] [phone]               Javan Witt,  MD Follow up on 7/7/2025.    Specialties: Cardiology, Interventional Radiology  Why: @ 9:30amn  Contact information:  [facility]   [place] [address] [phone]                           Future Appointments   Date Time Provider Department Center   2/4/2026 11:15 AM Donal Enriquez MD [facility]     Test Results Pending at Discharge:    Pending Results       None                 Vivienfortunato Olivia, APRN  06/27/25  11:37 EDT    Time: I spent >30 minutes on this discharge activity which included: face-to-face encounter with the patient, reviewing the data in the system, coordination of the care with the nursing staff as well as consultants, documentation, and entering orders.     Dictated utilizing Dragon dictation.      Electronically signed by Kerley, Brian Joseph, DO at 06/27/25 1421

## 2025-07-02 ENCOUNTER — READMISSION MANAGEMENT (OUTPATIENT)
Dept: CALL CENTER | Facility: HOSPITAL | Age: 75
End: 2025-07-02
Payer: MEDICARE

## 2025-07-02 NOTE — OUTREACH NOTE
Medical Week 1 Survey      Flowsheet Row Responses   [facility] patient discharged from? [facility]   Does the patient have one of the following disease processes/diagnoses(primary or secondary)? Other   Week 1 attempt successful? Yes   Call start time 1510   Call end time 1513   Discharge diagnosis Chest pain   Meds reviewed with patient/caregiver? Yes   Is the patient having any side effects they believe may be caused by any medication additions or changes? No   Does the patient have all medications ordered at discharge? Yes   Is the patient taking all medications as directed (includes completed medication regime)? Yes   Does the patient have a primary care provider?  Yes   Does the patient have an appointment with their PCP within 7 days of discharge? Yes   Has the patient kept scheduled appointments due by today? Yes   Has home health visited the patient within 72 hours of discharge? N/A   Psychosocial issues? No   Did the patient receive a copy of their discharge instructions? Yes   Nursing interventions Reviewed instructions with patient   What is the patient's perception of their health status since discharge? Improving   Is the patient/caregiver able to teach back signs and symptoms related to disease process for when to call PCP? Yes   Is the patient/caregiver able to teach back signs and symptoms related to disease process for when to call 911? Yes   Is the patient/caregiver able to teach back the hierarchy of who to call/visit for symptoms/problems? PCP, Specialist, Home health nurse, Urgent Care, ED, 911 Yes   If the patient is a current smoker, are they able to teach back resources for cessation? Not a smoker   Additional teach back comments denies chest pain   Week 1 call completed? Yes   Call end time 1513            Renee LY - Registered Nurse

## 2025-07-11 ENCOUNTER — READMISSION MANAGEMENT (OUTPATIENT)
Dept: CALL CENTER | Facility: HOSPITAL | Age: 75
End: 2025-07-11
Payer: MEDICARE

## 2025-07-11 NOTE — OUTREACH NOTE
Medical Week 2 Survey      Flowsheet Row Responses   [facility] facility patient discharged from? [facility]   Does the patient have one of the following disease processes/diagnoses(primary or secondary)? Other   Week 2 attempt successful? No   Unsuccessful attempts Attempt 1            Yaz FIELDS - Registered Nurse

## 2025-07-16 ENCOUNTER — READMISSION MANAGEMENT (OUTPATIENT)
Dept: CALL CENTER | Facility: HOSPITAL | Age: 75
End: 2025-07-16
Payer: MEDICARE

## 2025-07-16 NOTE — OUTREACH NOTE
Medical Week 2 Survey      Flowsheet Row Responses   [facility] patient discharged from? [facility]   Does the patient have one of the following disease processes/diagnoses(primary or secondary)? Other   Week 2 attempt successful? Yes   Call start time 1648   Discharge diagnosis Chest pain   Person spoke with today (if not patient) and relationship germán Sanchez   Meds reviewed with patient/caregiver? Yes   Is the patient having any side effects they believe may be caused by any medication additions or changes? No   Does the patient have all medications ordered at discharge? Yes   Is the patient taking all medications as directed (includes completed medication regime)? Yes   Does the patient have a primary care provider?  Yes   Does the patient have an appointment with their PCP within 7 days of discharge? N/A  [son does not info on pt's appts, will be looking into appts]   Has the patient kept scheduled appointments due by today? N/A   Has home health visited the patient within 72 hours of discharge? N/A   Psychosocial issues? Yes   Psychosocial comments son states there has been recent shift in family dynamics with pt's care, daughter Yajaira, no longer caring for pt, son planning to visit pt in a few days to manage pt's daily activities, appts and meds   Comments son states pt lives alone, son plans to move in with pt within a month, has daily contact with pt through texts and phone calls   Did the patient receive a copy of their discharge instructions? Yes   Nursing interventions Reviewed instructions with patient   What is the patient's perception of their health status since discharge? Improving   Is the patient/caregiver able to teach back signs and symptoms related to disease process for when to call PCP? Yes   Is the patient/caregiver able to teach back signs and symptoms related to disease process for when to call 911? Yes   Is the patient/caregiver able to teach back the hierarchy of who to call/visit  for symptoms/problems? PCP, Specialist, Home health nurse, Urgent Care, ED, 911 Yes   Additional teach back comments son states pt free of chest pain   Week 2 Call Completed? Yes            Renee LY - Registered Nurse

## 2025-07-28 ENCOUNTER — READMISSION MANAGEMENT (OUTPATIENT)
Dept: CALL CENTER | Facility: HOSPITAL | Age: 75
End: 2025-07-28
Payer: MEDICARE

## 2025-07-28 NOTE — OUTREACH NOTE
Medical Week 3 Survey      Flowsheet Row Responses   [facility] patient discharged from? [facility]   Does the patient have one of the following disease processes/diagnoses(primary or secondary)? Other   Week 3 attempt successful? No   Unsuccessful attempts Attempt 1   Conchis Wheeler Registered Nurse

## (undated) DEVICE — INTRO ACCSR BLNT TP

## (undated) DEVICE — ENDOGATOR AUXILIARY WATER JET CONNECTOR: Brand: ENDOGATOR

## (undated) DEVICE — TR BAND RADIAL ARTERY COMPRESSION DEVICE: Brand: TR BAND

## (undated) DEVICE — MODEL AT P65, P/N 701554-001KIT CONTENTS: HAND CONTROLLER, 3-WAY HIGH-PRESSURE STOPCOCK WITH ROTATING END AND PREMIUM HIGH-PRESSURE TUBING: Brand: ANGIOTOUCH® KIT

## (undated) DEVICE — Device

## (undated) DEVICE — HYBRID CO2 TUBING/CAP SET FOR OLYMPUS® SCOPES & CO2 SOURCE: Brand: ERBE

## (undated) DEVICE — SOL IRR H2O BTL 1000ML STRL

## (undated) DEVICE — MODEL BT2000 P/N 700287-012KIT CONTENTS: MANIFOLD WITH SALINE AND CONTRAST PORTS, SALINE TUBING WITH SPIKE AND HAND SYRINGE, TRANSDUCER: Brand: BT2000 AUTOMATED MANIFOLD KIT

## (undated) DEVICE — 2000CC GUARDIAN II: Brand: GUARDIAN

## (undated) DEVICE — JELLY,LUBE,STERILE,FLIP TOP,TUBE,2-OZ: Brand: MEDLINE

## (undated) DEVICE — GLV SURG SENSICARE W/ALOE PF LF 8.5 STRL

## (undated) DEVICE — GW PERIPH GUIDERIGHT STD/EXCHNG/J/TIP SS 0.035IN 5X260CM

## (undated) DEVICE — CATH DIAG EXPO M/ PK 5F FL4/FR4 PIG

## (undated) DEVICE — PK CATH CARD 10

## (undated) DEVICE — SYR LUERLOK 50ML

## (undated) DEVICE — KT ORCA ORCAPOD DISP STRL

## (undated) DEVICE — FRCP BIOP COLD ENDOJAW ALLGTR W/NDL 2.8X2300MM BLU

## (undated) DEVICE — SOLIDIFIER LIQ PREMISORB 1500CC

## (undated) DEVICE — FIRST STEP BEDSIDE ADD WATER KIT - RESEALABLE STAND-UP POUCH, ENDOSCOPIC CLEANING PAD - 1 POUCH: Brand: FIRST STEP BEDSIDE ADD WATER KIT - RESEALABLE STAND-UP POUCH, ENDOSCOPIC CLEANIN

## (undated) DEVICE — TUBING, SUCTION, 1/4" X 10', STRAIGHT: Brand: MEDLINE

## (undated) DEVICE — THE BITE BLOCK MAXI, LATEX FREE STRAP IS USED TO PROTECT THE ENDOSCOPE INSERTION TUBE FROM BEING BITTEN BY THE PATIENT.

## (undated) DEVICE — ST EXT IV SMRTSTE 2VLV FIX M LL 6ML 41

## (undated) DEVICE — ADAPT CLN LUM OLYMP AIR/H20

## (undated) DEVICE — ST LINER SAFECAP GRN RED CP STRL

## (undated) DEVICE — GLIDESHEATH SLENDER STAINLESS STEEL KIT: Brand: GLIDESHEATH SLENDER

## (undated) DEVICE — CONTN GRAD MEAS TRIANG 32OZ BLK

## (undated) DEVICE — CONMED SCOPE SAVER BITE BLOCK, 20X27 MM: Brand: SCOPE SAVER

## (undated) DEVICE — PAD GRND REM POLYHESIVE A/ DISP

## (undated) DEVICE — ST INF PRI SMRTSTE 20DRP 2VLV 24ML 117

## (undated) DEVICE — LUBE JELLY FOIL PACK 1.4 OZ: Brand: MEDLINE INDUSTRIES, INC.